# Patient Record
Sex: MALE | Race: WHITE | NOT HISPANIC OR LATINO | Employment: OTHER | URBAN - METROPOLITAN AREA
[De-identification: names, ages, dates, MRNs, and addresses within clinical notes are randomized per-mention and may not be internally consistent; named-entity substitution may affect disease eponyms.]

---

## 2019-04-16 ENCOUNTER — TELEPHONE (OUTPATIENT)
Dept: PAIN MEDICINE | Facility: CLINIC | Age: 59
End: 2019-04-16

## 2019-04-16 NOTE — TELEPHONE ENCOUNTER
Spoke with patient, states he has [place] [employer]. Advised that Dr. Neves is not in-network with Medicaid at this time. Patient voiced understanding.      ----- Message from Claudette Campbell sent at 4/16/2019 10:30 AM CDT -----  Contact: self [phone]  He said that a referral has been sent to you.  I do not see it in his chart.  Please call him to schedule or let him know that it was not received.  Thank you!

## 2020-01-27 ENCOUNTER — OFFICE VISIT (OUTPATIENT)
Dept: PODIATRY | Facility: CLINIC | Age: 60
End: 2020-01-27
Payer: MEDICAID

## 2020-01-27 VITALS
WEIGHT: 315 LBS | BODY MASS INDEX: 36.45 KG/M2 | HEART RATE: 95 BPM | TEMPERATURE: 98 F | DIASTOLIC BLOOD PRESSURE: 87 MMHG | HEIGHT: 78 IN | SYSTOLIC BLOOD PRESSURE: 135 MMHG

## 2020-01-27 DIAGNOSIS — Z89.9 HX OF AMPUTATION: ICD-10-CM

## 2020-01-27 DIAGNOSIS — L97.521 SKIN ULCER OF LEFT FOOT, LIMITED TO BREAKDOWN OF SKIN: ICD-10-CM

## 2020-01-27 PROCEDURE — 99999 PR PBB SHADOW E&M-EST. PATIENT-LVL III: CPT | Mod: PBBFAC,,, | Performed by: PODIATRIST

## 2020-01-27 PROCEDURE — 99205 PR OFFICE/OUTPT VISIT, NEW, LEVL V, 60-74 MIN: ICD-10-PCS | Mod: S$PBB,,, | Performed by: PODIATRIST

## 2020-01-27 PROCEDURE — 99213 OFFICE O/P EST LOW 20 MIN: CPT | Mod: PBBFAC | Performed by: PODIATRIST

## 2020-01-27 PROCEDURE — 99205 OFFICE O/P NEW HI 60 MIN: CPT | Mod: S$PBB,,, | Performed by: PODIATRIST

## 2020-01-27 PROCEDURE — 99999 PR PBB SHADOW E&M-EST. PATIENT-LVL III: ICD-10-PCS | Mod: PBBFAC,,, | Performed by: PODIATRIST

## 2020-01-27 RX ORDER — GABAPENTIN 600 MG/1
TABLET ORAL
COMMUNITY
Start: 2020-01-23 | End: 2020-05-19

## 2020-01-27 RX ORDER — UREA 40 %
CREAM (GRAM) TOPICAL 2 TIMES DAILY
Qty: 85 G | Refills: 6 | Status: SHIPPED | OUTPATIENT
Start: 2020-01-27 | End: 2020-02-26

## 2020-02-01 PROBLEM — Z89.9 HX OF AMPUTATION: Status: ACTIVE | Noted: 2020-02-01

## 2020-02-01 NOTE — PROGRESS NOTES
Subjective:       Patient ID: Alfred Villarreal is a 59 y.o. male.    Chief Complaint: Follow-up; Diabetes Mellitus; Foot Ulcer; and Foot Problem   Patient presents today for an ulceration of the left lower extremity patient was last seen by me over 5 years ago he states he has been living in a nursing home facility for the past 5 years and underwent extensive treatment for bone infection in his spine.  Patient has developed an ulceration on the left foot.    Past Medical History:   Diagnosis Date    Anticoagulant long-term use     Arthritis     Diabetes mellitus     Hypertension      History reviewed. No pertinent surgical history.  History reviewed. No pertinent family history.  Social History     Socioeconomic History    Marital status:      Spouse name: Not on file    Number of children: Not on file    Years of education: Not on file    Highest education level: Not on file   Occupational History    Not on file   Social Needs    Financial resource strain: Not on file    Food insecurity:     Worry: Not on file     Inability: Not on file    Transportation needs:     Medical: Not on file     Non-medical: Not on file   Tobacco Use    Smoking status: Never Smoker    Smokeless tobacco: Current User     Types: Chew   Substance and Sexual Activity    Alcohol use: Yes     Frequency: Monthly or less     Drinks per session: 3 or 4     Binge frequency: Monthly    Drug use: No    Sexual activity: Yes     Partners: Female   Lifestyle    Physical activity:     Days per week: Not on file     Minutes per session: Not on file    Stress: Not on file   Relationships    Social connections:     Talks on phone: Not on file     Gets together: Not on file     Attends Muslim service: Not on file     Active member of club or organization: Not on file     Attends meetings of clubs or organizations: Not on file     Relationship status: Not on file   Other Topics Concern    Not on file   Social History Narrative     "Not on file       Current Outpatient Medications   Medication Sig Dispense Refill    baclofen (LIORESAL) 10 MG tablet Take 10 mg by mouth 3 (three) times daily.      gabapentin (NEURONTIN) 600 MG tablet       lisinopril-hydrochlorothiazide (PRINZIDE,ZESTORETIC) 10-12.5 mg per tablet Take 1 tablet by mouth once daily.      metoprolol succinate (TOPROL-XL) 50 MG 24 hr tablet Take 50 mg by mouth once daily.      oxycodone-acetaminophen (PERCOCET)  mg per tablet   0    rivaroxaban (XARELTO) 20 mg Tab Take 20 mg by mouth once daily.      insulin aspart (NOVOLOG) 100 unit/mL InPn pen Inject 11 Units into the skin 3 (three) times daily. 3 mL 1    urea (CARMOL) 40 % Crea Apply topically 2 (two) times daily. 85 g 6     No current facility-administered medications for this visit.      Review of patient's allergies indicates:   Allergen Reactions    Amitriptyline      Vivid dreams( bad)       Review of Systems   Musculoskeletal: Positive for arthralgias, back pain, gait problem and joint swelling.   Skin: Positive for color change and wound.   Neurological: Positive for numbness.   All other systems reviewed and are negative.      Objective:      Vitals:    01/27/20 1308   BP: 135/87   Pulse: 95   Temp: 98 °F (36.7 °C)   Weight: (!) 145.2 kg (320 lb)   Height: 6' 6" (1.981 m)     Physical Exam   Constitutional: He appears well-developed and well-nourished.   Cardiovascular:   Pulses:       Dorsalis pedis pulses are 1+ on the right side.        Posterior tibial pulses are 0 on the right side.   Pulmonary/Chest: Effort normal.   Musculoskeletal: He exhibits edema.        Right foot: There is deformity.   Feet:   Right Foot:   Protective Sensation: 4 sites tested. 1 site sensed.  Skin Integrity: Positive for ulcer, skin breakdown and erythema.   Left Foot: amputated  Neurological: He displays abnormal reflex.   Skin: Capillary refill takes more than 3 seconds. There is erythema.   Psychiatric: He has a normal mood " and affect. His behavior is normal. Judgment and thought content normal.   Nursing note and vitals reviewed.                       Assessment:       1. Uncontrolled diabetes mellitus with foot ulcer    2. Skin ulcer of left foot, limited to breakdown of skin    3. 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  Patient has developed an ulceration on the left foot. Patient has a large ulceration on the plantar lateral aspect of the patient's left foot this ulceration is approximately 2 cm in diameter following sharp excisional debridement of this area there is healthy underlying pink skin and tissue with no obvious signs of infection noted patient also has breakdown and ulceration overlying the medial aspect of the left foot where there is a superficial ulceration also not displaying active signs of infection.  Skin breakdown is noted on the patient's left shin in the form of an abrasion this does not appear to be infected patient does have diabetic related neuropathy he has got significant peripheral vascular disease with extensive pitting swelling of the left lower extremity.  Wound care was performed on all areas patient advised that these areas need to be cleaned with Dakin solution and a well-padded dry dressing applied to these areas I have also it discussed with the patient the need for keeping his skin well moisturized and hydrated I have recommended a 40% urea cream this is to be applied daily I have advised patient not to put this on the current wound sites but once they have healed this will help keep the area hydrated and prevent further breakdown.  I [facility] so the patient will have appropriate supplies to  change the dressings every day. Total face-to-face time including including complete diabetic new patient evaluation examination wound care of the left lower extremity and discussion of treatment plan equaled 60 min.  Recommended follow-up will be 2-3 weeks unless the area looks worse show signs of worsening or increased infection or increased drainage.  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 Extensive time was spent with the patient today discussing his past medical history medical problems that he has had since I saw him last 5 years ago. This note was created using [employer] voice recognition software that occasionally misinterpreted phrases or words.

## 2020-02-05 ENCOUNTER — TELEPHONE (OUTPATIENT)
Dept: PODIATRY | Facility: CLINIC | Age: 60
End: 2020-02-05

## 2020-02-05 NOTE — TELEPHONE ENCOUNTER
----- Message from Ousmane Sepulveda sent at 2/5/2020  1:44 PM CST -----  Contact: Patient  Type: Needs Medical Advice    Who Called:  Patient  Best Call Back Number: [phone]  Additional Information: Patient would like to discuss new medication. Please call to advise. Thanks!

## 2020-02-06 ENCOUNTER — TELEPHONE (OUTPATIENT)
Dept: PODIATRY | Facility: CLINIC | Age: 60
End: 2020-02-06

## 2020-02-06 NOTE — TELEPHONE ENCOUNTER
----- Message from Harry Mcgowan sent at 2/6/2020 12:39 PM CST -----  Type: Needs Medical Advice    Who Called:  Self Symptoms (please be specific): NA   How long has patient had these symptoms:    Pharmacy name and phone #: A   Best Call Back Number: [phone]   Additional Information: Patient was advised a prescribed rx would arrive via home delivery. The patient has not received the rx.

## 2020-02-06 NOTE — TELEPHONE ENCOUNTER
Informed pt. that the company had his old address in the system somehow, but it has been updated now and his supplies will be sent back out for delivery to him. [employer] telephone number given to pt. In the event he has any further questions of delays.

## 2020-02-06 NOTE — TELEPHONE ENCOUNTER
Notified Lidia that pt. Did not receive medical supplies. Spoke with Alanna and address was incorrect. Address now updated and supplies will be sent back out.

## 2020-02-17 ENCOUNTER — PATIENT OUTREACH (OUTPATIENT)
Dept: ADMINISTRATIVE | Facility: HOSPITAL | Age: 60
End: 2020-02-17

## 2020-02-17 ENCOUNTER — HOSPITAL ENCOUNTER (EMERGENCY)
Facility: HOSPITAL | Age: 60
Discharge: HOME | End: 2020-02-17
Attending: EMERGENCY MEDICINE
Payer: MEDICAID

## 2020-02-17 VITALS
OXYGEN SATURATION: 100 % | DIASTOLIC BLOOD PRESSURE: 90 MMHG | WEIGHT: 315 LBS | BODY MASS INDEX: 36.45 KG/M2 | HEIGHT: 78 IN | SYSTOLIC BLOOD PRESSURE: 151 MMHG | TEMPERATURE: 98 F | RESPIRATION RATE: 16 BRPM | HEART RATE: 97 BPM

## 2020-02-17 DIAGNOSIS — L02.91 ABSCESS: Primary | ICD-10-CM

## 2020-02-17 LAB — POCT GLUCOSE: 362 MG/DL (ref 70–110)

## 2020-02-17 PROCEDURE — 10060 I&D ABSCESS SIMPLE/SINGLE: CPT

## 2020-02-17 PROCEDURE — 82962 GLUCOSE BLOOD TEST: CPT

## 2020-02-17 PROCEDURE — 99283 EMERGENCY DEPT VISIT LOW MDM: CPT | Mod: 25

## 2020-02-17 PROCEDURE — 87070 CULTURE OTHR SPECIMN AEROBIC: CPT

## 2020-02-17 PROCEDURE — 25000003 PHARM REV CODE 250: Performed by: PHYSICIAN ASSISTANT

## 2020-02-17 RX ORDER — TRAMADOL HYDROCHLORIDE 50 MG/1
50 TABLET ORAL
Status: COMPLETED | OUTPATIENT
Start: 2020-02-17 | End: 2020-02-17

## 2020-02-17 RX ORDER — LIDOCAINE HYDROCHLORIDE AND EPINEPHRINE 10; 10 MG/ML; UG/ML
10 INJECTION, SOLUTION INFILTRATION; PERINEURAL
Status: COMPLETED | OUTPATIENT
Start: 2020-02-17 | End: 2020-02-17

## 2020-02-17 RX ORDER — DOXYCYCLINE 100 MG/1
100 CAPSULE ORAL 2 TIMES DAILY
Qty: 20 CAPSULE | Refills: 0 | Status: SHIPPED | OUTPATIENT
Start: 2020-02-17 | End: 2020-02-27

## 2020-02-17 RX ADMIN — LIDOCAINE HYDROCHLORIDE,EPINEPHRINE BITARTRATE 10 ML: 10; .01 INJECTION, SOLUTION INFILTRATION; PERINEURAL at 06:02

## 2020-02-17 RX ADMIN — TRAMADOL HYDROCHLORIDE 50 MG: 50 TABLET, FILM COATED ORAL at 06:02

## 2020-02-17 NOTE — LETTER
"February 17, 2020    Alfred Villarreal  [address] [place] [address]             [facility]  [address]  [facility] [address]  Phone: [phone] Dear Alfred Villarreal    [facility] is committed to your overall health.  To help you get the most out of each of your visits, we will review your information to make sure you are up to date on all of your recommended tests and/or procedures.      As a new patient to [facility] , we may not have your complete medical records.  He has found that your chart shows you may be due for:    Health Maintenance Due   Topic Date Due    Hepatitis C Screening  1960    Lipid Panel  1960    Eye Exam  11/16/1971    HIV Screening  11/16/1976    TETANUS VACCINE  11/16/1979    Shingles Vaccine (1 of 2) 11/16/2011    Colonoscopy  11/16/2011    Hemoglobin A1c  07/16/2015    Influenza Vaccine (1) 09/01/2019   If you haven't signed up for a colorectal screening please accept this invitation to be screened.    According to the American Cancer Society, colon cancer is the third most common cancer for people in the [place].  A simple screening test "Fit Kit" - done in your own home - can help find colon cancer at an early stage when it can be treated, even before any signs or symptoms develop. THIS IS A YEARLY TEST.    Testing for blood in your stool (feces or bowel movement) is the first step. If you have an upcoming visit with your Primary Care Physician you can  a Fit Kit during your visit or you can  a Fit Kit at your Primary Care Clinic prior to your visit.    The Fit Kit includes:    · Instructions on how to perform the test  · (1) Sheet of tissue paper  · (1) Small Absorption pad  · (1) Bottle to hold the sample and a small probe to help you take the sample  · (1) Small plastic bio-hazard bag  · (1) Postage-paid return envelope    Please do your test (the instructions will tell you how) and then return your " "sample in the postage-paid return envelope within 24 hours of collection.     If your test results are negative, you won't need testing again for another year.  If results show you need more testing, we will call you with next steps.    Regular colorectal cancer screening is one of the most powerful weapons for preventing colon cancer.  The website https://www.cancer.org/cancer/colon-rectal-cancer.html can answer many of your questions about this cancer and its prevention.  Just search for "colorectal cancer".  If you have any questions please call [facility] [employer] [phone] for assistance.  If you have had any of the above done at another facility, please bring the records or information with you so that your record at [facility] will be complete.      If you are currently taking medication, please bring it with you to your appointment for review.    Also, if you have any type of Advanced Directives, please bring them with you to your office visit so we may scan them into your chart.      Thank You,  Your [facility] Team  Sarah Carbajal L.P.N. Clinical Care Coordinator  [address] [place] [address], [place] [address] [phone]           "

## 2020-02-18 ENCOUNTER — TELEPHONE (OUTPATIENT)
Dept: PODIATRY | Facility: CLINIC | Age: 60
End: 2020-02-18

## 2020-02-18 NOTE — TELEPHONE ENCOUNTER
----- Message from Princess ARA Yates sent at 2/18/2020 11:36 AM CST -----  Contact: Patient  Type: Needs Medical Advice    Who Called:  Patient  Symptoms (please be specific):  Foot is ozing out  How long has patient had these symptoms:  2 days  Best Call Back Number:   Additional Information: Patient is requesting a call in [facility] to his foot, patient states this is something new.

## 2020-02-18 NOTE — ED PROVIDER NOTES
Encounter Date: 2/17/2020       History     Chief Complaint   Patient presents with    Abscess     Patient presents to the ER with complaint of abscess to right upper arm.  Patient states has been there for the last couple of days.  Patient states is getting bigger and more painful.  Patient states has had similar symptoms in the past denies any nausea vomiting diarrhea denies any fever.  Patient denies any numbness or tingling distal the abscess.  Patient denies any drainage. Patient states has had similar symptoms in the past describes the pain as pressure nonradiating worse with palpation nothing makes it better.  Patient denied other associated symptoms.    The history is provided by the patient.     Review of patient's allergies indicates:   Allergen Reactions    Amitriptyline      Vivid dreams( bad)     Past Medical History:   Diagnosis Date    Anticoagulant long-term use     Arthritis     Diabetes mellitus     Hypertension      History reviewed. No pertinent surgical history.  History reviewed. No pertinent family history.  Social History     Tobacco Use    Smoking status: Never Smoker    Smokeless tobacco: Current User     Types: Chew   Substance Use Topics    Alcohol use: Yes     Frequency: Monthly or less     Drinks per session: 3 or 4     Binge frequency: Monthly    Drug use: No     Review of Systems   Constitutional: Negative for fever.   HENT: Negative for sore throat.    Respiratory: Negative for shortness of breath.    Cardiovascular: Negative for chest pain.   Gastrointestinal: Negative for nausea.   Genitourinary: Negative for dysuria.   Musculoskeletal: Negative for back pain.   Skin: Positive for color change (Redness surrounding abscess to right upper extremity). Negative for rash.        Abscess to right upper extremity   Neurological: Negative for weakness.   Hematological: Does not bruise/bleed easily.   All other systems reviewed and are negative.      Physical Exam     Initial  Vitals [02/17/20 1720]   BP Pulse Resp Temp SpO2   (!) 151/90 97 16 97.7 °F (36.5 °C) 100 %      MAP       --         Physical Exam    Nursing note and vitals reviewed.  Constitutional: He appears well-developed and well-nourished. He is not diaphoretic. No distress.   HENT:   Head: Atraumatic.   Eyes: Right eye exhibits no discharge. Left eye exhibits no discharge.   Neck: Normal range of motion. Neck supple.   Cardiovascular: Normal rate, regular rhythm, normal heart sounds and intact distal pulses. Exam reveals no gallop and no friction rub.    No murmur heard.  Pulmonary/Chest: Breath sounds normal. No respiratory distress. He has no wheezes. He has no rhonchi. He has no rales. He exhibits no tenderness.   Abdominal: Soft. He exhibits no distension. There is no tenderness.   Musculoskeletal: Normal range of motion.   Neurological: He is alert and oriented to person, place, and time. GCS score is 15. GCS eye subscore is 4. GCS verbal subscore is 5. GCS motor subscore is 6.   Skin: Skin is warm and dry. Capillary refill takes less than 2 seconds. Abscess (Right posterior biceps) noted. There is erythema ( right posterior bicep).   Psychiatric: He has a normal mood and affect. Thought content normal.         ED Course   I & D - Incision and Drainage  Date/Time: 2/17/2020 6:36 PM  Performed by: ANDRESSA You  Authorized by: Alfred Cabral MD   Consent Done: Yes  Consent: Verbal consent obtained.  Risks and benefits: risks, benefits and alternatives were discussed  Consent given by: patient  Patient understanding: patient states understanding of the procedure being performed  Type: abscess  Body area: upper extremity  Location details: right arm  Anesthesia: local infiltration    Anesthesia:  Local anesthesia used: yes  Local Anesthetic: lidocaine 1% with epinephrine  Anesthetic total: 6 mL  Patient sedated: no  Scalpel size: 11  Incision type: single straight  Complexity: simple  Drainage: pus,  bloody  and  purulent  Drainage amount: moderate  Wound treatment: incision,  wound left open,  drainage,  deloculation and  expression of material  Complications: No  Estimated blood loss (mL): 2  Specimens: No  Implants: No  Patient tolerance: Patient tolerated the procedure well with no immediate complications        Labs Reviewed   POCT GLUCOSE - Abnormal; Notable for the following components:       Result Value    POCT Glucose 362 (*)     All other components within normal limits   CULTURE, AEROBIC  (SPECIFY SOURCE)          Imaging Results    None          Medical Decision Making:   Differential Diagnosis:   Abscess cellulitis sebaceous cyst  Clinical Tests:   Lab Tests: Ordered  ED Management:  Discussed with the patient plan of care discussed risks benefit of abscess I&D patient states understood and verbally consented to procedure.    Discussed with the patient return to ER precautions as well as need for antibiotics discussed warm wet soaks twice daily 15 min at a time until resolution of the abscess.  Discussed with the patient need for follow-up with primary care for re-evaluation in 48 hr patient verbalized that he understood he did not have any questions.    This note was created using [employer] voice recognition software that occasionally misinterpreted phrases or words.                                 Clinical Impression:       ICD-10-CM ICD-9-CM   1. Abscess L02.91 682.9                             ADNRESSA You  02/17/20 2221

## 2020-02-18 NOTE — TELEPHONE ENCOUNTER
Pt states area to medial left foot is draining and is red in color for about 2 days. Pt need appt? Please advise

## 2020-02-18 NOTE — DISCHARGE INSTRUCTIONS
Be sureTo take all antibiotics as prescribed do not leave any left in the bottle.    Follow-up with pain management primary care for continued management of pain.    If acute worsening of symptoms redeveloped a fever return to ER for re-evaluation.

## 2020-02-20 LAB — BACTERIA SPEC AEROBE CULT: NORMAL

## 2020-03-06 LAB
LEFT EYE DM RETINOPATHY: NEGATIVE
RIGHT EYE DM RETINOPATHY: NEGATIVE

## 2020-04-29 ENCOUNTER — TELEPHONE (OUTPATIENT)
Dept: PODIATRY | Facility: CLINIC | Age: 60
End: 2020-04-29

## 2020-04-29 PROBLEM — E11.49 TYPE II DIABETES MELLITUS WITH NEUROLOGICAL MANIFESTATIONS: Status: ACTIVE | Noted: 2020-04-29

## 2020-04-29 NOTE — TELEPHONE ENCOUNTER
----- Message from Ailyn Obrien sent at 4/29/2020  1:28 PM CDT -----  Type: Needs Medical Advice    Who Called:  Patient  Best Call Back Number: [phone]  Additional Information: Patient requesting to speak with nurse concerning ordering woundcare supplies/please call back to advise.

## 2020-05-06 ENCOUNTER — OFFICE VISIT (OUTPATIENT)
Dept: PODIATRY | Facility: CLINIC | Age: 60
End: 2020-05-06
Payer: MEDICAID

## 2020-05-06 ENCOUNTER — HOSPITAL ENCOUNTER (OUTPATIENT)
Dept: RADIOLOGY | Facility: HOSPITAL | Age: 60
Discharge: HOME | End: 2020-05-06
Attending: PODIATRIST
Payer: MEDICAID

## 2020-05-06 VITALS
HEIGHT: 78 IN | TEMPERATURE: 98 F | DIASTOLIC BLOOD PRESSURE: 87 MMHG | WEIGHT: 315 LBS | SYSTOLIC BLOOD PRESSURE: 188 MMHG | HEART RATE: 84 BPM | BODY MASS INDEX: 36.45 KG/M2

## 2020-05-06 DIAGNOSIS — E11.49 TYPE II DIABETES MELLITUS WITH NEUROLOGICAL MANIFESTATIONS: ICD-10-CM

## 2020-05-06 DIAGNOSIS — Z89.9 HX OF AMPUTATION: ICD-10-CM

## 2020-05-06 DIAGNOSIS — L97.522 SKIN ULCER OF LEFT FOOT WITH FAT LAYER EXPOSED: ICD-10-CM

## 2020-05-06 DIAGNOSIS — L97.521 SKIN ULCER OF LEFT FOOT, LIMITED TO BREAKDOWN OF SKIN: Primary | ICD-10-CM

## 2020-05-06 DIAGNOSIS — L97.521 SKIN ULCER OF LEFT FOOT, LIMITED TO BREAKDOWN OF SKIN: ICD-10-CM

## 2020-05-06 PROCEDURE — 99213 OFFICE O/P EST LOW 20 MIN: CPT | Mod: PBBFAC,25 | Performed by: PODIATRIST

## 2020-05-06 PROCEDURE — 99999 PR PBB SHADOW E&M-EST. PATIENT-LVL III: ICD-10-PCS | Mod: PBBFAC,,, | Performed by: PODIATRIST

## 2020-05-06 PROCEDURE — 73630 X-RAY EXAM OF FOOT: CPT | Mod: TC,FY,LT

## 2020-05-06 PROCEDURE — 87070 CULTURE OTHR SPECIMN AEROBIC: CPT

## 2020-05-06 PROCEDURE — 73630 X-RAY EXAM OF FOOT: CPT | Mod: 26,LT,, | Performed by: RADIOLOGY

## 2020-05-06 PROCEDURE — 99214 OFFICE O/P EST MOD 30 MIN: CPT | Mod: S$PBB,,, | Performed by: PODIATRIST

## 2020-05-06 PROCEDURE — 99214 PR OFFICE/OUTPT VISIT, EST, LEVL IV, 30-39 MIN: ICD-10-PCS | Mod: S$PBB,,, | Performed by: PODIATRIST

## 2020-05-06 PROCEDURE — 99999 PR PBB SHADOW E&M-EST. PATIENT-LVL III: CPT | Mod: PBBFAC,,, | Performed by: PODIATRIST

## 2020-05-06 PROCEDURE — 87077 CULTURE AEROBIC IDENTIFY: CPT

## 2020-05-06 PROCEDURE — 87186 SC STD MICRODIL/AGAR DIL: CPT

## 2020-05-06 PROCEDURE — 73630 XR FOOT COMPLETE 3 VIEW LEFT: ICD-10-PCS | Mod: 26,LT,, | Performed by: RADIOLOGY

## 2020-05-06 RX ORDER — PIOGLITAZONEHYDROCHLORIDE 15 MG/1
15 TABLET ORAL
COMMUNITY
End: 2020-05-19

## 2020-05-06 RX ORDER — INSULIN DETEMIR 100 [IU]/ML
70 INJECTION, SOLUTION SUBCUTANEOUS 2 TIMES DAILY
COMMUNITY
Start: 2020-04-18 | End: 2021-08-31

## 2020-05-06 RX ORDER — IBUPROFEN 800 MG/1
TABLET ORAL
COMMUNITY
Start: 2020-04-15 | End: 2021-07-28

## 2020-05-06 RX ORDER — INSULIN ASPART 100 [IU]/ML
50 INJECTION, SOLUTION INTRAVENOUS; SUBCUTANEOUS
COMMUNITY
Start: 2020-04-18 | End: 2021-10-05 | Stop reason: SDUPTHER

## 2020-05-06 RX ORDER — ERGOCALCIFEROL 1.25 MG/1
CAPSULE ORAL
COMMUNITY
Start: 2020-02-11 | End: 2021-08-17

## 2020-05-06 RX ORDER — AMITRIPTYLINE HYDROCHLORIDE 10 MG/1
TABLET, FILM COATED ORAL
COMMUNITY
Start: 2020-04-15 | End: 2020-05-19

## 2020-05-06 RX ORDER — TRAZODONE HYDROCHLORIDE 100 MG/1
100 TABLET ORAL
COMMUNITY
End: 2021-10-27

## 2020-05-09 LAB — BACTERIA SPEC AEROBE CULT: ABNORMAL

## 2020-05-09 RX ORDER — SULFAMETHOXAZOLE AND TRIMETHOPRIM 400; 80 MG/1; MG/1
2 TABLET ORAL 2 TIMES DAILY
Qty: 56 TABLET | Refills: 0 | Status: SHIPPED | OUTPATIENT
Start: 2020-05-09 | End: 2020-05-19

## 2020-05-10 NOTE — PROGRESS NOTES
Subjective:       Patient ID: Alfred Villarreal is a 59 y.o. male.    Chief Complaint: Follow-up; Foot Ulcer; Foot Pain; Diabetes Mellitus; and Foot Problem   Patient presents today for an ulceration of the left lower extremity.  Patient was supposed to have followed up with me on his January 27, 2020 visit in 2-3 weeks however it has been 3 and half months since I last saw the patient for this ulceration site.    Past Medical History:   Diagnosis Date    Anticoagulant long-term use     Arthritis     Diabetes mellitus     Hypertension      History reviewed. No pertinent surgical history.  History reviewed. No pertinent family history.  Social History     Socioeconomic History    Marital status:      Spouse name: Not on file    Number of children: Not on file    Years of education: Not on file    Highest education level: Not on file   Occupational History    Not on file   Social Needs    Financial resource strain: Not on file    Food insecurity:     Worry: Not on file     Inability: Not on file    Transportation needs:     Medical: Not on file     Non-medical: Not on file   Tobacco Use    Smoking status: Never Smoker    Smokeless tobacco: Current User     Types: Chew   Substance and Sexual Activity    Alcohol use: Yes     Frequency: Monthly or less     Drinks per session: 3 or 4     Binge frequency: Monthly    Drug use: No    Sexual activity: Yes     Partners: Female   Lifestyle    Physical activity:     Days per week: Not on file     Minutes per session: Not on file    Stress: Not on file   Relationships    Social connections:     Talks on phone: Not on file     Gets together: Not on file     Attends Rastafari service: Not on file     Active member of club or organization: Not on file     Attends meetings of clubs or organizations: Not on file     Relationship status: Not on file   Other Topics Concern    Not on file   Social History Narrative    Not on file       Current Outpatient  "Medications   Medication Sig Dispense Refill    amitriptyline (ELAVIL) 10 MG tablet       baclofen (LIORESAL) 10 MG tablet Take 10 mg by mouth 3 (three) times daily.      ergocalciferol (ERGOCALCIFEROL) 50,000 unit Cap       gabapentin (NEURONTIN) 600 MG tablet       ibuprofen (ADVIL,MOTRIN) 800 MG tablet       LEVEMIR U-100 INSULIN 100 unit/mL injection ADM 80 UNI SC HS      lisinopril-hydrochlorothiazide (PRINZIDE,ZESTORETIC) 10-12.5 mg per tablet Take 1 tablet by mouth once daily.      metoprolol succinate (TOPROL-XL) 50 MG 24 hr tablet Take 50 mg by mouth once daily.      NOVOLOG U-100 INSULIN ASPART 100 unit/mL injection ADM 40 UNI SC TID B MEALS      oxycodone-acetaminophen (PERCOCET)  mg per tablet   0    pioglitazone (ACTOS) 15 MG tablet Take 15 mg by mouth.      rivaroxaban (XARELTO) 20 mg Tab Take 20 mg by mouth once daily.      sulfamethoxazole-trimethoprim 400-80mg (BACTRIM,SEPTRA) 400-80 mg per tablet Take 2 tablets by mouth 2 (two) times daily. for 14 days 56 tablet 0    traZODone (DESYREL) 100 MG tablet Take 100 mg by mouth.       No current facility-administered medications for this visit.      Review of patient's allergies indicates:   Allergen Reactions    Amitriptyline      Vivid dreams( bad)       Review of Systems   Musculoskeletal: Positive for arthralgias, back pain, gait problem and joint swelling.   Skin: Positive for color change and wound.   Neurological: Positive for numbness.   All other systems reviewed and are negative.      Objective:      Vitals:    05/06/20 1508   BP: (!) 188/87   Pulse: 84   Temp: 97.6 °F (36.4 °C)   Weight: (!) 145.2 kg (320 lb)   Height: 6' 6" (1.981 m)     Physical Exam   Constitutional: He appears well-developed and well-nourished.   Cardiovascular:   Pulses:       Dorsalis pedis pulses are 1+ on the right side.        Posterior tibial pulses are 0 on the right side.   Pulmonary/Chest: Effort normal.   Musculoskeletal: He exhibits edema.    "     Right foot: There is deformity.   Feet:   Right Foot:   Protective Sensation: 4 sites tested. 1 site sensed.  Skin Integrity: Positive for ulcer, skin breakdown and erythema.   Left Foot: amputated  Neurological: He displays abnormal reflex.   Skin: Capillary refill takes more than 3 seconds. There is erythema.   Psychiatric: He has a normal mood and affect. His behavior is normal. Judgment and thought content normal.   Nursing note and vitals reviewed.                                       Assessment:       1. Skin ulcer of left foot, limited to breakdown of skin    2. Type II diabetes mellitus with neurological manifestations    3. Hx of amputation    4. Skin ulcer of left foot with fat layer exposed        Plan:       Patient presents today for an ulceration of the left lower extremity.  Patient was supposed to have followed up with me on his January 27, 2020 visit in 2-3 weeks however it has been 3 and half months since I last saw the patient for this ulceration site.  Patient had an ulcer on the plantar lateral aspect of the left foot that completely healed however the ulcer 3 and half months ago that was small overlying the 1st MPJ left is now larger it is deeper while there is no bone exposed I cannot completely exclude the possibility of bone infection I did perform a culture and sensitivity of the area this was subsequently positive for MRSA Staph patient was contacted advised this and has been started on Bactrim immediately.  Patient has severe +4 pitting edema in the left lower extremity IA had asked him when the last time he saw his primary care provider he indicated he did not have 1 but wanted to see Dr. Cotton.  We did call Dr. Cotton office to evaluate the patient he will likely need medication adjustment or addition to help address the severe edema in the left lower extremity ultimately this is the largest contributing factor besides the patient's noncompliance to getting this area healed.   Evaluation of the x-ray displays signs that the ulcer does not go down to the level of bone there is no cortical interruption or periosteal reaction patient does have previous partial 4th and 5th ray resection left.  The area was thoroughly cleaned and sharply excisionally debrided on the edges it is 2 cm wide by 1 cm long by 4 mm deep.  Patient also indicated that he had recently smashed his left leg where he lives he states he is having problems with the battery is that of worn out and his power wheelchair they work sometimes and not others and a actually kicked in gear slamming the patient's leg I did give him an order for new wheelchair battery he has.  Patient did indicate that his leg is not nearly a swollen when he 1st gets up in the morning but immediately after he puts his leg down it becomes very edematous +4 pitting.  Face-to-face time equaled 30 min plan follow-up will be 1 month unless the patient has any problems questions or concerns prior to that time.  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  This note was created using [employer] voice recognition software that occasionally misinterpreted phrases or words.

## 2020-05-11 ENCOUNTER — TELEPHONE (OUTPATIENT)
Dept: PODIATRY | Facility: CLINIC | Age: 60
End: 2020-05-11

## 2020-05-11 ENCOUNTER — TELEPHONE (OUTPATIENT)
Dept: FAMILY MEDICINE | Facility: CLINIC | Age: 60
End: 2020-05-11

## 2020-05-11 NOTE — TELEPHONE ENCOUNTER
----- Message from Ailyn Obrien sent at 5/11/2020  2:34 PM CDT -----  Type: Needs soon appointment    Who Called:  Patient  Best Call Back Number: [phone]  Additional Information: Patient missed previous appointment in error/needs to reschedule/no appointment showing available until July/diabetic patient is being referred by Dr. Stringer for left foot being Septic/needs to be seen as soon as possible/please call back to schedule or advise.

## 2020-05-11 NOTE — TELEPHONE ENCOUNTER
----- Message from Gary Stringer DPM sent at 5/9/2020  1:09 PM CDT -----  Please call the patient regarding his abnormal result.  Call patient and advise culture and sensitivity positive for MRSA Staph prescription was sent in for Bactrim he is to start this right away.

## 2020-05-14 ENCOUNTER — TELEPHONE (OUTPATIENT)
Dept: PODIATRY | Facility: CLINIC | Age: 60
End: 2020-05-14

## 2020-05-14 RX ORDER — DOXYCYCLINE 100 MG/1
100 CAPSULE ORAL EVERY 12 HOURS
Qty: 28 CAPSULE | Refills: 0 | Status: SHIPPED | OUTPATIENT
Start: 2020-05-14 | End: 2020-05-28

## 2020-05-14 NOTE — TELEPHONE ENCOUNTER
----- Message from Brent Boyce sent at 5/14/2020  1:48 PM CDT -----  Type: Needs Medical Advice  Who Called:   Patient  Symptoms (please be specific):  Bad reaction to sulfamethoxazole-trimethoprim 400-80mg (BACTRIM,SEPTRA) 400-80 mg per tablet   How long has patient had these symptoms:  2 days    Pharmacy name and phone #:    [id number] - [place] [address] AT [facility] [address] [address]  Phone: [phone] Fax: [phone]      Best Call Back Number: [phone]  Additional Information: Please call to advise

## 2020-05-14 NOTE — TELEPHONE ENCOUNTER
Patient states he can not tolerate the antibiotic that was prescribed. He has tried eating a full meal before taking. He is experiencing nausea and general not feeling well. Please advise.

## 2020-05-15 NOTE — TELEPHONE ENCOUNTER
----- Message from Leidy Carcamo sent at 5/15/2020 10:02 AM CDT -----  Contact: self  Type:  Patient Returning Call    Who Called:  self  Who Left Message for Patient:  Lupe  Does the patient know what this is regarding?:  yes  Best Call Back Number:  [phone] (home)   Additional Information:  Patient states it is regarding his antibiotic changes. Please call patient. Thanks!

## 2020-05-19 ENCOUNTER — OFFICE VISIT (OUTPATIENT)
Dept: FAMILY MEDICINE | Facility: CLINIC | Age: 60
End: 2020-05-19
Payer: MEDICAID

## 2020-05-19 VITALS
BODY MASS INDEX: 32.17 KG/M2 | HEIGHT: 78 IN | SYSTOLIC BLOOD PRESSURE: 133 MMHG | HEART RATE: 81 BPM | TEMPERATURE: 98 F | OXYGEN SATURATION: 98 % | DIASTOLIC BLOOD PRESSURE: 77 MMHG | RESPIRATION RATE: 16 BRPM | WEIGHT: 278 LBS

## 2020-05-19 DIAGNOSIS — E11.621 TYPE 2 DIABETES MELLITUS WITH FOOT ULCER, WITH LONG-TERM CURRENT USE OF INSULIN: ICD-10-CM

## 2020-05-19 DIAGNOSIS — L97.509 TYPE 2 DIABETES MELLITUS WITH FOOT ULCER, WITH LONG-TERM CURRENT USE OF INSULIN: ICD-10-CM

## 2020-05-19 DIAGNOSIS — Z79.4 TYPE 2 DIABETES MELLITUS WITH FOOT ULCER, WITH LONG-TERM CURRENT USE OF INSULIN: ICD-10-CM

## 2020-05-19 DIAGNOSIS — Z76.89 ENCOUNTER TO ESTABLISH CARE: Primary | ICD-10-CM

## 2020-05-19 DIAGNOSIS — Z11.4 SCREENING FOR HIV (HUMAN IMMUNODEFICIENCY VIRUS): ICD-10-CM

## 2020-05-19 DIAGNOSIS — Z11.59 ENCOUNTER FOR HEPATITIS C SCREENING TEST FOR LOW RISK PATIENT: ICD-10-CM

## 2020-05-19 DIAGNOSIS — Z13.220 SCREENING FOR LIPID DISORDERS: ICD-10-CM

## 2020-05-19 PROCEDURE — 99203 OFFICE O/P NEW LOW 30 MIN: CPT | Mod: S$GLB,,, | Performed by: FAMILY MEDICINE

## 2020-05-19 PROCEDURE — 99203 PR OFFICE/OUTPT VISIT, NEW, LEVL III, 30-44 MIN: ICD-10-PCS | Mod: S$GLB,,, | Performed by: FAMILY MEDICINE

## 2020-05-23 ENCOUNTER — LAB VISIT (OUTPATIENT)
Dept: LAB | Facility: HOSPITAL | Age: 60
End: 2020-05-23
Attending: FAMILY MEDICINE
Payer: MEDICAID

## 2020-05-23 DIAGNOSIS — Z11.4 SCREENING FOR HIV (HUMAN IMMUNODEFICIENCY VIRUS): ICD-10-CM

## 2020-05-23 DIAGNOSIS — Z79.4 TYPE 2 DIABETES MELLITUS WITH FOOT ULCER, WITH LONG-TERM CURRENT USE OF INSULIN: ICD-10-CM

## 2020-05-23 DIAGNOSIS — E11.621 TYPE 2 DIABETES MELLITUS WITH FOOT ULCER, WITH LONG-TERM CURRENT USE OF INSULIN: ICD-10-CM

## 2020-05-23 DIAGNOSIS — Z11.59 ENCOUNTER FOR HEPATITIS C SCREENING TEST FOR LOW RISK PATIENT: ICD-10-CM

## 2020-05-23 DIAGNOSIS — L97.509 TYPE 2 DIABETES MELLITUS WITH FOOT ULCER, WITH LONG-TERM CURRENT USE OF INSULIN: ICD-10-CM

## 2020-05-23 DIAGNOSIS — Z13.220 SCREENING FOR LIPID DISORDERS: ICD-10-CM

## 2020-05-23 LAB
ALBUMIN SERPL BCP-MCNC: 3.9 G/DL (ref 3.5–5.2)
ALP SERPL-CCNC: 78 U/L (ref 55–135)
ALT SERPL W/O P-5'-P-CCNC: 17 U/L (ref 10–44)
ANION GAP SERPL CALC-SCNC: 12 MMOL/L (ref 8–16)
AST SERPL-CCNC: 17 U/L (ref 10–40)
BASOPHILS # BLD AUTO: 0.05 K/UL (ref 0–0.2)
BASOPHILS NFR BLD: 0.6 % (ref 0–1.9)
BILIRUB SERPL-MCNC: 0.6 MG/DL (ref 0.1–1)
BUN SERPL-MCNC: 14 MG/DL (ref 6–20)
CALCIUM SERPL-MCNC: 8.6 MG/DL (ref 8.7–10.5)
CHLORIDE SERPL-SCNC: 98 MMOL/L (ref 95–110)
CHOLEST SERPL-MCNC: 185 MG/DL (ref 120–199)
CHOLEST/HDLC SERPL: 6.2 {RATIO} (ref 2–5)
CO2 SERPL-SCNC: 23 MMOL/L (ref 23–29)
CREAT SERPL-MCNC: 0.7 MG/DL (ref 0.5–1.4)
DIFFERENTIAL METHOD: NORMAL
EOSINOPHIL # BLD AUTO: 0.2 K/UL (ref 0–0.5)
EOSINOPHIL NFR BLD: 2.4 % (ref 0–8)
ERYTHROCYTE [DISTWIDTH] IN BLOOD BY AUTOMATED COUNT: 12.4 % (ref 11.5–14.5)
EST. GFR  (AFRICAN AMERICAN): >60 ML/MIN/1.73 M^2
EST. GFR  (NON AFRICAN AMERICAN): >60 ML/MIN/1.73 M^2
GLUCOSE SERPL-MCNC: 176 MG/DL (ref 70–110)
HCT VFR BLD AUTO: 42.3 % (ref 40–54)
HDLC SERPL-MCNC: 30 MG/DL (ref 40–75)
HDLC SERPL: 16.2 % (ref 20–50)
HGB BLD-MCNC: 14 G/DL (ref 14–18)
IMM GRANULOCYTES # BLD AUTO: 0.02 K/UL (ref 0–0.04)
IMM GRANULOCYTES NFR BLD AUTO: 0.3 % (ref 0–0.5)
LDLC SERPL CALC-MCNC: 90 MG/DL (ref 63–159)
LYMPHOCYTES # BLD AUTO: 2.3 K/UL (ref 1–4.8)
LYMPHOCYTES NFR BLD: 28.5 % (ref 18–48)
MCH RBC QN AUTO: 29.5 PG (ref 27–31)
MCHC RBC AUTO-ENTMCNC: 33.1 G/DL (ref 32–36)
MCV RBC AUTO: 89 FL (ref 82–98)
MONOCYTES # BLD AUTO: 0.6 K/UL (ref 0.3–1)
MONOCYTES NFR BLD: 7 % (ref 4–15)
NEUTROPHILS # BLD AUTO: 4.9 K/UL (ref 1.8–7.7)
NEUTROPHILS NFR BLD: 61.2 % (ref 38–73)
NONHDLC SERPL-MCNC: 155 MG/DL
NRBC BLD-RTO: 0 /100 WBC
PLATELET # BLD AUTO: 189 K/UL (ref 150–350)
PMV BLD AUTO: 10.7 FL (ref 9.2–12.9)
POTASSIUM SERPL-SCNC: 3.2 MMOL/L (ref 3.5–5.1)
PROT SERPL-MCNC: 8 G/DL (ref 6–8.4)
RBC # BLD AUTO: 4.75 M/UL (ref 4.6–6.2)
SODIUM SERPL-SCNC: 133 MMOL/L (ref 136–145)
TRIGL SERPL-MCNC: 325 MG/DL (ref 30–150)
WBC # BLD AUTO: 7.99 K/UL (ref 3.9–12.7)

## 2020-05-23 PROCEDURE — 86703 HIV-1/HIV-2 1 RESULT ANTBDY: CPT

## 2020-05-23 PROCEDURE — 80053 COMPREHEN METABOLIC PANEL: CPT

## 2020-05-23 PROCEDURE — 80061 LIPID PANEL: CPT

## 2020-05-23 PROCEDURE — 83036 HEMOGLOBIN GLYCOSYLATED A1C: CPT

## 2020-05-23 PROCEDURE — 36415 COLL VENOUS BLD VENIPUNCTURE: CPT

## 2020-05-23 PROCEDURE — 86803 HEPATITIS C AB TEST: CPT

## 2020-05-23 PROCEDURE — 85025 COMPLETE CBC W/AUTO DIFF WBC: CPT

## 2020-05-24 NOTE — PROGRESS NOTES
[facility] - Clinic Note    Subjective      Mr. Villarreal is a 59 y.o. male who presents to clinic for Wound Care (left foot wound infection )    Patient presents to [facility].  He has a left foot ulcer which is healing.  He has had this since July 2019.  He finished antibiotics.  He is followed by Dr. Stringer.  He has a history of diabetes for which he takes 80 units of Levemir and 50 units of NovoLog 3 times a day with meals.  He also has a history of hypertension.  He reports that he has not been taking his lisinopril-hydrochlorothiazide or metoprolol.  He has a history of atrial fibrillation.  He denies any palpitations.  Denies any increased heart rate.  He is taking Xarelto but 1 to 2 times a week because it was causing nose bleeds.  He also takes trazodone as needed for insomnia.  He needs a new prescription for electric wheelchair which she uses because of a right below-the-knee amputation that Dr. Stringer did in 2006.  His electric wheelchair is 8 to 9 years old and needs a new battery.    PMH Alfred has a past medical history of Anticoagulant long-term use, Arthritis, Diabetes mellitus, and Hypertension.   PSXH Alfred has no past surgical history on file.    Alfred's family history is not on file.   SH Alfred reports that he has never smoked. His smokeless tobacco use includes chew. He reports that he drinks alcohol. He reports that he does not use drugs.   ALG Alfred is allergic to amitriptyline.   MED Alfred has a current medication list which includes the following prescription(s): baclofen, doxycycline, ergocalciferol, ibuprofen, lisinopril-hydrochlorothiazide, novolog u-100 insulin aspart, levemir u-100 insulin, metoprolol succinate, rivaroxaban, and trazodone.     Review of Systems   Constitutional: Negative for chills and fever.   HENT: Negative for congestion and rhinorrhea.    Eyes: Negative for visual disturbance.   Respiratory: Negative for cough and shortness of breath.    Cardiovascular: Negative  "for chest pain.   Gastrointestinal: Negative for abdominal pain, constipation, diarrhea, nausea and vomiting.   Genitourinary: Negative for dysuria.   Musculoskeletal: Negative for myalgias.   Skin: Negative for rash.   Neurological: Negative for weakness and headaches.     Objective     /77 (BP Location: Left arm, Patient Position: Sitting, BP Method: Large (Automatic))   Pulse 81   Temp 98.2 °F (36.8 °C) (Oral)   Resp 16   Ht 6' 6" (1.981 m)   Wt 126.1 kg (278 lb)   SpO2 98%   BMI 32.13 kg/m²     Physical Exam   Constitutional: He is oriented to person, place, and time. He appears well-developed and well-nourished. No distress.   HENT:   Head: Normocephalic and atraumatic.   Right Ear: External ear normal.   Left Ear: External ear normal.   Eyes: Right eye exhibits no discharge. Left eye exhibits no discharge.   Cardiovascular: Normal rate, regular rhythm and normal heart sounds.   Pulmonary/Chest: Effort normal and breath sounds normal. He has no wheezes. He has no rales.   Musculoskeletal:   Right below-the-knee amputation.  Left foot wrapped.   Neurological: He is alert and oriented to person, place, and time.   Skin: Skin is warm and dry. He is not diaphoretic.   Psychiatric: He has a normal mood and affect.   Nursing note and vitals reviewed.     Assessment/Plan     1. Encounter to establish care     2. Type 2 diabetes mellitus with foot ulcer, with long-term current use of insulin  Comprehensive metabolic panel    Hemoglobin A1C    CBC auto differential   3. Screening for lipid disorders  Lipid Panel   4. Screening for HIV (human immunodeficiency virus)  HIV 1 / 2 ANTIBODY   5. Encounter for hepatitis C screening test for low risk patient  Hepatitis C Antibody     Given history of diabetes will obtain lab work to assess.  Blood pressure controlled at this time.  Will check lab work.  Continue care of foot.  Will send prescription for electric wheelchair.    Follow up in about 1 month (around " 6/19/2020) for follow up.    Future Appointments   Date Time Provider Department Center   6/8/2020  3:00 PM Gary Stringer DPM [facility]   6/19/2020  2:20 PM Vahid Craig MD [facility] LELO Craig MD  Family Medicine  [facility] [place]

## 2020-05-25 LAB
ESTIMATED AVG GLUCOSE: 249 MG/DL (ref 68–131)
HBA1C MFR BLD HPLC: 10.3 % (ref 4.5–6.2)
HCV AB SERPL QL IA: NEGATIVE
HIV 1+2 AB+HIV1 P24 AG SERPL QL IA: NEGATIVE

## 2020-05-26 DIAGNOSIS — L97.509 TYPE 2 DIABETES MELLITUS WITH FOOT ULCER, WITH LONG-TERM CURRENT USE OF INSULIN: Primary | ICD-10-CM

## 2020-05-26 DIAGNOSIS — E11.621 TYPE 2 DIABETES MELLITUS WITH FOOT ULCER, WITH LONG-TERM CURRENT USE OF INSULIN: Primary | ICD-10-CM

## 2020-05-26 DIAGNOSIS — Z79.4 TYPE 2 DIABETES MELLITUS WITH FOOT ULCER, WITH LONG-TERM CURRENT USE OF INSULIN: Primary | ICD-10-CM

## 2020-05-26 RX ORDER — METFORMIN HYDROCHLORIDE 500 MG/1
TABLET, EXTENDED RELEASE ORAL
Qty: 120 TABLET | Refills: 3 | Status: SHIPPED | OUTPATIENT
Start: 2020-05-26 | End: 2020-07-15

## 2020-05-26 RX ORDER — ATORVASTATIN CALCIUM 40 MG/1
40 TABLET, FILM COATED ORAL DAILY
Qty: 90 TABLET | Refills: 3 | Status: SHIPPED | OUTPATIENT
Start: 2020-05-26 | End: 2021-07-28 | Stop reason: SDUPTHER

## 2020-05-28 DIAGNOSIS — Z12.11 COLON CANCER SCREENING: ICD-10-CM

## 2020-06-08 ENCOUNTER — PATIENT OUTREACH (OUTPATIENT)
Dept: ADMINISTRATIVE | Facility: HOSPITAL | Age: 60
End: 2020-06-08

## 2020-06-08 NOTE — LETTER
June 8, 2020    Alfred Villarreal  [address] [place] [address]             [facility] [address] [place] [address]  Phone: [phone] Dear [facility] is committed to your overall health and would like to ensure that you are up to date on your recommended test and/or procedures.   Vahid Craig MD  has found that your chart shows you may be due for the following:    Health Maintenance Due   Topic Date Due    TETANUS VACCINE  11/16/1979    Pneumococcal Vaccine (Medium Risk) (1 of 1 - PPSV23) 11/16/1980    Shingles Vaccine (1 of 2) 11/16/2011    Colonoscopy  11/16/2011     If you have had any of the above done at another facility, please let us know so that we may obtain copies from that facility.  If you have a copy of these records, please provide a copy for us to scan into your chart.  You are welcome to request that the report be faxed to us at  ([phone]).     Otherwise, please schedule these appointments at your earliest convenience by calling [phone] or going to [email or web address].    If you have an upcoming scheduled appointment for the item above, please disregard this letter.    Sincerely,  [facility]  MD Sarah Burns L.P.N. Clinical Care Coordinator  [address] [place] [address], [place] [address]  [phone]

## 2020-06-22 ENCOUNTER — OFFICE VISIT (OUTPATIENT)
Dept: FAMILY MEDICINE | Facility: CLINIC | Age: 60
End: 2020-06-22
Payer: MEDICAID

## 2020-06-22 VITALS
HEIGHT: 78 IN | BODY MASS INDEX: 32.13 KG/M2 | HEART RATE: 82 BPM | DIASTOLIC BLOOD PRESSURE: 81 MMHG | SYSTOLIC BLOOD PRESSURE: 131 MMHG | OXYGEN SATURATION: 96 % | RESPIRATION RATE: 16 BRPM | TEMPERATURE: 98 F

## 2020-06-22 DIAGNOSIS — N52.9 ERECTILE DYSFUNCTION, UNSPECIFIED ERECTILE DYSFUNCTION TYPE: ICD-10-CM

## 2020-06-22 DIAGNOSIS — Z79.4 TYPE 2 DIABETES MELLITUS WITH FOOT ULCER, WITH LONG-TERM CURRENT USE OF INSULIN: Primary | ICD-10-CM

## 2020-06-22 DIAGNOSIS — E11.621 TYPE 2 DIABETES MELLITUS WITH FOOT ULCER, WITH LONG-TERM CURRENT USE OF INSULIN: Primary | ICD-10-CM

## 2020-06-22 DIAGNOSIS — Z89.9 HX OF AMPUTATION: ICD-10-CM

## 2020-06-22 DIAGNOSIS — L97.509 TYPE 2 DIABETES MELLITUS WITH FOOT ULCER, WITH LONG-TERM CURRENT USE OF INSULIN: Primary | ICD-10-CM

## 2020-06-22 PROCEDURE — 99214 PR OFFICE/OUTPT VISIT, EST, LEVL IV, 30-39 MIN: ICD-10-PCS | Mod: S$GLB,,, | Performed by: FAMILY MEDICINE

## 2020-06-22 PROCEDURE — 99214 OFFICE O/P EST MOD 30 MIN: CPT | Mod: S$GLB,,, | Performed by: FAMILY MEDICINE

## 2020-06-22 RX ORDER — SILDENAFIL CITRATE 20 MG/1
TABLET ORAL
Qty: 30 TABLET | Refills: 0 | Status: SHIPPED | OUTPATIENT
Start: 2020-06-22 | End: 2023-01-27

## 2020-06-22 NOTE — PROGRESS NOTES
"[facility] - Clinic Note    Subjective      Mr. Villarreal is a 59 y.o. male who presents to clinic for Follow-up (pt is having issues with ED )    Patient presents for follow-up.  He reports that with regard to his diabetes he has maintained sugars around 200.  He states that his sugar goes below 200 then he feels bad.  He has been taking Levemir 80 units twice a day and NovoLog 50 units b.i.d. before meals.  He has not taken the metformin or the atorvastatin.  He has been having issues with erectile dysfunction.  He is not currently experiencing any chest pain, blurry vision, headaches.  He also needs a prescription for a new power wheelchair.    PMH Alfred has a past medical history of Anticoagulant long-term use, Arthritis, Diabetes mellitus, and Hypertension.   PSXH Alfred has no past surgical history on file.   FH Alfred's family history is not on file.   SH Alfred reports that he has never smoked. His smokeless tobacco use includes chew. He reports current alcohol use. He reports that he does not use drugs.   ALG Alfred is allergic to amitriptyline.   MED Alfred has a current medication list which includes the following prescription(s): atorvastatin, baclofen, ergocalciferol, ibuprofen, levemir u-100 insulin, lisinopril-hydrochlorothiazide, metformin, metoprolol succinate, novolog u-100 insulin aspart, rivaroxaban, trazodone, and sildenafil.     Review of Systems   Constitutional: Negative for chills and fever.   Eyes: Negative for visual disturbance.   Cardiovascular: Negative for chest pain.   Neurological: Negative for headaches.     Objective     /81 (BP Location: Left arm, Patient Position: Sitting, BP Method: Large (Manual))   Pulse 82   Temp 98 °F (36.7 °C) (Oral)   Resp 16   Ht 6' 6" (1.981 m)   SpO2 96%   BMI 32.13 kg/m²     Physical Exam  Vitals signs and nursing note reviewed.   Constitutional:       General: He is not in acute distress.     Appearance: He is well-developed. He is not diaphoretic. "   HENT:      Head: Normocephalic and atraumatic.      Right Ear: External ear normal.      Left Ear: External ear normal.   Eyes:      General:         Right eye: No discharge.         Left eye: No discharge.   Cardiovascular:      Rate and Rhythm: Normal rate and regular rhythm.      Heart sounds: Normal heart sounds.   Pulmonary:      Effort: Pulmonary effort is normal.      Breath sounds: Normal breath sounds. No wheezing or rales.   Musculoskeletal:      Comments: Right below-the-knee amputation.  Left foot wrapped.   Skin:     General: Skin is warm and dry.   Neurological:      Mental Status: He is alert and oriented to person, place, and time.        Assessment/Plan     1. Type 2 diabetes mellitus with foot ulcer, with long-term current use of insulin     2. Erectile dysfunction, unspecified erectile dysfunction type  sildenafil (REVATIO) 20 mg Tab   3. Hx of amputation  MOTORIZED SCOOTER FOR HOME USE     Last A1c 10.3.  Continue current medications as prescribed.  Recommended that patient start taking the metformin and atorvastatin.  Will prescribe sildenafil for erectile dysfunction.  Prescription for motorized scooter/wheelchair will be sent to the [facility].    Future Appointments   Date Time Provider Department Center   6/24/2020  1:45 PM Gary Stringer DPM [facility]   8/25/2020  3:00 PM Vahid Craig MD [facility] LELO Craig MD  Family Medicine  [facility] [facility]

## 2020-07-14 ENCOUNTER — PATIENT OUTREACH (OUTPATIENT)
Dept: ADMINISTRATIVE | Facility: OTHER | Age: 60
End: 2020-07-14

## 2020-07-15 ENCOUNTER — OFFICE VISIT (OUTPATIENT)
Dept: PODIATRY | Facility: CLINIC | Age: 60
End: 2020-07-15
Payer: MEDICAID

## 2020-07-15 ENCOUNTER — HOSPITAL ENCOUNTER (OUTPATIENT)
Dept: RADIOLOGY | Facility: HOSPITAL | Age: 60
Discharge: HOME | End: 2020-07-15
Attending: PODIATRIST
Payer: MEDICAID

## 2020-07-15 VITALS
RESPIRATION RATE: 20 BRPM | WEIGHT: 278 LBS | DIASTOLIC BLOOD PRESSURE: 86 MMHG | SYSTOLIC BLOOD PRESSURE: 153 MMHG | OXYGEN SATURATION: 98 % | TEMPERATURE: 97 F | BODY MASS INDEX: 32.17 KG/M2 | HEIGHT: 78 IN | HEART RATE: 96 BPM

## 2020-07-15 DIAGNOSIS — L97.521 SKIN ULCER OF LEFT FOOT, LIMITED TO BREAKDOWN OF SKIN: Primary | ICD-10-CM

## 2020-07-15 DIAGNOSIS — L97.521 SKIN ULCER OF LEFT FOOT, LIMITED TO BREAKDOWN OF SKIN: ICD-10-CM

## 2020-07-15 DIAGNOSIS — E11.49 TYPE II DIABETES MELLITUS WITH NEUROLOGICAL MANIFESTATIONS: ICD-10-CM

## 2020-07-15 PROCEDURE — 99999 PR PBB SHADOW E&M-EST. PATIENT-LVL IV: CPT | Mod: PBBFAC,,, | Performed by: PODIATRIST

## 2020-07-15 PROCEDURE — 99999 PR PBB SHADOW E&M-EST. PATIENT-LVL IV: ICD-10-PCS | Mod: PBBFAC,,, | Performed by: PODIATRIST

## 2020-07-15 PROCEDURE — 73630 XR FOOT COMPLETE 3 VIEW LEFT: ICD-10-PCS | Mod: 26,LT,, | Performed by: RADIOLOGY

## 2020-07-15 PROCEDURE — 99214 OFFICE O/P EST MOD 30 MIN: CPT | Mod: S$PBB,,, | Performed by: PODIATRIST

## 2020-07-15 PROCEDURE — 73630 X-RAY EXAM OF FOOT: CPT | Mod: TC,FY,LT

## 2020-07-15 PROCEDURE — 73630 X-RAY EXAM OF FOOT: CPT | Mod: 26,LT,, | Performed by: RADIOLOGY

## 2020-07-15 PROCEDURE — 99214 PR OFFICE/OUTPT VISIT, EST, LEVL IV, 30-39 MIN: ICD-10-PCS | Mod: S$PBB,,, | Performed by: PODIATRIST

## 2020-07-15 PROCEDURE — 99214 OFFICE O/P EST MOD 30 MIN: CPT | Mod: PBBFAC,25 | Performed by: PODIATRIST

## 2020-07-15 RX ORDER — POTASSIUM CHLORIDE 750 MG/1
1 CAPSULE, EXTENDED RELEASE ORAL
COMMUNITY
Start: 2014-08-20 | End: 2021-07-21 | Stop reason: ALTCHOICE

## 2020-07-15 RX ORDER — AMITRIPTYLINE HYDROCHLORIDE 10 MG/1
TABLET, FILM COATED ORAL
COMMUNITY
Start: 2020-07-08 | End: 2020-07-15

## 2020-07-15 RX ORDER — METFORMIN HYDROCHLORIDE 1000 MG/1
TABLET ORAL
COMMUNITY
Start: 2014-08-20 | End: 2021-07-21 | Stop reason: ALTCHOICE

## 2020-07-15 RX ORDER — GABAPENTIN 600 MG/1
TABLET ORAL
COMMUNITY
Start: 2020-07-08 | End: 2023-08-23

## 2020-07-15 RX ORDER — GLYBURIDE 5 MG/1
TABLET ORAL
COMMUNITY
Start: 2014-08-20 | End: 2021-08-17

## 2020-07-15 NOTE — PROGRESS NOTES
Requested updates within Care Everywhere.  Patient's chart was reviewed for overdue CECIL topics.  Immunizations reconciled.
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
## 2020-08-11 ENCOUNTER — PATIENT OUTREACH (OUTPATIENT)
Dept: ADMINISTRATIVE | Facility: HOSPITAL | Age: 60
End: 2020-08-11

## 2020-08-11 DIAGNOSIS — E11.9 DIABETES MELLITUS WITHOUT COMPLICATION: Primary | ICD-10-CM

## 2020-08-11 NOTE — PROGRESS NOTES
Pre-visit Chart review notification  Spoke with pt about overdue HM screenings. Scheduled A1c lab draw for 08/24/20 at [facility], order linked at this time.

## 2020-08-19 ENCOUNTER — OFFICE VISIT (OUTPATIENT)
Dept: PODIATRY | Facility: CLINIC | Age: 60
End: 2020-08-19
Payer: MEDICAID

## 2020-08-19 VITALS
BODY MASS INDEX: 32.17 KG/M2 | HEIGHT: 78 IN | WEIGHT: 278 LBS | SYSTOLIC BLOOD PRESSURE: 184 MMHG | DIASTOLIC BLOOD PRESSURE: 96 MMHG | HEART RATE: 91 BPM | TEMPERATURE: 97 F

## 2020-08-19 DIAGNOSIS — L97.522 SKIN ULCER OF LEFT FOOT WITH FAT LAYER EXPOSED: Primary | ICD-10-CM

## 2020-08-19 DIAGNOSIS — L97.521 SKIN ULCER OF LEFT FOOT, LIMITED TO BREAKDOWN OF SKIN: ICD-10-CM

## 2020-08-19 DIAGNOSIS — E11.49 TYPE II DIABETES MELLITUS WITH NEUROLOGICAL MANIFESTATIONS: ICD-10-CM

## 2020-08-19 PROCEDURE — 99215 OFFICE O/P EST HI 40 MIN: CPT | Mod: PBBFAC,25 | Performed by: PODIATRIST

## 2020-08-19 PROCEDURE — 99215 OFFICE O/P EST HI 40 MIN: CPT | Mod: 25,S$PBB,, | Performed by: PODIATRIST

## 2020-08-19 PROCEDURE — 99215 PR OFFICE/OUTPT VISIT, EST, LEVL V, 40-54 MIN: ICD-10-PCS | Mod: 25,S$PBB,, | Performed by: PODIATRIST

## 2020-08-19 PROCEDURE — 87070 CULTURE OTHR SPECIMN AEROBIC: CPT | Mod: 59

## 2020-08-19 PROCEDURE — 87077 CULTURE AEROBIC IDENTIFY: CPT | Mod: 59

## 2020-08-19 PROCEDURE — 87186 SC STD MICRODIL/AGAR DIL: CPT

## 2020-08-19 PROCEDURE — 87147 CULTURE TYPE IMMUNOLOGIC: CPT

## 2020-08-19 PROCEDURE — 99999 PR PBB SHADOW E&M-EST. PATIENT-LVL V: CPT | Mod: PBBFAC,,, | Performed by: PODIATRIST

## 2020-08-19 PROCEDURE — 99999 PR PBB SHADOW E&M-EST. PATIENT-LVL V: ICD-10-PCS | Mod: PBBFAC,,, | Performed by: PODIATRIST

## 2020-08-19 PROCEDURE — 96372 THER/PROPH/DIAG INJ SC/IM: CPT | Mod: PBBFAC,LT | Performed by: PODIATRIST

## 2020-08-19 RX ORDER — LIDOCAINE HYDROCHLORIDE 10 MG/ML
1 INJECTION INFILTRATION; PERINEURAL ONCE
Status: COMPLETED | OUTPATIENT
Start: 2020-08-19 | End: 2020-08-19

## 2020-08-19 RX ORDER — CEFTRIAXONE 1 G/1
1 INJECTION, POWDER, FOR SOLUTION INTRAMUSCULAR; INTRAVENOUS
Status: COMPLETED | OUTPATIENT
Start: 2020-08-19 | End: 2020-08-19

## 2020-08-19 RX ORDER — CIPROFLOXACIN 500 MG/1
500 TABLET ORAL 2 TIMES DAILY
Qty: 28 TABLET | Refills: 0 | Status: SHIPPED | OUTPATIENT
Start: 2020-08-19 | End: 2020-08-24 | Stop reason: ALTCHOICE

## 2020-08-19 RX ADMIN — LIDOCAINE HYDROCHLORIDE 1 ML: 10 INJECTION, SOLUTION INFILTRATION; PERINEURAL at 09:08

## 2020-08-19 RX ADMIN — CEFTRIAXONE SODIUM 1 G: 1 INJECTION, POWDER, FOR SOLUTION INTRAMUSCULAR; INTRAVENOUS at 09:08

## 2020-08-21 ENCOUNTER — TELEPHONE (OUTPATIENT)
Dept: PODIATRY | Facility: CLINIC | Age: 60
End: 2020-08-21

## 2020-08-21 ENCOUNTER — TELEPHONE (OUTPATIENT)
Dept: FAMILY MEDICINE | Facility: CLINIC | Age: 60
End: 2020-08-21

## 2020-08-21 NOTE — TELEPHONE ENCOUNTER
Notified pharmacy spoke with Kathi that PA for Ciprofloxacin was approved. Kathi stated it is still not showing on pharmacy side instructed to fax approval (done approval faxed to pharmacy and conformation received via email).

## 2020-08-21 NOTE — TELEPHONE ENCOUNTER
----- Message from Claudette Campbell sent at 8/21/2020  8:45 AM CDT -----  Patient is calling to follow up on the prior auth for the antibiotic.  He wants to know if it is complete so he can pick it up.  Please call him at [phone].  Thank you!

## 2020-08-21 NOTE — TELEPHONE ENCOUNTER
----- Message from Ivory Pace MA sent at 8/21/2020  4:32 PM CDT -----  Patient needs to speak with someone regarding an Rx  Please call to discuss  [phone]

## 2020-08-21 NOTE — TELEPHONE ENCOUNTER
Notified pt that medication was approved and to check with pharmacy when ready, understanding verbalized.

## 2020-08-21 NOTE — TELEPHONE ENCOUNTER
Pt calling to state he called his pharmacy, but they do not have an approved PA yet. Informed pt the insurance company has 72 hrs to reply with their decision. Pt verbalized understanding.

## 2020-08-21 NOTE — TELEPHONE ENCOUNTER
Pt calling to remind provider he needs something for his sex drive.        ----- Message from Claudette Campbell sent at 8/21/2020  8:47 AM CDT -----  Patient is requesting a return call in regards to his upcoming appt.  Please call [facility] at [phone]. Thank you!

## 2020-08-21 NOTE — TELEPHONE ENCOUNTER
Notified pt that PA was done via phone and his insurance stated it will be a 24 hour turn around time on all medications urgent or not. Instructed if unable to fill in the allotted time to call office. Understanding verbalized.

## 2020-08-22 LAB
BACTERIA SPEC AEROBE CULT: ABNORMAL
BACTERIA SPEC AEROBE CULT: ABNORMAL

## 2020-08-23 ENCOUNTER — PATIENT OUTREACH (OUTPATIENT)
Dept: ADMINISTRATIVE | Facility: OTHER | Age: 60
End: 2020-08-23

## 2020-08-23 NOTE — PROGRESS NOTES
Subjective:       Patient ID: Alfred Villarreal is a 59 y.o. male.    Chief Complaint: Follow-up, Diabetes Mellitus, Foot Ulcer, Foot Problem, and Foot Pain   Patient presents today for an unscheduled appointment he has developed a new ulceration on the left heel.    Past Medical History:   Diagnosis Date    Anticoagulant long-term use     Arthritis     Diabetes mellitus     Hypertension      History reviewed. No pertinent surgical history.  History reviewed. No pertinent family history.  Social History     Socioeconomic History    Marital status:      Spouse name: Not on file    Number of children: Not on file    Years of education: Not on file    Highest education level: Not on file   Occupational History    Not on file   Social Needs    Financial resource strain: Not on file    Food insecurity     Worry: Not on file     Inability: Not on file    Transportation needs     Medical: Not on file     Non-medical: Not on file   Tobacco Use    Smoking status: Never Smoker    Smokeless tobacco: Current User     Types: Chew   Substance and Sexual Activity    Alcohol use: Yes     Frequency: Monthly or less     Drinks per session: 3 or 4     Binge frequency: Monthly    Drug use: No    Sexual activity: Yes     Partners: Female   Lifestyle    Physical activity     Days per week: Not on file     Minutes per session: Not on file    Stress: Not on file   Relationships    Social connections     Talks on phone: Not on file     Gets together: Not on file     Attends Yazidi service: Not on file     Active member of club or organization: Not on file     Attends meetings of clubs or organizations: Not on file     Relationship status: Not on file   Other Topics Concern    Not on file   Social History Narrative    Not on file       Current Outpatient Medications   Medication Sig Dispense Refill    atorvastatin (LIPITOR) 40 MG tablet Take 1 tablet (40 mg total) by mouth once daily. 90 tablet 3    baclofen  "(LIORESAL) 10 MG tablet Take 10 mg by mouth 3 (three) times daily.      ciprofloxacin HCl (CIPRO) 500 MG tablet Take 1 tablet (500 mg total) by mouth 2 (two) times daily. for 14 days 28 tablet 0    ergocalciferol (ERGOCALCIFEROL) 50,000 unit Cap       gabapentin (NEURONTIN) 600 MG tablet       glyBURIDE (DIABETA) 5 MG tablet Take by mouth.      ibuprofen (ADVIL,MOTRIN) 800 MG tablet       LEVEMIR U-100 INSULIN 100 unit/mL injection Inject 80 Units into the skin 2 (two) times a day.      lisinopril-hydrochlorothiazide (PRINZIDE,ZESTORETIC) 10-12.5 mg per tablet Take 1 tablet by mouth once daily.      metFORMIN (GLUCOPHAGE) 1000 MG tablet Take by mouth.      metoprolol succinate (TOPROL-XL) 50 MG 24 hr tablet Take 50 mg by mouth once daily.      NOVOLOG U-100 INSULIN ASPART 100 unit/mL injection 50 Units by abdominal subcutaneous route 2 (two) times daily before meals.      potassium chloride (MICRO-K) 10 MEQ CpSR Take 1 capsule by mouth.      rivaroxaban (XARELTO) 20 mg Tab Take 20 mg by mouth once daily.      sildenafil (REVATIO) 20 mg Tab Take 1-3 tablets as need 1 hour prior to sexual activity 30 tablet 0    traZODone (DESYREL) 100 MG tablet Take 100 mg by mouth.       No current facility-administered medications for this visit.      Review of patient's allergies indicates:   Allergen Reactions    Amitriptyline      Vivid dreams( bad)       Review of Systems   Musculoskeletal: Positive for arthralgias, back pain, gait problem and joint swelling.   Skin: Positive for color change and wound.   Neurological: Positive for numbness.   All other systems reviewed and are negative.      Objective:      Vitals:    08/19/20 0850   BP: (!) 184/96   Pulse: 91   Temp: 97.2 °F (36.2 °C)   Weight: 126.1 kg (278 lb)   Height: 6' 6" (1.981 m)     Physical Exam  Vitals signs and nursing note reviewed.   Constitutional:       Appearance: He is well-developed.   Cardiovascular:      Pulses:           Dorsalis pedis " pulses are 1+ on the right side.        Posterior tibial pulses are 0 on the right side.   Pulmonary:      Effort: Pulmonary effort is normal.   Musculoskeletal:      Right foot: Deformity present.   Feet:      Right foot:      Protective Sensation: 4 sites tested. 1 site sensed.     Skin integrity: Ulcer, skin breakdown and erythema present.   Skin:     Capillary Refill: Capillary refill takes more than 3 seconds.      Findings: Erythema present.   Neurological:      Deep Tendon Reflexes: Reflexes abnormal.   Psychiatric:         Behavior: Behavior normal.         Thought Content: Thought content normal.         Judgment: Judgment normal.                                                          Assessment:       1. Skin ulcer of left foot with fat layer exposed    2. Uncontrolled diabetes mellitus with foot ulcer    3. Type II diabetes mellitus with neurological manifestations        Plan:       Patient presents today for an unscheduled appointment he came into the office stating that he has developed a new wound on the bottom of his left heel patient has had a previous right lower extremity amputation.  Patient states this was a large blister it broke last night and that is when he decided he needed to be seen.  Patient [place] states he stays in his wheelchair and his foot does not get wet.  Patient has a history of noncompliance and not following up with scheduled appointments.  On evaluation the previous ulceration overlying the medial 1st MPJ left is very macerated and draining yellow green drainage this ulceration is approximately 2 cm in diameter the patient has a very large area of new tissue breakdown on the plantar lateral aspect of the patient's left foot it is 4.5 cm long by 3 cm wide there is a centralized area of fibrous tissue with heavy serous drainage emitting from the area.  Patient admitted today he had previously tried to pull the skin off  of the area he states even though it had blistered he thought it was tape that was left on his foot from his dressing and after he pulled this off it got significantly worse.  Culture and sensitivity were performed of the newest ulceration site I have started the patient on Cipro he is to maintain a nonweightbearing status keep the area clean and dry were painting the area with Betadine allowing it to dry completely then applying a well-padded thick dressing over the area the ulceration overlying the 1st MPJ left will get the same type of dressing I want see him for follow-up in 1 week any increased redness swelling drainage he is to go to the emergency room immediately patient was administered an IM injection of Rocephin left side.  Total face-to-face time including discussion evaluation treatment wound care with both locations equaled 45 min.  I explained to the patient the severity of his condition he needs to make sure he keeps the area dry and clean and change the dressing every day as recommended he cannot get this area wet while bathing.  Patient encouraged to keep the appointment as scheduled.  This note was created using [employer] voice recognition software that occasionally misinterpreted phrases or words.

## 2020-08-24 ENCOUNTER — TELEPHONE (OUTPATIENT)
Dept: PODIATRY | Facility: CLINIC | Age: 60
End: 2020-08-24

## 2020-08-24 LAB
BACTERIA SPEC AEROBE CULT: ABNORMAL

## 2020-08-24 RX ORDER — SULFAMETHOXAZOLE AND TRIMETHOPRIM 400; 80 MG/1; MG/1
2 TABLET ORAL 2 TIMES DAILY
Qty: 56 TABLET | Refills: 0 | Status: SHIPPED | OUTPATIENT
Start: 2020-08-24 | End: 2020-09-02 | Stop reason: SDUPTHER

## 2020-08-24 NOTE — TELEPHONE ENCOUNTER
----- Message from Gary Stringer DPM sent at 8/24/2020 10:14 AM CDT -----  Please call the patient regarding his abnormal result.  Call patient and advise per culture and sensitivity he has 3 bacteria growing I have started him on Bactrim and sent in a new prescription he is to discontinue the Cipro.

## 2020-08-24 NOTE — TELEPHONE ENCOUNTER
----- Message from Debra Rodriguez sent at 8/24/2020  9:57 AM CDT -----  Regarding: PA Meds  Type: Needs Medical Advice  Who Called:  Pt  Best Call Back Number: [phone]  Additional Information: Patient is requesting a call back in regards to  PA for meds response . Please and thank you

## 2020-08-24 NOTE — TELEPHONE ENCOUNTER
----- Message from Hannah Cheek sent at 8/24/2020 12:35 PM CDT -----  Regarding: med Refill  Contact: pt  Med Refill    Patient called back he states his Insurance will not cover the Antibiotics     Patient  was told he is out of slots that is the reason his Insurance will   Not fill his antibiotics..    Pharmacy   [id number] [place] [address] [address] [facility] [address];    Call  back [phone]

## 2020-08-24 NOTE — TELEPHONE ENCOUNTER
----- Message from Claudette Campbell sent at 8/24/2020  3:20 PM CDT -----  Patient is requesting a return call to discuss the medication you ordered and the medication he already has.  His number is [phone].  Thank you!

## 2020-08-24 NOTE — TELEPHONE ENCOUNTER
----- Message from Gary Stringer DPM sent at 8/22/2020  2:26 PM CDT -----  Please call the patient regarding his abnormal result.  I need this culture un-suppressed

## 2020-08-24 NOTE — TELEPHONE ENCOUNTER
Pt stated he is out of slots with insurance. Instructed pt to ask pharmacy about cash price and that pharmacy cash prices differ and it would be worth calling around for prices. Informed pt about [facility].

## 2020-08-24 NOTE — TELEPHONE ENCOUNTER
Done. Spoke to Justina she was unable to help. Spoke with Melva and she said it will be taken care of.

## 2020-08-24 NOTE — TELEPHONE ENCOUNTER
Pt state pharmacy still will not fill Rx, informed pt PA was approved and faxed to pharmacy. Instructed pt will see what problem is and call him back. Understanding verbalized.

## 2020-08-24 NOTE — PROGRESS NOTES
Health Maintenance Due   Topic Date Due    TETANUS VACCINE  11/16/1979    Pneumococcal Vaccine (Medium Risk) (1 of 1 - PPSV23) 11/16/1980    Shingles Vaccine (1 of 2) 11/16/2011    Colorectal Cancer Screening  11/16/2011    Hemoglobin A1c  08/23/2020     Updates were requested from care everywhere.  Chart was reviewed for overdue Proactive [facility]ters ([facility]) topics (CRS, Breast Cancer Screening, Eye exam)  Health Maintenance has been updated.  LINKS immunization registry triggered.  Immunizations were reconciled.

## 2020-08-24 NOTE — TELEPHONE ENCOUNTER
Instructed pt we have submitted his PA as medically necessary and are still waiting to hear back from insurance company.

## 2020-08-26 ENCOUNTER — OFFICE VISIT (OUTPATIENT)
Dept: PODIATRY | Facility: CLINIC | Age: 60
End: 2020-08-26
Payer: MEDICAID

## 2020-08-26 VITALS
TEMPERATURE: 99 F | WEIGHT: 278 LBS | DIASTOLIC BLOOD PRESSURE: 85 MMHG | RESPIRATION RATE: 19 BRPM | OXYGEN SATURATION: 98 % | BODY MASS INDEX: 32.17 KG/M2 | HEART RATE: 89 BPM | SYSTOLIC BLOOD PRESSURE: 171 MMHG | HEIGHT: 78 IN

## 2020-08-26 DIAGNOSIS — L97.522 SKIN ULCER OF LEFT FOOT WITH FAT LAYER EXPOSED: ICD-10-CM

## 2020-08-26 DIAGNOSIS — E11.49 TYPE II DIABETES MELLITUS WITH NEUROLOGICAL MANIFESTATIONS: Primary | ICD-10-CM

## 2020-08-26 PROBLEM — L97.521 SKIN ULCER OF LEFT FOOT, LIMITED TO BREAKDOWN OF SKIN: Status: RESOLVED | Noted: 2020-01-27 | Resolved: 2020-08-26

## 2020-08-26 PROCEDURE — 97597 DBRDMT OPN WND 1ST 20 CM/<: CPT | Mod: PBBFAC | Performed by: PODIATRIST

## 2020-08-26 PROCEDURE — 97597 WOUND DEBRIDEMENT: ICD-10-PCS | Mod: S$PBB,,, | Performed by: PODIATRIST

## 2020-08-26 PROCEDURE — 99214 PR OFFICE/OUTPT VISIT, EST, LEVL IV, 30-39 MIN: ICD-10-PCS | Mod: 25,S$PBB,, | Performed by: PODIATRIST

## 2020-08-26 PROCEDURE — 99999 PR PBB SHADOW E&M-EST. PATIENT-LVL V: ICD-10-PCS | Mod: PBBFAC,,, | Performed by: PODIATRIST

## 2020-08-26 PROCEDURE — 96372 THER/PROPH/DIAG INJ SC/IM: CPT | Mod: PBBFAC,59

## 2020-08-26 PROCEDURE — 99999 PR PBB SHADOW E&M-EST. PATIENT-LVL V: CPT | Mod: PBBFAC,,, | Performed by: PODIATRIST

## 2020-08-26 PROCEDURE — 99214 OFFICE O/P EST MOD 30 MIN: CPT | Mod: 25,S$PBB,, | Performed by: PODIATRIST

## 2020-08-26 PROCEDURE — 99215 OFFICE O/P EST HI 40 MIN: CPT | Mod: PBBFAC | Performed by: PODIATRIST

## 2020-08-26 RX ORDER — LIDOCAINE HYDROCHLORIDE 10 MG/ML
1 INJECTION INFILTRATION; PERINEURAL
Status: COMPLETED | OUTPATIENT
Start: 2020-08-26 | End: 2020-08-26

## 2020-08-26 RX ORDER — CEFTRIAXONE 1 G/1
1 INJECTION, POWDER, FOR SOLUTION INTRAMUSCULAR; INTRAVENOUS
Status: COMPLETED | OUTPATIENT
Start: 2020-08-26 | End: 2020-08-26

## 2020-08-26 RX ADMIN — CEFTRIAXONE SODIUM 1 G: 1 INJECTION, POWDER, FOR SOLUTION INTRAMUSCULAR; INTRAVENOUS at 03:08

## 2020-08-26 RX ADMIN — LIDOCAINE HYDROCHLORIDE 1 ML: 10 INJECTION, SOLUTION INFILTRATION; PERINEURAL at 03:08

## 2020-08-27 ENCOUNTER — LAB VISIT (OUTPATIENT)
Dept: LAB | Facility: HOSPITAL | Age: 60
End: 2020-08-27
Attending: FAMILY MEDICINE
Payer: MEDICAID

## 2020-08-27 ENCOUNTER — OFFICE VISIT (OUTPATIENT)
Dept: FAMILY MEDICINE | Facility: CLINIC | Age: 60
End: 2020-08-27
Payer: MEDICAID

## 2020-08-27 VITALS
RESPIRATION RATE: 16 BRPM | OXYGEN SATURATION: 98 % | SYSTOLIC BLOOD PRESSURE: 160 MMHG | HEART RATE: 85 BPM | HEIGHT: 78 IN | TEMPERATURE: 98 F | BODY MASS INDEX: 32.13 KG/M2 | DIASTOLIC BLOOD PRESSURE: 78 MMHG

## 2020-08-27 DIAGNOSIS — L97.522 SKIN ULCER OF LEFT FOOT WITH FAT LAYER EXPOSED: ICD-10-CM

## 2020-08-27 DIAGNOSIS — L97.509 TYPE 2 DIABETES MELLITUS WITH FOOT ULCER, WITH LONG-TERM CURRENT USE OF INSULIN: ICD-10-CM

## 2020-08-27 DIAGNOSIS — Z79.4 TYPE 2 DIABETES MELLITUS WITH FOOT ULCER, WITH LONG-TERM CURRENT USE OF INSULIN: ICD-10-CM

## 2020-08-27 DIAGNOSIS — E11.621 TYPE 2 DIABETES MELLITUS WITH FOOT ULCER, WITH LONG-TERM CURRENT USE OF INSULIN: ICD-10-CM

## 2020-08-27 DIAGNOSIS — I10 ESSENTIAL HYPERTENSION: ICD-10-CM

## 2020-08-27 DIAGNOSIS — L97.509 TYPE 2 DIABETES MELLITUS WITH FOOT ULCER, WITH LONG-TERM CURRENT USE OF INSULIN: Primary | ICD-10-CM

## 2020-08-27 DIAGNOSIS — Z79.4 TYPE 2 DIABETES MELLITUS WITH FOOT ULCER, WITH LONG-TERM CURRENT USE OF INSULIN: Primary | ICD-10-CM

## 2020-08-27 DIAGNOSIS — E11.621 TYPE 2 DIABETES MELLITUS WITH FOOT ULCER, WITH LONG-TERM CURRENT USE OF INSULIN: Primary | ICD-10-CM

## 2020-08-27 LAB
ANION GAP SERPL CALC-SCNC: 11 MMOL/L (ref 8–16)
BUN SERPL-MCNC: 10 MG/DL (ref 6–20)
CALCIUM SERPL-MCNC: 9 MG/DL (ref 8.7–10.5)
CHLORIDE SERPL-SCNC: 96 MMOL/L (ref 95–110)
CO2 SERPL-SCNC: 25 MMOL/L (ref 23–29)
CREAT SERPL-MCNC: 0.8 MG/DL (ref 0.5–1.4)
EST. GFR  (AFRICAN AMERICAN): >60 ML/MIN/1.73 M^2
EST. GFR  (NON AFRICAN AMERICAN): >60 ML/MIN/1.73 M^2
ESTIMATED AVG GLUCOSE: 263 MG/DL (ref 68–131)
GLUCOSE SERPL-MCNC: 338 MG/DL (ref 70–110)
HBA1C MFR BLD HPLC: 10.8 % (ref 4.5–6.2)
POTASSIUM SERPL-SCNC: 3.4 MMOL/L (ref 3.5–5.1)
SODIUM SERPL-SCNC: 132 MMOL/L (ref 136–145)

## 2020-08-27 PROCEDURE — 99214 PR OFFICE/OUTPT VISIT, EST, LEVL IV, 30-39 MIN: ICD-10-PCS | Mod: 25,S$GLB,, | Performed by: FAMILY MEDICINE

## 2020-08-27 PROCEDURE — 80048 BASIC METABOLIC PNL TOTAL CA: CPT

## 2020-08-27 PROCEDURE — 96372 PR INJECTION,THERAP/PROPH/DIAG2ST, IM OR SUBCUT: ICD-10-PCS | Mod: S$GLB,,, | Performed by: FAMILY MEDICINE

## 2020-08-27 PROCEDURE — 99214 OFFICE O/P EST MOD 30 MIN: CPT | Mod: 25,S$GLB,, | Performed by: FAMILY MEDICINE

## 2020-08-27 PROCEDURE — 36415 COLL VENOUS BLD VENIPUNCTURE: CPT

## 2020-08-27 PROCEDURE — 96372 THER/PROPH/DIAG INJ SC/IM: CPT | Mod: S$GLB,,, | Performed by: FAMILY MEDICINE

## 2020-08-27 PROCEDURE — 83036 HEMOGLOBIN GLYCOSYLATED A1C: CPT

## 2020-08-27 RX ORDER — KETOROLAC TROMETHAMINE 30 MG/ML
15 INJECTION, SOLUTION INTRAMUSCULAR; INTRAVENOUS
Status: COMPLETED | OUTPATIENT
Start: 2020-08-27 | End: 2020-08-27

## 2020-08-27 RX ORDER — LISINOPRIL AND HYDROCHLOROTHIAZIDE 10; 12.5 MG/1; MG/1
2 TABLET ORAL DAILY
Qty: 180 TABLET | Refills: 3 | Status: SHIPPED | OUTPATIENT
Start: 2020-08-27 | End: 2021-07-28

## 2020-08-27 RX ORDER — KETOROLAC TROMETHAMINE 30 MG/ML
30 INJECTION, SOLUTION INTRAMUSCULAR; INTRAVENOUS ONCE
Status: COMPLETED | OUTPATIENT
Start: 2020-08-27 | End: 2020-08-27

## 2020-08-27 RX ADMIN — KETOROLAC TROMETHAMINE 30 MG: 30 INJECTION, SOLUTION INTRAMUSCULAR; INTRAVENOUS at 02:08

## 2020-08-27 RX ADMIN — KETOROLAC TROMETHAMINE 15 MG: 30 INJECTION, SOLUTION INTRAMUSCULAR; INTRAVENOUS at 03:08
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
## 2020-08-29 DIAGNOSIS — Z79.4 TYPE 2 DIABETES MELLITUS WITH FOOT ULCER, WITH LONG-TERM CURRENT USE OF INSULIN: Primary | ICD-10-CM

## 2020-08-29 DIAGNOSIS — L97.509 TYPE 2 DIABETES MELLITUS WITH FOOT ULCER, WITH LONG-TERM CURRENT USE OF INSULIN: Primary | ICD-10-CM

## 2020-08-29 DIAGNOSIS — E11.621 TYPE 2 DIABETES MELLITUS WITH FOOT ULCER, WITH LONG-TERM CURRENT USE OF INSULIN: Primary | ICD-10-CM

## 2020-08-30 NOTE — PROCEDURES
"Wound Debridement    Date/Time: 8/26/2020 3:00 PM  Performed by: Gary Stringer DPM  Authorized by: Gary Stringer DPM     Time out: Immediately prior to procedure a "time out" was called to verify the correct patient, procedure, equipment, support staff and site/side marked as required.    Consent Done?:  Yes (Verbal)    Preparation: Patient was prepped and draped in usual sterile fashion    Local anesthesia used?: No      Wound Details:    Location:  Left foot    Location:  Left Heel       Length (cm):  4       Area (sq cm):  12       Width (cm):  3       Percent Debrided (%):  100       Depth (cm):  0.4       Total Area Debrided (sq cm):  12    Depth of debridement:  Epidermis/Dermis    Devitalized tissue debrided:  Callus, Necrotic/Eschar, Slough, Fibrin and Exudate    Instruments:  Blade    Bleeding:  None  Patient tolerance:  Patient tolerated the procedure well with no immediate complications      "

## 2020-08-30 NOTE — PROGRESS NOTES
Subjective:       Patient ID: Alfred Villarreal is a 59 y.o. male.    Chief Complaint: Follow-up and Foot Ulcer   Patient presents for follow-up today multiple areas of breakdown and ulceration left.    Past Medical History:   Diagnosis Date    Anticoagulant long-term use     Arthritis     Diabetes mellitus     Hypertension      History reviewed. No pertinent surgical history.  History reviewed. No pertinent family history.  Social History     Socioeconomic History    Marital status:      Spouse name: Not on file    Number of children: Not on file    Years of education: Not on file    Highest education level: Not on file   Occupational History    Not on file   Social Needs    Financial resource strain: Not on file    Food insecurity     Worry: Not on file     Inability: Not on file    Transportation needs     Medical: Not on file     Non-medical: Not on file   Tobacco Use    Smoking status: Never Smoker    Smokeless tobacco: Current User     Types: Chew   Substance and Sexual Activity    Alcohol use: Yes     Frequency: Monthly or less     Drinks per session: 3 or 4     Binge frequency: Monthly    Drug use: No    Sexual activity: Yes     Partners: Female   Lifestyle    Physical activity     Days per week: Not on file     Minutes per session: Not on file    Stress: Not on file   Relationships    Social connections     Talks on phone: Not on file     Gets together: Not on file     Attends Protestant service: Not on file     Active member of club or organization: Not on file     Attends meetings of clubs or organizations: Not on file     Relationship status: Not on file   Other Topics Concern    Not on file   Social History Narrative    Not on file       Current Outpatient Medications   Medication Sig Dispense Refill    atorvastatin (LIPITOR) 40 MG tablet Take 1 tablet (40 mg total) by mouth once daily. 90 tablet 3    baclofen (LIORESAL) 10 MG tablet Take 10 mg by mouth 3 (three) times daily.    "   ergocalciferol (ERGOCALCIFEROL) 50,000 unit Cap       gabapentin (NEURONTIN) 600 MG tablet       glyBURIDE (DIABETA) 5 MG tablet Take by mouth.      ibuprofen (ADVIL,MOTRIN) 800 MG tablet       LEVEMIR U-100 INSULIN 100 unit/mL injection Inject 80 Units into the skin 2 (two) times a day.      metFORMIN (GLUCOPHAGE) 1000 MG tablet Take by mouth.      metoprolol succinate (TOPROL-XL) 50 MG 24 hr tablet Take 50 mg by mouth once daily.      NOVOLOG U-100 INSULIN ASPART 100 unit/mL injection 50 Units by abdominal subcutaneous route 2 (two) times daily before meals.      potassium chloride (MICRO-K) 10 MEQ CpSR Take 1 capsule by mouth.      rivaroxaban (XARELTO) 20 mg Tab Take 20 mg by mouth once daily.      sildenafil (REVATIO) 20 mg Tab Take 1-3 tablets as need 1 hour prior to sexual activity 30 tablet 0    sulfamethoxazole-trimethoprim 400-80mg (BACTRIM,SEPTRA) 400-80 mg per tablet Take 2 tablets by mouth 2 (two) times daily. for 14 days 56 tablet 0    traZODone (DESYREL) 100 MG tablet Take 100 mg by mouth.      lisinopriL-hydrochlorothiazide (PRINZIDE,ZESTORETIC) 10-12.5 mg per tablet Take 2 tablets by mouth once daily. 180 tablet 3     No current facility-administered medications for this visit.      Review of patient's allergies indicates:   Allergen Reactions    Amitriptyline      Vivid dreams( bad)       Review of Systems   Musculoskeletal: Positive for arthralgias, back pain, gait problem and joint swelling.   Skin: Positive for color change and wound.   Neurological: Positive for numbness.   All other systems reviewed and are negative.      Objective:      Vitals:    08/26/20 1448   BP: (!) 171/85   Pulse: 89   Resp: 19   Temp: 98.5 °F (36.9 °C)   TempSrc: Oral   SpO2: 98%   Weight: 126.1 kg (278 lb)   Height: 6' 6" (1.981 m)     Physical Exam  Vitals signs and nursing note reviewed.   Constitutional:       Appearance: He is well-developed.   Cardiovascular:      Pulses:           Dorsalis " pedis pulses are 1+ on the right side.        Posterior tibial pulses are 0 on the right side.   Pulmonary:      Effort: Pulmonary effort is normal.   Musculoskeletal:      Right foot: Deformity present.   Feet:      Right foot:      Protective Sensation: 4 sites tested. 1 site sensed.     Skin integrity: Ulcer, skin breakdown and erythema present.   Skin:     Capillary Refill: Capillary refill takes more than 3 seconds.      Findings: Erythema present.   Neurological:      Deep Tendon Reflexes: Reflexes abnormal.   Psychiatric:         Behavior: Behavior normal.         Thought Content: Thought content normal.         Judgment: Judgment normal.                                                                                      Assessment:       1. Type II diabetes mellitus with neurological manifestations    2. Skin ulcer of left foot with fat layer exposed        Plan:         Patient presents today follow-up of multiple sites of ulceration on the patient's left lower extremity he has had a previous amputation of the right lower extremity has an extensive history of osteomyelitis.  The ulceration overlying the 1st MPJ left show signs of improvement this ulceration site now has granular healthy tissue present it had previously been macerated with heavy drainage this ulceration is approximately 2 cm in diameter.  Area of breakdown on the patient's left heel is stable this continues to be approximately 4.5 cm long by 3 cm wide there is a centralized area of fibrous tissue with serous drainage emitting from the area there is some tracking from this ulcer site further plantar towards the heel this required excisional debridement as documented the area of debridement today was an additional 3 cm long by 2 cm wide with drainage underlying this area.  Previous culture and sensitivity was positive for 3 different bacteria there has been a delay in the patient's starting on antibiotic therapy his insurance refused to pay  for his initial antibiotics as ordered which was Cipro my staff had to get prior authorization for this this was subsequently changed to Bactrim once we got the patient's culture and sensitivity results un-suppressed.  Patient states he has now been taking the Bactrim for a couple of days I did give the patient a Rocephin injection today IM which he tolerated well left side.  Patient is going to continue to clean the entire area of ulceration with Dakin solution paint the area with Betadine and change the well-padded dressing daily he is to stay off of the foot and not put any weight on the area I have instructed the patient to go to the emergency room immediately with any increased redness swelling pain or drainage the overall area that is encompassed in the ulceration and skin breakdown site on the plantar heel region is 10 cm long by 4 cm wide patient is also having increased discomfort he has requested that we contact his pain management provider in let them know the patient is dealing with a large ulceration and increased pain to see if they will possibly increase the frequency of his pain medication.  We will reach out to the patient's pain management provider pain stop.  I explained to the patient the severity of his condition he needs to make sure he keeps the area dry and clean and change the dressing every day as recommended he cannot get this area wet while bathing.  Patient encouraged to keep the appointment as scheduled.  Face-to-face time including discussion evaluation treatment wound care wound debridement equaled 30 min.  This note was created using [employer] voice recognition software that occasionally misinterpreted phrases or words.

## 2020-09-01 ENCOUNTER — TELEPHONE (OUTPATIENT)
Dept: PODIATRY | Facility: CLINIC | Age: 60
End: 2020-09-01

## 2020-09-01 NOTE — TELEPHONE ENCOUNTER
----- Message from Anny Martinez sent at 9/1/2020  2:03 PM CDT -----  Contact: pt  Pt is retuning nurse Lupe [place] to see if he is going to be there. Pt states he has an earlier appointment in [place] with his pain management and maybe an hour late...[phone] (home)

## 2020-09-02 ENCOUNTER — OFFICE VISIT (OUTPATIENT)
Dept: PODIATRY | Facility: CLINIC | Age: 60
End: 2020-09-02
Payer: MEDICAID

## 2020-09-02 VITALS
BODY MASS INDEX: 32.17 KG/M2 | WEIGHT: 278 LBS | HEIGHT: 78 IN | DIASTOLIC BLOOD PRESSURE: 80 MMHG | SYSTOLIC BLOOD PRESSURE: 123 MMHG | HEART RATE: 57 BPM | TEMPERATURE: 99 F

## 2020-09-02 DIAGNOSIS — L97.522 SKIN ULCER OF LEFT FOOT WITH FAT LAYER EXPOSED: Primary | ICD-10-CM

## 2020-09-02 DIAGNOSIS — E11.49 TYPE II DIABETES MELLITUS WITH NEUROLOGICAL MANIFESTATIONS: ICD-10-CM

## 2020-09-02 PROCEDURE — 99214 OFFICE O/P EST MOD 30 MIN: CPT | Mod: PBBFAC | Performed by: PODIATRIST

## 2020-09-02 PROCEDURE — 99214 OFFICE O/P EST MOD 30 MIN: CPT | Mod: S$PBB,,, | Performed by: PODIATRIST

## 2020-09-02 PROCEDURE — 99999 PR PBB SHADOW E&M-EST. PATIENT-LVL IV: ICD-10-PCS | Mod: PBBFAC,,, | Performed by: PODIATRIST

## 2020-09-02 PROCEDURE — 99999 PR PBB SHADOW E&M-EST. PATIENT-LVL IV: CPT | Mod: PBBFAC,,, | Performed by: PODIATRIST

## 2020-09-02 PROCEDURE — 99214 PR OFFICE/OUTPT VISIT, EST, LEVL IV, 30-39 MIN: ICD-10-PCS | Mod: S$PBB,,, | Performed by: PODIATRIST

## 2020-09-02 RX ORDER — SULFAMETHOXAZOLE AND TRIMETHOPRIM 400; 80 MG/1; MG/1
2 TABLET ORAL 2 TIMES DAILY
Qty: 56 TABLET | Refills: 0 | Status: SHIPPED | OUTPATIENT
Start: 2020-09-02 | End: 2020-09-16

## 2020-09-07 NOTE — PROGRESS NOTES
Subjective:       Patient ID: Alfred Villarreal is a 59 y.o. male.    Chief Complaint: Follow-up, Diabetes Mellitus, and Foot Ulcer   Patient presents for follow-up today multiple areas of breakdown and ulceration left.    Past Medical History:   Diagnosis Date    Anticoagulant long-term use     Arthritis     Diabetes mellitus     Hypertension      History reviewed. No pertinent surgical history.  History reviewed. No pertinent family history.  Social History     Socioeconomic History    Marital status:      Spouse name: Not on file    Number of children: Not on file    Years of education: Not on file    Highest education level: Not on file   Occupational History    Not on file   Social Needs    Financial resource strain: Not on file    Food insecurity     Worry: Not on file     Inability: Not on file    Transportation needs     Medical: Not on file     Non-medical: Not on file   Tobacco Use    Smoking status: Never Smoker    Smokeless tobacco: Current User     Types: Chew   Substance and Sexual Activity    Alcohol use: Yes     Frequency: Monthly or less     Drinks per session: 3 or 4     Binge frequency: Monthly    Drug use: No    Sexual activity: Yes     Partners: Female   Lifestyle    Physical activity     Days per week: Not on file     Minutes per session: Not on file    Stress: Not on file   Relationships    Social connections     Talks on phone: Not on file     Gets together: Not on file     Attends Gnosticist service: Not on file     Active member of club or organization: Not on file     Attends meetings of clubs or organizations: Not on file     Relationship status: Not on file   Other Topics Concern    Not on file   Social History Narrative    Not on file       Current Outpatient Medications   Medication Sig Dispense Refill    atorvastatin (LIPITOR) 40 MG tablet Take 1 tablet (40 mg total) by mouth once daily. 90 tablet 3    baclofen (LIORESAL) 10 MG tablet Take 10 mg by mouth 3  "(three) times daily.      ergocalciferol (ERGOCALCIFEROL) 50,000 unit Cap       gabapentin (NEURONTIN) 600 MG tablet       glyBURIDE (DIABETA) 5 MG tablet Take by mouth.      ibuprofen (ADVIL,MOTRIN) 800 MG tablet       LEVEMIR U-100 INSULIN 100 unit/mL injection Inject 80 Units into the skin 2 (two) times a day.      lisinopriL-hydrochlorothiazide (PRINZIDE,ZESTORETIC) 10-12.5 mg per tablet Take 2 tablets by mouth once daily. 180 tablet 3    metFORMIN (GLUCOPHAGE) 1000 MG tablet Take by mouth.      metoprolol succinate (TOPROL-XL) 50 MG 24 hr tablet Take 50 mg by mouth once daily.      NOVOLOG U-100 INSULIN ASPART 100 unit/mL injection 50 Units by abdominal subcutaneous route 2 (two) times daily before meals.      potassium chloride (MICRO-K) 10 MEQ CpSR Take 1 capsule by mouth.      rivaroxaban (XARELTO) 20 mg Tab Take 20 mg by mouth once daily.      sildenafil (REVATIO) 20 mg Tab Take 1-3 tablets as need 1 hour prior to sexual activity 30 tablet 0    sulfamethoxazole-trimethoprim 400-80mg (BACTRIM,SEPTRA) 400-80 mg per tablet Take 2 tablets by mouth 2 (two) times daily. for 14 days 56 tablet 0    traZODone (DESYREL) 100 MG tablet Take 100 mg by mouth.       No current facility-administered medications for this visit.      Review of patient's allergies indicates:   Allergen Reactions    Amitriptyline      Vivid dreams( bad)       Review of Systems   Musculoskeletal: Positive for arthralgias, back pain, gait problem and joint swelling.   Skin: Positive for color change and wound.   Neurological: Positive for numbness.   All other systems reviewed and are negative.      Objective:      Vitals:    09/02/20 1542   BP: 123/80   Pulse: (!) 57   Temp: 98.5 °F (36.9 °C)   Weight: 126.1 kg (278 lb)   Height: 6' 6" (1.981 m)     Physical Exam  Vitals signs and nursing note reviewed.   Constitutional:       Appearance: He is well-developed.   Cardiovascular:      Pulses:           Dorsalis pedis pulses are 1+ " on the right side.        Posterior tibial pulses are 0 on the right side.   Pulmonary:      Effort: Pulmonary effort is normal.   Musculoskeletal:      Right foot: Deformity present.   Feet:      Right foot:      Protective Sensation: 4 sites tested. 1 site sensed.     Skin integrity: Ulcer, skin breakdown and erythema present.   Skin:     Capillary Refill: Capillary refill takes more than 3 seconds.      Findings: Erythema present.   Neurological:      Deep Tendon Reflexes: Reflexes abnormal.   Psychiatric:         Behavior: Behavior normal.         Thought Content: Thought content normal.         Judgment: Judgment normal.                                    Assessment:       1. Skin ulcer of left foot with fat layer exposed    2. Type II diabetes mellitus with neurological manifestations        Plan:         Patient presents today follow-up of multiple sites of ulceration on the patient's left lower extremity he has had a previous amputation of the right lower extremity has an extensive history of osteomyelitis.  The ulceration overlying the 1st MPJ left show signs of improvement this ulceration site now has granular healthy tissue present it had previously been macerated with heavy drainage this ulceration is approximately 1.5 cm in diameter.  Area of breakdown on the patient's left heel is stable this continues to be approximately 4.5 cm long by 2.5 cm wide there is a centralized area of fibrous tissue with minimal serous drainage.  Patient states he currently has about 4 days left of Bactrim I have advised him the wound looks so much better and the infection is being well controlled he is going to need another 2 weeks of Bactrim while we continue to treat this and provide wound care.  Patient is going to continue to clean the entire area of ulceration with Dakin solution paint the area with Betadine and change the well-padded dressing daily he is to stay off of the foot and not put any weight on the area I  have instructed the patient to go to the emergency room immediately with any increased redness swelling pain or drainage the overall area that is encompassed in the ulceration and skin breakdown site on the plantar heel region is 10 cm long by 4 cm wide patient is also having increased discomfort he has requested that we contact his pain management provider in let them know the patient is dealing with a large ulceration and increased pain to see if they will possibly increase the frequency of his pain medication.  We did reach out to the patient's pain management provider pain stop via fax to advised them that the patient is being treated for a large ulceration on the bottom of the left foot that is causing him increased nerve related pain..  I explained to the patient the severity of his condition he needs to make sure he keeps the area dry and clean and change the dressing every day as recommended he cannot get this area wet while bathing.  Patient encouraged to keep the appointment as scheduled.  Patient indicated he has not been on his feet at all I advised him this has made a huge difference and allowing this to heal very well it is dramatically improved in comparison to previous evaluation.  Follow-up is going to be 7-10 days.  Face-to-face time including discussion evaluation treatment wound care wound debridement equaled 30 min.  This note was created using [employer] voice recognition software that occasionally misinterpreted phrases or words.

## 2020-09-16 ENCOUNTER — OFFICE VISIT (OUTPATIENT)
Dept: PODIATRY | Facility: CLINIC | Age: 60
End: 2020-09-16
Payer: MEDICAID

## 2020-09-16 VITALS
DIASTOLIC BLOOD PRESSURE: 76 MMHG | HEART RATE: 86 BPM | WEIGHT: 278 LBS | SYSTOLIC BLOOD PRESSURE: 121 MMHG | HEIGHT: 78 IN | RESPIRATION RATE: 20 BRPM | BODY MASS INDEX: 32.17 KG/M2 | TEMPERATURE: 97 F | OXYGEN SATURATION: 98 %

## 2020-09-16 DIAGNOSIS — L97.522 SKIN ULCER OF LEFT FOOT WITH FAT LAYER EXPOSED: ICD-10-CM

## 2020-09-16 DIAGNOSIS — E11.49 TYPE II DIABETES MELLITUS WITH NEUROLOGICAL MANIFESTATIONS: Primary | ICD-10-CM

## 2020-09-16 PROCEDURE — 99999 PR PBB SHADOW E&M-EST. PATIENT-LVL V: CPT | Mod: PBBFAC,,, | Performed by: PODIATRIST

## 2020-09-16 PROCEDURE — 11045 WOUND DEBRIDEMENT: ICD-10-PCS | Mod: S$PBB,,, | Performed by: PODIATRIST

## 2020-09-16 PROCEDURE — 11042 DBRDMT SUBQ TIS 1ST 20SQCM/<: CPT | Mod: S$PBB,,, | Performed by: PODIATRIST

## 2020-09-16 PROCEDURE — 11042 WOUND DEBRIDEMENT: ICD-10-PCS | Mod: S$PBB,,, | Performed by: PODIATRIST

## 2020-09-16 PROCEDURE — 99999 PR PBB SHADOW E&M-EST. PATIENT-LVL V: ICD-10-PCS | Mod: PBBFAC,,, | Performed by: PODIATRIST

## 2020-09-16 PROCEDURE — 11045 DBRDMT SUBQ TISS EACH ADDL: CPT | Mod: PBBFAC | Performed by: PODIATRIST

## 2020-09-16 PROCEDURE — 99215 OFFICE O/P EST HI 40 MIN: CPT | Mod: PBBFAC,25 | Performed by: PODIATRIST

## 2020-09-16 PROCEDURE — 99214 OFFICE O/P EST MOD 30 MIN: CPT | Mod: 25,S$PBB,, | Performed by: PODIATRIST

## 2020-09-16 PROCEDURE — 99214 PR OFFICE/OUTPT VISIT, EST, LEVL IV, 30-39 MIN: ICD-10-PCS | Mod: 25,S$PBB,, | Performed by: PODIATRIST

## 2020-09-16 RX ORDER — OXYCODONE AND ACETAMINOPHEN 10; 325 MG/1; MG/1
1 TABLET ORAL EVERY 4 HOURS PRN
COMMUNITY
End: 2021-05-01

## 2020-09-19 NOTE — PROCEDURES
"Wound Debridement    Date/Time: 9/16/2020 2:30 PM  Performed by: Gary Stringer DPM  Authorized by: Gary Stringer DPM     Time out: Immediately prior to procedure a "time out" was called to verify the correct patient, procedure, equipment, support staff and site/side marked as required.    Consent Done?:  Yes (Verbal)    Preparation: Patient was prepped and draped in usual sterile fashion    Local anesthesia used?: No      Wound Details:    Location:  Left foot    Location:  Left Plantar    Type of Debridement:  Excisional       Length (cm):  9       Area (sq cm):  22.5       Width (cm):  2.5       Percent Debrided (%):  100       Depth (cm):  0.3       Total Area Debrided (sq cm):  22.5    Depth of debridement:  Subcutaneous tissue    Devitalized tissue debrided:  Callus, Necrotic/Eschar, Slough and Fibrin    Instruments:  Blade    Bleeding:  None  Patient tolerance:  Patient tolerated the procedure well with no immediate complications      "

## 2020-09-19 NOTE — PROGRESS NOTES
Subjective:       Patient ID: Alfred Villarreal is a 59 y.o. male.    Chief Complaint: Follow-up and Foot Ulcer   Patient presents for follow-up today multiple areas of breakdown and ulceration left.    Past Medical History:   Diagnosis Date    Anticoagulant long-term use     Arthritis     Diabetes mellitus     Hypertension      History reviewed. No pertinent surgical history.  History reviewed. No pertinent family history.  Social History     Socioeconomic History    Marital status:      Spouse name: Not on file    Number of children: Not on file    Years of education: Not on file    Highest education level: Not on file   Occupational History    Not on file   Social Needs    Financial resource strain: Not on file    Food insecurity     Worry: Not on file     Inability: Not on file    Transportation needs     Medical: Not on file     Non-medical: Not on file   Tobacco Use    Smoking status: Never Smoker    Smokeless tobacco: Current User     Types: Chew   Substance and Sexual Activity    Alcohol use: Yes     Frequency: Monthly or less     Drinks per session: 3 or 4     Binge frequency: Monthly    Drug use: No    Sexual activity: Yes     Partners: Female   Lifestyle    Physical activity     Days per week: Not on file     Minutes per session: Not on file    Stress: Not on file   Relationships    Social connections     Talks on phone: Not on file     Gets together: Not on file     Attends Voodoo service: Not on file     Active member of club or organization: Not on file     Attends meetings of clubs or organizations: Not on file     Relationship status: Not on file   Other Topics Concern    Not on file   Social History Narrative    Not on file       Current Outpatient Medications   Medication Sig Dispense Refill    atorvastatin (LIPITOR) 40 MG tablet Take 1 tablet (40 mg total) by mouth once daily. 90 tablet 3    baclofen (LIORESAL) 10 MG tablet Take 10 mg by mouth 3 (three) times daily.    "   ergocalciferol (ERGOCALCIFEROL) 50,000 unit Cap       gabapentin (NEURONTIN) 600 MG tablet       glyBURIDE (DIABETA) 5 MG tablet Take by mouth.      ibuprofen (ADVIL,MOTRIN) 800 MG tablet       LEVEMIR U-100 INSULIN 100 unit/mL injection Inject 80 Units into the skin 2 (two) times a day.      lisinopriL-hydrochlorothiazide (PRINZIDE,ZESTORETIC) 10-12.5 mg per tablet Take 2 tablets by mouth once daily. 180 tablet 3    metFORMIN (GLUCOPHAGE) 1000 MG tablet Take by mouth.      metoprolol succinate (TOPROL-XL) 50 MG 24 hr tablet Take 50 mg by mouth once daily.      NOVOLOG U-100 INSULIN ASPART 100 unit/mL injection 50 Units by abdominal subcutaneous route 2 (two) times daily before meals.      oxyCODONE-acetaminophen (PERCOCET)  mg per tablet Take 1 tablet by mouth every 4 (four) hours as needed for Pain.      potassium chloride (MICRO-K) 10 MEQ CpSR Take 1 capsule by mouth.      rivaroxaban (XARELTO) 20 mg Tab Take 20 mg by mouth once daily.      sildenafil (REVATIO) 20 mg Tab Take 1-3 tablets as need 1 hour prior to sexual activity 30 tablet 0    traZODone (DESYREL) 100 MG tablet Take 100 mg by mouth.       No current facility-administered medications for this visit.      Review of patient's allergies indicates:   Allergen Reactions    Amitriptyline      Vivid dreams( bad)       Review of Systems   Musculoskeletal: Positive for arthralgias, back pain, gait problem and joint swelling.   Skin: Positive for color change and wound.   Neurological: Positive for numbness.   All other systems reviewed and are negative.      Objective:      Vitals:    09/16/20 1428   BP: 121/76   Pulse: 86   Resp: 20   Temp: 96.9 °F (36.1 °C)   TempSrc: Temporal   SpO2: 98%   Weight: 126.1 kg (278 lb)   Height: 6' 6" (1.981 m)     Physical Exam  Vitals signs and nursing note reviewed.   Constitutional:       Appearance: He is well-developed.   Cardiovascular:      Pulses:           Dorsalis pedis pulses are 1+ on the " right side.        Posterior tibial pulses are 0 on the right side.   Pulmonary:                  Effort: Pulmonary effort is normal.   Musculoskeletal:      Right foot: Deformity present.   Feet:      Right foot:      Protective Sensation: 4 sites tested. 1 site sensed.     Skin integrity: Ulcer, skin breakdown and erythema present.   Skin:     Capillary Refill: Capillary refill takes more than 3 seconds.      Findings: Erythema present.   Neurological:      Deep Tendon Reflexes: Reflexes abnormal.   Psychiatric:         Behavior: Behavior normal.         Thought Content: Thought content normal.         Judgment: Judgment normal.                                    Assessment:       1. Type II diabetes mellitus with neurological manifestations    2. Uncontrolled diabetes mellitus with foot ulcer    3. Skin ulcer of left foot with fat layer exposed        Plan:         Patient presents today follow-up of multiple sites of ulceration on the patient's left lower extremity he has had a previous amputation of the right lower extremity has an extensive history of osteomyelitis.  The ulceration overlying the 1st MPJ left show signs of improvement this ulceration site now has granular healthy tissue present it had previously been macerated with heavy drainage this ulceration is approximately 1.0 cm in diameter.  Area of breakdown on the patient's left heel is stable this continues to be approximately 4.5 cm long by 2.5 cm wide there is a centralized area of fibrous tissue with minimal serous drainage.  Patient states he currently has about 4 days left of Bactrim I have advised him the wound looks so much better and the infection is being well controlled he is going to need another 2 weeks of Bactrim while we continue to treat this and provide wound care.  Patient is going to continue to clean the entire area of ulceration with Dakin solution paint the area with Betadine and change the well-padded dressing daily he is to  stay off of the foot and not put any weight on the area I have instructed the patient to go to the emergency room immediately with any increased redness swelling pain or drainage the overall area that is encompassed in the ulceration and skin breakdown site on the plantar heel region is 9.0 cm long by 2.5 cm wide patient is also having increased discomfort.    I explained to the patient the severity of his condition he needs to make sure he keeps the area dry and clean and change the dressing every day twice a day if needed as recommended he cannot get this area wet while bathing.  Patient states he has not been putting any pressure at all on the left foot that is why he believes it is continuing to look better.  We did showed the patient how to clean the area with a P cm X scrub this is to be done twice a week although the dressings will be changed 1-2 times per day.  Excisional debridement was performed on the largest wound site removing the nonviable skin and tissue from the edges and central aspect of the wound itself.  Patient encouraged to keep the appointment as scheduled.  Patient indicated he has not been on his feet at all I advised him this has made a huge difference and allowing this to heal very well it is dramatically improved in comparison to previous evaluation.  Follow-up is going to be 3 weeks since the area has improved and continue to improve with each visit.  Patient states he has a few Bactrim left I have advised him to finish the prescription as directed..  Face-to-face time including discussion evaluation treatment wound care wound debridement equaled 30 min.  This note was created using [employer] voice recognition software that occasionally misinterpreted phrases or words.

## 2020-09-25 ENCOUNTER — TELEPHONE (OUTPATIENT)
Dept: FAMILY MEDICINE | Facility: CLINIC | Age: 60
End: 2020-09-25

## 2020-09-25 NOTE — TELEPHONE ENCOUNTER
----- Message from En Kc sent at 9/25/2020  2:00 PM CDT -----  Contact:  Seating/Mable Akins called in and stated she faxed orders for signature to office for patients power wheelchair on 9/18/20 & wanted to check the status.    Mable's call back number is [phone], ext [phone], fax number is [phone]

## 2020-09-29 ENCOUNTER — TELEPHONE (OUTPATIENT)
Dept: PODIATRY | Facility: CLINIC | Age: 60
End: 2020-09-29

## 2020-09-29 NOTE — TELEPHONE ENCOUNTER
----- Message from Claudette Campbell sent at 9/29/2020  9:44 AM CDT -----  Patient is calling to follow up on the letter to be sent to his pain management doctor regarding additional medication due to his pain.  He has a telemed visit with pain management this morning and he wants to be sure you have sent it.  Please call him at [phone].  Thank you!

## 2020-09-29 NOTE — TELEPHONE ENCOUNTER
Spoke to patient and advised another letter will be sent to pain management provider. This is the third one that has been sent per patient's request.

## 2020-10-04 ENCOUNTER — PATIENT OUTREACH (OUTPATIENT)
Dept: ADMINISTRATIVE | Facility: OTHER | Age: 60
End: 2020-10-04

## 2020-10-04 NOTE — PROGRESS NOTES
Chart was reviewed for overdue Proactive [facility] Encounters (CECIL)  topics  Updates were requested from care everywhere  Health Maintenance was unable to be updated  LINKS immunization registry triggered

## 2020-10-08 ENCOUNTER — TELEPHONE (OUTPATIENT)
Dept: PODIATRY | Facility: CLINIC | Age: 60
End: 2020-10-08

## 2020-10-08 NOTE — TELEPHONE ENCOUNTER
----- Message from Ailyn Grace sent at 10/8/2020  8:17 AM CDT -----  Contact: marva  Type:  Same Day Appointment Request    Caller is requesting a same day appointment.  Caller declined first available appointment listed below.      Name of Caller:  patient   When is the first available appointment?  10/21/2020  Symptoms:  missed appointment yesterday, would like to be seen today states it is urgent to discuss things he needs for his foot  Best Call Back Number:  [phone]     Additional Information:

## 2020-10-09 ENCOUNTER — OFFICE VISIT (OUTPATIENT)
Dept: PODIATRY | Facility: CLINIC | Age: 60
End: 2020-10-09
Payer: MEDICAID

## 2020-10-09 VITALS
BODY MASS INDEX: 32.17 KG/M2 | WEIGHT: 278 LBS | TEMPERATURE: 98 F | DIASTOLIC BLOOD PRESSURE: 76 MMHG | HEART RATE: 77 BPM | HEIGHT: 78 IN | SYSTOLIC BLOOD PRESSURE: 137 MMHG

## 2020-10-09 DIAGNOSIS — L08.9 DIABETIC FOOT INFECTION: ICD-10-CM

## 2020-10-09 DIAGNOSIS — Z89.9 HX OF AMPUTATION: ICD-10-CM

## 2020-10-09 DIAGNOSIS — L97.522 SKIN ULCER OF LEFT FOOT WITH FAT LAYER EXPOSED: ICD-10-CM

## 2020-10-09 DIAGNOSIS — E11.628 DIABETIC FOOT INFECTION: ICD-10-CM

## 2020-10-09 DIAGNOSIS — E11.49 TYPE II DIABETES MELLITUS WITH NEUROLOGICAL MANIFESTATIONS: Primary | ICD-10-CM

## 2020-10-09 PROCEDURE — 99214 OFFICE O/P EST MOD 30 MIN: CPT | Mod: 25,S$PBB,, | Performed by: PODIATRIST

## 2020-10-09 PROCEDURE — 11045 WOUND DEBRIDEMENT: ICD-10-PCS | Mod: S$PBB,,, | Performed by: PODIATRIST

## 2020-10-09 PROCEDURE — 11042 DBRDMT SUBQ TIS 1ST 20SQCM/<: CPT | Mod: S$PBB,,, | Performed by: PODIATRIST

## 2020-10-09 PROCEDURE — 87077 CULTURE AEROBIC IDENTIFY: CPT | Mod: 59

## 2020-10-09 PROCEDURE — 99214 PR OFFICE/OUTPT VISIT, EST, LEVL IV, 30-39 MIN: ICD-10-PCS | Mod: 25,S$PBB,, | Performed by: PODIATRIST

## 2020-10-09 PROCEDURE — 11045 DBRDMT SUBQ TISS EACH ADDL: CPT | Mod: PBBFAC | Performed by: PODIATRIST

## 2020-10-09 PROCEDURE — 87186 SC STD MICRODIL/AGAR DIL: CPT

## 2020-10-09 PROCEDURE — 99999 PR PBB SHADOW E&M-EST. PATIENT-LVL V: ICD-10-PCS | Mod: PBBFAC,,, | Performed by: PODIATRIST

## 2020-10-09 PROCEDURE — 99999 PR PBB SHADOW E&M-EST. PATIENT-LVL V: CPT | Mod: PBBFAC,,, | Performed by: PODIATRIST

## 2020-10-09 PROCEDURE — 99215 OFFICE O/P EST HI 40 MIN: CPT | Mod: PBBFAC | Performed by: PODIATRIST

## 2020-10-09 PROCEDURE — 87070 CULTURE OTHR SPECIMN AEROBIC: CPT

## 2020-10-09 PROCEDURE — 11042 WOUND DEBRIDEMENT: ICD-10-PCS | Mod: S$PBB,,, | Performed by: PODIATRIST

## 2020-10-09 RX ORDER — SULFAMETHOXAZOLE AND TRIMETHOPRIM 400; 80 MG/1; MG/1
2 TABLET ORAL 2 TIMES DAILY
Qty: 84 TABLET | Refills: 0 | Status: SHIPPED | OUTPATIENT
Start: 2020-10-09 | End: 2020-10-14 | Stop reason: ALTCHOICE

## 2020-10-09 NOTE — LETTER
October 11, 2020      Vahid Craig MD  [address] [facility] [place] [address]           [facility]  [address] [facility] [facility] [place] [address]  Phone: [phone]  Fax: [phone]          Patient: Alfred Villarreal   MR Number: [id number]   YOB: 1960   Date of Visit: 10/9/2020       Dear Dr. Vahid Craig:    Thank you for referring Alfred Villarreal to me for evaluation. Attached you will find relevant portions of my assessment and plan of care.    If you have questions, please do not hesitate to call me. I look forward to following Alfred Villarreal along with you.    Sincerely,    Gary Stringer, ANNIE    Enclosure  CC:  No Recipients    If you would like to receive this communication electronically, please contact [email or web address] or [phone] to request more information on [facility] Link access.    For providers and/or their staff who would like to refer a patient to [facility], please contact us through our one-stop-shop provider referral line, [facility], at [phone].    If you feel you have received this communication in error or would no longer like to receive these types of communications, please e-mail [email or web address]

## 2020-10-11 NOTE — PROCEDURES
"Wound Debridement    Date/Time: 10/9/2020 9:00 AM  Performed by: Gary Stringer DPM  Authorized by: Gary Stringer DPM     Time out: Immediately prior to procedure a "time out" was called to verify the correct patient, procedure, equipment, support staff and site/side marked as required.    Consent Done?:  Yes (Verbal)    Preparation: Patient was prepped and draped in usual sterile fashion    Local anesthesia used?: No      Wound Details:    Location:  Left foot    Location:  Left Heel    Type of Debridement:  Excisional       Length (cm):  10       Area (sq cm):  25       Width (cm):  2.5       Percent Debrided (%):  100       Depth (cm):  0.4       Total Area Debrided (sq cm):  25    Depth of debridement:  Subcutaneous tissue    Tissue debrided:  Subcutaneous    Devitalized tissue debrided:  Callus, Necrotic/Eschar, Slough, Fibrin and Exudate    Instruments:  Blade    Bleeding:  None  Patient tolerance:  Patient tolerated the procedure well with no immediate complications      "

## 2020-10-11 NOTE — PROGRESS NOTES
Subjective:       Patient ID: Alfred Villarreal is a 59 y.o. male.    Chief Complaint: Follow-up, Foot Ulcer, Foot Pain, Diabetes Mellitus, and Foot Problem   Patient presents for follow-up today multiple areas of breakdown and ulceration left.    Past Medical History:   Diagnosis Date    Anticoagulant long-term use     Arthritis     Diabetes mellitus     Hypertension      History reviewed. No pertinent surgical history.  History reviewed. No pertinent family history.  Social History     Socioeconomic History    Marital status:      Spouse name: Not on file    Number of children: Not on file    Years of education: Not on file    Highest education level: Not on file   Occupational History    Not on file   Social Needs    Financial resource strain: Not on file    Food insecurity     Worry: Not on file     Inability: Not on file    Transportation needs     Medical: Not on file     Non-medical: Not on file   Tobacco Use    Smoking status: Never Smoker    Smokeless tobacco: Current User     Types: Chew   Substance and Sexual Activity    Alcohol use: Yes     Frequency: Monthly or less     Drinks per session: 3 or 4     Binge frequency: Monthly    Drug use: No    Sexual activity: Yes     Partners: Female   Lifestyle    Physical activity     Days per week: Not on file     Minutes per session: Not on file    Stress: Not on file   Relationships    Social connections     Talks on phone: Not on file     Gets together: Not on file     Attends [employer] service: Not on file     Active member of club or organization: Not on file     Attends meetings of clubs or organizations: Not on file     Relationship status: Not on file   Other Topics Concern    Not on file   Social History Narrative    Not on file       Current Outpatient Medications   Medication Sig Dispense Refill    atorvastatin (LIPITOR) 40 MG tablet Take 1 tablet (40 mg total) by mouth once daily. 90 tablet 3    baclofen (LIORESAL) 10 MG tablet  "Take 10 mg by mouth 3 (three) times daily.      ergocalciferol (ERGOCALCIFEROL) 50,000 unit Cap       gabapentin (NEURONTIN) 600 MG tablet       glyBURIDE (DIABETA) 5 MG tablet Take by mouth.      ibuprofen (ADVIL,MOTRIN) 800 MG tablet       LEVEMIR U-100 INSULIN 100 unit/mL injection Inject 80 Units into the skin 2 (two) times a day.      lisinopriL-hydrochlorothiazide (PRINZIDE,ZESTORETIC) 10-12.5 mg per tablet Take 2 tablets by mouth once daily. 180 tablet 3    metFORMIN (GLUCOPHAGE) 1000 MG tablet Take by mouth.      metoprolol succinate (TOPROL-XL) 50 MG 24 hr tablet Take 50 mg by mouth once daily.      NOVOLOG U-100 INSULIN ASPART 100 unit/mL injection 50 Units by abdominal subcutaneous route 2 (two) times daily before meals.      oxyCODONE-acetaminophen (PERCOCET)  mg per tablet Take 1 tablet by mouth every 4 (four) hours as needed for Pain.      potassium chloride (MICRO-K) 10 MEQ CpSR Take 1 capsule by mouth.      rivaroxaban (XARELTO) 20 mg Tab Take 20 mg by mouth once daily.      sildenafil (REVATIO) 20 mg Tab Take 1-3 tablets as need 1 hour prior to sexual activity 30 tablet 0    traZODone (DESYREL) 100 MG tablet Take 100 mg by mouth.      sulfamethoxazole-trimethoprim 400-80mg (BACTRIM,SEPTRA) 400-80 mg per tablet Take 2 tablets by mouth 2 (two) times daily. for 21 days 84 tablet 0     No current facility-administered medications for this visit.      Review of patient's allergies indicates:   Allergen Reactions    Amitriptyline      Vivid dreams( bad)       Review of Systems   Musculoskeletal: Positive for arthralgias, back pain, gait problem and joint swelling.   Skin: Positive for color change and wound.   Neurological: Positive for numbness.   All other systems reviewed and are negative.      Objective:      Vitals:    10/09/20 0835   BP: 137/76   Pulse: 77   Temp: 98.2 °F (36.8 °C)   Weight: 126.1 kg (278 lb)   Height: 6' 6" (1.981 m)     Physical Exam  Vitals signs and nursing " note reviewed.   Constitutional:       Appearance: He is well-developed.   Cardiovascular:      Pulses:           Dorsalis pedis pulses are 1+ on the right side.        Posterior tibial pulses are 0 on the right side.   Pulmonary:      Effort: Pulmonary effort is normal.   Musculoskeletal:      Right foot: Deformity present.   Feet:      Right foot:      Protective Sensation: 4 sites tested. 1 site sensed.     Skin integrity: Ulcer, skin breakdown and erythema present.   Skin:     Capillary Refill: Capillary refill takes more than 3 seconds.      Findings: Erythema present.   Neurological:      Deep Tendon Reflexes: Reflexes abnormal.   Psychiatric:         Behavior: Behavior normal.         Thought Content: Thought content normal.         Judgment: Judgment normal.                                                        Assessment:       1. Skin ulcer of left foot with fat layer exposed    2. Type II diabetes mellitus with neurological manifestations    3. Hx of amputation        Plan:         Patient presents today follow-up of multiple sites of ulceration on the patient's left lower extremity he has had a previous amputation of the right lower extremity has an extensive history of osteomyelitis.  The ulceration overlying the 1st MPJ left show signs of improvement this ulceration site now has granular healthy tissue present it had previously been macerated with heavy drainage this ulceration is approximately 1.0 cm in diameter.  Increased granular tissue is noted around the ulcer site me state medial 1st MPJ left.  The ulceration on the plantar lateral aspect of the patient's left heel is quite a bit larger there is a thick hyperkeratotic rim around the area with a new small pocket of undermining this ulceration is 10 cm long by 2 and 0.5 cm wide patient indicated he did have to put some additional weight on this foot which likely explains the increasing size and areas of breakdown.  Sharp excisional debridement  was performed on this large heel ulcer site.  New culture and sensitivity was performed on the patient's left heel I have also started the patient back on Bactrim.  Plan follow-up will be 2 weeks unless there is any problems questions or concerns prior to that time patient reminded he must stay off of this left foot any weight to the area is going to be detrimental to healing and addressing the infection.  I explained to the patient the severity of his condition he needs to make sure he keeps the area dry and clean and change the dressing every day twice a day if needed as recommended he cannot get this area wet while bathing.  Patient states he has not been putting any pressure at all on the left foot that is why he believes it is continuing to look better.  We did showed the patient how to clean the area with a P cm X scrub this is to be done twice a week although the dressings will be changed 1-2 times per day.  Excisional debridement was performed on the largest wound site removing the nonviable skin and tissue from the edges and central aspect of the wound itself.  Patient encouraged to keep the appointment as scheduled.  Patient indicated he has not been on his feet at all I advised him this has made a huge difference and allowing this to heal very well it is dramatically improved in comparison to previous evaluation.   Face-to-face time including discussion evaluation treatment wound care wound debridement equaled 30 min.  This note was created using [employer] voice recognition software that occasionally misinterpreted phrases or words.

## 2020-10-14 ENCOUNTER — TELEPHONE (OUTPATIENT)
Dept: PODIATRY | Facility: CLINIC | Age: 60
End: 2020-10-14

## 2020-10-14 PROBLEM — L08.9 DIABETIC FOOT INFECTION: Status: ACTIVE | Noted: 2020-10-14

## 2020-10-14 PROBLEM — E11.628 DIABETIC FOOT INFECTION: Status: ACTIVE | Noted: 2020-10-14

## 2020-10-14 LAB
BACTERIA SPEC AEROBE CULT: ABNORMAL
BACTERIA SPEC AEROBE CULT: ABNORMAL

## 2020-10-14 RX ORDER — LINEZOLID 600 MG/1
600 TABLET, FILM COATED ORAL EVERY 12 HOURS
Qty: 28 TABLET | Refills: 1 | Status: SHIPPED | OUTPATIENT
Start: 2020-10-14 | End: 2020-10-28

## 2020-10-14 NOTE — TELEPHONE ENCOUNTER
----- Message from Gary Stringer DPM sent at 10/14/2020 11:47 AM CDT -----  Please call the patient regarding his abnormal result.  Please call and have this culture and sensitivity un suppressed.

## 2020-10-14 NOTE — TELEPHONE ENCOUNTER
Advised patient of culture results and antibiotic needing to be changed. Patient was advised to continue taking current antibiotic as directed until PA can be done and processed. Patient verbalized understanding

## 2020-10-14 NOTE — TELEPHONE ENCOUNTER
----- Message from Gary Stringre DPM sent at 10/14/2020 12:05 PM CDT -----  Please call the patient regarding his abnormal result.  Call patient and advise he is going to need to start Zyvox as the antibiotic to cover both the enterococcus and the MRSA Staph.  Otherwise the patient will need to be on IV antibiotics as the only other option.  Prescription has been sent to his pharmacy.

## 2020-10-16 ENCOUNTER — TELEPHONE (OUTPATIENT)
Dept: PODIATRY | Facility: CLINIC | Age: 60
End: 2020-10-16

## 2020-10-16 NOTE — TELEPHONE ENCOUNTER
Zyvoxx was approved for patient. Pharmacy notified to fill rx since it was approved. Is the patient to stop bactrim once he picks up the zyvoxx? Please advise.

## 2020-10-16 NOTE — TELEPHONE ENCOUNTER
Advised patient rx for zyvoxx is approved and to stop taking bactrim and start zyvoxx asap. Patient states he was on his way to  medication. Patient states he has not taken bactrim today, I advised patient to start zyvoxx as soon as he gets home from picking it up, patient verbalized understanding   37

## 2020-10-21 ENCOUNTER — OFFICE VISIT (OUTPATIENT)
Dept: PODIATRY | Facility: CLINIC | Age: 60
End: 2020-10-21
Payer: MEDICAID

## 2020-10-21 VITALS
TEMPERATURE: 98 F | OXYGEN SATURATION: 99 % | HEART RATE: 104 BPM | BODY MASS INDEX: 32.17 KG/M2 | HEIGHT: 78 IN | WEIGHT: 278 LBS | DIASTOLIC BLOOD PRESSURE: 78 MMHG | RESPIRATION RATE: 19 BRPM | SYSTOLIC BLOOD PRESSURE: 164 MMHG

## 2020-10-21 DIAGNOSIS — L97.522 SKIN ULCER OF LEFT FOOT WITH FAT LAYER EXPOSED: ICD-10-CM

## 2020-10-21 DIAGNOSIS — L97.522 ULCER OF LEFT FOOT WITH FAT LAYER EXPOSED: ICD-10-CM

## 2020-10-21 DIAGNOSIS — E11.49 TYPE II DIABETES MELLITUS WITH NEUROLOGICAL MANIFESTATIONS: Primary | ICD-10-CM

## 2020-10-21 PROCEDURE — 99999 PR PBB SHADOW E&M-EST. PATIENT-LVL V: ICD-10-PCS | Mod: PBBFAC,,, | Performed by: PODIATRIST

## 2020-10-21 PROCEDURE — 99215 OFFICE O/P EST HI 40 MIN: CPT | Mod: PBBFAC,25 | Performed by: PODIATRIST

## 2020-10-21 PROCEDURE — 99999 PR PBB SHADOW E&M-EST. PATIENT-LVL V: CPT | Mod: PBBFAC,,, | Performed by: PODIATRIST

## 2020-10-21 PROCEDURE — 99214 OFFICE O/P EST MOD 30 MIN: CPT | Mod: 25,S$PBB,, | Performed by: PODIATRIST

## 2020-10-21 PROCEDURE — 99214 PR OFFICE/OUTPT VISIT, EST, LEVL IV, 30-39 MIN: ICD-10-PCS | Mod: 25,S$PBB,, | Performed by: PODIATRIST

## 2020-10-21 PROCEDURE — 11042 WOUND DEBRIDEMENT: ICD-10-PCS | Mod: S$PBB,,, | Performed by: PODIATRIST

## 2020-10-21 PROCEDURE — 11042 DBRDMT SUBQ TIS 1ST 20SQCM/<: CPT | Mod: PBBFAC | Performed by: PODIATRIST

## 2020-10-21 RX ORDER — HYDROCODONE BITARTRATE AND ACETAMINOPHEN 7.5; 325 MG/1; MG/1
1 TABLET ORAL EVERY 6 HOURS PRN
COMMUNITY
End: 2021-05-01

## 2020-10-21 NOTE — LETTER
October 21, 2020      Vahid Craig MD  [address] [facility] [place] [address]

## 2020-10-25 NOTE — PROCEDURES
"Wound Debridement    Date/Time: 10/21/2020 10:30 AM  Performed by: Gary Stringer DPM  Authorized by: Gary Stringer DPM     Time out: Immediately prior to procedure a "time out" was called to verify the correct patient, procedure, equipment, support staff and site/side marked as required.    Consent Done?:  Yes (Verbal)    Preparation: Patient was prepped and draped in usual sterile fashion    Local anesthesia used?: No      Wound Details:    Location:  Left foot    Location:  Left Heel    Type of Debridement:  Excisional       Length (cm):  10       Area (sq cm):  20       Width (cm):  2       Percent Debrided (%):  100       Depth (cm):  0.5       Total Area Debrided (sq cm):  20    Depth of debridement:  Subcutaneous tissue    Devitalized tissue debrided:  Callus, Necrotic/Eschar, Slough and Fibrin    Instruments:  Blade    Bleeding:  None  Patient tolerance:  Patient tolerated the procedure well with no immediate complications      "

## 2020-10-25 NOTE — PROGRESS NOTES
Subjective:       Patient ID: Alfred Villarreal is a 59 y.o. male.    Chief Complaint: Follow-up and Wound Care   Patient presents for follow-up today multiple areas of breakdown and ulceration left.    Past Medical History:   Diagnosis Date    Anticoagulant long-term use     Arthritis     Diabetes mellitus     Hypertension      History reviewed. No pertinent surgical history.  History reviewed. No pertinent family history.  Social History     Socioeconomic History    Marital status:      Spouse name: Not on file    Number of children: Not on file    Years of education: Not on file    Highest education level: Not on file   Occupational History    Not on file   Social Needs    Financial resource strain: Not on file    Food insecurity     Worry: Not on file     Inability: Not on file    Transportation needs     Medical: Not on file     Non-medical: Not on file   Tobacco Use    Smoking status: Never Smoker    Smokeless tobacco: Current User     Types: Chew   Substance and Sexual Activity    Alcohol use: Yes     Frequency: Monthly or less     Drinks per session: 3 or 4     Binge frequency: Monthly    Drug use: No    Sexual activity: Yes     Partners: Female   Lifestyle    Physical activity     Days per week: Not on file     Minutes per session: Not on file    Stress: Not on file   Relationships    Social connections     Talks on phone: Not on file     Gets together: Not on file     Attends Orthodox service: Not on file     Active member of club or organization: Not on file     Attends meetings of clubs or organizations: Not on file     Relationship status: Not on file   Other Topics Concern    Not on file   Social History Narrative    Not on file       Current Outpatient Medications   Medication Sig Dispense Refill    atorvastatin (LIPITOR) 40 MG tablet Take 1 tablet (40 mg total) by mouth once daily. 90 tablet 3    baclofen (LIORESAL) 10 MG tablet Take 10 mg by mouth 3 (three) times daily.       ergocalciferol (ERGOCALCIFEROL) 50,000 unit Cap       gabapentin (NEURONTIN) 600 MG tablet       glyBURIDE (DIABETA) 5 MG tablet Take by mouth.      HYDROcodone-acetaminophen (NORCO) 7.5-325 mg per tablet Take 1 tablet by mouth every 6 (six) hours as needed for Pain.      ibuprofen (ADVIL,MOTRIN) 800 MG tablet       LEVEMIR U-100 INSULIN 100 unit/mL injection Inject 80 Units into the skin 2 (two) times a day.      linezolid (ZYVOX) 600 mg Tab Take 1 tablet (600 mg total) by mouth every 12 (twelve) hours. for 14 days 28 tablet 1    lisinopriL-hydrochlorothiazide (PRINZIDE,ZESTORETIC) 10-12.5 mg per tablet Take 2 tablets by mouth once daily. 180 tablet 3    metFORMIN (GLUCOPHAGE) 1000 MG tablet Take by mouth.      metoprolol succinate (TOPROL-XL) 50 MG 24 hr tablet Take 50 mg by mouth once daily.      NOVOLOG U-100 INSULIN ASPART 100 unit/mL injection 50 Units by abdominal subcutaneous route 2 (two) times daily before meals.      oxyCODONE-acetaminophen (PERCOCET)  mg per tablet Take 1 tablet by mouth every 4 (four) hours as needed for Pain.      potassium chloride (MICRO-K) 10 MEQ CpSR Take 1 capsule by mouth.      rivaroxaban (XARELTO) 20 mg Tab Take 20 mg by mouth once daily.      sildenafil (REVATIO) 20 mg Tab Take 1-3 tablets as need 1 hour prior to sexual activity 30 tablet 0    traZODone (DESYREL) 100 MG tablet Take 100 mg by mouth.       No current facility-administered medications for this visit.      Review of patient's allergies indicates:   Allergen Reactions    Amitriptyline      Vivid dreams( bad)       Review of Systems   Musculoskeletal: Positive for arthralgias, back pain, gait problem and joint swelling.   Skin: Positive for color change and wound.   Neurological: Positive for numbness.   All other systems reviewed and are negative.      Objective:      Vitals:    10/21/20 1027   BP: (!) 164/78   Pulse: 104   Resp: 19   Temp: 98.2 °F (36.8 °C)   TempSrc: Temporal   SpO2: 99%  "  Weight: 126.1 kg (278 lb)   Height: 6' 6" (1.981 m)     Physical Exam  Vitals signs and nursing note reviewed.   Constitutional:       Appearance: He is well-developed.   Cardiovascular:      Pulses:           Dorsalis pedis pulses are 1+ on the right side.        Posterior tibial pulses are 0 on the right side.   Pulmonary:      Effort: Pulmonary effort is normal.   Musculoskeletal:      Right foot: Deformity present.   Feet:      Right foot:      Protective Sensation: 4 sites tested. 1 site sensed.     Skin integrity: Ulcer, skin breakdown and erythema present.   Skin:     Capillary Refill: Capillary refill takes more than 3 seconds.      Findings: Erythema present.   Neurological:      Deep Tendon Reflexes: Reflexes abnormal.   Psychiatric:         Behavior: Behavior normal.         Thought Content: Thought content normal.         Judgment: Judgment normal.                                                                    Assessment:       1. Type II diabetes mellitus with neurological manifestations    2. Ulcer of left foot with fat layer exposed    3. Skin ulcer of left foot with fat layer exposed        Plan:         Patient presents today follow-up of multiple sites of ulceration on the patient's left lower extremity he has had a previous amputation of the right lower extremity has an extensive history of osteomyelitis.  The ulceration overlying the 1st MPJ left show signs of improvement this ulceration site now has granular healthy tissue present it had previously been macerated with heavy drainage this ulceration is approximately 1.0 cm in diameter.  Increased granular tissue is noted around the ulcer site me state medial 1st MPJ left.  The ulceration on the plantar lateral aspect of the patient's left heel is quite a bit larger there is a thick hyperkeratotic rim around the area with a new small pocket of undermining this ulceration is 10 cm long by 2 cm wide and 0.3 cm deep patient indicated he did " have to put some additional weight on this foot which likely explains the increasing size and areas of breakdown.  Sharp excisional debridement was performed on this large heel ulcer site.  New culture and sensitivity was performed on the patient's left heel. Plan follow-up will be 3cweeks unless there is any problems questions or concerns prior to that time patient reminded he must stay off of this left foot any weight to the area is going to be detrimental to healing and addressing the infection.  I explained to the patient the severity of his condition he needs to make sure he keeps the area dry and clean and change the dressing every day twice a day if needed as recommended he cannot get this area wet while bathing.  Patient states he has not been putting any pressure at all on the left foot that is why he believes it is continuing to look better.  We did showed the patient how to clean the area with a P cm X scrub this is to be done twice a week although the dressings will be changed 1-2 times per day.  Excisional debridement was performed on the largest wound site removing the nonviable skin and tissue from the edges and central aspect of the wound itself.  Patient encouraged to keep the appointment as scheduled.  Patient indicated he has not been on his feet at all I advised him this has made a huge difference and allowing this to heal very well it is dramatically improved in comparison to previous evaluation.   Face-to-face time including discussion evaluation treatment wound care wound debridement equaled 30 min.  Patient is currently taking Zyvox per his culture and sensitivity he has discontinued his Bactrim.  Patient advised all the areas are looking better it is very important that he stays off of his foot and continue to follow instructions as well as do the wound care as directed.  This note was created using [employer] voice recognition software that occasionally misinterpreted phrases or  words.

## 2020-11-10 ENCOUNTER — PATIENT OUTREACH (OUTPATIENT)
Dept: ADMINISTRATIVE | Facility: OTHER | Age: 60
End: 2020-11-10

## 2020-11-10 NOTE — PROGRESS NOTES
Chart was reviewed for overdue Proactive [facility] Encounters (CECIL)  topics  Updates were requested from care everywhere  Health Maintenance has been updated  LINKS immunization registry triggered

## 2020-11-11 ENCOUNTER — OFFICE VISIT (OUTPATIENT)
Dept: PODIATRY | Facility: CLINIC | Age: 60
End: 2020-11-11
Payer: MEDICAID

## 2020-11-11 ENCOUNTER — HOSPITAL ENCOUNTER (OUTPATIENT)
Dept: RADIOLOGY | Facility: HOSPITAL | Age: 60
Discharge: HOME | End: 2020-11-11
Attending: PODIATRIST
Payer: MEDICAID

## 2020-11-11 VITALS
HEIGHT: 78 IN | DIASTOLIC BLOOD PRESSURE: 99 MMHG | BODY MASS INDEX: 32.17 KG/M2 | WEIGHT: 278 LBS | SYSTOLIC BLOOD PRESSURE: 177 MMHG | TEMPERATURE: 98 F | HEART RATE: 78 BPM

## 2020-11-11 DIAGNOSIS — L97.522 SKIN ULCER OF LEFT FOOT WITH FAT LAYER EXPOSED: ICD-10-CM

## 2020-11-11 DIAGNOSIS — E11.49 TYPE II DIABETES MELLITUS WITH NEUROLOGICAL MANIFESTATIONS: ICD-10-CM

## 2020-11-11 DIAGNOSIS — L97.522 SKIN ULCER OF LEFT FOOT WITH FAT LAYER EXPOSED: Primary | ICD-10-CM

## 2020-11-11 DIAGNOSIS — L97.522 ULCER OF LEFT FOOT WITH FAT LAYER EXPOSED: ICD-10-CM

## 2020-11-11 PROCEDURE — 73630 X-RAY EXAM OF FOOT: CPT | Mod: 26,LT,, | Performed by: RADIOLOGY

## 2020-11-11 PROCEDURE — 99214 OFFICE O/P EST MOD 30 MIN: CPT | Mod: S$PBB,,, | Performed by: PODIATRIST

## 2020-11-11 PROCEDURE — 99999 PR PBB SHADOW E&M-EST. PATIENT-LVL V: ICD-10-PCS | Mod: PBBFAC,,, | Performed by: PODIATRIST

## 2020-11-11 PROCEDURE — 99215 OFFICE O/P EST HI 40 MIN: CPT | Mod: PBBFAC,25 | Performed by: PODIATRIST

## 2020-11-11 PROCEDURE — 73630 XR FOOT COMPLETE 3 VIEW LEFT: ICD-10-PCS | Mod: 26,LT,, | Performed by: RADIOLOGY

## 2020-11-11 PROCEDURE — 99999 PR PBB SHADOW E&M-EST. PATIENT-LVL V: CPT | Mod: PBBFAC,,, | Performed by: PODIATRIST

## 2020-11-11 PROCEDURE — 99214 PR OFFICE/OUTPT VISIT, EST, LEVL IV, 30-39 MIN: ICD-10-PCS | Mod: S$PBB,,, | Performed by: PODIATRIST

## 2020-11-11 PROCEDURE — 73630 X-RAY EXAM OF FOOT: CPT | Mod: TC,FY,LT

## 2020-11-11 NOTE — LETTER
November 13, 2020      Vahid Craig MD  [address] [facility] [place] [address]           [facility]  [address] [facility] [place] [address]  Phone: [phone]  Fax: [phone]          Patient: Alfred Villarreal   MR Number: [id number]   YOB: 1960   Date of Visit: 11/11/2020       Dear Dr. Vahid Craig:    Thank you for referring Alfred Villarreal to me for evaluation. Attached you will find relevant portions of my assessment and plan of care.    If you have questions, please do not hesitate to call me. I look forward to following Alfred Villarreal along with you.    Sincerely,    Gary Stringer, ANNIE    Enclosure  CC:  No Recipients    If you would like to receive this communication electronically, please contact [email or web address] or [phone] to request more information on [facility] Link access.    For providers and/or their staff who would like to refer a patient to [facility], please contact us through our one-stop-shop provider referral line, [facility], at [phone].    If you feel you have received this communication in error or would no longer like to receive these types of communications, please e-mail [email or web address]
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
## 2020-11-30 ENCOUNTER — TELEPHONE (OUTPATIENT)
Dept: PODIATRY | Facility: CLINIC | Age: 60
End: 2020-11-30

## 2020-11-30 DIAGNOSIS — L97.522 ULCER OF LEFT FOOT WITH FAT LAYER EXPOSED: Primary | ICD-10-CM

## 2020-11-30 NOTE — TELEPHONE ENCOUNTER
Pt stated he is unable to do own wound care and would like to have HH if at all possible. Stated location of wound is making it difficult and pt has no one available and willing to provide help.

## 2020-11-30 NOTE — TELEPHONE ENCOUNTER
----- Message from Chata Walters sent at 11/30/2020  1:05 PM CST -----  Type: Needs Medical Advice  Who Called:  PATIENT  Best Call Back Number: [phone] (home)   Additional Information: THE PT WANTS A NURSE TO CALL HIM BACK ASAP TODAY PLEASE HE NEEDS AN ORDER FOR A HOME NURSE TO COME OUT.

## 2020-11-30 NOTE — TELEPHONE ENCOUNTER
Contacted GERARDO and Maria A and no waiting period that anyone is aware of. HH will be [facility].

## 2020-12-02 ENCOUNTER — OFFICE VISIT (OUTPATIENT)
Dept: PODIATRY | Facility: CLINIC | Age: 60
End: 2020-12-02
Payer: MEDICAID

## 2020-12-02 VITALS
SYSTOLIC BLOOD PRESSURE: 151 MMHG | HEIGHT: 78 IN | RESPIRATION RATE: 20 BRPM | BODY MASS INDEX: 32.28 KG/M2 | HEART RATE: 82 BPM | TEMPERATURE: 98 F | OXYGEN SATURATION: 99 % | DIASTOLIC BLOOD PRESSURE: 86 MMHG | WEIGHT: 279 LBS

## 2020-12-02 DIAGNOSIS — L97.522 SKIN ULCER OF LEFT FOOT WITH FAT LAYER EXPOSED: ICD-10-CM

## 2020-12-02 DIAGNOSIS — E11.49 TYPE II DIABETES MELLITUS WITH NEUROLOGICAL MANIFESTATIONS: Primary | ICD-10-CM

## 2020-12-02 DIAGNOSIS — L03.119 CELLULITIS OF FOOT: ICD-10-CM

## 2020-12-02 DIAGNOSIS — L97.522 ULCER OF LEFT FOOT WITH FAT LAYER EXPOSED: ICD-10-CM

## 2020-12-02 DIAGNOSIS — M71.072 ABSCESS OF BURSA OF LEFT FOOT: ICD-10-CM

## 2020-12-02 PROCEDURE — 11045 DBRDMT SUBQ TISS EACH ADDL: CPT | Mod: PBBFAC | Performed by: PODIATRIST

## 2020-12-02 PROCEDURE — 11042 WOUND DEBRIDEMENT: ICD-10-PCS | Mod: S$PBB,,, | Performed by: PODIATRIST

## 2020-12-02 PROCEDURE — 99215 PR OFFICE/OUTPT VISIT, EST, LEVL V, 40-54 MIN: ICD-10-PCS | Mod: 25,S$PBB,, | Performed by: PODIATRIST

## 2020-12-02 PROCEDURE — 99999 PR PBB SHADOW E&M-EST. PATIENT-LVL V: ICD-10-PCS | Mod: PBBFAC,,, | Performed by: PODIATRIST

## 2020-12-02 PROCEDURE — 99999 PR PBB SHADOW E&M-EST. PATIENT-LVL V: CPT | Mod: PBBFAC,,, | Performed by: PODIATRIST

## 2020-12-02 PROCEDURE — 11045 WOUND DEBRIDEMENT: ICD-10-PCS | Mod: S$PBB,,, | Performed by: PODIATRIST

## 2020-12-02 PROCEDURE — 11042 DBRDMT SUBQ TIS 1ST 20SQCM/<: CPT | Mod: S$PBB,,, | Performed by: PODIATRIST

## 2020-12-02 PROCEDURE — 99215 OFFICE O/P EST HI 40 MIN: CPT | Mod: PBBFAC | Performed by: PODIATRIST

## 2020-12-02 PROCEDURE — 99215 OFFICE O/P EST HI 40 MIN: CPT | Mod: 25,S$PBB,, | Performed by: PODIATRIST

## 2020-12-02 RX ORDER — LINEZOLID 600 MG/1
600 TABLET, FILM COATED ORAL EVERY 12 HOURS
Qty: 60 TABLET | Refills: 0 | Status: SHIPPED | OUTPATIENT
Start: 2020-12-02 | End: 2021-01-01

## 2020-12-02 NOTE — LETTER
December 2, 2020      Vahid Craig MD  [address] [place] [address]           [facility] - Podiatry/[facility]ound [facility]  [address] [address] [place] [address]  Phone: [phone]  Fax: [phone]          Patient: Alfred Villarreal   MR Number: [id number]   YOB: 1960   Date of Visit: 12/2/2020       Dear Dr. Vahid Craig:    Thank you for referring Alfred Villarreal to me for evaluation. Attached you will find relevant portions of my assessment and plan of care.    If you have questions, please do not hesitate to call me. I look forward to following Alfred Villarreal along with you.    Sincerely,    Gary Stringer, ANNIE    Enclosure  CC:  No Recipients    If you would like to receive this communication electronically, please contact [email or web address] or [phone] to request more information on [employer] access.    For providers and/or their staff who would like to refer a patient to [facility], please contact us through our one-stop-shop provider referral line, [facility], at [phone].    If you feel you have received this communication in error or would no longer like to receive these types of communications, please e-mail [email or web address]
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
## 2020-12-02 NOTE — PROCEDURES
"Wound Debridement    Date/Time: 12/2/2020 1:15 PM  Performed by: Gary Stringer DPM  Authorized by: Gary Stringer DPM     Time out: Immediately prior to procedure a "time out" was called to verify the correct patient, procedure, equipment, support staff and site/side marked as required.    Consent Done?:  Yes (Verbal)    Preparation: Patient was prepped and draped in usual sterile fashion    Local anesthesia used?: No      Wound Details:    Location:  Left foot    Location:  Left Heel    Type of Debridement:  Excisional       Length (cm):  16       Area (sq cm):  208       Width (cm):  13       Percent Debrided (%):  100       Depth (cm):  0.3       Total Area Debrided (sq cm):  208    Depth of debridement:  Subcutaneous tissue    Tissue debrided:  Epidermis, Hypergranulation, Subcutaneous and Other    Devitalized tissue debrided:  Biofilm, Callus, Exudate, Fibrin, Necrotic/Eschar and Slough    Instruments:  Blade    Bleeding:  None  Patient tolerance:  Patient tolerated the procedure well with no immediate complications      "

## 2020-12-03 PROCEDURE — G0180 PR HOME HEALTH MD CERTIFICATION: ICD-10-PCS | Mod: ,,, | Performed by: FAMILY MEDICINE

## 2020-12-03 PROCEDURE — G0180 MD CERTIFICATION HHA PATIENT: HCPCS | Mod: ,,, | Performed by: FAMILY MEDICINE

## 2020-12-04 ENCOUNTER — TELEPHONE (OUTPATIENT)
Dept: PODIATRY | Facility: CLINIC | Age: 60
End: 2020-12-04

## 2020-12-04 NOTE — TELEPHONE ENCOUNTER
----- Message from En LOPEZ Yamini sent at 12/4/2020  2:31 PM CST -----  Regarding: Prior Auth  Contact: patient  Patient called in and stated that his Rx for:    linezolid (ZYVOX) 600 mg Tab 60 tablet 0 12/2/2020 1/1/2021 --  Sig - Route: Take 1 tablet (600 mg total) by mouth every 12 (twelve) hours. - Oral    Needs a prior authorization.  Patient stated [facility] just informed him of this.      [id number] - [place] [id number] AT [facility] [address]  [address] [address]  Phone: [phone] Fax: [phone]    Patient call back number is [phone]

## 2020-12-04 NOTE — TELEPHONE ENCOUNTER
Advised patient the rx was approved he should be able to pick today. Patient verbalized understanding

## 2020-12-04 NOTE — TELEPHONE ENCOUNTER
Notified pharmacy PA was approved and can run again. Medication ran and they will prepare for pt and will notify him when it is ready for .

## 2020-12-04 NOTE — TELEPHONE ENCOUNTER
----- Message from Anca Pineda sent at 12/4/2020  3:31 PM CST -----  Regarding: prior authorization for antibiotic  Type: Needs Medical Advice  Who Called:  pt  Symptoms (please be specific):    How long has patient had these symptoms:    Pharmacy name and phone #:    Best Call Back Number: [phone] (home)     Additional Information: pt states that [facility] need prior authorization for antibiotic pls call to advise

## 2020-12-04 NOTE — TELEPHONE ENCOUNTER
Advised pharmacy medication has been approved. States she will try running it through again. Other nurse in the clinic is calling insurance company to see what the hold up may be.

## 2020-12-07 ENCOUNTER — TELEPHONE (OUTPATIENT)
Dept: PODIATRY | Facility: CLINIC | Age: 60
End: 2020-12-07

## 2020-12-07 ENCOUNTER — EXTERNAL HOME HEALTH (OUTPATIENT)
Dept: HOME HEALTH SERVICES | Facility: HOSPITAL | Age: 60
End: 2020-12-07
Payer: MEDICAID

## 2020-12-07 NOTE — TELEPHONE ENCOUNTER
[facility] stated they are able to provide visits twice weekly, then after the first of the year it will decrease to once weekly. This is due to insurance.

## 2020-12-09 ENCOUNTER — HOSPITAL ENCOUNTER (OUTPATIENT)
Dept: RADIOLOGY | Facility: HOSPITAL | Age: 60
Discharge: HOME | End: 2020-12-09
Attending: PODIATRIST
Payer: MEDICAID

## 2020-12-09 ENCOUNTER — OFFICE VISIT (OUTPATIENT)
Dept: PODIATRY | Facility: CLINIC | Age: 60
End: 2020-12-09
Payer: MEDICAID

## 2020-12-09 VITALS
OXYGEN SATURATION: 99 % | BODY MASS INDEX: 32.28 KG/M2 | WEIGHT: 279 LBS | HEART RATE: 97 BPM | RESPIRATION RATE: 18 BRPM | SYSTOLIC BLOOD PRESSURE: 142 MMHG | TEMPERATURE: 99 F | DIASTOLIC BLOOD PRESSURE: 75 MMHG | HEIGHT: 78 IN

## 2020-12-09 DIAGNOSIS — Z89.9 HX OF AMPUTATION: ICD-10-CM

## 2020-12-09 DIAGNOSIS — E11.49 TYPE II DIABETES MELLITUS WITH NEUROLOGICAL MANIFESTATIONS: Primary | ICD-10-CM

## 2020-12-09 DIAGNOSIS — L97.522 SKIN ULCER OF LEFT FOOT WITH FAT LAYER EXPOSED: ICD-10-CM

## 2020-12-09 DIAGNOSIS — L97.522 ULCER OF LEFT FOOT WITH FAT LAYER EXPOSED: ICD-10-CM

## 2020-12-09 PROCEDURE — 11042 WOUND DEBRIDEMENT: ICD-10-PCS | Mod: S$PBB,,, | Performed by: PODIATRIST

## 2020-12-09 PROCEDURE — 11042 DBRDMT SUBQ TIS 1ST 20SQCM/<: CPT | Mod: S$PBB,,, | Performed by: PODIATRIST

## 2020-12-09 PROCEDURE — 11045 WOUND DEBRIDEMENT: ICD-10-PCS | Mod: S$PBB,,, | Performed by: PODIATRIST

## 2020-12-09 PROCEDURE — 99215 OFFICE O/P EST HI 40 MIN: CPT | Mod: 25,S$PBB,, | Performed by: PODIATRIST

## 2020-12-09 PROCEDURE — 73630 X-RAY EXAM OF FOOT: CPT | Mod: 26,LT,, | Performed by: RADIOLOGY

## 2020-12-09 PROCEDURE — 99999 PR PBB SHADOW E&M-EST. PATIENT-LVL V: ICD-10-PCS | Mod: PBBFAC,,, | Performed by: PODIATRIST

## 2020-12-09 PROCEDURE — 73630 X-RAY EXAM OF FOOT: CPT | Mod: TC,FY,LT

## 2020-12-09 PROCEDURE — 73630 XR FOOT COMPLETE 3 VIEW LEFT: ICD-10-PCS | Mod: 26,LT,, | Performed by: RADIOLOGY

## 2020-12-09 PROCEDURE — 99215 OFFICE O/P EST HI 40 MIN: CPT | Mod: PBBFAC,25 | Performed by: PODIATRIST

## 2020-12-09 PROCEDURE — 99999 PR PBB SHADOW E&M-EST. PATIENT-LVL V: CPT | Mod: PBBFAC,,, | Performed by: PODIATRIST

## 2020-12-09 PROCEDURE — 11045 DBRDMT SUBQ TISS EACH ADDL: CPT | Mod: PBBFAC | Performed by: PODIATRIST

## 2020-12-09 PROCEDURE — 99215 PR OFFICE/OUTPT VISIT, EST, LEVL V, 40-54 MIN: ICD-10-PCS | Mod: 25,S$PBB,, | Performed by: PODIATRIST

## 2020-12-13 NOTE — PROGRESS NOTES
Subjective:       Patient ID: Alfred Villarreal is a 60 y.o. male.    Chief Complaint: Follow-up   Patient presents for follow-up today multiple areas of breakdown and ulceration left.  Patient states his left foot has gotten significantly worse since his wheelchair that has a faulty battery broke down he was stuck out the yard in the wet rain and his foot ultimately got wet which lead to additional skin breakdown and ulceration.    Past Medical History:   Diagnosis Date    Anticoagulant long-term use     Arthritis     Diabetes mellitus     Hypertension      History reviewed. No pertinent surgical history.  History reviewed. No pertinent family history.  Social History     Socioeconomic History    Marital status:      Spouse name: Not on file    Number of children: Not on file    Years of education: Not on file    Highest education level: Not on file   Occupational History    Not on file   Social Needs    Financial resource strain: Not on file    Food insecurity     Worry: Not on file     Inability: Not on file    Transportation needs     Medical: Not on file     Non-medical: Not on file   Tobacco Use    Smoking status: Never Smoker    Smokeless tobacco: Current User     Types: Chew   Substance and Sexual Activity    Alcohol use: Yes     Frequency: Monthly or less     Drinks per session: 3 or 4     Binge frequency: Monthly    Drug use: No    Sexual activity: Yes     Partners: Female   Lifestyle    Physical activity     Days per week: Not on file     Minutes per session: Not on file    Stress: Not on file   Relationships    Social connections     Talks on phone: Not on file     Gets together: Not on file     Attends Hoah[employer] service: Not on file     Active member of club or organization: Not on file     Attends meetings of clubs or organizations: Not on file     Relationship status: Not on file   Other Topics Concern    Not on file   Social History Narrative    Not on file       Current  Outpatient Medications   Medication Sig Dispense Refill    atorvastatin (LIPITOR) 40 MG tablet Take 1 tablet (40 mg total) by mouth once daily. 90 tablet 3    baclofen (LIORESAL) 10 MG tablet Take 10 mg by mouth 3 (three) times daily.      ergocalciferol (ERGOCALCIFEROL) 50,000 unit Cap       gabapentin (NEURONTIN) 600 MG tablet       glyBURIDE (DIABETA) 5 MG tablet Take by mouth.      HYDROcodone-acetaminophen (NORCO) 7.5-325 mg per tablet Take 1 tablet by mouth every 6 (six) hours as needed for Pain.      ibuprofen (ADVIL,MOTRIN) 800 MG tablet       LEVEMIR U-100 INSULIN 100 unit/mL injection Inject 80 Units into the skin 2 (two) times a day.      linezolid (ZYVOX) 600 mg Tab Take 1 tablet (600 mg total) by mouth every 12 (twelve) hours. 60 tablet 0    lisinopriL-hydrochlorothiazide (PRINZIDE,ZESTORETIC) 10-12.5 mg per tablet Take 2 tablets by mouth once daily. 180 tablet 3    metFORMIN (GLUCOPHAGE) 1000 MG tablet Take by mouth.      metoprolol succinate (TOPROL-XL) 50 MG 24 hr tablet Take 50 mg by mouth once daily.      NOVOLOG U-100 INSULIN ASPART 100 unit/mL injection 50 Units by abdominal subcutaneous route 2 (two) times daily before meals.      oxyCODONE-acetaminophen (PERCOCET)  mg per tablet Take 1 tablet by mouth every 4 (four) hours as needed for Pain.      potassium chloride (MICRO-K) 10 MEQ CpSR Take 1 capsule by mouth.      rivaroxaban (XARELTO) 20 mg Tab Take 20 mg by mouth once daily.      sildenafil (REVATIO) 20 mg Tab Take 1-3 tablets as need 1 hour prior to sexual activity 30 tablet 0    traZODone (DESYREL) 100 MG tablet Take 100 mg by mouth.       No current facility-administered medications for this visit.      Review of patient's allergies indicates:   Allergen Reactions    Amitriptyline      Vivid dreams( bad)       Review of Systems   Musculoskeletal: Positive for arthralgias, back pain, gait problem and joint swelling.   Skin: Positive for color change and wound.  "  Neurological: Positive for numbness.   All other systems reviewed and are negative.      Objective:      Vitals:    12/09/20 1316   BP: (!) 142/75   Pulse: 97   Resp: 18   Temp: 98.5 °F (36.9 °C)   TempSrc: Temporal   SpO2: 99%   Weight: 126.6 kg (279 lb)   Height: 6' 6" (1.981 m)     Physical Exam  Vitals signs and nursing note reviewed.   Constitutional:       General: He is in acute distress.      Appearance: Normal appearance. He is well-developed.   Cardiovascular:      Pulses:           Dorsalis pedis pulses are 1+ on the right side and 1+ on the left side.        Posterior tibial pulses are 0 on the right side and 0 on the left side.   Pulmonary:      Effort: Pulmonary effort is normal.   Musculoskeletal:      Left lower leg: Edema present.      Left foot: Deformity present.        Feet:    Feet:      Right foot:      Amputation: Right leg is amputated below knee.      Left foot:      Protective Sensation: 4 sites tested. 1 site sensed.     Skin integrity: Ulcer, skin breakdown, erythema and warmth present.   Skin:     Capillary Refill: Capillary refill takes more than 3 seconds.      Findings: Erythema and lesion present.   Neurological:      Mental Status: He is alert.      Sensory: Sensory deficit present.      Deep Tendon Reflexes: Reflexes abnormal.   Psychiatric:         Mood and Affect: Mood normal.         Behavior: Behavior normal.         Thought Content: Thought content normal.         Judgment: Judgment normal.                                                                                    Assessment:       1. Type II diabetes mellitus with neurological manifestations    2. Skin ulcer of left foot with fat layer exposed    3. Hx of amputation    4. Ulcer of left foot with fat layer exposed        Plan:         Patient presents today follow-up of multiple sites of ulceration on the patient's left lower extremity he has had a previous amputation of the right lower extremity has an extensive " history of osteomyelitis.  Patient states the ulceration on his left heel has gotten significantly worse he has been having problems with his power wheelchair breaking down he states he broke down in the middle of the yd he was caught outside in the rain on the wet grass his foot dressing and wound sites subsequently became wet which caused the ulceration to enlarge and additional tissue to breakdown.  Patient has had a chronic ulceration on his left foot for some time and had been showing signs of improvement but has subsequently gotten worse patient was previously placed on Zyvox this had been helping to control the infection he completed the course of Zyvox and the area was looking better until this recent episode that the area got wet and breakdown further now his condition has worsened on the left side patient has already undergone a lower extremity amputation on the right side.  On evaluation patient has 2 separate areas of extensive breakdown the plantar lateral aspect of the patient's left foot the area is broken down there are signs of obvious obvious infection to the area with extensive nonviable tissue the a ulceration itself is 10 cm long by 8 cm wide by 3 mm deep a new were ulceration is now present on the medial plantar aspect of the patient's left heel this area is 6 cm long by 5 cm wide by 3 mm deep this area has extensive soft tissue destruction there is serous drainage in the area with extensive nonviable tissue at the edges of the site.  Patient understands the seriousness of this condition I did sharply excisionally debride both of these ulcer locations removing the nonviable skin and tissue from the central aspect and edges of the ulcer site utilizing a 15.  Scalpel the area was then scrubbed with a P cm X scrub rinsed with Dakin solution painted with Betadine and a well-padded thick dressing was applied.  Previously noted ulceration overlying the 1st MPJ left is now completely resolved closed  and healed.  Patient indicated today he just started taking his Zyvox 4 days ago I had prescribed this medication for the patient to start 2 weeks ago however the patient's insurance of refused to approve this medication even with several hours spent by my nursing staff to get it approved it was ultimately not approved until I did appear to peer review with the pharmacist and I explained to the pharmacist patient has already had what leg amputated has a serious infection on the left foot which may lead to another leg amputation and a culture and sensitivity was not performed as noted in my medical record because the patient had covered his foot in Betadine and a culture and sensitivity would not be accurate therefore a previous culture and sensitivity was used in antibiotic selection on the last visit.  Even after the patient's antibiotics were approved there was still a delay at least several days in actually getting these to the patient.  Ultimately the patient's insurance delaying his start of antibiotics could be a contributing factor to the loss of the lower extremity if indeed it goes on to amputation.  X-rays were taken today there is serious concern for osteomyelitis in the left foot this will be monitored and re-evaluated did explain to the patient the severity of his current condition he has been pulling dead skin off of the bottom of his foot I have advised him against doing this I made him aware he needs to let me do the debridement by pulling this dead skin he may cause spreading of the infection or even get into some of the good healthy tissue that becomes damaged or contaminated by the infected tissue.  Wound care provided today excisional debridement performed plan follow-up 2 weeks obviously if there is any changes to the foot the patient will contact us immediately home health is currently seeing the patient twice a week patient is being seen by [facility].  Total face-to-face time including  discussion evaluation treatment wound debridement review of medical record and discussion with the patient regarding his insurance is delay to provide antibiotics as prescribed equaled 60 min.  This note was created using [employer] voice recognition software that occasionally misinterpreted phrases or words.

## 2020-12-13 NOTE — PROCEDURES
"Wound Debridement    Date/Time: 12/9/2020 1:45 PM  Performed by: Gary Stringer DPM  Authorized by: Gary Stringer DPM     Time out: Immediately prior to procedure a "time out" was called to verify the correct patient, procedure, equipment, support staff and site/side marked as required.    Consent Done?:  Yes (Verbal)    Preparation: Patient was prepped and draped in usual sterile fashion    Local anesthesia used?: No      Wound Details:    Location:  Left foot    Location:  Left Heel       Length (cm):  10       Area (sq cm):  80       Width (cm):  8       Percent Debrided (%):  100       Depth (cm):  0.4       Total Area Debrided (sq cm):  80    Depth of debridement:  Subcutaneous tissue    Tissue debrided:  Epidermis, Dermis, Hypergranulation and Subcutaneous    Devitalized tissue debrided:  Biofilm, Callus, Exudate, Fibrin and Necrotic/Eschar    Instruments:  Blade    Bleeding:  Minimal  Hemostasis Achieved: Yes    Method Used:  Pressure and Alginate  Patient tolerance:  Patient tolerated the procedure well with no immediate complications      "

## 2020-12-16 ENCOUNTER — TELEPHONE (OUTPATIENT)
Dept: PODIATRY | Facility: CLINIC | Age: 60
End: 2020-12-16

## 2020-12-16 NOTE — TELEPHONE ENCOUNTER
"Anne from [facility] stated she noted pt to have new area to leg, lateral just above the ankle approximately the size of a 2X2 and open. Would like wound care instructions. Pt refusing Dakins to heel would Dr. Stringer please clarify if Dakins to heel still ordered "as shown".  "

## 2020-12-16 NOTE — TELEPHONE ENCOUNTER
----- Message from Raysa Esteves sent at 12/16/2020  1:19 PM CST -----  Regarding: advice  Contact: Anne from [facility]  Type: Needs Medical Advice    Who Called:      Best Call Back Number:     Additional Information: Requesting a call back from Nurse, regarding pt told nurse it was change in wound care and nurse has found a new wound and needs advice ,please call back with advice

## 2020-12-22 ENCOUNTER — TELEPHONE (OUTPATIENT)
Dept: PODIATRY | Facility: CLINIC | Age: 60
End: 2020-12-22

## 2020-12-22 ENCOUNTER — PATIENT OUTREACH (OUTPATIENT)
Dept: ADMINISTRATIVE | Facility: OTHER | Age: 60
End: 2020-12-22

## 2020-12-22 NOTE — PROGRESS NOTES
Chart was reviewed for overdue [facility] Encounters ([facility])  topics  Updates were requested from care everywhere  Health Maintenance has been updated  LINKS immunization registry triggered  A1C previously ordered

## 2020-12-22 NOTE — TELEPHONE ENCOUNTER
----- Message from Mallory Dsouza sent at 12/22/2020 10:57 AM CST -----  Contact: Anne from [facility]  Anne states she has a question about pt's dressing change. Please call back at [phone]

## 2020-12-23 ENCOUNTER — OFFICE VISIT (OUTPATIENT)
Dept: PODIATRY | Facility: CLINIC | Age: 60
End: 2020-12-23
Payer: MEDICAID

## 2020-12-23 VITALS
WEIGHT: 279 LBS | SYSTOLIC BLOOD PRESSURE: 177 MMHG | BODY MASS INDEX: 32.28 KG/M2 | TEMPERATURE: 98 F | HEART RATE: 102 BPM | OXYGEN SATURATION: 99 % | HEIGHT: 78 IN | DIASTOLIC BLOOD PRESSURE: 92 MMHG | RESPIRATION RATE: 19 BRPM

## 2020-12-23 DIAGNOSIS — E11.628 DIABETIC FOOT INFECTION: ICD-10-CM

## 2020-12-23 DIAGNOSIS — L97.522 ULCER OF LEFT FOOT WITH FAT LAYER EXPOSED: ICD-10-CM

## 2020-12-23 DIAGNOSIS — Z89.9 HX OF AMPUTATION: ICD-10-CM

## 2020-12-23 DIAGNOSIS — M71.072 ABSCESS OF BURSA OF LEFT FOOT: ICD-10-CM

## 2020-12-23 DIAGNOSIS — E11.49 TYPE II DIABETES MELLITUS WITH NEUROLOGICAL MANIFESTATIONS: Primary | ICD-10-CM

## 2020-12-23 DIAGNOSIS — L08.9 DIABETIC FOOT INFECTION: ICD-10-CM

## 2020-12-23 DIAGNOSIS — L97.522 SKIN ULCER OF LEFT FOOT WITH FAT LAYER EXPOSED: ICD-10-CM

## 2020-12-23 PROCEDURE — 99999 PR PBB SHADOW E&M-EST. PATIENT-LVL V: CPT | Mod: PBBFAC,,, | Performed by: PODIATRIST

## 2020-12-23 PROCEDURE — 99215 OFFICE O/P EST HI 40 MIN: CPT | Mod: PBBFAC,25 | Performed by: PODIATRIST

## 2020-12-23 PROCEDURE — 11045 DBRDMT SUBQ TISS EACH ADDL: CPT | Mod: PBBFAC | Performed by: PODIATRIST

## 2020-12-23 PROCEDURE — 11045 WOUND DEBRIDEMENT: ICD-10-PCS | Mod: S$PBB,,, | Performed by: PODIATRIST

## 2020-12-23 PROCEDURE — 99214 PR OFFICE/OUTPT VISIT, EST, LEVL IV, 30-39 MIN: ICD-10-PCS | Mod: 25,S$PBB,, | Performed by: PODIATRIST

## 2020-12-23 PROCEDURE — 99214 OFFICE O/P EST MOD 30 MIN: CPT | Mod: 25,S$PBB,, | Performed by: PODIATRIST

## 2020-12-23 PROCEDURE — 11042 WOUND DEBRIDEMENT: ICD-10-PCS | Mod: S$PBB,,, | Performed by: PODIATRIST

## 2020-12-23 PROCEDURE — 99999 PR PBB SHADOW E&M-EST. PATIENT-LVL V: ICD-10-PCS | Mod: PBBFAC,,, | Performed by: PODIATRIST

## 2020-12-23 PROCEDURE — 11042 DBRDMT SUBQ TIS 1ST 20SQCM/<: CPT | Mod: S$PBB,,, | Performed by: PODIATRIST

## 2020-12-30 ENCOUNTER — DOCUMENT SCAN (OUTPATIENT)
Dept: HOME HEALTH SERVICES | Facility: HOSPITAL | Age: 60
End: 2020-12-30
Payer: MEDICAID

## 2020-12-31 NOTE — PROGRESS NOTES
Subjective:       Patient ID: Alfred Villarreal is a 60 y.o. male.    Chief Complaint: Follow-up and Foot Ulcer   Patient presents for follow-up today multiple areas of breakdown and ulceration left.  Patient states his left foot has gotten significantly worse since his wheelchair that has a faulty battery broke down he was stuck out the yard in the wet rain and his foot ultimately got wet which lead to additional skin breakdown and ulceration.    Past Medical History:   Diagnosis Date    Anticoagulant long-term use     Arthritis     Diabetes mellitus     Hypertension      History reviewed. No pertinent surgical history.  History reviewed. No pertinent family history.  Social History     Socioeconomic History    Marital status:      Spouse name: Not on file    Number of children: Not on file    Years of education: Not on file    Highest education level: Not on file   Occupational History    Not on file   Social Needs    Financial resource strain: Not on file    Food insecurity     Worry: Not on file     Inability: Not on file    Transportation needs     Medical: Not on file     Non-medical: Not on file   Tobacco Use    Smoking status: Never Smoker    Smokeless tobacco: Current User     Types: Chew   Substance and Sexual Activity    Alcohol use: Yes     Frequency: Monthly or less     Drinks per session: 3 or 4     Binge frequency: Monthly    Drug use: No    Sexual activity: Yes     Partners: Female   Lifestyle    Physical activity     Days per week: Not on file     Minutes per session: Not on file    Stress: Not on file   Relationships    Social connections     Talks on phone: Not on file     Gets together: Not on file     Attends [employer] service: Not on file     Active member of club or organization: Not on file     Attends meetings of clubs or organizations: Not on file     Relationship status: Not on file   Other Topics Concern    Not on file   Social History Narrative    Not on file        Current Outpatient Medications   Medication Sig Dispense Refill    atorvastatin (LIPITOR) 40 MG tablet Take 1 tablet (40 mg total) by mouth once daily. 90 tablet 3    baclofen (LIORESAL) 10 MG tablet Take 10 mg by mouth 3 (three) times daily.      ergocalciferol (ERGOCALCIFEROL) 50,000 unit Cap       gabapentin (NEURONTIN) 600 MG tablet       glyBURIDE (DIABETA) 5 MG tablet Take by mouth.      HYDROcodone-acetaminophen (NORCO) 7.5-325 mg per tablet Take 1 tablet by mouth every 6 (six) hours as needed for Pain.      ibuprofen (ADVIL,MOTRIN) 800 MG tablet       LEVEMIR U-100 INSULIN 100 unit/mL injection Inject 80 Units into the skin 2 (two) times a day.      linezolid (ZYVOX) 600 mg Tab Take 1 tablet (600 mg total) by mouth every 12 (twelve) hours. 60 tablet 0    lisinopriL-hydrochlorothiazide (PRINZIDE,ZESTORETIC) 10-12.5 mg per tablet Take 2 tablets by mouth once daily. 180 tablet 3    metFORMIN (GLUCOPHAGE) 1000 MG tablet Take by mouth.      metoprolol succinate (TOPROL-XL) 50 MG 24 hr tablet Take 50 mg by mouth once daily.      NOVOLOG U-100 INSULIN ASPART 100 unit/mL injection 50 Units by abdominal subcutaneous route 2 (two) times daily before meals.      oxyCODONE-acetaminophen (PERCOCET)  mg per tablet Take 1 tablet by mouth every 4 (four) hours as needed for Pain.      potassium chloride (MICRO-K) 10 MEQ CpSR Take 1 capsule by mouth.      rivaroxaban (XARELTO) 20 mg Tab Take 20 mg by mouth once daily.      sildenafil (REVATIO) 20 mg Tab Take 1-3 tablets as need 1 hour prior to sexual activity 30 tablet 0    traZODone (DESYREL) 100 MG tablet Take 100 mg by mouth.       No current facility-administered medications for this visit.      Review of patient's allergies indicates:   Allergen Reactions    Amitriptyline      Vivid dreams( bad)       Review of Systems   Musculoskeletal: Positive for arthralgias, back pain, gait problem and joint swelling.   Skin: Positive for color change  "and wound.   Neurological: Positive for numbness.   All other systems reviewed and are negative.      Objective:      Vitals:    12/23/20 1318   BP: (!) 177/92   Pulse: 102   Resp: 19   Temp: 98 °F (36.7 °C)   SpO2: 99%   Weight: 126.6 kg (279 lb)   Height: 6' 6" (1.981 m)     Physical Exam  Vitals signs and nursing note reviewed.   Constitutional:       General: He is in acute distress.      Appearance: Normal appearance. He is well-developed.   Cardiovascular:      Pulses:           Dorsalis pedis pulses are 1+ on the right side and 1+ on the left side.        Posterior tibial pulses are 0 on the right side and 0 on the left side.   Pulmonary:      Effort: Pulmonary effort is normal.   Musculoskeletal:      Left lower leg: Edema present.      Left foot: Deformity present.        Feet:    Feet:      Right foot:      Amputation: Right leg is amputated below knee.      Left foot:      Protective Sensation: 4 sites tested. 1 site sensed.     Skin integrity: Ulcer, skin breakdown, erythema and warmth present.   Skin:     Capillary Refill: Capillary refill takes more than 3 seconds.      Findings: Erythema and lesion present.   Neurological:      Mental Status: He is alert.      Sensory: Sensory deficit present.      Deep Tendon Reflexes: Reflexes abnormal.   Psychiatric:         Mood and Affect: Mood normal.         Behavior: Behavior normal.         Thought Content: Thought content normal.         Judgment: Judgment normal.                                    Assessment:       1. Type II diabetes mellitus with neurological manifestations    2. Ulcer of left foot with fat layer exposed    3. Skin ulcer of left foot with fat layer exposed    4. Diabetic foot infection    5. Abscess of bursa of left foot    6. Hx of amputation        Plan:         Patient presents today follow-up of multiple sites of ulceration on the patient's left lower extremity he has had a previous amputation of the right lower extremity has an " extensive history of osteomyelitis.    Patient has had a chronic ulceration on his left foot for some time and had been showing signs of improvement but has subsequently gotten worse.  patient was previously placed on Zyvox this had been helping to control the infection he completed the course of Zyvox and the area was looking better until this recent episode that the area got wet and breakdown further now his condition has worsened on the left side patient has already undergone a lower extremity amputation on the right side.  On evaluation patient has 2 separate areas of extensive breakdown the plantar lateral aspect of the patient's left foot the area is broken down there are signs of obvious obvious infection to the area with extensive nonviable tissue the a ulceration itself is 10 cm long by 8 cm wide by 3 mm deep a new were ulceration is now present on the medial plantar aspect of the patient's left heel this area is 6 cm long by 5 cm wide by 3 mm deep this area has extensive soft tissue destruction there is serous drainage in the area with extensive nonviable tissue at the edges of the site.  Patient understands the seriousness of this condition I did sharply excisionally debride both of these ulcer locations removing the nonviable skin and tissue from the central aspect and edges of the ulcer site utilizing a 15.  Scalpel the area was then scrubbed with a P cm X scrub rinsed with Dakin solution painted with Betadine and a well-padded thick dressing was applied.  Previously noted ulceration overlying the 1st MPJ left is now completely resolved closed and healed.    X-rays were taken today there is serious concern for osteomyelitis in the left foot this will be monitored and re-evaluated did explain to the patient the severity of his current condition he has been pulling dead skin off of the bottom of his foot I have advised him against doing this I made him aware he needs to let me do the debridement by  pulling this dead skin he may cause spreading of the infection or even get into some of the good healthy tissue that becomes damaged or contaminated by the infected tissue.  Wound care provided today excisional debridement performed plan follow-up 3 weeks obviously if there is any changes to the foot the patient will contact us immediately home health is currently seeing the patient twice a week patient is being seen by [facility].  Patient has enough Zyvox to last him until January 2, 2021 he is going to finish taking these as directed.  Current ulcerations of the left foot are stable patient has a new area of breakdown on the lateral aspect of the left lower extremity this was thoroughly cleaned with Dakin solution painted with Betadine silver alginate and a well-padded dressing was applied to this area also.  Patient has been advised to contact us with any problems questions or concerns prior to scheduled follow-up.  This note was created using [employer] voice recognition software that occasionally misinterpreted phrases or words.

## 2020-12-31 NOTE — PROCEDURES
"Wound Debridement    Date/Time: 12/23/2020 1:30 PM  Performed by: Gary Stringer DPM  Authorized by: Gary Stringer DPM     Time out: Immediately prior to procedure a "time out" was called to verify the correct patient, procedure, equipment, support staff and site/side marked as required.    Consent Done?:  Yes (Verbal)    Preparation: Patient was prepped and draped in usual sterile fashion    Local anesthesia used?: No      Wound Details:    Location:  Left foot    Location:  Left Heel    Type of Debridement:  Excisional       Length (cm):  10       Area (sq cm):  80       Width (cm):  8       Percent Debrided (%):  100       Depth (cm):  0.3       Total Area Debrided (sq cm):  80    Depth of debridement:  Subcutaneous tissue    Devitalized tissue debrided:  Callus, Necrotic/Eschar, Slough and Exudate    Instruments:  Blade    Bleeding:  None  Patient tolerance:  Patient tolerated the procedure well with no immediate complications      "

## 2021-01-13 ENCOUNTER — OFFICE VISIT (OUTPATIENT)
Dept: PODIATRY | Facility: CLINIC | Age: 61
End: 2021-01-13
Payer: MEDICAID

## 2021-01-13 ENCOUNTER — HOSPITAL ENCOUNTER (OUTPATIENT)
Dept: RADIOLOGY | Facility: HOSPITAL | Age: 61
Discharge: HOME | End: 2021-01-13
Attending: PODIATRIST
Payer: MEDICAID

## 2021-01-13 VITALS
WEIGHT: 279 LBS | HEIGHT: 78 IN | RESPIRATION RATE: 19 BRPM | HEART RATE: 88 BPM | OXYGEN SATURATION: 99 % | SYSTOLIC BLOOD PRESSURE: 180 MMHG | DIASTOLIC BLOOD PRESSURE: 96 MMHG | TEMPERATURE: 99 F | BODY MASS INDEX: 32.28 KG/M2

## 2021-01-13 DIAGNOSIS — L97.522 SKIN ULCER OF LEFT FOOT WITH FAT LAYER EXPOSED: ICD-10-CM

## 2021-01-13 DIAGNOSIS — L97.522 SKIN ULCER OF LEFT FOOT WITH FAT LAYER EXPOSED: Primary | ICD-10-CM

## 2021-01-13 DIAGNOSIS — E11.49 TYPE II DIABETES MELLITUS WITH NEUROLOGICAL MANIFESTATIONS: ICD-10-CM

## 2021-01-13 PROCEDURE — 73630 X-RAY EXAM OF FOOT: CPT | Mod: TC,FY,LT

## 2021-01-13 PROCEDURE — 73630 X-RAY EXAM OF FOOT: CPT | Mod: 26,LT,, | Performed by: RADIOLOGY

## 2021-01-13 PROCEDURE — 73630 XR FOOT COMPLETE 3 VIEW LEFT: ICD-10-PCS | Mod: 26,LT,, | Performed by: RADIOLOGY

## 2021-01-13 PROCEDURE — 99999 PR PBB SHADOW E&M-EST. PATIENT-LVL V: CPT | Mod: PBBFAC,,, | Performed by: PODIATRIST

## 2021-01-13 PROCEDURE — 99999 PR PBB SHADOW E&M-EST. PATIENT-LVL V: ICD-10-PCS | Mod: PBBFAC,,, | Performed by: PODIATRIST

## 2021-01-13 PROCEDURE — 99215 OFFICE O/P EST HI 40 MIN: CPT | Mod: S$PBB,,, | Performed by: PODIATRIST

## 2021-01-13 PROCEDURE — 99215 PR OFFICE/OUTPT VISIT, EST, LEVL V, 40-54 MIN: ICD-10-PCS | Mod: S$PBB,,, | Performed by: PODIATRIST

## 2021-01-13 PROCEDURE — 99215 OFFICE O/P EST HI 40 MIN: CPT | Mod: PBBFAC,25 | Performed by: PODIATRIST

## 2021-01-13 RX ORDER — HYDROCODONE BITARTRATE AND ACETAMINOPHEN 10; 325 MG/1; MG/1
TABLET ORAL
COMMUNITY
Start: 2020-12-28 | End: 2021-07-21 | Stop reason: ALTCHOICE

## 2021-02-01 ENCOUNTER — PATIENT OUTREACH (OUTPATIENT)
Dept: ADMINISTRATIVE | Facility: OTHER | Age: 61
End: 2021-02-01

## 2021-02-01 PROCEDURE — G0179 PR HOME HEALTH MD RECERTIFICATION: ICD-10-PCS | Mod: ,,, | Performed by: PODIATRIST

## 2021-02-01 PROCEDURE — G0179 MD RECERTIFICATION HHA PT: HCPCS | Mod: ,,, | Performed by: PODIATRIST

## 2021-02-02 ENCOUNTER — EXTERNAL HOME HEALTH (OUTPATIENT)
Dept: HOME HEALTH SERVICES | Facility: HOSPITAL | Age: 61
End: 2021-02-02
Payer: MEDICAID

## 2021-02-03 ENCOUNTER — OFFICE VISIT (OUTPATIENT)
Dept: PODIATRY | Facility: CLINIC | Age: 61
End: 2021-02-03
Payer: MEDICAID

## 2021-02-03 ENCOUNTER — HOSPITAL ENCOUNTER (OUTPATIENT)
Dept: RADIOLOGY | Facility: HOSPITAL | Age: 61
Discharge: HOME | End: 2021-02-03
Attending: PODIATRIST
Payer: MEDICAID

## 2021-02-03 ENCOUNTER — PATIENT OUTREACH (OUTPATIENT)
Dept: ADMINISTRATIVE | Facility: HOSPITAL | Age: 61
End: 2021-02-03

## 2021-02-03 VITALS
DIASTOLIC BLOOD PRESSURE: 86 MMHG | BODY MASS INDEX: 32.28 KG/M2 | TEMPERATURE: 98 F | OXYGEN SATURATION: 99 % | RESPIRATION RATE: 18 BRPM | HEART RATE: 92 BPM | SYSTOLIC BLOOD PRESSURE: 166 MMHG | WEIGHT: 279 LBS | HEIGHT: 78 IN

## 2021-02-03 DIAGNOSIS — L97.522 SKIN ULCER OF LEFT FOOT WITH FAT LAYER EXPOSED: ICD-10-CM

## 2021-02-03 DIAGNOSIS — E11.49 TYPE II DIABETES MELLITUS WITH NEUROLOGICAL MANIFESTATIONS: Primary | ICD-10-CM

## 2021-02-03 PROCEDURE — 99999 PR PBB SHADOW E&M-EST. PATIENT-LVL V: CPT | Mod: PBBFAC,,, | Performed by: PODIATRIST

## 2021-02-03 PROCEDURE — 73630 X-RAY EXAM OF FOOT: CPT | Mod: 26,LT,, | Performed by: RADIOLOGY

## 2021-02-03 PROCEDURE — 73630 X-RAY EXAM OF FOOT: CPT | Mod: TC,FY,LT

## 2021-02-03 PROCEDURE — 73630 XR FOOT COMPLETE 3 VIEW LEFT: ICD-10-PCS | Mod: 26,LT,, | Performed by: RADIOLOGY

## 2021-02-03 PROCEDURE — 99215 OFFICE O/P EST HI 40 MIN: CPT | Mod: 25,S$PBB,, | Performed by: PODIATRIST

## 2021-02-03 PROCEDURE — 11042 WOUND DEBRIDEMENT: ICD-10-PCS | Mod: S$PBB,,, | Performed by: PODIATRIST

## 2021-02-03 PROCEDURE — 99215 OFFICE O/P EST HI 40 MIN: CPT | Mod: PBBFAC,25 | Performed by: PODIATRIST

## 2021-02-03 PROCEDURE — 11045 DBRDMT SUBQ TISS EACH ADDL: CPT | Mod: S$PBB,,, | Performed by: PODIATRIST

## 2021-02-03 PROCEDURE — 11042 DBRDMT SUBQ TIS 1ST 20SQCM/<: CPT | Mod: PBBFAC | Performed by: PODIATRIST

## 2021-02-03 PROCEDURE — 99999 PR PBB SHADOW E&M-EST. PATIENT-LVL V: ICD-10-PCS | Mod: PBBFAC,,, | Performed by: PODIATRIST

## 2021-02-03 PROCEDURE — 11045 WOUND DEBRIDEMENT: ICD-10-PCS | Mod: S$PBB,,, | Performed by: PODIATRIST

## 2021-02-03 PROCEDURE — 99215 PR OFFICE/OUTPT VISIT, EST, LEVL V, 40-54 MIN: ICD-10-PCS | Mod: 25,S$PBB,, | Performed by: PODIATRIST

## 2021-02-19 ENCOUNTER — TELEPHONE (OUTPATIENT)
Dept: PODIATRY | Facility: CLINIC | Age: 61
End: 2021-02-19

## 2021-02-22 ENCOUNTER — TELEPHONE (OUTPATIENT)
Dept: PODIATRY | Facility: CLINIC | Age: 61
End: 2021-02-22

## 2021-02-24 ENCOUNTER — HOSPITAL ENCOUNTER (OUTPATIENT)
Dept: RADIOLOGY | Facility: HOSPITAL | Age: 61
Discharge: HOME | End: 2021-02-24
Attending: PODIATRIST
Payer: MEDICAID

## 2021-02-24 ENCOUNTER — OFFICE VISIT (OUTPATIENT)
Dept: PODIATRY | Facility: CLINIC | Age: 61
End: 2021-02-24
Payer: MEDICAID

## 2021-02-24 VITALS
DIASTOLIC BLOOD PRESSURE: 55 MMHG | WEIGHT: 279 LBS | TEMPERATURE: 98 F | BODY MASS INDEX: 32.28 KG/M2 | SYSTOLIC BLOOD PRESSURE: 145 MMHG | HEIGHT: 78 IN | HEART RATE: 88 BPM

## 2021-02-24 DIAGNOSIS — Z89.9 HX OF AMPUTATION: ICD-10-CM

## 2021-02-24 DIAGNOSIS — E11.49 TYPE II DIABETES MELLITUS WITH NEUROLOGICAL MANIFESTATIONS: Primary | ICD-10-CM

## 2021-02-24 DIAGNOSIS — L08.9 DIABETIC FOOT INFECTION: ICD-10-CM

## 2021-02-24 DIAGNOSIS — L97.522 ULCER OF LEFT FOOT WITH FAT LAYER EXPOSED: ICD-10-CM

## 2021-02-24 DIAGNOSIS — E11.628 DIABETIC FOOT INFECTION: ICD-10-CM

## 2021-02-24 PROCEDURE — 99215 OFFICE O/P EST HI 40 MIN: CPT | Mod: 25,S$PBB,, | Performed by: PODIATRIST

## 2021-02-24 PROCEDURE — 99215 PR OFFICE/OUTPT VISIT, EST, LEVL V, 40-54 MIN: ICD-10-PCS | Mod: 25,S$PBB,, | Performed by: PODIATRIST

## 2021-02-24 PROCEDURE — 99214 OFFICE O/P EST MOD 30 MIN: CPT | Mod: PBBFAC,25 | Performed by: PODIATRIST

## 2021-02-24 PROCEDURE — 73630 X-RAY EXAM OF FOOT: CPT | Mod: TC,FY,LT

## 2021-02-24 PROCEDURE — 99999 PR PBB SHADOW E&M-EST. PATIENT-LVL IV: CPT | Mod: PBBFAC,,, | Performed by: PODIATRIST

## 2021-02-24 PROCEDURE — 11045 WOUND DEBRIDEMENT: ICD-10-PCS | Mod: S$PBB,,, | Performed by: PODIATRIST

## 2021-02-24 PROCEDURE — 73630 XR FOOT COMPLETE 3 VIEW LEFT: ICD-10-PCS | Mod: 26,LT,, | Performed by: RADIOLOGY

## 2021-02-24 PROCEDURE — 99999 PR PBB SHADOW E&M-EST. PATIENT-LVL IV: ICD-10-PCS | Mod: PBBFAC,,, | Performed by: PODIATRIST

## 2021-02-24 PROCEDURE — 11042 DBRDMT SUBQ TIS 1ST 20SQCM/<: CPT | Mod: PBBFAC | Performed by: PODIATRIST

## 2021-02-24 PROCEDURE — 73630 X-RAY EXAM OF FOOT: CPT | Mod: 26,LT,, | Performed by: RADIOLOGY

## 2021-02-24 PROCEDURE — 11042 WOUND DEBRIDEMENT: ICD-10-PCS | Mod: S$PBB,,, | Performed by: PODIATRIST

## 2021-02-24 PROCEDURE — 11045 DBRDMT SUBQ TISS EACH ADDL: CPT | Mod: S$PBB,,, | Performed by: PODIATRIST

## 2021-02-25 ENCOUNTER — TELEPHONE (OUTPATIENT)
Dept: PODIATRY | Facility: CLINIC | Age: 61
End: 2021-02-25

## 2021-03-11 ENCOUNTER — TELEPHONE (OUTPATIENT)
Dept: PODIATRY | Facility: CLINIC | Age: 61
End: 2021-03-11

## 2021-03-14 ENCOUNTER — PATIENT OUTREACH (OUTPATIENT)
Dept: ADMINISTRATIVE | Facility: OTHER | Age: 61
End: 2021-03-14

## 2021-03-14 DIAGNOSIS — E11.9 TYPE 2 DIABETES MELLITUS WITHOUT COMPLICATION, UNSPECIFIED WHETHER LONG TERM INSULIN USE: Primary | ICD-10-CM

## 2021-03-17 ENCOUNTER — OFFICE VISIT (OUTPATIENT)
Dept: PODIATRY | Facility: CLINIC | Age: 61
End: 2021-03-17
Payer: MEDICAID

## 2021-03-17 ENCOUNTER — TELEPHONE (OUTPATIENT)
Dept: PODIATRY | Facility: CLINIC | Age: 61
End: 2021-03-17

## 2021-03-17 ENCOUNTER — HOSPITAL ENCOUNTER (OUTPATIENT)
Dept: RADIOLOGY | Facility: HOSPITAL | Age: 61
Discharge: HOME | End: 2021-03-17
Attending: PODIATRIST
Payer: MEDICAID

## 2021-03-17 VITALS
TEMPERATURE: 98 F | HEART RATE: 112 BPM | BODY MASS INDEX: 32.28 KG/M2 | SYSTOLIC BLOOD PRESSURE: 161 MMHG | DIASTOLIC BLOOD PRESSURE: 84 MMHG | HEIGHT: 78 IN | WEIGHT: 279 LBS

## 2021-03-17 DIAGNOSIS — L97.522 SKIN ULCER OF LEFT FOOT WITH FAT LAYER EXPOSED: Primary | ICD-10-CM

## 2021-03-17 DIAGNOSIS — L97.522 SKIN ULCER OF LEFT FOOT WITH FAT LAYER EXPOSED: ICD-10-CM

## 2021-03-17 DIAGNOSIS — E11.49 TYPE II DIABETES MELLITUS WITH NEUROLOGICAL MANIFESTATIONS: ICD-10-CM

## 2021-03-17 PROCEDURE — 11042 WOUND DEBRIDEMENT: ICD-10-PCS | Mod: S$PBB,,, | Performed by: PODIATRIST

## 2021-03-17 PROCEDURE — 73630 XR FOOT COMPLETE 3 VIEW LEFT: ICD-10-PCS | Mod: 26,LT,, | Performed by: RADIOLOGY

## 2021-03-17 PROCEDURE — 99999 PR PBB SHADOW E&M-EST. PATIENT-LVL V: CPT | Mod: PBBFAC,,, | Performed by: PODIATRIST

## 2021-03-17 PROCEDURE — 99215 OFFICE O/P EST HI 40 MIN: CPT | Mod: 25,S$PBB,, | Performed by: PODIATRIST

## 2021-03-17 PROCEDURE — 11045 DBRDMT SUBQ TISS EACH ADDL: CPT | Mod: S$PBB,,, | Performed by: PODIATRIST

## 2021-03-17 PROCEDURE — 73630 X-RAY EXAM OF FOOT: CPT | Mod: 26,LT,, | Performed by: RADIOLOGY

## 2021-03-17 PROCEDURE — 99215 OFFICE O/P EST HI 40 MIN: CPT | Mod: PBBFAC,25 | Performed by: PODIATRIST

## 2021-03-17 PROCEDURE — 73630 X-RAY EXAM OF FOOT: CPT | Mod: TC,FY,LT

## 2021-03-17 PROCEDURE — 11045 WOUND DEBRIDEMENT: ICD-10-PCS | Mod: S$PBB,,, | Performed by: PODIATRIST

## 2021-03-17 PROCEDURE — 11042 DBRDMT SUBQ TIS 1ST 20SQCM/<: CPT | Mod: PBBFAC | Performed by: PODIATRIST

## 2021-03-17 PROCEDURE — 99215 PR OFFICE/OUTPT VISIT, EST, LEVL V, 40-54 MIN: ICD-10-PCS | Mod: 25,S$PBB,, | Performed by: PODIATRIST

## 2021-03-17 PROCEDURE — 99999 PR PBB SHADOW E&M-EST. PATIENT-LVL V: ICD-10-PCS | Mod: PBBFAC,,, | Performed by: PODIATRIST

## 2021-03-23 ENCOUNTER — DOCUMENT SCAN (OUTPATIENT)
Dept: HOME HEALTH SERVICES | Facility: HOSPITAL | Age: 61
End: 2021-03-23
Payer: MEDICAID

## 2021-04-01 ENCOUNTER — EXTERNAL HOME HEALTH (OUTPATIENT)
Dept: HOME HEALTH SERVICES | Facility: HOSPITAL | Age: 61
End: 2021-04-01
Payer: MEDICAID

## 2021-04-02 PROCEDURE — G0179 MD RECERTIFICATION HHA PT: HCPCS | Mod: ,,, | Performed by: PODIATRIST

## 2021-04-02 PROCEDURE — G0179 PR HOME HEALTH MD RECERTIFICATION: ICD-10-PCS | Mod: ,,, | Performed by: PODIATRIST

## 2021-04-07 ENCOUNTER — HOSPITAL ENCOUNTER (OUTPATIENT)
Dept: RADIOLOGY | Facility: HOSPITAL | Age: 61
Discharge: HOME | End: 2021-04-07
Attending: PODIATRIST
Payer: MEDICAID

## 2021-04-07 ENCOUNTER — OFFICE VISIT (OUTPATIENT)
Dept: PODIATRY | Facility: CLINIC | Age: 61
End: 2021-04-07
Payer: MEDICAID

## 2021-04-07 VITALS
DIASTOLIC BLOOD PRESSURE: 89 MMHG | HEART RATE: 95 BPM | HEIGHT: 78 IN | WEIGHT: 279 LBS | BODY MASS INDEX: 32.28 KG/M2 | TEMPERATURE: 98 F | RESPIRATION RATE: 18 BRPM | SYSTOLIC BLOOD PRESSURE: 166 MMHG

## 2021-04-07 DIAGNOSIS — L97.522 ULCER OF LEFT FOOT WITH FAT LAYER EXPOSED: ICD-10-CM

## 2021-04-07 DIAGNOSIS — L97.522 ULCER OF LEFT FOOT WITH FAT LAYER EXPOSED: Primary | ICD-10-CM

## 2021-04-07 DIAGNOSIS — E11.49 TYPE II DIABETES MELLITUS WITH NEUROLOGICAL MANIFESTATIONS: ICD-10-CM

## 2021-04-07 DIAGNOSIS — Z89.9 HX OF AMPUTATION: ICD-10-CM

## 2021-04-07 DIAGNOSIS — L97.522 SKIN ULCER OF LEFT FOOT WITH FAT LAYER EXPOSED: ICD-10-CM

## 2021-04-07 PROCEDURE — 11042 DBRDMT SUBQ TIS 1ST 20SQCM/<: CPT | Mod: PBBFAC | Performed by: PODIATRIST

## 2021-04-07 PROCEDURE — 73630 XR FOOT COMPLETE 3 VIEW LEFT: ICD-10-PCS | Mod: 26,LT,, | Performed by: RADIOLOGY

## 2021-04-07 PROCEDURE — 73630 X-RAY EXAM OF FOOT: CPT | Mod: TC,FY,LT

## 2021-04-07 PROCEDURE — 73630 X-RAY EXAM OF FOOT: CPT | Mod: 26,LT,, | Performed by: RADIOLOGY

## 2021-04-07 PROCEDURE — 99999 PR PBB SHADOW E&M-EST. PATIENT-LVL V: ICD-10-PCS | Mod: PBBFAC,,, | Performed by: PODIATRIST

## 2021-04-07 PROCEDURE — 11045 DBRDMT SUBQ TISS EACH ADDL: CPT | Mod: S$PBB,,, | Performed by: PODIATRIST

## 2021-04-07 PROCEDURE — 99215 OFFICE O/P EST HI 40 MIN: CPT | Mod: PBBFAC,25 | Performed by: PODIATRIST

## 2021-04-07 PROCEDURE — 99214 PR OFFICE/OUTPT VISIT, EST, LEVL IV, 30-39 MIN: ICD-10-PCS | Mod: 25,S$PBB,, | Performed by: PODIATRIST

## 2021-04-07 PROCEDURE — 11045 WOUND DEBRIDEMENT: ICD-10-PCS | Mod: S$PBB,,, | Performed by: PODIATRIST

## 2021-04-07 PROCEDURE — 99214 OFFICE O/P EST MOD 30 MIN: CPT | Mod: 25,S$PBB,, | Performed by: PODIATRIST

## 2021-04-07 PROCEDURE — 11042 WOUND DEBRIDEMENT: ICD-10-PCS | Mod: S$PBB,,, | Performed by: PODIATRIST

## 2021-04-07 PROCEDURE — 99999 PR PBB SHADOW E&M-EST. PATIENT-LVL V: CPT | Mod: PBBFAC,,, | Performed by: PODIATRIST

## 2021-04-07 RX ORDER — INSULIN ASPART 100 [IU]/ML
INJECTION, SOLUTION INTRAVENOUS; SUBCUTANEOUS
COMMUNITY
Start: 2021-03-09 | End: 2021-04-10

## 2021-04-07 RX ORDER — TRAZODONE HYDROCHLORIDE 50 MG/1
TABLET ORAL
COMMUNITY
Start: 2021-02-16 | End: 2021-04-10

## 2021-04-20 ENCOUNTER — TELEPHONE (OUTPATIENT)
Dept: PODIATRY | Facility: CLINIC | Age: 61
End: 2021-04-20

## 2021-04-21 ENCOUNTER — TELEPHONE (OUTPATIENT)
Dept: PODIATRY | Facility: CLINIC | Age: 61
End: 2021-04-21

## 2021-04-22 ENCOUNTER — TELEPHONE (OUTPATIENT)
Dept: FAMILY MEDICINE | Facility: CLINIC | Age: 61
End: 2021-04-22

## 2021-04-27 ENCOUNTER — PATIENT OUTREACH (OUTPATIENT)
Dept: ADMINISTRATIVE | Facility: OTHER | Age: 61
End: 2021-04-27

## 2021-04-27 ENCOUNTER — TELEPHONE (OUTPATIENT)
Dept: PODIATRY | Facility: CLINIC | Age: 61
End: 2021-04-27

## 2021-04-28 ENCOUNTER — OFFICE VISIT (OUTPATIENT)
Dept: PODIATRY | Facility: CLINIC | Age: 61
End: 2021-04-28
Payer: MEDICAID

## 2021-04-28 VITALS
HEIGHT: 78 IN | DIASTOLIC BLOOD PRESSURE: 84 MMHG | HEART RATE: 91 BPM | TEMPERATURE: 98 F | WEIGHT: 279 LBS | SYSTOLIC BLOOD PRESSURE: 168 MMHG | BODY MASS INDEX: 32.28 KG/M2

## 2021-04-28 DIAGNOSIS — L97.522 ULCER OF LEFT FOOT WITH FAT LAYER EXPOSED: ICD-10-CM

## 2021-04-28 DIAGNOSIS — L97.522 SKIN ULCER OF LEFT FOOT WITH FAT LAYER EXPOSED: ICD-10-CM

## 2021-04-28 DIAGNOSIS — E11.49 TYPE II DIABETES MELLITUS WITH NEUROLOGICAL MANIFESTATIONS: Primary | ICD-10-CM

## 2021-04-28 PROCEDURE — 99999 PR PBB SHADOW E&M-EST. PATIENT-LVL V: ICD-10-PCS | Mod: PBBFAC,,, | Performed by: PODIATRIST

## 2021-04-28 PROCEDURE — 99999 PR PBB SHADOW E&M-EST. PATIENT-LVL V: CPT | Mod: PBBFAC,,, | Performed by: PODIATRIST

## 2021-04-28 PROCEDURE — 11045 WOUND DEBRIDEMENT: ICD-10-PCS | Mod: S$PBB,,, | Performed by: PODIATRIST

## 2021-04-28 PROCEDURE — 11045 DBRDMT SUBQ TISS EACH ADDL: CPT | Mod: S$PBB,,, | Performed by: PODIATRIST

## 2021-04-28 PROCEDURE — 99215 OFFICE O/P EST HI 40 MIN: CPT | Mod: PBBFAC,25 | Performed by: PODIATRIST

## 2021-04-28 PROCEDURE — 99214 OFFICE O/P EST MOD 30 MIN: CPT | Mod: 25,S$PBB,, | Performed by: PODIATRIST

## 2021-04-28 PROCEDURE — 11042 WOUND DEBRIDEMENT: ICD-10-PCS | Mod: S$PBB,,, | Performed by: PODIATRIST

## 2021-04-28 PROCEDURE — 99214 PR OFFICE/OUTPT VISIT, EST, LEVL IV, 30-39 MIN: ICD-10-PCS | Mod: 25,S$PBB,, | Performed by: PODIATRIST

## 2021-04-28 PROCEDURE — 11042 DBRDMT SUBQ TIS 1ST 20SQCM/<: CPT | Mod: PBBFAC | Performed by: PODIATRIST

## 2021-05-04 DIAGNOSIS — E11.9 TYPE 2 DIABETES MELLITUS WITHOUT COMPLICATION: ICD-10-CM

## 2021-05-05 ENCOUNTER — TELEPHONE (OUTPATIENT)
Dept: FAMILY MEDICINE | Facility: CLINIC | Age: 61
End: 2021-05-05

## 2021-05-05 ENCOUNTER — TELEPHONE (OUTPATIENT)
Dept: PODIATRY | Facility: CLINIC | Age: 61
End: 2021-05-05

## 2021-05-05 DIAGNOSIS — Z89.9 HX OF AMPUTATION: Primary | ICD-10-CM

## 2021-05-07 ENCOUNTER — DOCUMENT SCAN (OUTPATIENT)
Dept: HOME HEALTH SERVICES | Facility: HOSPITAL | Age: 61
End: 2021-05-07
Payer: MEDICAID

## 2021-05-10 ENCOUNTER — TELEPHONE (OUTPATIENT)
Dept: PODIATRY | Facility: CLINIC | Age: 61
End: 2021-05-10

## 2021-05-11 ENCOUNTER — TELEPHONE (OUTPATIENT)
Dept: FAMILY MEDICINE | Facility: CLINIC | Age: 61
End: 2021-05-11

## 2021-05-12 ENCOUNTER — TELEPHONE (OUTPATIENT)
Dept: PODIATRY | Facility: CLINIC | Age: 61
End: 2021-05-12

## 2021-05-20 ENCOUNTER — TELEPHONE (OUTPATIENT)
Dept: PODIATRY | Facility: CLINIC | Age: 61
End: 2021-05-20

## 2021-05-25 DIAGNOSIS — E11.9 TYPE 2 DIABETES MELLITUS WITHOUT COMPLICATION: ICD-10-CM

## 2021-05-27 ENCOUNTER — HOSPITAL ENCOUNTER (EMERGENCY)
Facility: HOSPITAL | Age: 61
Discharge: HOME | End: 2021-05-27
Attending: EMERGENCY MEDICINE
Payer: MEDICAID

## 2021-05-27 VITALS
BODY MASS INDEX: 31.24 KG/M2 | SYSTOLIC BLOOD PRESSURE: 150 MMHG | HEIGHT: 78 IN | DIASTOLIC BLOOD PRESSURE: 82 MMHG | OXYGEN SATURATION: 97 % | RESPIRATION RATE: 18 BRPM | HEART RATE: 99 BPM | TEMPERATURE: 98 F | WEIGHT: 270 LBS

## 2021-05-27 DIAGNOSIS — Z79.4 TYPE 2 DIABETES MELLITUS WITH HYPERGLYCEMIA, WITH LONG-TERM CURRENT USE OF INSULIN: ICD-10-CM

## 2021-05-27 DIAGNOSIS — M54.50 ACUTE EXACERBATION OF CHRONIC LOW BACK PAIN: Primary | ICD-10-CM

## 2021-05-27 DIAGNOSIS — E11.65 TYPE 2 DIABETES MELLITUS WITH HYPERGLYCEMIA, WITH LONG-TERM CURRENT USE OF INSULIN: ICD-10-CM

## 2021-05-27 DIAGNOSIS — G89.29 ACUTE EXACERBATION OF CHRONIC LOW BACK PAIN: Primary | ICD-10-CM

## 2021-05-27 LAB
POCT GLUCOSE: 434 MG/DL (ref 70–110)
POCT GLUCOSE: 478 MG/DL (ref 70–110)

## 2021-05-27 PROCEDURE — 96372 THER/PROPH/DIAG INJ SC/IM: CPT

## 2021-05-27 PROCEDURE — 99284 EMERGENCY DEPT VISIT MOD MDM: CPT | Mod: 25

## 2021-05-27 PROCEDURE — 82962 GLUCOSE BLOOD TEST: CPT

## 2021-05-27 PROCEDURE — 63600175 PHARM REV CODE 636 W HCPCS: Performed by: EMERGENCY MEDICINE

## 2021-05-27 RX ORDER — KETOROLAC TROMETHAMINE 30 MG/ML
30 INJECTION, SOLUTION INTRAMUSCULAR; INTRAVENOUS
Status: COMPLETED | OUTPATIENT
Start: 2021-05-27 | End: 2021-05-27

## 2021-05-27 RX ORDER — MORPHINE SULFATE 4 MG/ML
10 INJECTION, SOLUTION INTRAMUSCULAR; INTRAVENOUS
Status: COMPLETED | OUTPATIENT
Start: 2021-05-27 | End: 2021-05-27

## 2021-05-27 RX ADMIN — MORPHINE SULFATE 10 MG: 4 INJECTION, SOLUTION INTRAMUSCULAR; INTRAVENOUS at 09:05

## 2021-05-27 RX ADMIN — KETOROLAC TROMETHAMINE 30 MG: 30 INJECTION, SOLUTION INTRAMUSCULAR at 09:05

## 2021-06-01 ENCOUNTER — PATIENT OUTREACH (OUTPATIENT)
Dept: ADMINISTRATIVE | Facility: OTHER | Age: 61
End: 2021-06-01

## 2021-06-01 DIAGNOSIS — Z12.11 ENCOUNTER FOR FIT (FECAL IMMUNOCHEMICAL TEST) SCREENING: Primary | ICD-10-CM

## 2021-06-02 ENCOUNTER — TELEPHONE (OUTPATIENT)
Dept: PODIATRY | Facility: CLINIC | Age: 61
End: 2021-06-02

## 2021-06-07 ENCOUNTER — TELEPHONE (OUTPATIENT)
Dept: PODIATRY | Facility: CLINIC | Age: 61
End: 2021-06-07

## 2021-06-22 ENCOUNTER — TELEPHONE (OUTPATIENT)
Dept: PODIATRY | Facility: CLINIC | Age: 61
End: 2021-06-22

## 2021-06-23 ENCOUNTER — TELEPHONE (OUTPATIENT)
Dept: PODIATRY | Facility: CLINIC | Age: 61
End: 2021-06-23

## 2021-06-23 ENCOUNTER — CLINICAL SUPPORT (OUTPATIENT)
Dept: REHABILITATION | Facility: HOSPITAL | Age: 61
End: 2021-06-23
Payer: MEDICAID

## 2021-06-23 DIAGNOSIS — Z74.09 DECREASED FUNCTIONAL MOBILITY AND ENDURANCE: ICD-10-CM

## 2021-06-23 PROCEDURE — 97162 PT EVAL MOD COMPLEX 30 MIN: CPT | Mod: PN

## 2021-06-26 PROBLEM — Z74.09 DECREASED FUNCTIONAL MOBILITY AND ENDURANCE: Status: ACTIVE | Noted: 2021-06-26

## 2021-07-12 ENCOUNTER — TELEPHONE (OUTPATIENT)
Dept: PODIATRY | Facility: CLINIC | Age: 61
End: 2021-07-12

## 2021-07-13 ENCOUNTER — TELEPHONE (OUTPATIENT)
Dept: FAMILY MEDICINE | Facility: CLINIC | Age: 61
End: 2021-07-13

## 2021-07-13 ENCOUNTER — PATIENT OUTREACH (OUTPATIENT)
Dept: ADMINISTRATIVE | Facility: OTHER | Age: 61
End: 2021-07-13

## 2021-07-19 ENCOUNTER — HOSPITAL ENCOUNTER (OUTPATIENT)
Dept: RADIOLOGY | Facility: HOSPITAL | Age: 61
Discharge: HOME | End: 2021-07-19
Attending: PODIATRIST
Payer: MEDICAID

## 2021-07-19 ENCOUNTER — OFFICE VISIT (OUTPATIENT)
Dept: PODIATRY | Facility: CLINIC | Age: 61
End: 2021-07-19
Payer: MEDICAID

## 2021-07-19 VITALS
TEMPERATURE: 98 F | BODY MASS INDEX: 35.87 KG/M2 | SYSTOLIC BLOOD PRESSURE: 127 MMHG | HEART RATE: 88 BPM | DIASTOLIC BLOOD PRESSURE: 76 MMHG | WEIGHT: 310 LBS | HEIGHT: 78 IN

## 2021-07-19 DIAGNOSIS — E11.49 TYPE II DIABETES MELLITUS WITH NEUROLOGICAL MANIFESTATIONS: ICD-10-CM

## 2021-07-19 DIAGNOSIS — Z74.09 DECREASED FUNCTIONAL MOBILITY AND ENDURANCE: ICD-10-CM

## 2021-07-19 DIAGNOSIS — L97.522 SKIN ULCER OF LEFT FOOT WITH FAT LAYER EXPOSED: Primary | ICD-10-CM

## 2021-07-19 DIAGNOSIS — E11.9 COMPREHENSIVE DIABETIC FOOT EXAMINATION, TYPE 2 DM, ENCOUNTER FOR: ICD-10-CM

## 2021-07-19 DIAGNOSIS — Z89.9 HX OF AMPUTATION: ICD-10-CM

## 2021-07-19 DIAGNOSIS — L97.522 SKIN ULCER OF LEFT FOOT WITH FAT LAYER EXPOSED: ICD-10-CM

## 2021-07-19 PROCEDURE — 99999 PR PBB SHADOW E&M-EST. PATIENT-LVL V: ICD-10-PCS | Mod: PBBFAC,,, | Performed by: PODIATRIST

## 2021-07-19 PROCEDURE — 99215 OFFICE O/P EST HI 40 MIN: CPT | Mod: PBBFAC | Performed by: PODIATRIST

## 2021-07-19 PROCEDURE — 73630 XR FOOT COMPLETE 3 VIEW LEFT: ICD-10-PCS | Mod: 26,LT,, | Performed by: RADIOLOGY

## 2021-07-19 PROCEDURE — 99215 OFFICE O/P EST HI 40 MIN: CPT | Mod: S$PBB,,, | Performed by: PODIATRIST

## 2021-07-19 PROCEDURE — 99999 PR PBB SHADOW E&M-EST. PATIENT-LVL V: CPT | Mod: PBBFAC,,, | Performed by: PODIATRIST

## 2021-07-19 PROCEDURE — 99215 PR OFFICE/OUTPT VISIT, EST, LEVL V, 40-54 MIN: ICD-10-PCS | Mod: S$PBB,,, | Performed by: PODIATRIST

## 2021-07-19 PROCEDURE — 73630 X-RAY EXAM OF FOOT: CPT | Mod: 26,LT,, | Performed by: RADIOLOGY

## 2021-07-19 PROCEDURE — 73630 X-RAY EXAM OF FOOT: CPT | Mod: TC,FY,LT

## 2021-07-19 RX ORDER — OXYCODONE AND ACETAMINOPHEN 10; 325 MG/1; MG/1
1 TABLET ORAL EVERY 8 HOURS PRN
COMMUNITY
Start: 2021-07-09 | End: 2022-08-20

## 2021-07-20 ENCOUNTER — LAB VISIT (OUTPATIENT)
Dept: LAB | Facility: HOSPITAL | Age: 61
End: 2021-07-20
Attending: FAMILY MEDICINE
Payer: MEDICAID

## 2021-07-20 DIAGNOSIS — E11.9 DIABETES MELLITUS WITHOUT COMPLICATION: ICD-10-CM

## 2021-07-20 DIAGNOSIS — E11.9 TYPE 2 DIABETES MELLITUS WITHOUT COMPLICATION: ICD-10-CM

## 2021-07-20 LAB
CHOLEST SERPL-MCNC: 134 MG/DL (ref 120–199)
CHOLEST/HDLC SERPL: 4.5 {RATIO} (ref 2–5)
ESTIMATED AVG GLUCOSE: 255 MG/DL (ref 68–131)
HBA1C MFR BLD: 10.5 % (ref 4–5.6)
HDLC SERPL-MCNC: 30 MG/DL (ref 40–75)
HDLC SERPL: 22.4 % (ref 20–50)
LDLC SERPL CALC-MCNC: 87.2 MG/DL (ref 63–159)
NONHDLC SERPL-MCNC: 104 MG/DL
TRIGL SERPL-MCNC: 84 MG/DL (ref 30–150)

## 2021-07-20 PROCEDURE — 83036 HEMOGLOBIN GLYCOSYLATED A1C: CPT | Performed by: FAMILY MEDICINE

## 2021-07-20 PROCEDURE — 36415 COLL VENOUS BLD VENIPUNCTURE: CPT | Performed by: FAMILY MEDICINE

## 2021-07-20 PROCEDURE — 80061 LIPID PANEL: CPT | Performed by: FAMILY MEDICINE

## 2021-07-21 ENCOUNTER — HOSPITAL ENCOUNTER (EMERGENCY)
Facility: HOSPITAL | Age: 61
Discharge: HOME | End: 2021-07-21
Attending: EMERGENCY MEDICINE
Payer: MEDICAID

## 2021-07-21 ENCOUNTER — TELEPHONE (OUTPATIENT)
Dept: PODIATRY | Facility: CLINIC | Age: 61
End: 2021-07-21

## 2021-07-21 VITALS
OXYGEN SATURATION: 94 % | BODY MASS INDEX: 36.45 KG/M2 | SYSTOLIC BLOOD PRESSURE: 129 MMHG | TEMPERATURE: 98 F | HEIGHT: 78 IN | RESPIRATION RATE: 15 BRPM | DIASTOLIC BLOOD PRESSURE: 78 MMHG | HEART RATE: 96 BPM | WEIGHT: 315 LBS

## 2021-07-21 DIAGNOSIS — Z51.89 VISIT FOR WOUND CHECK: ICD-10-CM

## 2021-07-21 DIAGNOSIS — L97.522 ULCER OF LEFT FOOT WITH FAT LAYER EXPOSED: Primary | ICD-10-CM

## 2021-07-21 PROCEDURE — 99282 EMERGENCY DEPT VISIT SF MDM: CPT

## 2021-07-22 ENCOUNTER — TELEPHONE (OUTPATIENT)
Dept: PODIATRY | Facility: CLINIC | Age: 61
End: 2021-07-22

## 2021-07-28 ENCOUNTER — OFFICE VISIT (OUTPATIENT)
Dept: FAMILY MEDICINE | Facility: CLINIC | Age: 61
End: 2021-07-28
Payer: MEDICAID

## 2021-07-28 ENCOUNTER — HOSPITAL ENCOUNTER (OUTPATIENT)
Dept: RADIOLOGY | Facility: HOSPITAL | Age: 61
Discharge: HOME | End: 2021-07-28
Attending: FAMILY MEDICINE
Payer: MEDICAID

## 2021-07-28 ENCOUNTER — CLINICAL SUPPORT (OUTPATIENT)
Dept: FAMILY MEDICINE | Facility: CLINIC | Age: 61
End: 2021-07-28
Attending: FAMILY MEDICINE
Payer: MEDICAID

## 2021-07-28 VITALS
HEART RATE: 87 BPM | BODY MASS INDEX: 36.75 KG/M2 | OXYGEN SATURATION: 97 % | HEIGHT: 78 IN | RESPIRATION RATE: 15 BRPM | SYSTOLIC BLOOD PRESSURE: 156 MMHG | DIASTOLIC BLOOD PRESSURE: 88 MMHG | TEMPERATURE: 98 F

## 2021-07-28 DIAGNOSIS — Z12.11 SCREENING FOR COLON CANCER: ICD-10-CM

## 2021-07-28 DIAGNOSIS — Z79.4 TYPE 2 DIABETES MELLITUS WITH FOOT ULCER, WITH LONG-TERM CURRENT USE OF INSULIN: Primary | ICD-10-CM

## 2021-07-28 DIAGNOSIS — L97.509 TYPE 2 DIABETES MELLITUS WITH FOOT ULCER, WITH LONG-TERM CURRENT USE OF INSULIN: Primary | ICD-10-CM

## 2021-07-28 DIAGNOSIS — M25.562 ACUTE PAIN OF LEFT KNEE: ICD-10-CM

## 2021-07-28 DIAGNOSIS — Z79.4 TYPE 2 DIABETES MELLITUS WITH FOOT ULCER, WITH LONG-TERM CURRENT USE OF INSULIN: ICD-10-CM

## 2021-07-28 DIAGNOSIS — I10 ESSENTIAL HYPERTENSION: ICD-10-CM

## 2021-07-28 DIAGNOSIS — L97.509 TYPE 2 DIABETES MELLITUS WITH FOOT ULCER, WITH LONG-TERM CURRENT USE OF INSULIN: ICD-10-CM

## 2021-07-28 DIAGNOSIS — E11.621 TYPE 2 DIABETES MELLITUS WITH FOOT ULCER, WITH LONG-TERM CURRENT USE OF INSULIN: Primary | ICD-10-CM

## 2021-07-28 DIAGNOSIS — E11.621 TYPE 2 DIABETES MELLITUS WITH FOOT ULCER, WITH LONG-TERM CURRENT USE OF INSULIN: ICD-10-CM

## 2021-07-28 PROCEDURE — 73560 XR KNEE 1 OR 2 VIEW LEFT: ICD-10-PCS | Mod: 26,LT,, | Performed by: RADIOLOGY

## 2021-07-28 PROCEDURE — 73560 X-RAY EXAM OF KNEE 1 OR 2: CPT | Mod: 26,LT,, | Performed by: RADIOLOGY

## 2021-07-28 PROCEDURE — 99499 UNLISTED E&M SERVICE: CPT | Mod: S$GLB,,, | Performed by: OPHTHALMOLOGY

## 2021-07-28 PROCEDURE — 92228 DIABETIC EYE SCREENING PHOTO: ICD-10-PCS | Mod: TC,S$GLB,, | Performed by: FAMILY MEDICINE

## 2021-07-28 PROCEDURE — 99214 OFFICE O/P EST MOD 30 MIN: CPT | Mod: S$GLB,,, | Performed by: FAMILY MEDICINE

## 2021-07-28 PROCEDURE — 73560 X-RAY EXAM OF KNEE 1 OR 2: CPT | Mod: TC,FY,LT

## 2021-07-28 PROCEDURE — 99214 PR OFFICE/OUTPT VISIT, EST, LEVL IV, 30-39 MIN: ICD-10-PCS | Mod: S$GLB,,, | Performed by: FAMILY MEDICINE

## 2021-07-28 PROCEDURE — 92228 IMG RTA DETC/MNTR DS PHY/QHP: CPT | Mod: TC,S$GLB,, | Performed by: FAMILY MEDICINE

## 2021-07-28 PROCEDURE — 99499 DIABETIC EYE SCREENING PHOTO: ICD-10-PCS | Mod: S$GLB,,, | Performed by: OPHTHALMOLOGY

## 2021-07-28 RX ORDER — ATORVASTATIN CALCIUM 40 MG/1
40 TABLET, FILM COATED ORAL DAILY
Qty: 90 TABLET | Refills: 3 | Status: SHIPPED | OUTPATIENT
Start: 2021-07-28 | End: 2022-04-02

## 2021-07-28 RX ORDER — PEN NEEDLE, DIABETIC 29 G X1/2"
NEEDLE, DISPOSABLE MISCELLANEOUS
Qty: 100 EACH | Refills: 3 | Status: SHIPPED | OUTPATIENT
Start: 2021-07-28 | End: 2024-02-14 | Stop reason: SDUPTHER

## 2021-07-28 RX ORDER — LOSARTAN POTASSIUM 100 MG/1
100 TABLET ORAL DAILY
Qty: 90 TABLET | Refills: 3 | Status: SHIPPED | OUTPATIENT
Start: 2021-07-28 | End: 2022-04-02

## 2021-07-29 ENCOUNTER — LAB VISIT (OUTPATIENT)
Dept: LAB | Facility: HOSPITAL | Age: 61
End: 2021-07-29
Attending: FAMILY MEDICINE
Payer: MEDICAID

## 2021-07-29 ENCOUNTER — DOCUMENTATION ONLY (OUTPATIENT)
Dept: FAMILY MEDICINE | Facility: CLINIC | Age: 61
End: 2021-07-29

## 2021-07-29 DIAGNOSIS — Z12.11 SCREENING FOR COLON CANCER: ICD-10-CM

## 2021-07-29 PROCEDURE — 82274 ASSAY TEST FOR BLOOD FECAL: CPT | Performed by: FAMILY MEDICINE

## 2021-08-04 LAB — HEMOCCULT STL QL IA: NEGATIVE

## 2021-08-10 ENCOUNTER — TELEPHONE (OUTPATIENT)
Dept: FAMILY MEDICINE | Facility: CLINIC | Age: 61
End: 2021-08-10

## 2021-08-16 ENCOUNTER — OFFICE VISIT (OUTPATIENT)
Dept: PODIATRY | Facility: CLINIC | Age: 61
End: 2021-08-16
Payer: MEDICAID

## 2021-08-16 ENCOUNTER — PATIENT OUTREACH (OUTPATIENT)
Dept: ADMINISTRATIVE | Facility: HOSPITAL | Age: 61
End: 2021-08-16

## 2021-08-16 VITALS
HEART RATE: 89 BPM | SYSTOLIC BLOOD PRESSURE: 158 MMHG | WEIGHT: 315 LBS | HEIGHT: 78 IN | BODY MASS INDEX: 36.45 KG/M2 | DIASTOLIC BLOOD PRESSURE: 79 MMHG | TEMPERATURE: 98 F

## 2021-08-16 DIAGNOSIS — L97.522 ULCER OF LEFT FOOT WITH FAT LAYER EXPOSED: ICD-10-CM

## 2021-08-16 DIAGNOSIS — Z74.09 DECREASED FUNCTIONAL MOBILITY AND ENDURANCE: ICD-10-CM

## 2021-08-16 DIAGNOSIS — E11.49 TYPE II DIABETES MELLITUS WITH NEUROLOGICAL MANIFESTATIONS: ICD-10-CM

## 2021-08-16 DIAGNOSIS — L97.522 SKIN ULCER OF LEFT FOOT WITH FAT LAYER EXPOSED: Primary | ICD-10-CM

## 2021-08-16 DIAGNOSIS — Z89.9 HX OF AMPUTATION: ICD-10-CM

## 2021-08-16 DIAGNOSIS — E11.9 COMPREHENSIVE DIABETIC FOOT EXAMINATION, TYPE 2 DM, ENCOUNTER FOR: ICD-10-CM

## 2021-08-16 PROCEDURE — 99215 OFFICE O/P EST HI 40 MIN: CPT | Mod: 25,S$PBB,, | Performed by: PODIATRIST

## 2021-08-16 PROCEDURE — 11042 DBRDMT SUBQ TIS 1ST 20SQCM/<: CPT | Mod: PBBFAC | Performed by: PODIATRIST

## 2021-08-16 PROCEDURE — 11042 WOUND DEBRIDEMENT: ICD-10-PCS | Mod: S$PBB,,, | Performed by: PODIATRIST

## 2021-08-16 PROCEDURE — 99215 OFFICE O/P EST HI 40 MIN: CPT | Mod: PBBFAC | Performed by: PODIATRIST

## 2021-08-16 PROCEDURE — 99999 PR PBB SHADOW E&M-EST. PATIENT-LVL V: CPT | Mod: PBBFAC,,, | Performed by: PODIATRIST

## 2021-08-16 PROCEDURE — 99999 PR PBB SHADOW E&M-EST. PATIENT-LVL V: ICD-10-PCS | Mod: PBBFAC,,, | Performed by: PODIATRIST

## 2021-08-16 PROCEDURE — 99215 PR OFFICE/OUTPT VISIT, EST, LEVL V, 40-54 MIN: ICD-10-PCS | Mod: 25,S$PBB,, | Performed by: PODIATRIST

## 2021-08-16 PROCEDURE — 11045 DBRDMT SUBQ TISS EACH ADDL: CPT | Mod: S$PBB,,, | Performed by: PODIATRIST

## 2021-08-16 PROCEDURE — 11045 WOUND DEBRIDEMENT: ICD-10-PCS | Mod: S$PBB,,, | Performed by: PODIATRIST

## 2021-08-19 ENCOUNTER — TELEPHONE (OUTPATIENT)
Dept: PODIATRY | Facility: CLINIC | Age: 61
End: 2021-08-19

## 2021-08-20 ENCOUNTER — TELEPHONE (OUTPATIENT)
Dept: PODIATRY | Facility: CLINIC | Age: 61
End: 2021-08-20

## 2021-08-23 ENCOUNTER — TELEPHONE (OUTPATIENT)
Dept: PODIATRY | Facility: CLINIC | Age: 61
End: 2021-08-23

## 2021-08-24 ENCOUNTER — TELEPHONE (OUTPATIENT)
Dept: PODIATRY | Facility: CLINIC | Age: 61
End: 2021-08-24

## 2021-08-31 ENCOUNTER — TELEPHONE (OUTPATIENT)
Dept: FAMILY MEDICINE | Facility: CLINIC | Age: 61
End: 2021-08-31

## 2021-08-31 RX ORDER — INSULIN DETEMIR 100 [IU]/ML
70 INJECTION, SOLUTION SUBCUTANEOUS 2 TIMES DAILY
Qty: 42 ML | Refills: 11 | Status: SHIPPED | OUTPATIENT
Start: 2021-08-31 | End: 2021-10-05 | Stop reason: SDUPTHER

## 2021-09-01 ENCOUNTER — TELEPHONE (OUTPATIENT)
Dept: PODIATRY | Facility: CLINIC | Age: 61
End: 2021-09-01

## 2021-09-02 DIAGNOSIS — E11.9 COMPREHENSIVE DIABETIC FOOT EXAMINATION, TYPE 2 DM, ENCOUNTER FOR: ICD-10-CM

## 2021-09-03 ENCOUNTER — TELEPHONE (OUTPATIENT)
Dept: PODIATRY | Facility: CLINIC | Age: 61
End: 2021-09-03

## 2021-09-08 ENCOUNTER — TELEPHONE (OUTPATIENT)
Dept: PODIATRY | Facility: CLINIC | Age: 61
End: 2021-09-08

## 2021-09-09 ENCOUNTER — TELEPHONE (OUTPATIENT)
Dept: PODIATRY | Facility: CLINIC | Age: 61
End: 2021-09-09

## 2021-09-13 ENCOUNTER — TELEPHONE (OUTPATIENT)
Dept: PODIATRY | Facility: CLINIC | Age: 61
End: 2021-09-13

## 2021-09-14 ENCOUNTER — PATIENT OUTREACH (OUTPATIENT)
Dept: ADMINISTRATIVE | Facility: OTHER | Age: 61
End: 2021-09-14

## 2021-09-15 ENCOUNTER — TELEPHONE (OUTPATIENT)
Dept: PODIATRY | Facility: CLINIC | Age: 61
End: 2021-09-15

## 2021-09-17 ENCOUNTER — TELEPHONE (OUTPATIENT)
Dept: PODIATRY | Facility: CLINIC | Age: 61
End: 2021-09-17

## 2021-09-20 ENCOUNTER — OFFICE VISIT (OUTPATIENT)
Dept: PODIATRY | Facility: CLINIC | Age: 61
End: 2021-09-20
Payer: MEDICAID

## 2021-09-20 VITALS
WEIGHT: 315 LBS | TEMPERATURE: 98 F | HEIGHT: 78 IN | SYSTOLIC BLOOD PRESSURE: 157 MMHG | DIASTOLIC BLOOD PRESSURE: 83 MMHG | HEART RATE: 94 BPM | BODY MASS INDEX: 36.45 KG/M2

## 2021-09-20 DIAGNOSIS — L97.522 ULCER OF LEFT FOOT WITH FAT LAYER EXPOSED: ICD-10-CM

## 2021-09-20 DIAGNOSIS — L97.522 SKIN ULCER OF LEFT FOOT WITH FAT LAYER EXPOSED: ICD-10-CM

## 2021-09-20 DIAGNOSIS — E11.9 COMPREHENSIVE DIABETIC FOOT EXAMINATION, TYPE 2 DM, ENCOUNTER FOR: ICD-10-CM

## 2021-09-20 DIAGNOSIS — Z89.9 HX OF AMPUTATION: ICD-10-CM

## 2021-09-20 DIAGNOSIS — E11.49 TYPE II DIABETES MELLITUS WITH NEUROLOGICAL MANIFESTATIONS: Primary | ICD-10-CM

## 2021-09-20 PROCEDURE — 11042 DBRDMT SUBQ TIS 1ST 20SQCM/<: CPT | Mod: PBBFAC | Performed by: PODIATRIST

## 2021-09-20 PROCEDURE — 99214 OFFICE O/P EST MOD 30 MIN: CPT | Mod: PBBFAC,25 | Performed by: PODIATRIST

## 2021-09-20 PROCEDURE — 99999 PR PBB SHADOW E&M-EST. PATIENT-LVL IV: CPT | Mod: PBBFAC,,, | Performed by: PODIATRIST

## 2021-09-20 PROCEDURE — 11045 DBRDMT SUBQ TISS EACH ADDL: CPT | Mod: S$PBB,,, | Performed by: PODIATRIST

## 2021-09-20 PROCEDURE — 11042 WOUND DEBRIDEMENT: ICD-10-PCS | Mod: S$PBB,,, | Performed by: PODIATRIST

## 2021-09-20 PROCEDURE — 99214 PR OFFICE/OUTPT VISIT, EST, LEVL IV, 30-39 MIN: ICD-10-PCS | Mod: 25,S$PBB,, | Performed by: PODIATRIST

## 2021-09-20 PROCEDURE — 99999 PR PBB SHADOW E&M-EST. PATIENT-LVL IV: ICD-10-PCS | Mod: PBBFAC,,, | Performed by: PODIATRIST

## 2021-09-20 PROCEDURE — 11045 WOUND DEBRIDEMENT: ICD-10-PCS | Mod: S$PBB,,, | Performed by: PODIATRIST

## 2021-09-20 PROCEDURE — 99214 OFFICE O/P EST MOD 30 MIN: CPT | Mod: 25,S$PBB,, | Performed by: PODIATRIST

## 2021-09-20 RX ORDER — IBUPROFEN 800 MG/1
TABLET ORAL
COMMUNITY
Start: 2021-09-08

## 2021-10-05 ENCOUNTER — TELEPHONE (OUTPATIENT)
Dept: PODIATRY | Facility: CLINIC | Age: 61
End: 2021-10-05

## 2021-10-06 ENCOUNTER — TELEPHONE (OUTPATIENT)
Dept: PODIATRY | Facility: CLINIC | Age: 61
End: 2021-10-06

## 2021-10-06 RX ORDER — INSULIN ASPART 100 [IU]/ML
50 INJECTION, SOLUTION INTRAVENOUS; SUBCUTANEOUS
Qty: 90 ML | Refills: 0 | Status: SHIPPED | OUTPATIENT
Start: 2021-10-06 | End: 2021-12-03 | Stop reason: SDUPTHER

## 2021-10-06 RX ORDER — INSULIN DETEMIR 100 [IU]/ML
70 INJECTION, SOLUTION SUBCUTANEOUS 2 TIMES DAILY
Qty: 42 ML | Refills: 11 | Status: SHIPPED | OUTPATIENT
Start: 2021-10-06 | End: 2023-04-18 | Stop reason: SDUPTHER

## 2021-10-15 ENCOUNTER — TELEPHONE (OUTPATIENT)
Dept: PODIATRY | Facility: CLINIC | Age: 61
End: 2021-10-15

## 2021-10-17 ENCOUNTER — HOSPITAL ENCOUNTER (EMERGENCY)
Facility: HOSPITAL | Age: 61
Discharge: HOME | End: 2021-10-17
Attending: INTERNAL MEDICINE
Payer: MEDICAID

## 2021-10-17 VITALS
DIASTOLIC BLOOD PRESSURE: 95 MMHG | SYSTOLIC BLOOD PRESSURE: 171 MMHG | OXYGEN SATURATION: 98 % | HEART RATE: 86 BPM | BODY MASS INDEX: 35.87 KG/M2 | HEIGHT: 78 IN | WEIGHT: 310 LBS | RESPIRATION RATE: 18 BRPM | TEMPERATURE: 98 F
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
## 2021-10-17 DIAGNOSIS — S02.2XXA CLOSED FRACTURE OF NASAL BONE, INITIAL ENCOUNTER: Primary | ICD-10-CM
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
## 2021-10-17 DIAGNOSIS — M25.511 ACUTE PAIN OF RIGHT SHOULDER: ICD-10-CM

## 2021-10-17 LAB
AMPHET+METHAMPHET UR QL: NEGATIVE
BARBITURATES UR QL SCN>200 NG/ML: NEGATIVE
BASOPHILS # BLD AUTO: 0.03 K/UL (ref 0–0.2)
BASOPHILS NFR BLD: 0.4 % (ref 0–1.9)
BENZODIAZ UR QL SCN>200 NG/ML: NEGATIVE
BZE UR QL SCN: NEGATIVE
CANNABINOIDS UR QL SCN: NEGATIVE
CREAT UR-MCNC: 56.1 MG/DL (ref 23–375)
DIFFERENTIAL METHOD: ABNORMAL
EOSINOPHIL # BLD AUTO: 0.1 K/UL (ref 0–0.5)
EOSINOPHIL NFR BLD: 1.6 % (ref 0–8)
ERYTHROCYTE [DISTWIDTH] IN BLOOD BY AUTOMATED COUNT: 13.2 % (ref 11.5–14.5)
HCT VFR BLD AUTO: 38.8 % (ref 40–54)
HGB BLD-MCNC: 12.5 G/DL (ref 14–18)
IMM GRANULOCYTES # BLD AUTO: 0.02 K/UL (ref 0–0.04)
IMM GRANULOCYTES NFR BLD AUTO: 0.3 % (ref 0–0.5)
LYMPHOCYTES # BLD AUTO: 1.4 K/UL (ref 1–4.8)
LYMPHOCYTES NFR BLD: 17.6 % (ref 18–48)
MCH RBC QN AUTO: 28.5 PG (ref 27–31)
MCHC RBC AUTO-ENTMCNC: 32.2 G/DL (ref 32–36)
MCV RBC AUTO: 88 FL (ref 82–98)
METHADONE UR QL SCN>300 NG/ML: NEGATIVE
MONOCYTES # BLD AUTO: 0.4 K/UL (ref 0.3–1)
MONOCYTES NFR BLD: 5.4 % (ref 4–15)
NEUTROPHILS # BLD AUTO: 6 K/UL (ref 1.8–7.7)
NEUTROPHILS NFR BLD: 74.7 % (ref 38–73)
NRBC BLD-RTO: 0 /100 WBC
OPIATES UR QL SCN: ABNORMAL
PCP UR QL SCN>25 NG/ML: NEGATIVE
PLATELET # BLD AUTO: 276 K/UL (ref 150–450)
PMV BLD AUTO: 10.1 FL (ref 9.2–12.9)
RBC # BLD AUTO: 4.39 M/UL (ref 4.6–6.2)
TOXICOLOGY INFORMATION: ABNORMAL
WBC # BLD AUTO: 7.97 K/UL (ref 3.9–12.7)

## 2021-10-17 PROCEDURE — 70486 CT MAXILLOFACIAL W/O DYE: CPT | Mod: 26,,, | Performed by: RADIOLOGY

## 2021-10-17 PROCEDURE — 73562 X-RAY EXAM OF KNEE 3: CPT | Mod: 26,LT,, | Performed by: RADIOLOGY

## 2021-10-17 PROCEDURE — 70486 CT MAXILLOFACIAL W/O DYE: CPT | Mod: TC

## 2021-10-17 PROCEDURE — 73030 X-RAY EXAM OF SHOULDER: CPT | Mod: 26,RT,, | Performed by: RADIOLOGY

## 2021-10-17 PROCEDURE — 73030 X-RAY EXAM OF SHOULDER: CPT | Mod: TC,FY,RT

## 2021-10-17 PROCEDURE — 80307 DRUG TEST PRSMV CHEM ANLYZR: CPT | Performed by: INTERNAL MEDICINE

## 2021-10-17 PROCEDURE — 36415 COLL VENOUS BLD VENIPUNCTURE: CPT | Performed by: INTERNAL MEDICINE

## 2021-10-17 PROCEDURE — 70450 CT HEAD/BRAIN W/O DYE: CPT | Mod: 26,,, | Performed by: RADIOLOGY

## 2021-10-17 PROCEDURE — 85025 COMPLETE CBC W/AUTO DIFF WBC: CPT | Performed by: INTERNAL MEDICINE

## 2021-10-17 PROCEDURE — 72125 CT NECK SPINE W/O DYE: CPT | Mod: TC

## 2021-10-17 PROCEDURE — 73562 XR KNEE 3 VIEW LEFT: ICD-10-PCS | Mod: 26,LT,, | Performed by: RADIOLOGY

## 2021-10-17 PROCEDURE — 70450 CT HEAD WITHOUT CONTRAST: ICD-10-PCS | Mod: 26,,, | Performed by: RADIOLOGY

## 2021-10-17 PROCEDURE — 99284 EMERGENCY DEPT VISIT MOD MDM: CPT | Mod: 25

## 2021-10-17 PROCEDURE — 70450 CT HEAD/BRAIN W/O DYE: CPT | Mod: TC

## 2021-10-17 PROCEDURE — 73030 XR SHOULDER COMPLETE 2 OR MORE VIEWS RIGHT: ICD-10-PCS | Mod: 26,RT,, | Performed by: RADIOLOGY

## 2021-10-17 PROCEDURE — 72125 CT NECK SPINE W/O DYE: CPT | Mod: 26,,, | Performed by: RADIOLOGY

## 2021-10-17 PROCEDURE — 63600175 PHARM REV CODE 636 W HCPCS: Performed by: INTERNAL MEDICINE

## 2021-10-17 PROCEDURE — 73562 X-RAY EXAM OF KNEE 3: CPT | Mod: TC,FY,LT

## 2021-10-17 PROCEDURE — 70486 CT MAXILLOFACIAL WITHOUT CONTRAST: ICD-10-PCS | Mod: 26,,, | Performed by: RADIOLOGY

## 2021-10-17 PROCEDURE — 72125 CT CERVICAL SPINE WITHOUT CONTRAST: ICD-10-PCS | Mod: 26,,, | Performed by: RADIOLOGY

## 2021-10-17 PROCEDURE — 96372 THER/PROPH/DIAG INJ SC/IM: CPT

## 2021-10-17 RX ORDER — KETOROLAC TROMETHAMINE 30 MG/ML
30 INJECTION, SOLUTION INTRAMUSCULAR; INTRAVENOUS
Status: COMPLETED | OUTPATIENT
Start: 2021-10-17 | End: 2021-10-17

## 2021-10-17 RX ORDER — ORPHENADRINE CITRATE 30 MG/ML
30 INJECTION INTRAMUSCULAR; INTRAVENOUS
Status: COMPLETED | OUTPATIENT
Start: 2021-10-17 | End: 2021-10-17

## 2021-10-17 RX ADMIN — KETOROLAC TROMETHAMINE 30 MG: 30 INJECTION, SOLUTION INTRAMUSCULAR at 01:10

## 2021-10-17 RX ADMIN — ORPHENADRINE CITRATE 30 MG: 60 INJECTION INTRAMUSCULAR; INTRAVENOUS at 01:10

## 2021-10-18 ENCOUNTER — TELEPHONE (OUTPATIENT)
Dept: PODIATRY | Facility: CLINIC | Age: 61
End: 2021-10-18

## 2021-10-19 ENCOUNTER — TELEPHONE (OUTPATIENT)
Dept: PODIATRY | Facility: CLINIC | Age: 61
End: 2021-10-19

## 2021-10-22 ENCOUNTER — TELEPHONE (OUTPATIENT)
Dept: PODIATRY | Facility: CLINIC | Age: 61
End: 2021-10-22

## 2021-10-27 ENCOUNTER — HOSPITAL ENCOUNTER (OUTPATIENT)
Dept: RADIOLOGY | Facility: HOSPITAL | Age: 61
Discharge: HOME | End: 2021-10-27
Attending: PODIATRIST
Payer: MEDICAID

## 2021-10-27 ENCOUNTER — OFFICE VISIT (OUTPATIENT)
Dept: PODIATRY | Facility: CLINIC | Age: 61
End: 2021-10-27
Payer: MEDICAID

## 2021-10-27 VITALS
TEMPERATURE: 98 F | HEIGHT: 78 IN | SYSTOLIC BLOOD PRESSURE: 198 MMHG | HEART RATE: 91 BPM | DIASTOLIC BLOOD PRESSURE: 92 MMHG | BODY MASS INDEX: 35.87 KG/M2 | WEIGHT: 310 LBS

## 2021-10-27 DIAGNOSIS — E11.9 COMPREHENSIVE DIABETIC FOOT EXAMINATION, TYPE 2 DM, ENCOUNTER FOR: ICD-10-CM

## 2021-10-27 DIAGNOSIS — Z74.09 DECREASED FUNCTIONAL MOBILITY AND ENDURANCE: ICD-10-CM

## 2021-10-27 DIAGNOSIS — L97.522 SKIN ULCER OF LEFT FOOT WITH FAT LAYER EXPOSED: Primary | ICD-10-CM

## 2021-10-27 DIAGNOSIS — L97.522 ULCER OF LEFT FOOT WITH FAT LAYER EXPOSED: ICD-10-CM

## 2021-10-27 DIAGNOSIS — L97.522 SKIN ULCER OF LEFT FOOT WITH FAT LAYER EXPOSED: ICD-10-CM

## 2021-10-27 PROCEDURE — 73630 X-RAY EXAM OF FOOT: CPT | Mod: TC,FY,LT

## 2021-10-27 PROCEDURE — 99215 PR OFFICE/OUTPT VISIT, EST, LEVL V, 40-54 MIN: ICD-10-PCS | Mod: 25,S$PBB,, | Performed by: PODIATRIST

## 2021-10-27 PROCEDURE — 99215 OFFICE O/P EST HI 40 MIN: CPT | Mod: PBBFAC,25 | Performed by: PODIATRIST

## 2021-10-27 PROCEDURE — 73630 X-RAY EXAM OF FOOT: CPT | Mod: 26,LT,, | Performed by: RADIOLOGY

## 2021-10-27 PROCEDURE — 73630 XR FOOT COMPLETE 3 VIEW LEFT: ICD-10-PCS | Mod: 26,LT,, | Performed by: RADIOLOGY

## 2021-10-27 PROCEDURE — 99215 OFFICE O/P EST HI 40 MIN: CPT | Mod: 25,S$PBB,, | Performed by: PODIATRIST

## 2021-10-27 PROCEDURE — 99999 PR PBB SHADOW E&M-EST. PATIENT-LVL V: ICD-10-PCS | Mod: PBBFAC,,, | Performed by: PODIATRIST

## 2021-10-27 PROCEDURE — 99999 PR PBB SHADOW E&M-EST. PATIENT-LVL V: CPT | Mod: PBBFAC,,, | Performed by: PODIATRIST

## 2021-10-27 PROCEDURE — 11045 DBRDMT SUBQ TISS EACH ADDL: CPT | Mod: S$PBB,,, | Performed by: PODIATRIST

## 2021-10-27 PROCEDURE — 11042 DBRDMT SUBQ TIS 1ST 20SQCM/<: CPT | Mod: PBBFAC | Performed by: PODIATRIST

## 2021-10-27 PROCEDURE — 11042 WOUND DEBRIDEMENT: ICD-10-PCS | Mod: S$PBB,,, | Performed by: PODIATRIST

## 2021-10-27 PROCEDURE — 11045 WOUND DEBRIDEMENT: ICD-10-PCS | Mod: S$PBB,,, | Performed by: PODIATRIST

## 2021-10-27 RX ORDER — TRAZODONE HYDROCHLORIDE 50 MG/1
TABLET ORAL
COMMUNITY
Start: 2021-09-08 | End: 2022-04-02

## 2021-11-05 ENCOUNTER — TELEPHONE (OUTPATIENT)
Dept: PODIATRY | Facility: CLINIC | Age: 61
End: 2021-11-05
Payer: MEDICAID

## 2021-11-11 ENCOUNTER — TELEPHONE (OUTPATIENT)
Dept: PODIATRY | Facility: CLINIC | Age: 61
End: 2021-11-11
Payer: MEDICAID

## 2021-11-19 ENCOUNTER — TELEPHONE (OUTPATIENT)
Dept: PODIATRY | Facility: CLINIC | Age: 61
End: 2021-11-19
Payer: MEDICAID

## 2021-11-21 ENCOUNTER — HOSPITAL ENCOUNTER (EMERGENCY)
Facility: HOSPITAL | Age: 61
Discharge: HOME | End: 2021-11-21
Attending: EMERGENCY MEDICINE
Payer: MEDICAID

## 2021-11-21 VITALS
OXYGEN SATURATION: 97 % | HEART RATE: 83 BPM | SYSTOLIC BLOOD PRESSURE: 160 MMHG | HEIGHT: 78 IN | WEIGHT: 315 LBS | DIASTOLIC BLOOD PRESSURE: 85 MMHG | RESPIRATION RATE: 20 BRPM | TEMPERATURE: 98 F | BODY MASS INDEX: 36.45 KG/M2

## 2021-11-21 DIAGNOSIS — G54.2 CERVICAL NERVE ROOT IMPINGEMENT: Primary | ICD-10-CM

## 2021-11-21 PROCEDURE — 99284 EMERGENCY DEPT VISIT MOD MDM: CPT | Mod: 25

## 2021-11-21 PROCEDURE — 96372 THER/PROPH/DIAG INJ SC/IM: CPT

## 2021-11-21 PROCEDURE — 63600175 PHARM REV CODE 636 W HCPCS: Performed by: NURSE PRACTITIONER

## 2021-11-21 RX ORDER — KETOROLAC TROMETHAMINE 30 MG/ML
30 INJECTION, SOLUTION INTRAMUSCULAR; INTRAVENOUS
Status: COMPLETED | OUTPATIENT
Start: 2021-11-21 | End: 2021-11-21

## 2021-11-21 RX ORDER — ORPHENADRINE CITRATE 30 MG/ML
60 INJECTION INTRAMUSCULAR; INTRAVENOUS
Status: COMPLETED | OUTPATIENT
Start: 2021-11-21 | End: 2021-11-21

## 2021-11-21 RX ADMIN — KETOROLAC TROMETHAMINE 30 MG: 30 INJECTION, SOLUTION INTRAMUSCULAR; INTRAVENOUS at 02:11

## 2021-11-21 RX ADMIN — ORPHENADRINE CITRATE 60 MG: 30 INJECTION INTRAMUSCULAR; INTRAVENOUS at 02:11

## 2021-11-23 ENCOUNTER — TELEPHONE (OUTPATIENT)
Dept: PODIATRY | Facility: CLINIC | Age: 61
End: 2021-11-23
Payer: MEDICAID

## 2021-11-26 LAB — POCT GLUCOSE: 272 MG/DL (ref 70–110)

## 2021-11-28 ENCOUNTER — NURSE TRIAGE (OUTPATIENT)
Dept: ADMINISTRATIVE | Facility: CLINIC | Age: 61
End: 2021-11-28
Payer: MEDICAID

## 2021-11-29 ENCOUNTER — TELEPHONE (OUTPATIENT)
Dept: PODIATRY | Facility: CLINIC | Age: 61
End: 2021-11-29
Payer: MEDICAID

## 2021-11-29 ENCOUNTER — TELEPHONE (OUTPATIENT)
Dept: FAMILY MEDICINE | Facility: CLINIC | Age: 61
End: 2021-11-29
Payer: MEDICAID

## 2021-11-30 ENCOUNTER — TELEPHONE (OUTPATIENT)
Dept: FAMILY MEDICINE | Facility: CLINIC | Age: 61
End: 2021-11-30
Payer: MEDICAID

## 2021-12-03 ENCOUNTER — TELEPHONE (OUTPATIENT)
Dept: FAMILY MEDICINE | Facility: CLINIC | Age: 61
End: 2021-12-03
Payer: MEDICAID

## 2021-12-03 ENCOUNTER — NURSE TRIAGE (OUTPATIENT)
Dept: ADMINISTRATIVE | Facility: CLINIC | Age: 61
End: 2021-12-03
Payer: MEDICAID

## 2021-12-03 RX ORDER — INSULIN ASPART 100 [IU]/ML
50 INJECTION, SOLUTION INTRAVENOUS; SUBCUTANEOUS
Qty: 90 ML | Refills: 0 | Status: SHIPPED | OUTPATIENT
Start: 2021-12-03 | End: 2022-05-31

## 2021-12-08 ENCOUNTER — TELEPHONE (OUTPATIENT)
Dept: PODIATRY | Facility: CLINIC | Age: 61
End: 2021-12-08
Payer: MEDICAID

## 2021-12-13 ENCOUNTER — TELEPHONE (OUTPATIENT)
Dept: FAMILY MEDICINE | Facility: CLINIC | Age: 61
End: 2021-12-13
Payer: MEDICAID

## 2021-12-15 ENCOUNTER — OFFICE VISIT (OUTPATIENT)
Dept: PODIATRY | Facility: CLINIC | Age: 61
End: 2021-12-15
Payer: MEDICAID

## 2021-12-15 ENCOUNTER — HOSPITAL ENCOUNTER (OUTPATIENT)
Dept: RADIOLOGY | Facility: HOSPITAL | Age: 61
Discharge: HOME | End: 2021-12-15
Attending: PODIATRIST
Payer: MEDICAID

## 2021-12-15 VITALS — BODY MASS INDEX: 35.87 KG/M2 | RESPIRATION RATE: 19 BRPM | WEIGHT: 310 LBS | HEIGHT: 78 IN

## 2021-12-15 DIAGNOSIS — E11.9 COMPREHENSIVE DIABETIC FOOT EXAMINATION, TYPE 2 DM, ENCOUNTER FOR: ICD-10-CM

## 2021-12-15 DIAGNOSIS — E11.49 TYPE II DIABETES MELLITUS WITH NEUROLOGICAL MANIFESTATIONS: ICD-10-CM

## 2021-12-15 DIAGNOSIS — L97.522 SKIN ULCER OF LEFT FOOT WITH FAT LAYER EXPOSED: ICD-10-CM

## 2021-12-15 DIAGNOSIS — L97.522 SKIN ULCER OF LEFT FOOT WITH FAT LAYER EXPOSED: Primary | ICD-10-CM

## 2021-12-15 PROCEDURE — 4010F PR ACE/ARB THEARPY RXD/TAKEN: ICD-10-PCS | Mod: CPTII,,, | Performed by: PODIATRIST

## 2021-12-15 PROCEDURE — 99214 OFFICE O/P EST MOD 30 MIN: CPT | Mod: PBBFAC | Performed by: PODIATRIST

## 2021-12-15 PROCEDURE — 99999 PR PBB SHADOW E&M-EST. PATIENT-LVL IV: ICD-10-PCS | Mod: PBBFAC,,, | Performed by: PODIATRIST

## 2021-12-15 PROCEDURE — 99999 PR PBB SHADOW E&M-EST. PATIENT-LVL IV: CPT | Mod: PBBFAC,,, | Performed by: PODIATRIST

## 2021-12-15 PROCEDURE — 4010F ACE/ARB THERAPY RXD/TAKEN: CPT | Mod: CPTII,,, | Performed by: PODIATRIST

## 2021-12-15 PROCEDURE — 73630 X-RAY EXAM OF FOOT: CPT | Mod: TC,FY,LT

## 2021-12-15 PROCEDURE — 3061F PR NEG MICROALBUMINURIA RESULT DOCUMENTED/REVIEW: ICD-10-PCS | Mod: CPTII,,, | Performed by: PODIATRIST

## 2021-12-15 PROCEDURE — 3061F NEG MICROALBUMINURIA REV: CPT | Mod: CPTII,,, | Performed by: PODIATRIST

## 2021-12-15 PROCEDURE — 3066F PR DOCUMENTATION OF TREATMENT FOR NEPHROPATHY: ICD-10-PCS | Mod: CPTII,,, | Performed by: PODIATRIST

## 2021-12-15 PROCEDURE — 3066F NEPHROPATHY DOC TX: CPT | Mod: CPTII,,, | Performed by: PODIATRIST

## 2021-12-15 PROCEDURE — 99214 PR OFFICE/OUTPT VISIT, EST, LEVL IV, 30-39 MIN: ICD-10-PCS | Mod: S$PBB,,, | Performed by: PODIATRIST

## 2021-12-15 PROCEDURE — 73630 XR FOOT COMPLETE 3 VIEW LEFT: ICD-10-PCS | Mod: 26,LT,, | Performed by: RADIOLOGY

## 2021-12-15 PROCEDURE — 99214 OFFICE O/P EST MOD 30 MIN: CPT | Mod: S$PBB,,, | Performed by: PODIATRIST

## 2021-12-15 PROCEDURE — 73630 X-RAY EXAM OF FOOT: CPT | Mod: 26,LT,, | Performed by: RADIOLOGY

## 2021-12-15 RX ORDER — HYDROCODONE BITARTRATE AND ACETAMINOPHEN 10; 325 MG/1; MG/1
1 TABLET ORAL 2 TIMES DAILY PRN
Status: ON HOLD | COMMUNITY
Start: 2021-11-05 | End: 2023-08-16 | Stop reason: HOSPADM

## 2021-12-16 ENCOUNTER — TELEPHONE (OUTPATIENT)
Dept: FAMILY MEDICINE | Facility: CLINIC | Age: 61
End: 2021-12-16
Payer: MEDICAID

## 2021-12-20 ENCOUNTER — TELEPHONE (OUTPATIENT)
Dept: PODIATRY | Facility: CLINIC | Age: 61
End: 2021-12-20
Payer: MEDICAID

## 2021-12-20 ENCOUNTER — OFFICE VISIT (OUTPATIENT)
Dept: FAMILY MEDICINE | Facility: CLINIC | Age: 61
End: 2021-12-20
Payer: MEDICAID

## 2021-12-20 VITALS
TEMPERATURE: 98 F | OXYGEN SATURATION: 97 % | SYSTOLIC BLOOD PRESSURE: 160 MMHG | HEIGHT: 78 IN | RESPIRATION RATE: 14 BRPM | HEART RATE: 73 BPM | DIASTOLIC BLOOD PRESSURE: 86 MMHG | BODY MASS INDEX: 35.82 KG/M2

## 2021-12-20 DIAGNOSIS — M54.2 CERVICALGIA: Primary | ICD-10-CM

## 2021-12-20 PROCEDURE — 99999 PR PBB SHADOW E&M-EST. PATIENT-LVL IV: ICD-10-PCS | Mod: PBBFAC,,, | Performed by: FAMILY MEDICINE

## 2021-12-20 PROCEDURE — 4010F PR ACE/ARB THEARPY RXD/TAKEN: ICD-10-PCS | Mod: CPTII,,, | Performed by: FAMILY MEDICINE

## 2021-12-20 PROCEDURE — 3066F PR DOCUMENTATION OF TREATMENT FOR NEPHROPATHY: ICD-10-PCS | Mod: CPTII,,, | Performed by: FAMILY MEDICINE

## 2021-12-20 PROCEDURE — 99214 OFFICE O/P EST MOD 30 MIN: CPT | Mod: PBBFAC | Performed by: FAMILY MEDICINE

## 2021-12-20 PROCEDURE — 99214 PR OFFICE/OUTPT VISIT, EST, LEVL IV, 30-39 MIN: ICD-10-PCS | Mod: S$PBB,,, | Performed by: FAMILY MEDICINE

## 2021-12-20 PROCEDURE — 99999 PR PBB SHADOW E&M-EST. PATIENT-LVL IV: CPT | Mod: PBBFAC,,, | Performed by: FAMILY MEDICINE

## 2021-12-20 PROCEDURE — 4010F ACE/ARB THERAPY RXD/TAKEN: CPT | Mod: CPTII,,, | Performed by: FAMILY MEDICINE

## 2021-12-20 PROCEDURE — 3061F PR NEG MICROALBUMINURIA RESULT DOCUMENTED/REVIEW: ICD-10-PCS | Mod: CPTII,,, | Performed by: FAMILY MEDICINE

## 2021-12-20 PROCEDURE — 3061F NEG MICROALBUMINURIA REV: CPT | Mod: CPTII,,, | Performed by: FAMILY MEDICINE

## 2021-12-20 PROCEDURE — 99214 OFFICE O/P EST MOD 30 MIN: CPT | Mod: S$PBB,,, | Performed by: FAMILY MEDICINE

## 2021-12-20 PROCEDURE — 3066F NEPHROPATHY DOC TX: CPT | Mod: CPTII,,, | Performed by: FAMILY MEDICINE

## 2022-01-03 ENCOUNTER — TELEPHONE (OUTPATIENT)
Dept: FAMILY MEDICINE | Facility: CLINIC | Age: 62
End: 2022-01-03
Payer: MEDICAID

## 2022-01-03 NOTE — TELEPHONE ENCOUNTER
Per patient request, rescheduled appt to 01/07/2022.       ----- Message from Sun Khan sent at 1/3/2022 10:37 AM CST -----  Type: Needs Medical Advice  Who Called:  pt  Symptoms (please be specific):    How long has patient had these symptoms:    Pharmacy name and phone #:    Best Call Back Number: [phone] (home)     Additional Information: pt is needing to reschedule her nurse visit that was for today. Please call to discuss.

## 2022-01-05 ENCOUNTER — TELEPHONE (OUTPATIENT)
Dept: PODIATRY | Facility: CLINIC | Age: 62
End: 2022-01-05
Payer: MEDICAID

## 2022-01-05 NOTE — TELEPHONE ENCOUNTER
----- Message from Harry Mcgowan sent at 1/5/2022  9:12 AM CST -----  Regarding: advice  Contact: self  Type: Needs Medical Advice  Who Called:  self  Symptoms (please be specific):    How long has patient had these symptoms:    Pharmacy name and phone #:    Best Call Back Number: [phone]  Additional Information: Patient state he received wrong bandages, requesting to discuss with the nurse.

## 2022-01-07 ENCOUNTER — TELEPHONE (OUTPATIENT)
Dept: FAMILY MEDICINE | Facility: CLINIC | Age: 62
End: 2022-01-07
Payer: MEDICAID

## 2022-01-07 NOTE — TELEPHONE ENCOUNTER
Contacted patient, verified . Pt states he will be here later today for visit but ask if we can go to his vehicle due to him having issues getting his wheelchair in and out. I advised of course we will be down to check BP at 3 PM today    ----- Message from Brent Byoce sent at 2022  1:12 PM CST -----  Type: Needs Medical Advice  Who Called:  Patient    Best Call Back Number: [phone]  Additional Information: Patient states that he would like a callback regarding rescheduling his Nurse Visit today.

## 2022-01-12 ENCOUNTER — TELEPHONE (OUTPATIENT)
Dept: PODIATRY | Facility: CLINIC | Age: 62
End: 2022-01-12
Payer: MEDICAID

## 2022-01-12 ENCOUNTER — TELEPHONE (OUTPATIENT)
Dept: FAMILY MEDICINE | Facility: CLINIC | Age: 62
End: 2022-01-12
Payer: MEDICAID

## 2022-01-12 NOTE — TELEPHONE ENCOUNTER
Spoke with patient, instructed to call number on back of insurance card and see what local eye doctor that accepts his insurance and the call back to advise.

## 2022-01-12 NOTE — TELEPHONE ENCOUNTER
----- Message from Ailyn Obrien sent at 1/12/2022  1:14 PM CST -----  Type: Needs Medical Advice    Who Called:  Patient  Best Call Back Number: [phone]  Additional Information:  Patient is requesting doctor to refer him to a local eye doctor that accepts his insurance/please call patient back to advise.

## 2022-01-12 NOTE — TELEPHONE ENCOUNTER
----- Message from Gary Stringer DPM sent at 1/12/2022 11:09 AM CST -----    ----- Message -----  From: Claudette Campbell  Sent: 1/12/2022  10:59 AM CST  To: Gary Stringer DPM    Patient is requesting a return call regarding his appt.  Please call him at [phone].  Thank you!

## 2022-01-12 NOTE — TELEPHONE ENCOUNTER
Patient missed appointment due to transportation issues. Appointment rescheduled to 1/24/2022 @4:00.

## 2022-01-13 ENCOUNTER — TELEPHONE (OUTPATIENT)
Dept: PODIATRY | Facility: CLINIC | Age: 62
End: 2022-01-13
Payer: MEDICAID

## 2022-01-13 NOTE — TELEPHONE ENCOUNTER
----- Message from Zoraida Kong sent at 1/13/2022  3:04 PM CST -----  Regarding: supplies  Contact: Patient  Patient want to speak with a nurse regarding calling supplies in, please call back at [phone]

## 2022-01-18 ENCOUNTER — TELEPHONE (OUTPATIENT)
Dept: FAMILY MEDICINE | Facility: CLINIC | Age: 62
End: 2022-01-18
Payer: MEDICAID

## 2022-01-18 NOTE — TELEPHONE ENCOUNTER
----- Message from Stephanie Pastor LPN sent at 1/12/2022  2:49 PM CST -----  Can you assist the patient please?   ----- Message -----  From: Ailyn Obrien  Sent: 1/12/2022   1:16 PM CST  To: Rafa Roach Staff    Type: Needs Medical Advice    Who Called:  Patient  Best Call Back Number: [phone]  Additional Information:  Patient is requesting doctor to refer him to a local eye doctor that accepts his insurance/please call patient back to advise.

## 2022-01-18 NOTE — TELEPHONE ENCOUNTER
Called pt and advised him to call [facility] in [facility]  to make an appt

## 2022-01-24 ENCOUNTER — OFFICE VISIT (OUTPATIENT)
Dept: PODIATRY | Facility: CLINIC | Age: 62
End: 2022-01-24
Payer: MEDICAID

## 2022-01-24 ENCOUNTER — HOSPITAL ENCOUNTER (OUTPATIENT)
Dept: RADIOLOGY | Facility: HOSPITAL | Age: 62
Discharge: HOME | End: 2022-01-24
Attending: PODIATRIST
Payer: MEDICAID

## 2022-01-24 VITALS
HEIGHT: 78 IN | BODY MASS INDEX: 36.45 KG/M2 | RESPIRATION RATE: 18 BRPM | SYSTOLIC BLOOD PRESSURE: 177 MMHG | DIASTOLIC BLOOD PRESSURE: 88 MMHG | HEART RATE: 98 BPM | WEIGHT: 315 LBS

## 2022-01-24 DIAGNOSIS — L97.522 ULCER OF LEFT FOOT WITH FAT LAYER EXPOSED: ICD-10-CM

## 2022-01-24 DIAGNOSIS — L97.522 SKIN ULCER OF LEFT FOOT WITH FAT LAYER EXPOSED: ICD-10-CM

## 2022-01-24 DIAGNOSIS — E11.49 TYPE II DIABETES MELLITUS WITH NEUROLOGICAL MANIFESTATIONS: ICD-10-CM

## 2022-01-24 DIAGNOSIS — E11.9 COMPREHENSIVE DIABETIC FOOT EXAMINATION, TYPE 2 DM, ENCOUNTER FOR: ICD-10-CM

## 2022-01-24 DIAGNOSIS — M71.072 ABSCESS OF BURSA OF LEFT FOOT: Primary | ICD-10-CM

## 2022-01-24 PROCEDURE — 3008F BODY MASS INDEX DOCD: CPT | Mod: CPTII,,, | Performed by: PODIATRIST

## 2022-01-24 PROCEDURE — 11042 WOUND DEBRIDEMENT: ICD-10-PCS | Mod: S$PBB,,, | Performed by: PODIATRIST

## 2022-01-24 PROCEDURE — 73630 XR FOOT COMPLETE 3 VIEW LEFT: ICD-10-PCS | Mod: 26,LT,, | Performed by: RADIOLOGY

## 2022-01-24 PROCEDURE — 87070 CULTURE OTHR SPECIMN AEROBIC: CPT | Performed by: PODIATRIST

## 2022-01-24 PROCEDURE — 3008F PR BODY MASS INDEX (BMI) DOCUMENTED: ICD-10-PCS | Mod: CPTII,,, | Performed by: PODIATRIST

## 2022-01-24 PROCEDURE — 73630 X-RAY EXAM OF FOOT: CPT | Mod: 26,LT,, | Performed by: RADIOLOGY

## 2022-01-24 PROCEDURE — 3077F SYST BP >= 140 MM HG: CPT | Mod: CPTII,,, | Performed by: PODIATRIST

## 2022-01-24 PROCEDURE — 99214 OFFICE O/P EST MOD 30 MIN: CPT | Mod: 25,S$PBB,, | Performed by: PODIATRIST

## 2022-01-24 PROCEDURE — 3079F PR MOST RECENT DIASTOLIC BLOOD PRESSURE 80-89 MM HG: ICD-10-PCS | Mod: CPTII,,, | Performed by: PODIATRIST

## 2022-01-24 PROCEDURE — 1159F MED LIST DOCD IN RCRD: CPT | Mod: CPTII,,, | Performed by: PODIATRIST

## 2022-01-24 PROCEDURE — 1159F PR MEDICATION LIST DOCUMENTED IN MEDICAL RECORD: ICD-10-PCS | Mod: CPTII,,, | Performed by: PODIATRIST

## 2022-01-24 PROCEDURE — 1160F RVW MEDS BY RX/DR IN RCRD: CPT | Mod: CPTII,,, | Performed by: PODIATRIST

## 2022-01-24 PROCEDURE — 3079F DIAST BP 80-89 MM HG: CPT | Mod: CPTII,,, | Performed by: PODIATRIST

## 2022-01-24 PROCEDURE — 1160F PR REVIEW ALL MEDS BY PRESCRIBER/CLIN PHARMACIST DOCUMENTED: ICD-10-PCS | Mod: CPTII,,, | Performed by: PODIATRIST

## 2022-01-24 PROCEDURE — 87077 CULTURE AEROBIC IDENTIFY: CPT | Performed by: PODIATRIST

## 2022-01-24 PROCEDURE — 87186 SC STD MICRODIL/AGAR DIL: CPT | Performed by: PODIATRIST

## 2022-01-24 PROCEDURE — 11042 DBRDMT SUBQ TIS 1ST 20SQCM/<: CPT | Mod: PBBFAC | Performed by: PODIATRIST

## 2022-01-24 PROCEDURE — 73630 X-RAY EXAM OF FOOT: CPT | Mod: TC,FY,LT

## 2022-01-24 PROCEDURE — 99999 PR PBB SHADOW E&M-EST. PATIENT-LVL V: CPT | Mod: PBBFAC,,, | Performed by: PODIATRIST

## 2022-01-24 PROCEDURE — 3077F PR MOST RECENT SYSTOLIC BLOOD PRESSURE >= 140 MM HG: ICD-10-PCS | Mod: CPTII,,, | Performed by: PODIATRIST

## 2022-01-24 PROCEDURE — 99214 PR OFFICE/OUTPT VISIT, EST, LEVL IV, 30-39 MIN: ICD-10-PCS | Mod: 25,S$PBB,, | Performed by: PODIATRIST

## 2022-01-24 PROCEDURE — 99215 OFFICE O/P EST HI 40 MIN: CPT | Mod: PBBFAC,25 | Performed by: PODIATRIST

## 2022-01-24 PROCEDURE — 99999 PR PBB SHADOW E&M-EST. PATIENT-LVL V: ICD-10-PCS | Mod: PBBFAC,,, | Performed by: PODIATRIST

## 2022-01-25 NOTE — PROGRESS NOTES
"Subjective:       Patient ID: Alfred Villarreal is a 61 y.o. male.    Chief Complaint: Follow-up, Foot Ulcer, and Diabetes Mellitus   Patient presents for follow-up today multiple areas of breakdown and ulceration left.      Past Medical History:   Diagnosis Date    Anticoagulant long-term use     Arthritis     Diabetes mellitus     Hypertension      History reviewed. No pertinent surgical history.  History reviewed. No pertinent family history.  Social History     Socioeconomic History    Marital status:    Tobacco Use    Smoking status: Never Smoker    Smokeless tobacco: Current User     Types: Chew   Substance and Sexual Activity    Alcohol use: Yes    Drug use: No    Sexual activity: Yes     Partners: Female       Current Outpatient Medications   Medication Sig Dispense Refill    atorvastatin (LIPITOR) 40 MG tablet Take 1 tablet (40 mg total) by mouth once daily. 90 tablet 3    baclofen (LIORESAL) 10 MG tablet Take 10 mg by mouth 3 (three) times daily.      gabapentin (NEURONTIN) 600 MG tablet       HYDROcodone-acetaminophen (NORCO)  mg per tablet Take 1 tablet by mouth 2 (two) times daily as needed.      ibuprofen (ADVIL,MOTRIN) 800 MG tablet SMARTSI Tablet(s) By Mouth Every 12 Hours PRN      insulin aspart U-100 (NOVOLOG U-100 INSULIN ASPART) 100 unit/mL injection Inject 50 Units into the skin 2 (two) times daily before meals. 90 mL 0    insulin detemir U-100 (LEVEMIR FLEXTOUCH U-100 INSULN) 100 unit/mL (3 mL) InPn pen Inject 70 Units into the skin 2 (two) times daily. 42 mL 11    oxyCODONE-acetaminophen (PERCOCET)  mg per tablet Take 1 tablet by mouth every 8 (eight) hours as needed.      pen needle, diabetic 31 gauge x 1/4" Ndle Use daily with victoza 100 each 3    traZODone (DESYREL) 50 MG tablet TAKE 1 TO 2 TABLETS AT BEDTIME AS NEEDED FOR INSOMNIA "THANK YOU" MAY CAUSE DROWSINESS      liraglutide 0.6 mg/0.1 mL, 18 mg/3 mL, subq PNIJ (VICTOZA 2-ALEYDA) 0.6 mg/0.1 mL (18 " "mg/3 mL) PnIj pen Inject 0.6 mg into the skin once daily. (Patient not taking: No sig reported) 3 mL 11    losartan (COZAAR) 100 MG tablet Take 1 tablet (100 mg total) by mouth once daily. (Patient not taking: No sig reported) 90 tablet 3    meloxicam (MOBIC) 15 MG tablet Take 1 tablet (15 mg total) by mouth daily as needed for Pain. (Patient not taking: No sig reported) 30 tablet 0    sildenafil (REVATIO) 20 mg Tab Take 1-3 tablets as need 1 hour prior to sexual activity (Patient not taking: No sig reported) 30 tablet 0     No current facility-administered medications for this visit.     Review of patient's allergies indicates:   Allergen Reactions    Amitriptyline      Vivid dreams( bad)       Review of Systems   Musculoskeletal: Positive for arthralgias, back pain, gait problem and joint swelling.   Skin: Positive for color change and wound.   Neurological: Positive for numbness.   All other systems reviewed and are negative.      Objective:      Vitals:    01/24/22 1550   BP: (!) 177/88   Pulse: 98   Resp: 18   Weight: (!) 144.2 kg (318 lb)   Height: 6' 6" (1.981 m)     Physical Exam  Vitals and nursing note reviewed.   Constitutional:       General: He is in acute distress.      Appearance: Normal appearance. He is well-developed.   Cardiovascular:      Pulses:           Dorsalis pedis pulses are 1+ on the left side.        Posterior tibial pulses are 0 on the left side.   Pulmonary:      Effort: Pulmonary effort is normal.   Musculoskeletal:      Left lower leg: Edema present.      Left foot: Deformity present.        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   Feet:      Left foot:      Protective Sensation: 4 sites tested. 1 site sensed.     Skin integrity: Ulcer, skin breakdown, erythema and warmth present.   Skin:     Capillary Refill: Capillary refill takes more than 3 seconds.      Findings: Erythema and lesion present.   Neurological:      Mental Status: He is alert.      " Sensory: Sensory deficit present.      Deep Tendon Reflexes: Reflexes abnormal.   Psychiatric:         Mood and Affect: Mood normal.         Behavior: Behavior normal.         Thought Content: Thought content normal.         Judgment: Judgment normal.                                              Assessment:       1. Ulcer of left foot with fat layer exposed    2. Skin ulcer of left foot with fat layer exposed    3. Abscess of bursa of left foot    4. Type II diabetes mellitus with neurological manifestations    5. Comprehensive diabetic foot examination, type 2 DM, encounter for        Plan:         Patient presents today follow-up of multiple sites of ulceration on the patient's left lower extremity he has had a previous amputation of the right lower extremity has an extensive history of osteomyelitis.    Patient has had a chronic ulceration on his left foot for some time and had been showing signs of improvement but has subsequently gotten worse due to weight-bearing on the patient's left heel.  On evaluation there are no signs of obvious infection to the area with extensive nonviable tissue the a ulceration itself is 6 cm long by 4 cm wide by 3 mm deep.  Patient does have a new area of necrotic tissue centrally located within large area of granular tissue and ulceration of the patient's left heel this area the patient relates started after he hit his foot while trying to load his wheelchair in his truck he states he hit it very hard obviously a traumatized this area the area had become necrotic and required excisional debridement the area that was excisionally debrided was approximately 2 cm in diameter and was 4 mm deep.    The areas were then scrubbed with a Betadine scrub scrub rinsed with Dakin solution painted with Betadine and apply silver alginate with a well-padded thick dressing was applied.  Previously noted ulceration overlying the 1st MPJ left has opened up an ulcerated again this had been completely  closed and well resolved.  I did perform a culture and sensitivity on the wound site left and will place the patient on appropriate antibiotics pending culture and sensitivity.  Wound care provided today excisional debridement performed plan follow-up 4 weeks obviously if there is any changes to the foot the patient will contact us immediately.  All current ulcer sites look considerably better the ulcer overlying the lateral ankle area left has completely closed healed and resolved at this time.  Patient states he is on the heel quite a bit putting a lot of pressure on the area he states when the only ways he is able to transfer since he has had his right lower extremity amputated.  I again advised the patient he must stay off of this area keep pressure off of the area failure to be compliant is going to lead to nonhealing and possibly even amputation of the left lower extremity patient has already lost his right lower leg.   Total face-to-face time including discussion evaluation treatment wound care non excisional debridement and discussion of treatment plan equaled 30 min.   Plan follow-up will be 4 weeks.  Patient had an area of hyperkeratotic tissue that required non excisional debridement on the plantar lateral forefoot additionally he had a little bit of breakdown overlying the 1st MPJ where he has had previous breakdown before this was very superficial do not appear infected the patient's left heel has improved dramatically and got considerably smaller since the patient has been changing the dressing regularly and trying to stay off of the foot as much as possible.  Patient states he has again run out of wound care dressing supplies we had to provide him with additional supplies will work on getting these for the patient some days he is having to change the dressing twice a day due to drainage.  Patient states he is currently working from his wheelchair to help clear a lot I have advised him he needs to be  careful to keep this dry clean and absolutely stay off of this left foot so it can continue to heal.  X-rays were stable on today's evaluation.  This note was created using M*Modal voice recognition software that occasionally misinterpreted phrases or words.

## 2022-01-25 NOTE — PROCEDURES
"Wound Debridement    Date/Time: 1/24/2022 4:00 PM  Performed by: Gary Stringer DPM  Authorized by: Gary Stringer DPM     Time out: Immediately prior to procedure a "time out" was called to verify the correct patient, procedure, equipment, support staff and site/side marked as required.    Consent Done?:  Yes (Verbal)    Preparation: Patient was prepped and draped in usual sterile fashion    Local anesthesia used?: No      Wound Details:    Location:  Left foot    Location:  Left Heel    Type of Debridement:  Excisional       Length (cm):  2       Area (sq cm):  4       Width (cm):  2       Percent Debrided (%):  100       Depth (cm):  0.4       Total Area Debrided (sq cm):  4    Depth of debridement:  Subcutaneous tissue    Tissue debrided:  Subcutaneous    Devitalized tissue debrided:  Fibrin, Necrotic/Eschar, Slough, Biofilm and Callus    Instruments:  Blade    Bleeding:  Minimal  Hemostasis Achieved: Yes    Method Used:  Pressure and Alginate  Patient tolerance:  Patient tolerated the procedure well with no immediate complications      "

## 2022-01-27 ENCOUNTER — TELEPHONE (OUTPATIENT)
Dept: PODIATRY | Facility: CLINIC | Age: 62
End: 2022-01-27
Payer: MEDICAID

## 2022-01-27 LAB — BACTERIA SPEC AEROBE CULT: ABNORMAL

## 2022-01-27 RX ORDER — CIPROFLOXACIN 500 MG/1
500 TABLET ORAL 2 TIMES DAILY
Qty: 28 TABLET | Refills: 0 | Status: SHIPPED | OUTPATIENT
Start: 2022-01-27 | End: 2022-02-10

## 2022-01-27 NOTE — TELEPHONE ENCOUNTER
----- Message from Gary Stringer DPM sent at 1/27/2022 12:07 PM CST -----  Please call patient advise his culture and sensitivity was positive for Proteus I have sent in a prescription and started him on Cipro x2 weeks.

## 2022-02-03 ENCOUNTER — TELEPHONE (OUTPATIENT)
Dept: PODIATRY | Facility: CLINIC | Age: 62
End: 2022-02-03
Payer: MEDICAID

## 2022-02-03 NOTE — TELEPHONE ENCOUNTER
----- Message from Qing Glez sent at 2/3/2022  1:41 PM CST -----  Contact: patient  Type:  Needs Medical Advice    Who Called:  Patient       Would the patient rather a call back or a response via [facility]?  Call    Best Call Back Number:  [phone] (home)     Additional Information:  Patient needs to speak to the nurse to see where his monica and wraps will be coming from, He needs to know the number for the company where that will be coming from     Please call to advise

## 2022-02-04 ENCOUNTER — TELEPHONE (OUTPATIENT)
Dept: FAMILY MEDICINE | Facility: CLINIC | Age: 62
End: 2022-02-04
Payer: MEDICAID

## 2022-02-04 ENCOUNTER — TELEPHONE (OUTPATIENT)
Dept: PODIATRY | Facility: CLINIC | Age: 62
End: 2022-02-04
Payer: MEDICAID

## 2022-02-04 NOTE — TELEPHONE ENCOUNTER
----- Message from Martha Hammond sent at 2/4/2022 11:35 AM CST -----  Contact: Patient  Type: Needs Medical Advice    Who Called:  Patient    Best Call Back Number: [phone]    Additional Information: Patient wants to know if he can come &  some bandages.  Please call back.  Thanks.

## 2022-02-04 NOTE — TELEPHONE ENCOUNTER
Requesting an order for medical supplies.  LVM to return call to specify what medical supplies? Diabetic?. X1        ----- Message from Martha Hammodn sent at 2/4/2022  4:49 PM CST -----  Contact: [facility]  Type: Wound Care Supplies    Who Called:  Mayra    Pharmacy name and phone #:    [facility]  Phone: [phone]    Best Call Back Number: [phone]    Additional Information: Requesting an order for medical supplies.  Please call back.  Thanks.

## 2022-02-08 ENCOUNTER — TELEPHONE (OUTPATIENT)
Dept: FAMILY MEDICINE | Facility: CLINIC | Age: 62
End: 2022-02-08
Payer: MEDICAID

## 2022-02-08 DIAGNOSIS — L97.522 SKIN ULCER OF LEFT FOOT WITH FAT LAYER EXPOSED: Primary | ICD-10-CM

## 2022-02-08 NOTE — TELEPHONE ENCOUNTER
----- Message from Qing Glez sent at 2/8/2022  9:11 AM CST -----  Contact: Mayra  Type:  Needs Medical Advice    Who Called:  Mayra with [email or web address]       Would the patient rather a call back or a response via [facility]?  Call    Best Call Back Number:  [phone]     Additional Information:  Mayra with [email or web address] needs to speak to nurse about getting Evaluation for PT for patient     Please call to advise

## 2022-02-08 NOTE — TELEPHONE ENCOUNTER
NEREYDAM on the secure  for Mayra with [employer] to return the call to further discuss the requested PT orders.  LOV with Dr. Cotter on 12.20.21- PCP is listed as RUCHI Caets NP.

## 2022-02-09 NOTE — TELEPHONE ENCOUNTER
Medicaid is stating they need an order sent to [employer] [facility] for supplies.     Supplies needed are:  Sterile Gauze   Conforming bandage, non-elastic    Gelling dressing    Need a 30 day supply.    DME will need a PA due to amount requesting.   stated normally a PA is not needed but because of the amount it is needed but need an order first.     Please advise. Thanks

## 2022-02-18 ENCOUNTER — TELEPHONE (OUTPATIENT)
Dept: FAMILY MEDICINE | Facility: CLINIC | Age: 62
End: 2022-02-18
Payer: MEDICAID

## 2022-02-18 NOTE — TELEPHONE ENCOUNTER
----- Message from Alyssa Vasquez sent at 2/18/2022  1:05 PM CST -----  Regarding: advice  Contact: pt  Type: Needs Medical Advice    Who Called:  pt  Symptoms (please be specific):  n/a  How long has patient had these symptoms:  n/a  Pharmacy name and phone #:  n/a  Best Call Back Number: [phone]    Additional Information: asking for a call regarding MRI from yesterday

## 2022-02-18 NOTE — TELEPHONE ENCOUNTER
Spoke with patient . Patient stated he has an MRI completed yesterday at [facility]. Patient would like us to send the results to his pain management. Informed patient we do not have those results but that he could bring his copy to pain management. Patient voiced understanding.

## 2022-02-21 ENCOUNTER — TELEPHONE (OUTPATIENT)
Dept: PODIATRY | Facility: CLINIC | Age: 62
End: 2022-02-21
Payer: MEDICAID

## 2022-02-21 ENCOUNTER — TELEPHONE (OUTPATIENT)
Dept: FAMILY MEDICINE | Facility: CLINIC | Age: 62
End: 2022-02-21
Payer: MEDICAID

## 2022-02-21 NOTE — TELEPHONE ENCOUNTER
----- Message from Brent Boyce sent at 2/21/2022 11:04 AM CST -----  Type: Needs Medical Advice  Who Called: Patient    Best Call Back Number: [phone]  Additional Information: Patient states that he would like a callback regarding needing bandages.

## 2022-02-21 NOTE — TELEPHONE ENCOUNTER
----- Message from Ava Morrissey sent at 2/21/2022  9:19 AM CST -----  Regarding: MRI results  Name of Who is Calling: IVONNE LIN [[id number]]           What is the request in detail: Pt would like a call back in regards to MRI results, states he has on 02/17/2022, pl advise           Can the clinic reply by [facility]: no           [facility]: [phone]

## 2022-02-21 NOTE — TELEPHONE ENCOUNTER
"Spoke with patient. Patient stated," I need my MRI results for my pain management appointment on Wednesday." advised patient to contact the facility where he had his MRI completed, obtain a paper copy of his report and bring with him to his pain management appointment. Patient voiced understanding.   "

## 2022-03-04 ENCOUNTER — TELEPHONE (OUTPATIENT)
Dept: FAMILY MEDICINE | Facility: CLINIC | Age: 62
End: 2022-03-04
Payer: MEDICAID

## 2022-03-04 NOTE — TELEPHONE ENCOUNTER
----- Message from Latosha Barnard sent at 3/4/2022 10:35 AM CST -----  Contact: pt  Type: Needs Medical Advice    Who Called: pt  Best Call Back Number:[phone]  Additional  Information: pt stated he needs to speak with someone concerning his MRI  Please Advise- Thank you

## 2022-03-04 NOTE — TELEPHONE ENCOUNTER
----- Message from Kunal Oakes Patient Care Assistant sent at 3/4/2022 10:58 AM CST -----  Contact: Pt  Type: Needs Medical Advice    Who Called: Pt  Best Call Back Number: [phone]    Inquiry/Question: Pt is calling to see if someone can meet him in the parking lot with a copy of his MRI due to not having help with his wheelchair and not able to get out and come in the office. Please call back and advise. Thank you~

## 2022-03-04 NOTE — TELEPHONE ENCOUNTER
"Spoke with patient. Patient stated," I had my MRI completed at [facility] but I have no way of going to [place] to get the paperwork with the results. Is there anyway your office could get it." Informed patient we are unable to obtain the MRI report without a release of information form signed. Patient voiced understanding.   "

## 2022-03-04 NOTE — TELEPHONE ENCOUNTER
Spoke with patient. Patient would like to see if someone would be able to bring FRANTZ form to him in his vehicle to sign to obtain his MRI from [facility]. If able to do so, please contact the patient! Thanks!

## 2022-03-10 ENCOUNTER — TELEPHONE (OUTPATIENT)
Dept: PODIATRY | Facility: CLINIC | Age: 62
End: 2022-03-10
Payer: MEDICAID

## 2022-03-10 NOTE — TELEPHONE ENCOUNTER
----- Message from Melva Vasquez sent at 3/10/2022 11:31 AM CST -----  Contact: pt  Type: Needs Medical Advice  Who Called:  pt   Best Call Back Number: [phone]    Additional Information: please call pt, requesting bandage gauze

## 2022-03-14 ENCOUNTER — TELEPHONE (OUTPATIENT)
Dept: PODIATRY | Facility: CLINIC | Age: 62
End: 2022-03-14
Payer: MEDICAID

## 2022-03-14 NOTE — TELEPHONE ENCOUNTER
----- Message from Selena York sent at 3/14/2022 10:18 AM CDT -----  Regarding: advice  Contact: Patient/[phone] (home)  Type: Needs Medical Advice  Who Called:  Patient/[phone] (home)     Additional Information: Needs to talk to the office about the appointment he missed today and a personal matter. Please call for the details. Thanks.

## 2022-03-15 ENCOUNTER — TELEPHONE (OUTPATIENT)
Dept: PODIATRY | Facility: CLINIC | Age: 62
End: 2022-03-15
Payer: MEDICAID

## 2022-03-15 NOTE — TELEPHONE ENCOUNTER
----- Message from Latosha Barnard sent at 3/15/2022 11:41 AM CDT -----  Contact: [employer]  Type: Needs Medical Advice    Who Called:[employer]  Best Call Back Number:[phone]  Additional  Information: Need request for STAT order for pt medical needs supplies  Please Advise- Thank you

## 2022-03-15 NOTE — TELEPHONE ENCOUNTER
Will need updated wound care DME orders please, per Mayra at insurance company. Fax to number listed below.    [facility]  Phone#[phone]   Fax# [phone]

## 2022-03-15 NOTE — TELEPHONE ENCOUNTER
----- Message from Martha Hammond sent at 3/15/2022  3:02 PM CDT -----  Contact: Mayra Lobo  Type:  Patient Returning Call    Who Called:  Mayra  Who Left Message for Patient:  Lupe  Does the patient know what this is regarding?:  Yes  Best Call Back Number:  [phone]  Additional Information:  Please call back.  Thanks.      Assessment/Plan:  1  Primary osteoarthritis of both knees     2  Pain in both knees, unspecified chronicity  XR knee 3 vw right non injury    XR knee 3 vw left non injury     Scribe Attestation    I,:   Vasquez Bowens am acting as a scribe while in the presence of the attending physician :        I,:   Linda Crisostomo MD personally performed the services described in this documentation    as scribed in my presence :              Vinay Vickers upon examination and x-rays today demonstrates severe tricompartmental osteoarthritis of both the left and right knee  He does demonstrate a varus deformity that is exacerbated with weight-bearing  His knee does demonstrate to be stable however he may be stable due to the severe osteoarthritis  I provided a cortisone injection for both the left and right knee that he tolerated well  I stated to him that he should ice his knees at night to help with any pain or swelling that he may experience  he is encouraged to remain active  It would may follow up with me on an as-needed basis          Large joint arthrocentesis  Date/Time: 6/25/2018 11:33 AM  Consent given by: patient  Site marked: site marked  Timeout: Immediately prior to procedure a time out was called to verify the correct patient, procedure, equipment, support staff and site/side marked as required   Supporting Documentation  Indications: pain   Procedure Details  Location: knee - R knee  Preparation: Patient was prepped and draped in the usual sterile fashion  Needle size: 22 G  Ultrasound guidance: no  Approach: anterolateral  Medications administered: 4 mL lidocaine 1 %; 40 mg dexamethasone 100 mg/10 mL      Large joint arthrocentesis  Date/Time: 6/25/2018 11:37 AM  Consent given by: patient  Site marked: site marked  Timeout: Immediately prior to procedure a time out was called to verify the correct patient, procedure, equipment, support staff and site/side marked as required   Supporting Documentation  Indications: pain   Procedure Details  Location: knee - L knee  Preparation: Patient was prepped and draped in the usual sterile fashion  Needle size: 22 G  Ultrasound guidance: no  Approach: anterolateral  Medications administered: 4 mL lidocaine 1 %; 40 mg dexamethasone 100 mg/10 mL            Subjective:   Jessica Worrell is a 81 y o  male who presents with bilateral knee pain  He states that this has been ongoing issue for several years  Denies any trauma to his knees  States that he is experiencing intermittent and moderate aching about the knees when he ambulates for a moderate amount of time  Pain is better at rest   He did receive a cortisone injection into his left knee approximately 1 year ago that did provide relief for a full year  The he does note shuffling while ambulating however this is mostly due to in security and fear of falling and not so much of instability in the knee  Today currently denies any paresthesias  Review of Systems   Constitutional: Negative for chills, fever and unexpected weight change  HENT: Negative for hearing loss, nosebleeds and sore throat  Eyes: Negative for pain, redness and visual disturbance  Respiratory: Negative for cough, shortness of breath and wheezing  Cardiovascular: Negative for chest pain, palpitations and leg swelling  Gastrointestinal: Negative for abdominal pain, nausea and vomiting  Endocrine: Negative for polyphagia and polyuria  Genitourinary: Negative for dysuria and hematuria  Musculoskeletal:        See HPI   Skin: Negative for rash and wound  Neurological: Negative for dizziness, numbness and headaches  Psychiatric/Behavioral: Negative for decreased concentration and suicidal ideas  The patient is not nervous/anxious  Past Medical History:   Diagnosis Date    Hyperlipidemia     Hypertension        Past Surgical History:   Procedure Laterality Date    A-V CARDIAC PACEMAKER INSERTION         History reviewed   No pertinent family history  Social History     Occupational History    Not on file  Social History Main Topics    Smoking status: Former Smoker    Smokeless tobacco: Never Used    Alcohol use No    Drug use: No    Sexual activity: Not on file         Current Outpatient Prescriptions:     AMLODIPINE BESYLATE PO, Take 5 mg by mouth daily, Disp: , Rfl:     metoprolol tartrate (LOPRESSOR) 25 mg tablet, Take 25 mg by mouth every 12 (twelve) hours, Disp: , Rfl:     simvastatin (ZOCOR) 40 mg tablet, Take 40 mg by mouth daily at bedtime, Disp: , Rfl:     hydrocortisone 2 5 % ointment, Apply topically 2 (two) times a day for 7 days, Disp: 30 g, Rfl: 0    lisinopril-hydrochlorothiazide (PRINZIDE,ZESTORETIC) 20-12 5 MG per tablet, Take 1 tablet by mouth daily, Disp: , Rfl:     Allergies   Allergen Reactions    Penicillins        Objective:  Vitals:    06/25/18 1039   BP: (!) 171/80   Pulse: 60       Right Knee Exam     Tenderness   The patient is experiencing no tenderness  Range of Motion   Extension: 5   Flexion: 110     Tests   Stefanie:  Medial - negative Lateral - negative  Varus: negative  Valgus: negative    Other   Erythema: absent  Scars: absent  Sensation: normal  Pulse: present  Swelling: mild    Comments:  Varus defomrity      Left Knee Exam     Tenderness   The patient is experiencing no tenderness  Range of Motion   Extension: 5   Flexion: 110     Tests   Stefanie:  Medial - negative Lateral - negative  Varus: negative        Other   Sensation: normal  Pulse: present  Swelling: mild    Comments:  Varus deformity            Physical Exam   Constitutional: He is oriented to person, place, and time  He appears well-developed and well-nourished  HENT:   Head: Normocephalic and atraumatic  Eyes: Conjunctivae are normal    Neck: Normal range of motion  Cardiovascular: Normal rate      Pulmonary/Chest: Effort normal    Musculoskeletal:   As noted in HPI   Neurological: He is alert and oriented to person, place, and time  Skin: Skin is warm  Psychiatric: He has a normal mood and affect  His behavior is normal  Judgment and thought content normal        I have personally reviewed pertinent films in PACS and my interpretation is as follows:  X-rays of the left and right knee demonstrates severe tricompartmental osteoarthritis  Varus deformity demonstrated on imaging

## 2022-03-15 NOTE — TELEPHONE ENCOUNTER
----- Message from Leila Ramsey sent at 3/15/2022  9:22 AM CDT -----  Type: Needs Medical Advice  Who Called:  Betsy lopez   Symptoms (please be specific):  Calling to find out about pts diabetic supplies and to discuss with a  nurse    Best Call Back Number: [phone]  Additional Information: Please call and discuss

## 2022-03-16 ENCOUNTER — TELEPHONE (OUTPATIENT)
Dept: FAMILY MEDICINE | Facility: CLINIC | Age: 62
End: 2022-03-16
Payer: MEDICAID

## 2022-03-16 NOTE — TELEPHONE ENCOUNTER
Spoke with DME to fax old order and to send new form so that Dr. Stringer can complete and turn in as requested.

## 2022-03-16 NOTE — TELEPHONE ENCOUNTER
Spoke with Mayra from insurance company and Tosha from DME supply company. They are requesting new order to be placed with today's date for supplies. To be changed twice daily.    .     [facility]  Phone#[phone]   Fax# [phone]

## 2022-03-16 NOTE — TELEPHONE ENCOUNTER
----- Message from Melva Ventura sent at 3/16/2022  3:05 PM CDT -----  Patient Call Back    Who Called: Mayra/ Yamil    What is the request in detail: Mayra calling to speak with someone regarding the pt medication list. Please call Mayra.    Can the clinic reply by [facility]?    Best Call Back Number: [phone], leave a message

## 2022-03-17 ENCOUNTER — TELEPHONE (OUTPATIENT)
Dept: PODIATRY | Facility: CLINIC | Age: 62
End: 2022-03-17
Payer: MEDICAID

## 2022-03-17 NOTE — TELEPHONE ENCOUNTER
Spoke with Mayra with [employer] she stated for some reason the PA for medical supplies was denied due to over limit of supplies.     Reviewed with her that all the documentation has been provided for validation of amount needed to proved adequate wound care for this patiens as ordered by the provider. Informed her that this patient requires these supplies and they need to be provided, nurse is ready and willing to speak with whom ever need be to get this long going issue resolved now as this has been going on way to long and is not in the patients best intrest. Mayra agreed and will call back once she speaks with her supervisor. Instructed her to have her review Dr. Jameson detailed notes that are provided and she may understand why this is required and not an option.

## 2022-03-17 NOTE — TELEPHONE ENCOUNTER
----- Message from Mable Ferrer sent at 3/17/2022  8:24 AM CDT -----  Contact: shauntice with [employer]  Type::Prior Authorization    Who Called: Mayra Sanders  Best Call Back Number: [phone]  Additional Info: Requesting a call back regarding a prior authorization on medication.  Please advise-Thank You~

## 2022-03-17 NOTE — TELEPHONE ENCOUNTER
----- Message from Martha Hammond sent at 3/17/2022  1:21 PM CDT -----  Contact: Pt  Type: Needs Medical Advice    Who Called:  Pt    Best Call Back Number: [phone]    Additional Information: Pt wants to know if his supplies are ready to .  Please call back.  Thanks.

## 2022-03-30 ENCOUNTER — OFFICE VISIT (OUTPATIENT)
Dept: PODIATRY | Facility: CLINIC | Age: 62
End: 2022-03-30
Payer: MEDICAID

## 2022-03-30 ENCOUNTER — HOSPITAL ENCOUNTER (OUTPATIENT)
Dept: RADIOLOGY | Facility: HOSPITAL | Age: 62
Discharge: HOME | End: 2022-03-30
Attending: PODIATRIST
Payer: MEDICAID

## 2022-03-30 VITALS
SYSTOLIC BLOOD PRESSURE: 141 MMHG | HEIGHT: 78 IN | RESPIRATION RATE: 18 BRPM | WEIGHT: 315 LBS | HEART RATE: 89 BPM | BODY MASS INDEX: 36.45 KG/M2 | DIASTOLIC BLOOD PRESSURE: 83 MMHG

## 2022-03-30 DIAGNOSIS — E11.9 COMPREHENSIVE DIABETIC FOOT EXAMINATION, TYPE 2 DM, ENCOUNTER FOR: ICD-10-CM

## 2022-03-30 DIAGNOSIS — L97.522 ULCER OF LEFT FOOT WITH FAT LAYER EXPOSED: ICD-10-CM

## 2022-03-30 DIAGNOSIS — Z89.9 HX OF AMPUTATION: ICD-10-CM

## 2022-03-30 DIAGNOSIS — E11.49 TYPE II DIABETES MELLITUS WITH NEUROLOGICAL MANIFESTATIONS: Primary | ICD-10-CM

## 2022-03-30 DIAGNOSIS — L97.522 SKIN ULCER OF LEFT FOOT WITH FAT LAYER EXPOSED: ICD-10-CM

## 2022-03-30 PROCEDURE — 11042 WOUND DEBRIDEMENT: ICD-10-PCS | Mod: S$PBB,,, | Performed by: PODIATRIST

## 2022-03-30 PROCEDURE — 11045 WOUND DEBRIDEMENT: ICD-10-PCS | Mod: S$PBB,,, | Performed by: PODIATRIST

## 2022-03-30 PROCEDURE — 73630 X-RAY EXAM OF FOOT: CPT | Mod: 26,LT,, | Performed by: RADIOLOGY

## 2022-03-30 PROCEDURE — 3077F PR MOST RECENT SYSTOLIC BLOOD PRESSURE >= 140 MM HG: ICD-10-PCS | Mod: CPTII,,, | Performed by: PODIATRIST

## 2022-03-30 PROCEDURE — 99999 PR PBB SHADOW E&M-EST. PATIENT-LVL IV: CPT | Mod: PBBFAC,,, | Performed by: PODIATRIST

## 2022-03-30 PROCEDURE — 73630 X-RAY EXAM OF FOOT: CPT | Mod: TC,FY,LT

## 2022-03-30 PROCEDURE — 3008F BODY MASS INDEX DOCD: CPT | Mod: CPTII,,, | Performed by: PODIATRIST

## 2022-03-30 PROCEDURE — 99214 OFFICE O/P EST MOD 30 MIN: CPT | Mod: 25,S$PBB,, | Performed by: PODIATRIST

## 2022-03-30 PROCEDURE — 3079F PR MOST RECENT DIASTOLIC BLOOD PRESSURE 80-89 MM HG: ICD-10-PCS | Mod: CPTII,,, | Performed by: PODIATRIST

## 2022-03-30 PROCEDURE — 1160F RVW MEDS BY RX/DR IN RCRD: CPT | Mod: CPTII,,, | Performed by: PODIATRIST

## 2022-03-30 PROCEDURE — 1160F PR REVIEW ALL MEDS BY PRESCRIBER/CLIN PHARMACIST DOCUMENTED: ICD-10-PCS | Mod: CPTII,,, | Performed by: PODIATRIST

## 2022-03-30 PROCEDURE — 1159F PR MEDICATION LIST DOCUMENTED IN MEDICAL RECORD: ICD-10-PCS | Mod: CPTII,,, | Performed by: PODIATRIST

## 2022-03-30 PROCEDURE — 73630 XR FOOT COMPLETE 3 VIEW LEFT: ICD-10-PCS | Mod: 26,LT,, | Performed by: RADIOLOGY

## 2022-03-30 PROCEDURE — 99214 PR OFFICE/OUTPT VISIT, EST, LEVL IV, 30-39 MIN: ICD-10-PCS | Mod: 25,S$PBB,, | Performed by: PODIATRIST

## 2022-03-30 PROCEDURE — 3079F DIAST BP 80-89 MM HG: CPT | Mod: CPTII,,, | Performed by: PODIATRIST

## 2022-03-30 PROCEDURE — 11045 DBRDMT SUBQ TISS EACH ADDL: CPT | Mod: PBBFAC | Performed by: PODIATRIST

## 2022-03-30 PROCEDURE — 11042 DBRDMT SUBQ TIS 1ST 20SQCM/<: CPT | Mod: S$PBB,,, | Performed by: PODIATRIST

## 2022-03-30 PROCEDURE — 99999 PR PBB SHADOW E&M-EST. PATIENT-LVL IV: ICD-10-PCS | Mod: PBBFAC,,, | Performed by: PODIATRIST

## 2022-03-30 PROCEDURE — 3008F PR BODY MASS INDEX (BMI) DOCUMENTED: ICD-10-PCS | Mod: CPTII,,, | Performed by: PODIATRIST

## 2022-03-30 PROCEDURE — 99214 OFFICE O/P EST MOD 30 MIN: CPT | Mod: PBBFAC,25 | Performed by: PODIATRIST

## 2022-03-30 PROCEDURE — 3077F SYST BP >= 140 MM HG: CPT | Mod: CPTII,,, | Performed by: PODIATRIST

## 2022-03-30 PROCEDURE — 1159F MED LIST DOCD IN RCRD: CPT | Mod: CPTII,,, | Performed by: PODIATRIST

## 2022-03-30 RX ORDER — INDOMETHACIN 50 MG/1
CAPSULE ORAL
COMMUNITY
Start: 2022-03-23 | End: 2022-04-02

## 2022-03-30 RX ORDER — PREDNISONE 10 MG/1
10 TABLET ORAL DAILY
COMMUNITY
Start: 2022-03-23 | End: 2022-04-02

## 2022-03-30 RX ORDER — INSULIN DETEMIR 100 [IU]/ML
INJECTION, SOLUTION SUBCUTANEOUS
COMMUNITY
Start: 2021-06-14 | End: 2022-04-02 | Stop reason: SDUPTHER

## 2022-03-30 RX ORDER — DULOXETIN HYDROCHLORIDE 30 MG/1
30 CAPSULE, DELAYED RELEASE ORAL DAILY
COMMUNITY
Start: 2022-01-26 | End: 2022-04-02

## 2022-03-31 ENCOUNTER — TELEPHONE (OUTPATIENT)
Dept: FAMILY MEDICINE | Facility: CLINIC | Age: 62
End: 2022-03-31
Payer: MEDICAID

## 2022-03-31 DIAGNOSIS — M54.2 CERVICALGIA: Primary | ICD-10-CM

## 2022-03-31 NOTE — TELEPHONE ENCOUNTER
----- Message from Anu Samanta sent at 3/31/2022  1:24 PM CDT -----  Regarding: pt called  Name of Who is Calling: IVONNE LIN [[id number]]      What is the request in detail: pt would like to know if you can order a neck brace for him. He has neck pain and would like to speak with a nurse. Please advise       Can the clinic reply by [facility]: No      [facility]: [phone]

## 2022-03-31 NOTE — TELEPHONE ENCOUNTER
Patient is requesting Neck brace as he fell months ago and having increased neck pain. Patient follows up with neurologist in May. Please advise

## 2022-04-01 NOTE — TELEPHONE ENCOUNTER
I am not sure if we can order a neck brace for him.  We could set him up with physical therapy for evaluation if he would like.

## 2022-04-03 NOTE — PROGRESS NOTES
"Subjective:       Patient ID: Alfred Villarreal is a 61 y.o. male.    Chief Complaint: Follow-up, Foot Ulcer, Abcess, and Diabetes Mellitus   Patient presents for follow-up today multiple areas of breakdown and ulceration left.      Past Medical History:   Diagnosis Date    Anticoagulant long-term use     Arthritis     Diabetes mellitus     Hypertension      History reviewed. No pertinent surgical history.  History reviewed. No pertinent family history.  Social History     Socioeconomic History    Marital status:    Tobacco Use    Smoking status: Never Smoker    Smokeless tobacco: Current User     Types: Chew   Substance and Sexual Activity    Alcohol use: Yes    Drug use: No    Sexual activity: Yes     Partners: Female       Current Outpatient Medications   Medication Sig Dispense Refill    baclofen (LIORESAL) 10 MG tablet Take 10 mg by mouth 3 (three) times daily.      gabapentin (NEURONTIN) 600 MG tablet       HYDROcodone-acetaminophen (NORCO)  mg per tablet Take 1 tablet by mouth 2 (two) times daily as needed.      ibuprofen (ADVIL,MOTRIN) 800 MG tablet SMARTSI Tablet(s) By Mouth Every 12 Hours PRN      INSULIN ASPART U-100 SUBQ Inject 50 Units into the skin 2 (two) times a day.      insulin detemir U-100 (LEVEMIR FLEXTOUCH U-100 INSULN) 100 unit/mL (3 mL) InPn pen Inject 70 Units into the skin 2 (two) times daily. 42 mL 11    oxyCODONE-acetaminophen (PERCOCET)  mg per tablet Take 1 tablet by mouth every 8 (eight) hours as needed.      pen needle, diabetic 31 gauge x 1/4" Ndle Use daily with victoza 100 each 3    sildenafil (REVATIO) 20 mg Tab Take 1-3 tablets as need 1 hour prior to sexual activity (Patient not taking: No sig reported) 30 tablet 0     No current facility-administered medications for this visit.     Review of patient's allergies indicates:   Allergen Reactions    Amitriptyline      Vivid dreams( bad)       Review of Systems   Musculoskeletal: Positive for " "arthralgias, back pain, gait problem and joint swelling.   Skin: Positive for color change and wound.   Neurological: Positive for numbness.   All other systems reviewed and are negative.      Objective:      Vitals:    03/30/22 1343   BP: (!) 141/83   Pulse: 89   Resp: 18   Weight: (!) 142.9 kg (315 lb)   Height: 6' 6" (1.981 m)     Physical Exam  Vitals and nursing note reviewed.   Constitutional:       General: He is in acute distress.      Appearance: Normal appearance. He is well-developed.   Cardiovascular:      Pulses:           Dorsalis pedis pulses are 1+ on the left side.        Posterior tibial pulses are 0 on the left side.   Pulmonary:      Effort: Pulmonary effort is normal.   Musculoskeletal:      Left lower leg: Edema present.      Left foot: Deformity present.        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   Feet:      Left foot:      Protective Sensation: 4 sites tested. 1 site sensed.     Skin integrity: Ulcer, skin breakdown, erythema and warmth present.   Skin:     Capillary Refill: Capillary refill takes more than 3 seconds.      Findings: Erythema and lesion present.   Neurological:      Mental Status: He is alert.      Sensory: Sensory deficit present.      Deep Tendon Reflexes: Reflexes abnormal.   Psychiatric:         Mood and Affect: Mood normal.         Behavior: Behavior normal.         Thought Content: Thought content normal.         Judgment: Judgment normal.                                    Assessment:       1. Type II diabetes mellitus with neurological manifestations    2. Comprehensive diabetic foot examination, type 2 DM, encounter for    3. Ulcer of left foot with fat layer exposed    4. Skin ulcer of left foot with fat layer exposed    5. Hx of amputation        Plan:         Patient presents today follow-up of multiple sites of ulceration on the patient's left lower extremity he has had a previous amputation of the right lower extremity has an " extensive history of osteomyelitis.    Patient has had a chronic ulceration on his left foot for some time and had been showing signs of improvement but has subsequently gotten worse due to weight-bearing on the patient's left heel.  On evaluation there are no signs of obvious infection to the area with extensive nonviable tissue the a ulceration itself is 6 cm long by 4 cm wide by 3 mm deep.  The areas were then scrubbed with a Betadine scrub scrub rinsed with Dakin solution painted with Betadine and apply silver alginate with a well-padded thick dressing was applied.  Previously noted ulceration overlying the 1st MPJ left has opened up an ulcerated again this had been completely closed and well resolved.  I did perform a culture and sensitivity on the wound site left and will place the patient on appropriate antibiotics pending culture and sensitivity.  Wound care provided today excisional debridement performed plan follow-up 4 weeks obviously if there is any changes to the foot the patient will contact us immediately.  All current ulcer sites look considerably better the ulcer overlying the lateral ankle area left has completely closed healed and resolved at this time.  Patient states he is on the heel quite a bit putting a lot of pressure on the area he states when the only ways he is able to transfer since he has had his right lower extremity amputated.  I again advised the patient he must stay off of this area keep pressure off of the area failure to be compliant is going to lead to nonhealing and possibly even amputation of the left lower extremity patient has already lost his right lower leg.   Total face-to-face time including discussion evaluation treatment wound care non excisional debridement and discussion of treatment plan equaled 30 min.   Plan follow-up will be 4 weeks.  Patient had an area of hyperkeratotic tissue that required non excisional debridement on the plantar lateral forefoot additionally  he had a little bit of breakdown overlying the 1st MPJ where he has had previous breakdown before this was very superficial do not appear infected the patient's left heel has improved dramatically and got considerably smaller since the patient has been changing the dressing regularly and trying to stay off of the foot as much as possible.  Patient states he has again run out of wound care dressing supplies we had to provide him with additional supplies will work on getting these for the patient some days he is having to change the dressing twice a day due to drainage.  Patient states he is currently working from his wheelchair to help clear a lot I have advised him he needs to be careful to keep this dry clean and absolutely stay off of this left foot so it can continue to heal.  X-rays were stable on today's evaluation.  Patient does have improvement since the area of necrotic tissue in previously been debrided the area has now filled in with healthy granular tissue.  Patient states he supposed to be getting an MRI of his cervical vertebrae due to severe deformity.  This note was created using M*Modal voice recognition software that occasionally misinterpreted phrases or words.

## 2022-04-03 NOTE — PROCEDURES
"Wound Debridement    Date/Time: 3/30/2022 1:45 PM  Performed by: Gary Stringer DPM  Authorized by: Gary Stringer DPM     Time out: Immediately prior to procedure a "time out" was called to verify the correct patient, procedure, equipment, support staff and site/side marked as required.    Consent Done?:  Yes (Verbal)    Preparation: Patient was prepped and draped in usual sterile fashion    Local anesthesia used?: No      Wound Details:    Location:  Left foot    Location:  Left Heel    Type of Debridement:  Excisional       Length (cm):  6       Area (sq cm):  24       Width (cm):  4       Percent Debrided (%):  100       Depth (cm):  0.4       Total Area Debrided (sq cm):  24    Depth of debridement:  Subcutaneous tissue    Devitalized tissue debrided:  Biofilm, Callus, Necrotic/Eschar, Slough and Exudate    Instruments:  Blade    Bleeding:  None  Patient tolerance:  Patient tolerated the procedure well with no immediate complications      "

## 2022-04-05 NOTE — TELEPHONE ENCOUNTER
Physical therapy usually schedules there own pt's but I did notify the pt of the referral being placed and about the ordering of the neck brace pt did state that he does not have any transportation at this time and I did advise him to reach out to Medicaid because they did have a transportation service

## 2022-04-10 ENCOUNTER — HOSPITAL ENCOUNTER (EMERGENCY)
Facility: HOSPITAL | Age: 62
Discharge: HOME | End: 2022-04-11
Attending: FAMILY MEDICINE
Payer: MEDICAID

## 2022-04-10 VITALS
HEART RATE: 96 BPM | HEIGHT: 78 IN | WEIGHT: 315 LBS | TEMPERATURE: 98 F | BODY MASS INDEX: 36.45 KG/M2 | SYSTOLIC BLOOD PRESSURE: 152 MMHG | RESPIRATION RATE: 18 BRPM | OXYGEN SATURATION: 98 % | DIASTOLIC BLOOD PRESSURE: 70 MMHG

## 2022-04-10 DIAGNOSIS — G89.29 CHRONIC BILATERAL LOW BACK PAIN WITHOUT SCIATICA: Primary | ICD-10-CM

## 2022-04-10 DIAGNOSIS — M54.50 CHRONIC BILATERAL LOW BACK PAIN WITHOUT SCIATICA: Primary | ICD-10-CM

## 2022-04-10 LAB — POCT GLUCOSE: 392 MG/DL (ref 70–110)

## 2022-04-10 PROCEDURE — 96372 THER/PROPH/DIAG INJ SC/IM: CPT | Performed by: FAMILY MEDICINE

## 2022-04-10 PROCEDURE — 99284 EMERGENCY DEPT VISIT MOD MDM: CPT | Mod: 25

## 2022-04-10 PROCEDURE — 25000003 PHARM REV CODE 250: Performed by: FAMILY MEDICINE

## 2022-04-10 PROCEDURE — 82962 GLUCOSE BLOOD TEST: CPT

## 2022-04-10 PROCEDURE — 63600175 PHARM REV CODE 636 W HCPCS: Performed by: FAMILY MEDICINE

## 2022-04-10 RX ORDER — HYDROMORPHONE HYDROCHLORIDE 2 MG/ML
1 INJECTION, SOLUTION INTRAMUSCULAR; INTRAVENOUS; SUBCUTANEOUS
Status: COMPLETED | OUTPATIENT
Start: 2022-04-10 | End: 2022-04-10

## 2022-04-10 RX ORDER — ONDANSETRON 4 MG/1
8 TABLET, ORALLY DISINTEGRATING ORAL
Status: COMPLETED | OUTPATIENT
Start: 2022-04-10 | End: 2022-04-10

## 2022-04-10 RX ADMIN — HYDROMORPHONE HYDROCHLORIDE 1 MG: 2 INJECTION, SOLUTION INTRAMUSCULAR; INTRAVENOUS; SUBCUTANEOUS at 10:04

## 2022-04-10 RX ADMIN — INSULIN HUMAN 8 UNITS: 100 INJECTION, SOLUTION PARENTERAL at 09:04

## 2022-04-10 RX ADMIN — ONDANSETRON 8 MG: 4 TABLET, ORALLY DISINTEGRATING ORAL at 10:04

## 2022-04-11 NOTE — ED PROVIDER NOTES
"Encounter Date: 4/10/2022       History     Chief Complaint   Patient presents with    Back Pain     Pt. States he was at home yesterday when he fell out of his bed.  Pt. Has right BKA.  States he was on the ground for approx. 2 hours before he was able to get up. Pt. Now c/o upper and lower back pain, BL shoulder pain, and neck pain.  States he has chronic pain and is being treated for it but the pain is worse.      61-year-old presents the ED complaining of pain in his low back he states that he felt approximately 1/5 while transfer he is status post right BKA and has chronic ulcer to the left foot he denies any nausea vomiting diarrhea states that he does have some a help at home but it does not feel adequate        Review of patient's allergies indicates:   Allergen Reactions    Amitriptyline      Vivid dreams( bad)     Past Medical History:   Diagnosis Date    A-fib     Anticoagulant long-term use     Arthritis     Diabetes mellitus     Hypertension      Past Surgical History:   Procedure Laterality Date    BELOW KNEE AMPUTATION OF LOWER EXTREMITY Right     "About 15 years"     History reviewed. No pertinent family history.  Social History     Tobacco Use    Smoking status: Never Smoker    Smokeless tobacco: Current User     Types: Chew   Substance Use Topics    Alcohol use: Not Currently    Drug use: No     Review of Systems   Constitutional: Negative for fever.   HENT: Negative for sore throat.    Respiratory: Negative for shortness of breath.    Cardiovascular: Negative for chest pain.   Gastrointestinal: Negative for nausea.   Genitourinary: Negative for dysuria.   Musculoskeletal: Negative for back pain.   Skin: Negative for rash.   Allergic/Immunologic: Negative for environmental allergies.   Neurological: Negative for weakness.   Hematological: Does not bruise/bleed easily.       Physical Exam     Initial Vitals [04/10/22 2105]   BP Pulse Resp Temp SpO2   (!) 152/70 96 18 97.6 °F (36.4 °C) 98 " %      MAP       --         Physical Exam    Nursing note and vitals reviewed.  Constitutional: He appears well-developed and well-nourished. He is not diaphoretic. No distress.   HENT:   Head: Normocephalic and atraumatic.   Nose: Nose normal.   Mouth/Throat: Oropharynx is clear and moist. No oropharyngeal exudate.   Eyes: EOM are normal.   Neck: Neck supple. No tracheal deviation present.   Normal range of motion.  Cardiovascular: Normal rate and regular rhythm.   No murmur heard.  Pulmonary/Chest: Breath sounds normal. No stridor. No respiratory distress. He has no rales.   Abdominal: Abdomen is soft. He exhibits no distension and no mass. There is no abdominal tenderness. There is no rebound.   Musculoskeletal:         General: No edema. Normal range of motion.      Cervical back: Normal range of motion and neck supple.     Lymphadenopathy:     He has no cervical adenopathy.   Neurological: He is alert and oriented to person, place, and time. He has normal strength.   Skin: Skin is warm and dry. Capillary refill takes less than 2 seconds. No pallor.   Psychiatric: He has a normal mood and affect.         ED Course   Procedures  Labs Reviewed   POCT GLUCOSE - Abnormal; Notable for the following components:       Result Value    POCT Glucose 392 (*)     All other components within normal limits          Imaging Results    None          Medications   insulin regular injection 8 Units (8 Units Subcutaneous Given 4/10/22 2159)   HYDROmorphone (PF) injection 1 mg (1 mg Intramuscular Given 4/10/22 2208)   ondansetron disintegrating tablet 8 mg (8 mg Oral Given 4/10/22 2206)                          Clinical Impression:   Final diagnoses:  [M54.50, G89.29] Chronic bilateral low back pain without sciatica (Primary)          ED Disposition Condition    Discharge Stable        ED Prescriptions     None        Follow-up Information    None          Julio Cesar Duong MD  04/11/22 8971       Julio Cesar Duong,  MD  04/11/22 0430       Julio Cesar Duong MD  04/11/22 0431

## 2022-04-28 ENCOUNTER — TELEPHONE (OUTPATIENT)
Dept: PODIATRY | Facility: CLINIC | Age: 62
End: 2022-04-28
Payer: MEDICAID

## 2022-04-28 NOTE — TELEPHONE ENCOUNTER
----- Message from Supriya Puga sent at 4/28/2022 10:15 AM CDT -----  Type: Needs Medical Advice  Who Called: Patient  Symptoms (please be specific):   How long has patient had these symptoms:    Pharmacy name and phone #:    Best Call Back Number: [phone]  Additional Information: Pt requesting a call back from the nurse.

## 2022-05-11 ENCOUNTER — TELEPHONE (OUTPATIENT)
Dept: PODIATRY | Facility: CLINIC | Age: 62
End: 2022-05-11
Payer: MEDICAID

## 2022-05-11 NOTE — TELEPHONE ENCOUNTER
----- Message from Michael Connell sent at 5/11/2022 12:14 PM CDT -----  Type: Needs Medical Advice  Who Called:  Pt  Symptoms (please be specific):  back pain  How long has patient had these symptoms:    Pharmacy name and phone #:    Best Call Back Number: [phone]     Additional Information: Wants to speak to someone prior to his appt .  Please advise -- Thank you

## 2022-05-25 ENCOUNTER — HOSPITAL ENCOUNTER (OUTPATIENT)
Dept: RADIOLOGY | Facility: HOSPITAL | Age: 62
Discharge: HOME | End: 2022-05-25
Attending: PODIATRIST
Payer: MEDICAID

## 2022-05-25 ENCOUNTER — OFFICE VISIT (OUTPATIENT)
Dept: PODIATRY | Facility: CLINIC | Age: 62
End: 2022-05-25
Payer: MEDICAID

## 2022-05-25 VITALS
WEIGHT: 315 LBS | DIASTOLIC BLOOD PRESSURE: 88 MMHG | HEART RATE: 84 BPM | HEIGHT: 78 IN | TEMPERATURE: 98 F | BODY MASS INDEX: 36.45 KG/M2 | SYSTOLIC BLOOD PRESSURE: 154 MMHG

## 2022-05-25 DIAGNOSIS — E11.9 COMPREHENSIVE DIABETIC FOOT EXAMINATION, TYPE 2 DM, ENCOUNTER FOR: ICD-10-CM

## 2022-05-25 DIAGNOSIS — Z89.9 HX OF AMPUTATION: ICD-10-CM

## 2022-05-25 DIAGNOSIS — L97.522 SKIN ULCER OF LEFT FOOT WITH FAT LAYER EXPOSED: ICD-10-CM

## 2022-05-25 DIAGNOSIS — L97.522 ULCER OF LEFT FOOT WITH FAT LAYER EXPOSED: Primary | ICD-10-CM

## 2022-05-25 DIAGNOSIS — E11.49 TYPE II DIABETES MELLITUS WITH NEUROLOGICAL MANIFESTATIONS: ICD-10-CM

## 2022-05-25 DIAGNOSIS — L97.522 ULCER OF LEFT FOOT WITH FAT LAYER EXPOSED: ICD-10-CM

## 2022-05-25 PROCEDURE — 99999 PR PBB SHADOW E&M-EST. PATIENT-LVL IV: ICD-10-PCS | Mod: PBBFAC,,, | Performed by: PODIATRIST

## 2022-05-25 PROCEDURE — 3079F DIAST BP 80-89 MM HG: CPT | Mod: CPTII,,, | Performed by: PODIATRIST

## 2022-05-25 PROCEDURE — 99214 OFFICE O/P EST MOD 30 MIN: CPT | Mod: S$PBB,,, | Performed by: PODIATRIST

## 2022-05-25 PROCEDURE — 3077F PR MOST RECENT SYSTOLIC BLOOD PRESSURE >= 140 MM HG: ICD-10-PCS | Mod: CPTII,,, | Performed by: PODIATRIST

## 2022-05-25 PROCEDURE — 3079F PR MOST RECENT DIASTOLIC BLOOD PRESSURE 80-89 MM HG: ICD-10-PCS | Mod: CPTII,,, | Performed by: PODIATRIST

## 2022-05-25 PROCEDURE — 3008F PR BODY MASS INDEX (BMI) DOCUMENTED: ICD-10-PCS | Mod: CPTII,,, | Performed by: PODIATRIST

## 2022-05-25 PROCEDURE — 73630 XR FOOT COMPLETE 3 VIEW LEFT: ICD-10-PCS | Mod: 26,LT,, | Performed by: RADIOLOGY

## 2022-05-25 PROCEDURE — 1159F PR MEDICATION LIST DOCUMENTED IN MEDICAL RECORD: ICD-10-PCS | Mod: CPTII,,, | Performed by: PODIATRIST

## 2022-05-25 PROCEDURE — 73630 X-RAY EXAM OF FOOT: CPT | Mod: TC,FY,LT

## 2022-05-25 PROCEDURE — 73630 X-RAY EXAM OF FOOT: CPT | Mod: 26,LT,, | Performed by: RADIOLOGY

## 2022-05-25 PROCEDURE — 3008F BODY MASS INDEX DOCD: CPT | Mod: CPTII,,, | Performed by: PODIATRIST

## 2022-05-25 PROCEDURE — 1160F PR REVIEW ALL MEDS BY PRESCRIBER/CLIN PHARMACIST DOCUMENTED: ICD-10-PCS | Mod: CPTII,,, | Performed by: PODIATRIST

## 2022-05-25 PROCEDURE — 1160F RVW MEDS BY RX/DR IN RCRD: CPT | Mod: CPTII,,, | Performed by: PODIATRIST

## 2022-05-25 PROCEDURE — 99999 PR PBB SHADOW E&M-EST. PATIENT-LVL IV: CPT | Mod: PBBFAC,,, | Performed by: PODIATRIST

## 2022-05-25 PROCEDURE — 99214 PR OFFICE/OUTPT VISIT, EST, LEVL IV, 30-39 MIN: ICD-10-PCS | Mod: S$PBB,,, | Performed by: PODIATRIST

## 2022-05-25 PROCEDURE — 3077F SYST BP >= 140 MM HG: CPT | Mod: CPTII,,, | Performed by: PODIATRIST

## 2022-05-25 PROCEDURE — 99214 OFFICE O/P EST MOD 30 MIN: CPT | Mod: PBBFAC | Performed by: PODIATRIST

## 2022-05-25 PROCEDURE — 1159F MED LIST DOCD IN RCRD: CPT | Mod: CPTII,,, | Performed by: PODIATRIST

## 2022-05-29 NOTE — PROGRESS NOTES
"Subjective:       Patient ID: Alfred Villarreal is a 61 y.o. male.    Chief Complaint: Diabetes Mellitus, Follow-up, and Foot Ulcer   Patient presents for follow-up today multiple areas of breakdown and ulceration left.      Past Medical History:   Diagnosis Date    A-fib     Anticoagulant long-term use     Arthritis     Diabetes mellitus     Hypertension      Past Surgical History:   Procedure Laterality Date    BELOW KNEE AMPUTATION OF LOWER EXTREMITY Right     "About 15 years"     History reviewed. No pertinent family history.  Social History     Socioeconomic History    Marital status:    Tobacco Use    Smoking status: Never Smoker    Smokeless tobacco: Current User     Types: Chew   Substance and Sexual Activity    Alcohol use: Not Currently    Drug use: No    Sexual activity: Yes     Partners: Female       Current Outpatient Medications   Medication Sig Dispense Refill    baclofen (LIORESAL) 10 MG tablet Take 10 mg by mouth 3 (three) times daily.      gabapentin (NEURONTIN) 600 MG tablet       HYDROcodone-acetaminophen (NORCO)  mg per tablet Take 1 tablet by mouth 2 (two) times daily as needed.      ibuprofen (ADVIL,MOTRIN) 800 MG tablet SMARTSI Tablet(s) By Mouth Every 12 Hours PRN      INSULIN ASPART U-100 SUBQ Inject 50 Units into the skin 2 (two) times a day.      insulin detemir U-100 (LEVEMIR FLEXTOUCH U-100 INSULN) 100 unit/mL (3 mL) InPn pen Inject 70 Units into the skin 2 (two) times daily. 42 mL 11    oxyCODONE-acetaminophen (PERCOCET)  mg per tablet Take 1 tablet by mouth every 8 (eight) hours as needed.      pen needle, diabetic 31 gauge x 1/4" Ndle Use daily with victoza 100 each 3    sildenafil (REVATIO) 20 mg Tab Take 1-3 tablets as need 1 hour prior to sexual activity 30 tablet 0     No current facility-administered medications for this visit.     Review of patient's allergies indicates:   Allergen Reactions    Amitriptyline      Vivid dreams( bad) " "      Review of Systems   Musculoskeletal: Positive for arthralgias, back pain, gait problem and joint swelling.   Skin: Positive for color change and wound.   Neurological: Positive for numbness.   All other systems reviewed and are negative.      Objective:      Vitals:    05/25/22 1341   BP: (!) 154/88   Pulse: 84   Temp: 98 °F (36.7 °C)   Weight: (!) 147.4 kg (325 lb)   Height: 6' 6" (1.981 m)     Physical Exam  Vitals and nursing note reviewed.   Constitutional:       General: He is in acute distress.      Appearance: Normal appearance. He is well-developed.   Cardiovascular:      Pulses:           Dorsalis pedis pulses are 1+ on the left side.        Posterior tibial pulses are 0 on the left side.   Pulmonary:      Effort: Pulmonary effort is normal.   Musculoskeletal:      Left lower leg: Edema present.      Left foot: Deformity present.        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   Feet:      Left foot:      Protective Sensation: 4 sites tested. 1 site sensed.     Skin integrity: Ulcer, skin breakdown, erythema and warmth present.   Skin:     Capillary Refill: Capillary refill takes more than 3 seconds.      Findings: Erythema and lesion present.   Neurological:      Mental Status: He is alert.      Sensory: Sensory deficit present.      Deep Tendon Reflexes: Reflexes abnormal.   Psychiatric:         Mood and Affect: Mood normal.         Behavior: Behavior normal.         Thought Content: Thought content normal.         Judgment: Judgment normal.                                            Assessment:       1. Ulcer of left foot with fat layer exposed    2. Type II diabetes mellitus with neurological manifestations    3. Comprehensive diabetic foot examination, type 2 DM, encounter for    4. Hx of amputation    5. Skin ulcer of left foot with fat layer exposed        Plan:         Patient presents today follow-up of multiple sites of ulceration on the patient's left lower " extremity he has had a previous amputation of the right lower extremity has an extensive history of osteomyelitis.    Patient has had a chronic ulceration on his left foot for some time and had been showing signs of improvement but has subsequently gotten worse due to weight-bearing on the patient's left heel.  On evaluation there are no signs of obvious infection to the area with extensive nonviable tissue the a ulceration itself is 6 cm long by 4 cm wide by 3 mm deep.  The areas were then scrubbed with a Betadine scrub scrub rinsed with Dakin solution painted with Betadine and apply silver alginate with a well-padded thick dressing was applied.  Previously noted ulceration overlying the 1st MPJ left has opened up an ulcerated again this had been completely closed and well resolved.  I did perform a culture and sensitivity on the wound site left and will place the patient on appropriate antibiotics pending culture and sensitivity.  Wound care provided today excisional debridement performed plan follow-up 6 weeks obviously if there is any changes to the foot the patient will contact us immediately.  All current ulcer sites look considerably better the ulcer overlying the lateral ankle area left has completely closed healed and resolved at this time.  Patient states he is on the heel quite a bit putting a lot of pressure on the area he states when the only ways he is able to transfer since he has had his right lower extremity amputated.  I again advised the patient he must stay off of this area keep pressure off of the area failure to be compliant is going to lead to nonhealing and possibly even amputation of the left lower extremity patient has already lost his right lower leg.   Total face-to-face time including discussion evaluation treatment wound care non excisional debridement and discussion of treatment plan equaled 30 min.   Plan follow-up will be 6 weeks.  Patient had an area of hyperkeratotic tissue that  required non excisional debridement on the plantar lateral forefoot additionally he had a little bit of breakdown overlying the 1st MPJ where he has had previous breakdown before this was very superficial do not appear infected the patient's left heel has improved dramatically and got considerably smaller since the patient has been changing the dressing regularly and trying to stay off of the foot as much as possible.  Patient states he has again run out of wound care dressing supplies we had to provide him with additional supplies will work on getting these for the patient some days he is having to change the dressing twice a day due to drainage.  Patient states he is currently working from his wheelchair to help clear a lot I have advised him he needs to be careful to keep this dry clean and absolutely stay off of this left foot so it can continue to heal.  X-rays were stable on today's evaluation.  Patient does have improvement since the area of necrotic tissue in previously been debrided the area has now filled in with healthy granular tissue.  Patient is usually followed up every month however it has been 2 months since I saw him last due to no call no-show.  Patient is having increasing pain and discomfort with his neck and back issues.  Patient is currently under the care of pain management.  Patient has been putting some pressure on the left foot which I have advised against this could certainly lead to further breakdown or necrotic tissue.  This note was created using [employer] voice recognition software that occasionally misinterpreted phrases or words.

## 2022-07-01 ENCOUNTER — TELEPHONE (OUTPATIENT)
Dept: PODIATRY | Facility: CLINIC | Age: 62
End: 2022-07-01
Payer: MEDICAID

## 2022-07-01 NOTE — TELEPHONE ENCOUNTER
----- Message from Renetta Cadet sent at 7/1/2022  9:58 AM CDT -----  Regarding: Call back  Contact: [phone]  Type:  Patient Call Back    Who Called:pt  What this is regarding?:NEW RX  Would the patient rather a call back or a response via [employer]? Call back  Best Call Back Number:[phone]  Additional Information:     Advised to call back directly if there are further questions, or if these symptoms fail to improve as anticipated or worsen.

## 2022-07-06 ENCOUNTER — HOSPITAL ENCOUNTER (OUTPATIENT)
Dept: RADIOLOGY | Facility: HOSPITAL | Age: 62
Discharge: HOME | End: 2022-07-06
Attending: PODIATRIST
Payer: MEDICAID

## 2022-07-06 ENCOUNTER — OFFICE VISIT (OUTPATIENT)
Dept: PODIATRY | Facility: CLINIC | Age: 62
End: 2022-07-06
Payer: MEDICAID

## 2022-07-06 VITALS
BODY MASS INDEX: 36.45 KG/M2 | TEMPERATURE: 98 F | DIASTOLIC BLOOD PRESSURE: 75 MMHG | SYSTOLIC BLOOD PRESSURE: 144 MMHG | HEART RATE: 77 BPM | WEIGHT: 315 LBS | HEIGHT: 78 IN

## 2022-07-06 DIAGNOSIS — L97.522 ULCER OF LEFT FOOT WITH FAT LAYER EXPOSED: ICD-10-CM

## 2022-07-06 DIAGNOSIS — E11.9 COMPREHENSIVE DIABETIC FOOT EXAMINATION, TYPE 2 DM, ENCOUNTER FOR: ICD-10-CM

## 2022-07-06 DIAGNOSIS — L97.522 ULCER OF LEFT FOOT WITH FAT LAYER EXPOSED: Primary | ICD-10-CM

## 2022-07-06 DIAGNOSIS — E11.49 TYPE II DIABETES MELLITUS WITH NEUROLOGICAL MANIFESTATIONS: ICD-10-CM

## 2022-07-06 DIAGNOSIS — Z89.9 HX OF AMPUTATION: ICD-10-CM

## 2022-07-06 PROCEDURE — 1159F PR MEDICATION LIST DOCUMENTED IN MEDICAL RECORD: ICD-10-PCS | Mod: CPTII,,, | Performed by: PODIATRIST

## 2022-07-06 PROCEDURE — 1159F MED LIST DOCD IN RCRD: CPT | Mod: CPTII,,, | Performed by: PODIATRIST

## 2022-07-06 PROCEDURE — 1160F PR REVIEW ALL MEDS BY PRESCRIBER/CLIN PHARMACIST DOCUMENTED: ICD-10-PCS | Mod: CPTII,,, | Performed by: PODIATRIST

## 2022-07-06 PROCEDURE — 73630 XR FOOT COMPLETE 3 VIEW LEFT: ICD-10-PCS | Mod: 26,LT,, | Performed by: RADIOLOGY

## 2022-07-06 PROCEDURE — 87077 CULTURE AEROBIC IDENTIFY: CPT | Performed by: PODIATRIST

## 2022-07-06 PROCEDURE — 99999 PR PBB SHADOW E&M-EST. PATIENT-LVL IV: ICD-10-PCS | Mod: PBBFAC,,, | Performed by: PODIATRIST

## 2022-07-06 PROCEDURE — 99215 OFFICE O/P EST HI 40 MIN: CPT | Mod: S$PBB,,, | Performed by: PODIATRIST

## 2022-07-06 PROCEDURE — 87147 CULTURE TYPE IMMUNOLOGIC: CPT | Performed by: PODIATRIST

## 2022-07-06 PROCEDURE — 3077F PR MOST RECENT SYSTOLIC BLOOD PRESSURE >= 140 MM HG: ICD-10-PCS | Mod: CPTII,,, | Performed by: PODIATRIST

## 2022-07-06 PROCEDURE — 1160F RVW MEDS BY RX/DR IN RCRD: CPT | Mod: CPTII,,, | Performed by: PODIATRIST

## 2022-07-06 PROCEDURE — 99215 PR OFFICE/OUTPT VISIT, EST, LEVL V, 40-54 MIN: ICD-10-PCS | Mod: S$PBB,,, | Performed by: PODIATRIST

## 2022-07-06 PROCEDURE — 73630 X-RAY EXAM OF FOOT: CPT | Mod: TC,LT

## 2022-07-06 PROCEDURE — 3008F PR BODY MASS INDEX (BMI) DOCUMENTED: ICD-10-PCS | Mod: CPTII,,, | Performed by: PODIATRIST

## 2022-07-06 PROCEDURE — 3008F BODY MASS INDEX DOCD: CPT | Mod: CPTII,,, | Performed by: PODIATRIST

## 2022-07-06 PROCEDURE — 3078F PR MOST RECENT DIASTOLIC BLOOD PRESSURE < 80 MM HG: ICD-10-PCS | Mod: CPTII,,, | Performed by: PODIATRIST

## 2022-07-06 PROCEDURE — 99214 OFFICE O/P EST MOD 30 MIN: CPT | Mod: PBBFAC | Performed by: PODIATRIST

## 2022-07-06 PROCEDURE — 87070 CULTURE OTHR SPECIMN AEROBIC: CPT | Performed by: PODIATRIST

## 2022-07-06 PROCEDURE — 73630 X-RAY EXAM OF FOOT: CPT | Mod: 26,LT,, | Performed by: RADIOLOGY

## 2022-07-06 PROCEDURE — 99999 PR PBB SHADOW E&M-EST. PATIENT-LVL IV: CPT | Mod: PBBFAC,,, | Performed by: PODIATRIST

## 2022-07-06 PROCEDURE — 87186 SC STD MICRODIL/AGAR DIL: CPT | Performed by: PODIATRIST

## 2022-07-06 PROCEDURE — 3078F DIAST BP <80 MM HG: CPT | Mod: CPTII,,, | Performed by: PODIATRIST

## 2022-07-06 PROCEDURE — 3077F SYST BP >= 140 MM HG: CPT | Mod: CPTII,,, | Performed by: PODIATRIST

## 2022-07-08 NOTE — PROGRESS NOTES
"Subjective:       Patient ID: Alfred Villarreal is a 61 y.o. male.    Chief Complaint: Follow-up, Foot Ulcer, and Diabetes Mellitus   Patient presents for follow-up today multiple areas of breakdown and ulceration left.      Past Medical History:   Diagnosis Date    A-fib     Anticoagulant long-term use     Arthritis     Diabetes mellitus     Hypertension      Past Surgical History:   Procedure Laterality Date    BELOW KNEE AMPUTATION OF LOWER EXTREMITY Right     "About 15 years"     History reviewed. No pertinent family history.  Social History     Socioeconomic History    Marital status:    Tobacco Use    Smoking status: Never Smoker    Smokeless tobacco: Current User     Types: Chew   Substance and Sexual Activity    Alcohol use: Not Currently    Drug use: No    Sexual activity: Yes     Partners: Female       Current Outpatient Medications   Medication Sig Dispense Refill    liraglutide 0.6 mg/0.1 mL, 18 mg/3 mL, subq PNIJ (VICTOZA 2-ALEYDA) 0.6 mg/0.1 mL (18 mg/3 mL) PnIj pen Inject 0.6 mg into the skin.      baclofen (LIORESAL) 10 MG tablet Take 10 mg by mouth 3 (three) times daily.      gabapentin (NEURONTIN) 600 MG tablet       HYDROcodone-acetaminophen (NORCO)  mg per tablet Take 1 tablet by mouth 2 (two) times daily as needed.      ibuprofen (ADVIL,MOTRIN) 800 MG tablet SMARTSI Tablet(s) By Mouth Every 12 Hours PRN      insulin aspart U-100 (NOVOLOG) 100 unit/mL injection INJECT 50 UNITS UNDER THE SKIN TWICE DAILY BEFORE MEALS 90 mL 0    INSULIN ASPART U-100 SUBQ Inject 50 Units into the skin 2 (two) times a day.      insulin detemir U-100 (LEVEMIR FLEXTOUCH U-100 INSULN) 100 unit/mL (3 mL) InPn pen Inject 70 Units into the skin 2 (two) times daily. 42 mL 11    oxyCODONE-acetaminophen (PERCOCET)  mg per tablet Take 1 tablet by mouth every 8 (eight) hours as needed.      pen needle, diabetic 31 gauge x 1/4" Ndle Use daily with victoza 100 each 3    sildenafil (REVATIO) 20 " "mg Tab Take 1-3 tablets as need 1 hour prior to sexual activity 30 tablet 0     No current facility-administered medications for this visit.     Review of patient's allergies indicates:   Allergen Reactions    Amitriptyline      Vivid dreams( bad)       Review of Systems   Musculoskeletal: Positive for arthralgias, back pain, gait problem and joint swelling.   Skin: Positive for color change and wound.   Neurological: Positive for numbness.   All other systems reviewed and are negative.      Objective:      Vitals:    07/06/22 1326   BP: (!) 144/75   Pulse: 77   Temp: 97.8 °F (36.6 °C)   Weight: (!) 147.4 kg (325 lb)   Height: 6' 6" (1.981 m)     Physical Exam  Vitals and nursing note reviewed.   Constitutional:       General: He is in acute distress.      Appearance: Normal appearance. He is well-developed.   Cardiovascular:      Pulses:           Dorsalis pedis pulses are 1+ on the left side.        Posterior tibial pulses are 0 on the left side.   Pulmonary:      Effort: Pulmonary effort is normal.   Musculoskeletal:      Left lower leg: Edema present.      Left foot: Deformity present.        Feet:                                   Right Lower Extremity: Right leg is amputated below knee.   Feet:      Left foot:      Protective Sensation: 4 sites tested. 1 site sensed.     Skin integrity: Ulcer, skin breakdown, erythema and warmth present.   Skin:     Capillary Refill: Capillary refill takes more than 3 seconds.      Findings: Erythema and lesion present.   Neurological:      Mental Status: He is alert.      Sensory: Sensory deficit present.      Deep Tendon Reflexes: Reflexes abnormal.   Psychiatric:         Mood and Affect: Mood normal.         Behavior: Behavior normal.         Thought Content: Thought content normal.         Judgment: Judgment normal.                                            Assessment:       1. Ulcer of left foot with fat layer exposed    2. Hx of amputation    3. Type II diabetes " mellitus with neurological manifestations    4. Comprehensive diabetic foot examination, type 2 DM, encounter for        Plan:         Patient presents today follow-up of multiple sites of ulceration on the patient's left lower extremity he has had a previous amputation of the right lower extremity has an extensive history of osteomyelitis.    Patient has had a chronic ulceration on his left foot for some time and had been showing signs of improvement but has subsequently gotten worse due to weight-bearing on the patient's left heel.  On evaluation there are no signs of obvious infection to the area with extensive nonviable tissue the a ulceration itself is 6 cm long by 4 cm wide by 3 mm deep.  The areas were then scrubbed with a Betadine scrub scrub rinsed with Dakin solution painted with Betadine and apply silver alginate with a well-padded thick dressing was applied.  Previously noted ulceration overlying the 1st MPJ left has opened up an ulcerated again this had been completely closed and well resolved.  I did perform a culture and sensitivity on the wound site left and will place the patient on appropriate antibiotics pending culture and sensitivity.  Wound care provided today excisional debridement performed plan follow-up 6 weeks obviously if there is any changes to the foot the patient will contact us immediately.  All current ulcer sites look considerably better the ulcer overlying the lateral ankle area left has completely closed healed and resolved at this time.  Patient states he is on the heel quite a bit putting a lot of pressure on the area he states when the only ways he is able to transfer since he has had his right lower extremity amputated.  I again advised the patient he must stay off of this area keep pressure off of the area failure to be compliant is going to lead to nonhealing and possibly even amputation of the left lower extremity patient has already lost his right lower leg.   Total  face-to-face time including discussion evaluation treatment wound care non excisional debridement and discussion of treatment plan equaled 40 min.   Plan follow-up will be 6 weeks.  Patient had an area of hyperkeratotic tissue that required non excisional debridement on the plantar lateral forefoot additionally he had a little bit of breakdown overlying the 1st MPJ where he has had previous breakdown before this was very superficial do not appear infected the patient's left heel has improved dramatically and got considerably smaller since the patient has been changing the dressing regularly and trying to stay off of the foot as much as possible.  Patient states he has again run out of wound care dressing supplies we had to provide him with additional supplies will work on getting these for the patient some days he is having to change the dressing twice a day due to drainage.  Patient states he is currently working from his wheelchair to help clear a lot I have advised him he needs to be careful to keep this dry clean and absolutely stay off of this left foot so it can continue to heal.  X-rays were stable on today's evaluation.  Patient does have improvement since the area of necrotic tissue in previously been debrided the area has now filled in with healthy granular tissue.   Patient is having increasing pain and discomfort with his neck and back issues.  Patient is currently under the care of pain management.  Patient has been putting some pressure on the left foot which I have advised against this could certainly lead to further breakdown or necrotic tissue.  Patient did have an area of drainage and previously healed ulcers sub 1st MPJ left I did perform a culture and sensitivity on this area will place the patient on appropriate antibiotics pending culture.  Patient had a large area of ulceration on the plantar lateral foot this was non excisionally debrided today.  Recommended follow-up 4-6 weeks unless the  patient has any problems questions or concerns sooner.  Patient requires extensive wound care due to the shear size of the ulceration sites with associated nonexcisional debridement and review of x-ray total time including documentation of today's visit equaled 40 minutes.  This note was created using [employer] voice recognition software that occasionally misinterpreted phrases or words.

## 2022-07-12 ENCOUNTER — TELEPHONE (OUTPATIENT)
Dept: PODIATRY | Facility: CLINIC | Age: 62
End: 2022-07-12
Payer: MEDICAID

## 2022-07-12 NOTE — TELEPHONE ENCOUNTER
Called to have unsuppressed but lab stated they would leave a message for morning crew to unsuppress. I will call again in the morning.

## 2022-07-12 NOTE — TELEPHONE ENCOUNTER
----- Message from Gary Stringer DPM sent at 7/12/2022  4:47 PM CDT -----  Please call and have this culture and sensitivity un suppressed.

## 2022-07-13 ENCOUNTER — TELEPHONE (OUTPATIENT)
Dept: PODIATRY | Facility: CLINIC | Age: 62
End: 2022-07-13
Payer: MEDICAID

## 2022-07-13 RX ORDER — CIPROFLOXACIN 500 MG/1
500 TABLET ORAL 2 TIMES DAILY
Qty: 42 TABLET | Refills: 0 | Status: SHIPPED | OUTPATIENT
Start: 2022-07-13 | End: 2022-08-03

## 2022-07-13 NOTE — TELEPHONE ENCOUNTER
Patient notified of results and verbalized understanding that he is to take antibiotic for 3 weeks.

## 2022-07-13 NOTE — TELEPHONE ENCOUNTER
----- Message from Gary Stringer DPM sent at 7/13/2022  8:47 AM CDT -----  Please call the patient and advise per his culture and sensitivity there were 2 bacteria both strep and Staph I have put in a prescription for him to start taking Cipro x3 weeks.

## 2022-07-15 LAB
BACTERIA SPEC AEROBE CULT: ABNORMAL
BACTERIA SPEC AEROBE CULT: ABNORMAL

## 2022-07-22 ENCOUNTER — TELEPHONE (OUTPATIENT)
Dept: FAMILY MEDICINE | Facility: CLINIC | Age: 62
End: 2022-07-22

## 2022-07-29 ENCOUNTER — TELEPHONE (OUTPATIENT)
Dept: FAMILY MEDICINE | Facility: CLINIC | Age: 62
End: 2022-07-29
Payer: MEDICAID

## 2022-07-29 NOTE — TELEPHONE ENCOUNTER
----- Message from Anu Samanta sent at 7/29/2022  2:19 PM CDT -----  Regarding: pt called  Name of Who is Calling: IVONNE LIN [[id number]]      What is the request in detail: pt is requested a referral be place for a pain management. Please fax it to [phone] attn : Dr Childress . Please advise       Can the clinic reply by [facility]: NO      What Number to Call Back if not in [facility]: [phone] (home)

## 2022-07-29 NOTE — TELEPHONE ENCOUNTER
Spoke to the pt and discussed that he will have to make an appt to see his PCP to get a referral for pain management.

## 2022-08-17 ENCOUNTER — OFFICE VISIT (OUTPATIENT)
Dept: PODIATRY | Facility: CLINIC | Age: 62
End: 2022-08-17
Payer: MEDICAID

## 2022-08-17 ENCOUNTER — HOSPITAL ENCOUNTER (OUTPATIENT)
Dept: RADIOLOGY | Facility: HOSPITAL | Age: 62
Discharge: HOME | End: 2022-08-17
Attending: PODIATRIST
Payer: MEDICAID

## 2022-08-17 VITALS
HEART RATE: 86 BPM | SYSTOLIC BLOOD PRESSURE: 159 MMHG | BODY MASS INDEX: 36.45 KG/M2 | TEMPERATURE: 98 F | HEIGHT: 78 IN | DIASTOLIC BLOOD PRESSURE: 88 MMHG | WEIGHT: 315 LBS | RESPIRATION RATE: 18 BRPM

## 2022-08-17 DIAGNOSIS — L97.522 ULCER OF LEFT FOOT WITH FAT LAYER EXPOSED: Primary | ICD-10-CM

## 2022-08-17 DIAGNOSIS — E11.9 COMPREHENSIVE DIABETIC FOOT EXAMINATION, TYPE 2 DM, ENCOUNTER FOR: ICD-10-CM

## 2022-08-17 DIAGNOSIS — Z89.9 HX OF AMPUTATION: ICD-10-CM

## 2022-08-17 DIAGNOSIS — L97.522 ULCER OF LEFT FOOT WITH FAT LAYER EXPOSED: ICD-10-CM

## 2022-08-17 DIAGNOSIS — E11.49 TYPE II DIABETES MELLITUS WITH NEUROLOGICAL MANIFESTATIONS: ICD-10-CM

## 2022-08-17 PROCEDURE — 99214 OFFICE O/P EST MOD 30 MIN: CPT | Mod: S$PBB,,, | Performed by: PODIATRIST

## 2022-08-17 PROCEDURE — 3008F PR BODY MASS INDEX (BMI) DOCUMENTED: ICD-10-PCS | Mod: CPTII,,, | Performed by: PODIATRIST

## 2022-08-17 PROCEDURE — 73630 X-RAY EXAM OF FOOT: CPT | Mod: TC,LT

## 2022-08-17 PROCEDURE — 73630 X-RAY EXAM OF FOOT: CPT | Mod: 26,LT,, | Performed by: RADIOLOGY

## 2022-08-17 PROCEDURE — 99215 OFFICE O/P EST HI 40 MIN: CPT | Mod: PBBFAC | Performed by: PODIATRIST

## 2022-08-17 PROCEDURE — 3079F DIAST BP 80-89 MM HG: CPT | Mod: CPTII,,, | Performed by: PODIATRIST

## 2022-08-17 PROCEDURE — 1159F PR MEDICATION LIST DOCUMENTED IN MEDICAL RECORD: ICD-10-PCS | Mod: CPTII,,, | Performed by: PODIATRIST

## 2022-08-17 PROCEDURE — 3077F SYST BP >= 140 MM HG: CPT | Mod: CPTII,,, | Performed by: PODIATRIST

## 2022-08-17 PROCEDURE — 99999 PR PBB SHADOW E&M-EST. PATIENT-LVL V: CPT | Mod: PBBFAC,,, | Performed by: PODIATRIST

## 2022-08-17 PROCEDURE — 3077F PR MOST RECENT SYSTOLIC BLOOD PRESSURE >= 140 MM HG: ICD-10-PCS | Mod: CPTII,,, | Performed by: PODIATRIST

## 2022-08-17 PROCEDURE — 3079F PR MOST RECENT DIASTOLIC BLOOD PRESSURE 80-89 MM HG: ICD-10-PCS | Mod: CPTII,,, | Performed by: PODIATRIST

## 2022-08-17 PROCEDURE — 3008F BODY MASS INDEX DOCD: CPT | Mod: CPTII,,, | Performed by: PODIATRIST

## 2022-08-17 PROCEDURE — 99214 PR OFFICE/OUTPT VISIT, EST, LEVL IV, 30-39 MIN: ICD-10-PCS | Mod: S$PBB,,, | Performed by: PODIATRIST

## 2022-08-17 PROCEDURE — 99999 PR PBB SHADOW E&M-EST. PATIENT-LVL V: ICD-10-PCS | Mod: PBBFAC,,, | Performed by: PODIATRIST

## 2022-08-17 PROCEDURE — 1160F RVW MEDS BY RX/DR IN RCRD: CPT | Mod: CPTII,,, | Performed by: PODIATRIST

## 2022-08-17 PROCEDURE — 1159F MED LIST DOCD IN RCRD: CPT | Mod: CPTII,,, | Performed by: PODIATRIST

## 2022-08-17 PROCEDURE — 73630 XR FOOT COMPLETE 3 VIEW LEFT: ICD-10-PCS | Mod: 26,LT,, | Performed by: RADIOLOGY

## 2022-08-17 PROCEDURE — 1160F PR REVIEW ALL MEDS BY PRESCRIBER/CLIN PHARMACIST DOCUMENTED: ICD-10-PCS | Mod: CPTII,,, | Performed by: PODIATRIST

## 2022-08-17 RX ORDER — CYCLOBENZAPRINE HCL 10 MG
10 TABLET ORAL 3 TIMES DAILY
COMMUNITY
Start: 2022-08-10 | End: 2022-11-19

## 2022-08-21 NOTE — PROGRESS NOTES
"Subjective:       Patient ID: Alfred Villarreal is a 61 y.o. male.    Chief Complaint: Follow-up, Diabetes Mellitus, Foot Ulcer, and Foot Pain   Patient presents for follow-up today multiple areas of breakdown and ulceration left.      Past Medical History:   Diagnosis Date    A-fib     Anticoagulant long-term use     Arthritis     Diabetes mellitus     Hypertension      Past Surgical History:   Procedure Laterality Date    BELOW KNEE AMPUTATION OF LOWER EXTREMITY Right     "About 15 years"     History reviewed. No pertinent family history.  Social History     Socioeconomic History    Marital status:    Tobacco Use    Smoking status: Never Smoker    Smokeless tobacco: Current User     Types: Chew   Substance and Sexual Activity    Alcohol use: Not Currently    Drug use: No    Sexual activity: Yes     Partners: Female       Current Outpatient Medications   Medication Sig Dispense Refill    baclofen (LIORESAL) 10 MG tablet Take 10 mg by mouth 3 (three) times daily.      gabapentin (NEURONTIN) 600 MG tablet       HYDROcodone-acetaminophen (NORCO)  mg per tablet Take 1 tablet by mouth 2 (two) times daily as needed.      ibuprofen (ADVIL,MOTRIN) 800 MG tablet SMARTSI Tablet(s) By Mouth Every 12 Hours PRN      insulin aspart U-100 (NOVOLOG) 100 unit/mL injection INJECT 50 UNITS UNDER THE SKIN TWICE DAILY BEFORE A MEAL 90 mL 0    insulin detemir U-100 (LEVEMIR FLEXTOUCH U-100 INSULN) 100 unit/mL (3 mL) InPn pen Inject 70 Units into the skin 2 (two) times daily. 42 mL 11    pen needle, diabetic 31 gauge x 1/4" Ndle Use daily with victoza 100 each 3    cyclobenzaprine (FLEXERIL) 10 MG tablet Take 10 mg by mouth 3 (three) times daily.      sildenafil (REVATIO) 20 mg Tab Take 1-3 tablets as need 1 hour prior to sexual activity 30 tablet 0     No current facility-administered medications for this visit.     Review of patient's allergies indicates:   Allergen Reactions    Amitriptyline      Vivid " "dreams( bad)       Review of Systems   Musculoskeletal: Positive for arthralgias, back pain, gait problem and joint swelling.   Skin: Positive for color change and wound.   Neurological: Positive for numbness.   All other systems reviewed and are negative.      Objective:      Vitals:    08/17/22 1359   BP: (!) 159/88   Pulse: 86   Resp: 18   Temp: 98.3 °F (36.8 °C)   TempSrc: Oral   Weight: (!) 147.4 kg (325 lb)   Height: 6' 6" (1.981 m)     Physical Exam  Vitals and nursing note reviewed.   Constitutional:       General: He is in acute distress.      Appearance: Normal appearance. He is well-developed.   Cardiovascular:      Pulses:           Dorsalis pedis pulses are 1+ on the left side.        Posterior tibial pulses are 0 on the left side.   Pulmonary:      Effort: Pulmonary effort is normal.   Musculoskeletal:      Left lower leg: Edema present.      Left foot: Deformity present.        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   Feet:      Left foot:      Protective Sensation: 4 sites tested. 1 site sensed.     Skin integrity: Ulcer, skin breakdown, erythema and warmth present.   Skin:     Capillary Refill: Capillary refill takes more than 3 seconds.      Findings: Erythema and lesion present.   Neurological:      Mental Status: He is alert.      Sensory: Sensory deficit present.      Deep Tendon Reflexes: Reflexes abnormal.   Psychiatric:         Mood and Affect: Mood normal.         Behavior: Behavior normal.         Thought Content: Thought content normal.         Judgment: Judgment normal.                                                                                                Assessment:       1. Ulcer of left foot with fat layer exposed    2. Type II diabetes mellitus with neurological manifestations    3. Comprehensive diabetic foot examination, type 2 DM, encounter for    4. Hx of amputation        Plan:         Patient presents today follow-up of multiple sites of " ulceration on the patient's left lower extremity he has had a previous amputation of the right lower extremity has an extensive history of osteomyelitis.    Patient has had a chronic ulceration on his left foot for some time and had been showing signs of improvement but has subsequently gotten worse due to weight-bearing on the patient's left heel.  On evaluation there are no signs of obvious infection to the area with extensive nonviable tissue the a ulceration itself is 6 cm long by 4 cm wide by 3 mm deep.  The areas were then scrubbed with a Betadine scrub scrub rinsed with Dakin solution painted with Betadine and apply silver alginate with a well-padded thick dressing was applied.  Previously noted ulceration overlying the 1st MPJ left has opened up an ulcerated again this had been completely closed and well resolved.  I did perform a culture and sensitivity on the wound site left and will place the patient on appropriate antibiotics pending culture and sensitivity.  Wound care provided today excisional debridement performed plan follow-up 6 weeks obviously if there is any changes to the foot the patient will contact us immediately.  All current ulcer sites look considerably better the ulcer overlying the lateral ankle area left has completely closed healed and resolved at this time.  Patient states he is on the heel quite a bit putting a lot of pressure on the area he states when the only ways he is able to transfer since he has had his right lower extremity amputated.  I again advised the patient he must stay off of this area keep pressure off of the area failure to be compliant is going to lead to nonhealing and possibly even amputation of the left lower extremity patient has already lost his right lower leg.   Total face-to-face time including discussion evaluation treatment wound care non excisional debridement and discussion of treatment plan equaled 40 min.   Plan follow-up will be 6 weeks.  Patient had  an area of hyperkeratotic tissue that required non excisional debridement on the plantar lateral forefoot additionally he had a little bit of breakdown overlying the 1st MPJ where he has had previous breakdown before this was very superficial do not appear infected the patient's left heel has improved dramatically and got considerably smaller since the patient has been changing the dressing regularly and trying to stay off of the foot as much as possible.  Patient states he has again run out of wound care dressing supplies we had to provide him with additional supplies will work on getting these for the patient some days he is having to change the dressing twice a day due to drainage.  Patient states he is currently working from his wheelchair to help clear a lot I have advised him he needs to be careful to keep this dry clean and absolutely stay off of this left foot so it can continue to heal.  X-rays were stable on today's evaluation.  Patient does have improvement since the area of necrotic tissue in previously been debrided the area has now filled in with healthy granular tissue.   Patient is having increasing pain and discomfort with his neck and back issues.  Patient is currently under the care of pain management.  Patient has been putting some pressure on the left foot which I have advised against this could certainly lead to further breakdown or necrotic tissue. Patient had a large area of ulceration on the plantar lateral foot this was non excisionally debrided today.  Patient had a blood blister on the 2nd digit left he states he had run is wheelchair over the tip of his toe.  Patient had multiple areas of blister none of these areas were active for infection am going to have the patient use a surgical Betadine scrub to scrub the bottom of his left foot and the large ulcer rinse with Dakin's and apply ascetic acid wet-to-dry dressing as there does appear to be some degree of pseudomonal involvement on the  surface of the wound.  Patient was in understanding and agreement with this I would like to see him for follow-up in 4 weeks however the patient frequently pushes this to 6 weeks.  Recommended follow-up 4-6 weeks unless the patient has any problems questions or concerns sooner.  Patient requires extensive wound care due to the shear size of the ulceration sites with associated nonexcisional debridement and review of x-ray total time including documentation of today's visit equaled 40 minutes.  This note was created using [employer] voice recognition software that occasionally misinterpreted phrases or words.

## 2022-09-09 ENCOUNTER — TELEPHONE (OUTPATIENT)
Dept: FAMILY MEDICINE | Facility: CLINIC | Age: 62
End: 2022-09-09
Payer: MEDICAID

## 2022-09-09 NOTE — TELEPHONE ENCOUNTER
----- Message from Brayden Roy sent at 9/9/2022  1:13 PM CDT -----  Type: Needs Medical Advice  Who Called:  pt  Symptoms (please be specific):  pt said he need to know when he can drop off some paper work for the dr to fill out--please call and advise  Best Call Back Number: [phone] (home)     Additional Information: thank you

## 2022-10-18 ENCOUNTER — TELEPHONE (OUTPATIENT)
Dept: PODIATRY | Facility: CLINIC | Age: 62
End: 2022-10-18
Payer: MEDICAID

## 2022-10-18 NOTE — TELEPHONE ENCOUNTER
Rescheduled appointment for patient----- Message from Michael Connell sent at 10/18/2022 11:21 AM CDT -----  Type: Needs Medical Advice  Who Called:  Pt  Symptoms (please be specific):  situation    Best Call Back Number: [phone]     Additional Information: Sts he needs to talk to someone in the office about a Situation.  Wouldn't say more.  Please advise -- Thank you

## 2022-10-19 ENCOUNTER — TELEPHONE (OUTPATIENT)
Dept: PODIATRY | Facility: CLINIC | Age: 62
End: 2022-10-19
Payer: MEDICAID

## 2022-10-19 ENCOUNTER — TELEPHONE (OUTPATIENT)
Dept: FAMILY MEDICINE | Facility: CLINIC | Age: 62
End: 2022-10-19
Payer: MEDICAID

## 2022-10-19 NOTE — TELEPHONE ENCOUNTER
Rescheduled appointment with patient----- Message from Mable Ferrer sent at 10/19/2022 11:57 AM CDT -----  Contact: pt  Type: Needs Medical Advice    Who Called: pt  Best Call Back Number: [phone]    Inquiry/Question: pt has an appt today. He is not able to make it but needs a nurse to call him back.      Thank you~

## 2022-10-19 NOTE — TELEPHONE ENCOUNTER
I spoke to Nel regarding paperwork for this patient and advised her that he will have to be evaluation before any paperwork can be signed. He has an appointment scheduled with Dr. Craig on 11/18/22.

## 2022-10-19 NOTE — TELEPHONE ENCOUNTER
----- Message from Cinthia Lyman LPN sent at 10/6/2022 10:36 AM CDT -----  Contact: called at  [phone] ext. [phone]  Can you please advise if received.   Thanks!  ----- Message -----  From: Sherrie Bonner  Sent: 10/6/2022  10:35 AM CDT  To: Rafa Roach Staff     Type: Needs Medical Advice  Who Called:  Stefania from diabetic supply  Best Call Back Number: [phone]. [phone]  Additional Information: She needs a signature on paperwork that was faxed over on 08/18/2022 for supplies needed for pt. Her fax number is [phone].

## 2022-10-26 ENCOUNTER — OFFICE VISIT (OUTPATIENT)
Dept: PODIATRY | Facility: CLINIC | Age: 62
End: 2022-10-26
Payer: MEDICAID

## 2022-10-26 ENCOUNTER — HOSPITAL ENCOUNTER (OUTPATIENT)
Dept: RADIOLOGY | Facility: HOSPITAL | Age: 62
Discharge: HOME | End: 2022-10-26
Attending: PODIATRIST
Payer: MEDICAID

## 2022-10-26 VITALS
SYSTOLIC BLOOD PRESSURE: 176 MMHG | TEMPERATURE: 98 F | WEIGHT: 315 LBS | BODY MASS INDEX: 36.45 KG/M2 | HEART RATE: 86 BPM | DIASTOLIC BLOOD PRESSURE: 96 MMHG | HEIGHT: 78 IN

## 2022-10-26 DIAGNOSIS — E11.49 TYPE II DIABETES MELLITUS WITH NEUROLOGICAL MANIFESTATIONS: ICD-10-CM

## 2022-10-26 DIAGNOSIS — L97.522 SKIN ULCER OF LEFT FOOT WITH FAT LAYER EXPOSED: Primary | ICD-10-CM

## 2022-10-26 DIAGNOSIS — E11.9 COMPREHENSIVE DIABETIC FOOT EXAMINATION, TYPE 2 DM, ENCOUNTER FOR: ICD-10-CM

## 2022-10-26 DIAGNOSIS — L97.522 SKIN ULCER OF LEFT FOOT WITH FAT LAYER EXPOSED: ICD-10-CM

## 2022-10-26 DIAGNOSIS — Z89.9 HX OF AMPUTATION: ICD-10-CM

## 2022-10-26 PROCEDURE — 73630 X-RAY EXAM OF FOOT: CPT | Mod: 26,LT,, | Performed by: RADIOLOGY

## 2022-10-26 PROCEDURE — 1160F RVW MEDS BY RX/DR IN RCRD: CPT | Mod: CPTII,,, | Performed by: PODIATRIST

## 2022-10-26 PROCEDURE — 99999 PR PBB SHADOW E&M-EST. PATIENT-LVL V: ICD-10-PCS | Mod: PBBFAC,,, | Performed by: PODIATRIST

## 2022-10-26 PROCEDURE — 73630 XR FOOT COMPLETE 3 VIEW LEFT: ICD-10-PCS | Mod: 26,LT,, | Performed by: RADIOLOGY

## 2022-10-26 PROCEDURE — 1159F MED LIST DOCD IN RCRD: CPT | Mod: CPTII,,, | Performed by: PODIATRIST

## 2022-10-26 PROCEDURE — 99215 OFFICE O/P EST HI 40 MIN: CPT | Mod: S$PBB,,, | Performed by: PODIATRIST

## 2022-10-26 PROCEDURE — 1160F PR REVIEW ALL MEDS BY PRESCRIBER/CLIN PHARMACIST DOCUMENTED: ICD-10-PCS | Mod: CPTII,,, | Performed by: PODIATRIST

## 2022-10-26 PROCEDURE — 3080F DIAST BP >= 90 MM HG: CPT | Mod: CPTII,,, | Performed by: PODIATRIST

## 2022-10-26 PROCEDURE — 1159F PR MEDICATION LIST DOCUMENTED IN MEDICAL RECORD: ICD-10-PCS | Mod: CPTII,,, | Performed by: PODIATRIST

## 2022-10-26 PROCEDURE — 99215 OFFICE O/P EST HI 40 MIN: CPT | Mod: PBBFAC | Performed by: PODIATRIST

## 2022-10-26 PROCEDURE — 73630 X-RAY EXAM OF FOOT: CPT | Mod: TC,LT

## 2022-10-26 PROCEDURE — 99215 PR OFFICE/OUTPT VISIT, EST, LEVL V, 40-54 MIN: ICD-10-PCS | Mod: S$PBB,,, | Performed by: PODIATRIST

## 2022-10-26 PROCEDURE — 99999 PR PBB SHADOW E&M-EST. PATIENT-LVL V: CPT | Mod: PBBFAC,,, | Performed by: PODIATRIST

## 2022-10-26 PROCEDURE — 3077F PR MOST RECENT SYSTOLIC BLOOD PRESSURE >= 140 MM HG: ICD-10-PCS | Mod: CPTII,,, | Performed by: PODIATRIST

## 2022-10-26 PROCEDURE — 3077F SYST BP >= 140 MM HG: CPT | Mod: CPTII,,, | Performed by: PODIATRIST

## 2022-10-26 PROCEDURE — 3080F PR MOST RECENT DIASTOLIC BLOOD PRESSURE >= 90 MM HG: ICD-10-PCS | Mod: CPTII,,, | Performed by: PODIATRIST

## 2022-10-26 RX ORDER — OXYCODONE AND ACETAMINOPHEN 10; 325 MG/1; MG/1
1 TABLET ORAL 2 TIMES DAILY PRN
Status: ON HOLD | COMMUNITY
Start: 2022-10-05 | End: 2023-08-16 | Stop reason: HOSPADM

## 2022-10-29 NOTE — PROGRESS NOTES
"Subjective:       Patient ID: Alfred Villarreal is a 61 y.o. male.    Chief Complaint: Diabetes Mellitus, Follow-up, Foot Ulcer, and Foot Pain   Patient presents for follow-up today multiple areas of breakdown and ulceration left.      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 No pertinent family history.  Social History     Socioeconomic History    Marital status:    Tobacco Use    Smoking status: Never    Smokeless tobacco: Current     Types: Chew   Substance and Sexual Activity    Alcohol use: Not Currently    Drug use: No    Sexual activity: Yes     Partners: Female       Current Outpatient Medications   Medication Sig Dispense Refill    baclofen (LIORESAL) 10 MG tablet Take 10 mg by mouth 3 (three) times daily.      cyclobenzaprine (FLEXERIL) 10 MG tablet Take 10 mg by mouth 3 (three) times daily.      gabapentin (NEURONTIN) 600 MG tablet       HYDROcodone-acetaminophen (NORCO)  mg per tablet Take 1 tablet by mouth 2 (two) times daily as needed.      ibuprofen (ADVIL,MOTRIN) 800 MG tablet SMARTSI Tablet(s) By Mouth Every 12 Hours PRN      insulin aspart U-100 (NOVOLOG) 100 unit/mL injection INJECT 50 UNITS UNDER THE SKIN TWICE DAILY BEFORE A MEAL 90 mL 0    insulin detemir U-100 (LEVEMIR FLEXTOUCH U-100 INSULN) 100 unit/mL (3 mL) InPn pen Inject 70 Units into the skin 2 (two) times daily. 42 mL 11    oxyCODONE-acetaminophen (PERCOCET)  mg per tablet Take 1 tablet by mouth 2 (two) times daily as needed.      pen needle, diabetic 31 gauge x 1/4" Ndle Use daily with victoza 100 each 3    sildenafil (REVATIO) 20 mg Tab Take 1-3 tablets as need 1 hour prior to sexual activity 30 tablet 0     No current facility-administered medications for this visit.     Review " "of patient's allergies indicates:   Allergen Reactions    Amitriptyline      Vivid dreams( bad)       Review of Systems   Musculoskeletal:  Positive for arthralgias, back pain, gait problem and joint swelling.   Skin:  Positive for color change and wound.   Neurological:  Positive for numbness.   All other systems reviewed and are negative.    Objective:      Vitals:    10/26/22 1032   BP: (!) 176/96   Pulse: 86   Temp: 98.3 °F (36.8 °C)   Weight: (!) 147.4 kg (325 lb)   Height: 6' 6" (1.981 m)     Physical Exam  Vitals and nursing note reviewed.   Constitutional:       General: He is in acute distress.      Appearance: Normal appearance. He is well-developed.   Cardiovascular:      Pulses:           Dorsalis pedis pulses are 1+ on the left side.        Posterior tibial pulses are 0 on the left side.   Pulmonary:      Effort: Pulmonary effort is normal.   Musculoskeletal:      Left lower leg: Edema present.      Left foot: Deformity present.        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   Feet:      Left foot:      Protective Sensation: 4 sites tested.   1 site sensed.     Skin integrity: Ulcer, skin breakdown, erythema and warmth present.   Skin:     Capillary Refill: Capillary refill takes more than 3 seconds.      Findings: Erythema and lesion present.   Neurological:      Mental Status: He is alert.      Sensory: Sensory deficit present.      Deep Tendon Reflexes: Reflexes abnormal.   Psychiatric:         Mood and Affect: Mood normal.         Behavior: Behavior normal.         Thought Content: Thought content normal.         Judgment: Judgment normal.                                                                                                                        Assessment:       1. Skin ulcer of left foot with fat layer exposed    2. Hx of amputation    3. Comprehensive diabetic foot examination, type 2 DM, encounter for    4. Type II diabetes mellitus with neurological " manifestations        Plan:         Patient presents today follow-up of multiple sites of ulceration on the patient's left lower extremity he has had a previous amputation of the right lower extremity has an extensive history of osteomyelitis.    Patient has had a chronic ulceration on his left foot for some time and had been showing signs of improvement but has subsequently gotten worse due to weight-bearing on the patient's left heel.  On evaluation there are no signs of obvious infection to the area with extensive nonviable tissue the a ulceration itself is 6 cm long by 4 cm wide by 3 mm deep.  The areas were then scrubbed with a Betadine scrub scrub rinsed with Dakin solution painted with Betadine and apply silver alginate with a well-padded thick dressing was applied.  Previously noted ulceration overlying the 1st MPJ left has opened up an ulcerated again this had been completely closed and well resolved.  I did perform a culture and sensitivity on the wound site left and will place the patient on appropriate antibiotics pending culture and sensitivity.  Wound care provided today excisional debridement performed plan follow-up 6 weeks obviously if there is any changes to the foot the patient will contact us immediately.  All current ulcer sites look considerably better the ulcer overlying the lateral ankle area left has completely closed healed and resolved at this time.  Patient states he is on the heel quite a bit putting a lot of pressure on the area he states when the only ways he is able to transfer since he has had his right lower extremity amputated.  I again advised the patient he must stay off of this area keep pressure off of the area failure to be compliant is going to lead to nonhealing and possibly even amputation of the left lower extremity patient has already lost his right lower leg.   Total face-to-face time including discussion evaluation treatment wound care non excisional debridement and  discussion of treatment plan equaled 40 min.   Plan follow-up will be 6 weeks.  Patient had an area of hyperkeratotic tissue that required non excisional debridement on the plantar lateral forefoot additionally he had a little bit of breakdown overlying the 1st MPJ where he has had previous breakdown before this was very superficial do not appear infected the patient's left heel has improved dramatically and got considerably smaller since the patient has been changing the dressing regularly and trying to stay off of the foot as much as possible.  Patient states he has again run out of wound care dressing supplies we had to provide him with additional supplies will work on getting these for the patient some days he is having to change the dressing twice a day due to drainage.  Patient states he is currently working from his wheelchair to help clear a lot I have advised him he needs to be careful to keep this dry clean and absolutely stay off of this left foot so it can continue to heal.  X-rays were stable on today's evaluation.  Patient does have improvement since the area of necrotic tissue in previously been debrided the area has now filled in with healthy granular tissue.   Patient is having increasing pain and discomfort with his neck and back issues.  Patient is currently under the care of pain management.  Patient has been putting some pressure on the left foot which I have advised against this could certainly lead to further breakdown or necrotic tissue. Patient had a large area of ulceration on the plantar lateral foot this was non excisionally debrided today.  Patient had a blood blister on the 2nd digit left he states he had run is wheelchair over the tip of his toe.  Patient had multiple areas of blister none of these areas were active for infection am going to have the patient use a surgical Betadine scrub to scrub the bottom of his left foot and the large ulcer rinse with Dakin's and apply ascetic acid  wet-to-dry dressing as there does appear to be some degree of pseudomonal involvement on the surface of the wound.  Patient was in understanding and agreement with this I would like to see him for follow-up in 4 weeks however the patient frequently pushes this to 6 weeks.  Recommended follow-up 4-6 weeks unless the patient has any problems questions or concerns sooner.  Patient requires extensive wound care due to the shear size of the ulceration sites with associated nonexcisional debridement and review of x-ray total time including documentation of today's visit equaled 40 minutes.  This note was created using [email or web address] voice recognition software that occasionally misinterpreted phrases or words.

## 2022-11-18 ENCOUNTER — LAB VISIT (OUTPATIENT)
Dept: LAB | Facility: HOSPITAL | Age: 62
End: 2022-11-18
Attending: FAMILY MEDICINE
Payer: MEDICAID

## 2022-11-18 ENCOUNTER — OFFICE VISIT (OUTPATIENT)
Dept: FAMILY MEDICINE | Facility: CLINIC | Age: 62
End: 2022-11-18
Payer: MEDICAID

## 2022-11-18 VITALS
DIASTOLIC BLOOD PRESSURE: 80 MMHG | BODY MASS INDEX: 37.56 KG/M2 | RESPIRATION RATE: 18 BRPM | OXYGEN SATURATION: 99 % | TEMPERATURE: 98 F | SYSTOLIC BLOOD PRESSURE: 150 MMHG | HEIGHT: 78 IN | HEART RATE: 99 BPM

## 2022-11-18 DIAGNOSIS — Z79.4 TYPE 2 DIABETES MELLITUS WITH FOOT ULCER, WITH LONG-TERM CURRENT USE OF INSULIN: ICD-10-CM

## 2022-11-18 DIAGNOSIS — Z00.00 ANNUAL PHYSICAL EXAM: Primary | ICD-10-CM

## 2022-11-18 DIAGNOSIS — E11.621 TYPE 2 DIABETES MELLITUS WITH FOOT ULCER, WITH LONG-TERM CURRENT USE OF INSULIN: ICD-10-CM

## 2022-11-18 DIAGNOSIS — L97.522 SKIN ULCER OF LEFT FOOT WITH FAT LAYER EXPOSED: ICD-10-CM

## 2022-11-18 DIAGNOSIS — L97.509 TYPE 2 DIABETES MELLITUS WITH FOOT ULCER, WITH LONG-TERM CURRENT USE OF INSULIN: ICD-10-CM

## 2022-11-18 DIAGNOSIS — Z89.9 HX OF AMPUTATION: ICD-10-CM

## 2022-11-18 LAB
ALBUMIN SERPL BCP-MCNC: 3 G/DL (ref 3.5–5.2)
ALP SERPL-CCNC: 95 U/L (ref 55–135)
ALT SERPL W/O P-5'-P-CCNC: 11 U/L (ref 10–44)
ANION GAP SERPL CALC-SCNC: 10 MMOL/L (ref 8–16)
AST SERPL-CCNC: 13 U/L (ref 10–40)
BASOPHILS # BLD AUTO: 0.02 K/UL (ref 0–0.2)
BASOPHILS NFR BLD: 0.2 % (ref 0–1.9)
BILIRUB SERPL-MCNC: 0.6 MG/DL (ref 0.1–1)
BUN SERPL-MCNC: 6 MG/DL (ref 8–23)
CALCIUM SERPL-MCNC: 8.9 MG/DL (ref 8.7–10.5)
CHLORIDE SERPL-SCNC: 95 MMOL/L (ref 95–110)
CHOLEST SERPL-MCNC: 134 MG/DL (ref 120–199)
CHOLEST/HDLC SERPL: 4.5 {RATIO} (ref 2–5)
CO2 SERPL-SCNC: 26 MMOL/L (ref 23–29)
CREAT SERPL-MCNC: 0.8 MG/DL (ref 0.5–1.4)
DIFFERENTIAL METHOD: ABNORMAL
EOSINOPHIL # BLD AUTO: 0.1 K/UL (ref 0–0.5)
EOSINOPHIL NFR BLD: 0.5 % (ref 0–8)
ERYTHROCYTE [DISTWIDTH] IN BLOOD BY AUTOMATED COUNT: 12.6 % (ref 11.5–14.5)
EST. GFR  (NO RACE VARIABLE): >60 ML/MIN/1.73 M^2
ESTIMATED AVG GLUCOSE: 246 MG/DL (ref 68–131)
GLUCOSE SERPL-MCNC: 338 MG/DL (ref 70–110)
HBA1C MFR BLD: 10.2 % (ref 4–5.6)
HCT VFR BLD AUTO: 39.3 % (ref 40–54)
HDLC SERPL-MCNC: 30 MG/DL (ref 40–75)
HDLC SERPL: 22.4 % (ref 20–50)
HGB BLD-MCNC: 13.2 G/DL (ref 14–18)
IMM GRANULOCYTES # BLD AUTO: 0.02 K/UL (ref 0–0.04)
IMM GRANULOCYTES NFR BLD AUTO: 0.2 % (ref 0–0.5)
LDLC SERPL CALC-MCNC: 92.4 MG/DL (ref 63–159)
LYMPHOCYTES # BLD AUTO: 1.3 K/UL (ref 1–4.8)
LYMPHOCYTES NFR BLD: 13.7 % (ref 18–48)
MCH RBC QN AUTO: 28.8 PG (ref 27–31)
MCHC RBC AUTO-ENTMCNC: 33.6 G/DL (ref 32–36)
MCV RBC AUTO: 86 FL (ref 82–98)
MONOCYTES # BLD AUTO: 0.4 K/UL (ref 0.3–1)
MONOCYTES NFR BLD: 3.7 % (ref 4–15)
NEUTROPHILS # BLD AUTO: 7.8 K/UL (ref 1.8–7.7)
NEUTROPHILS NFR BLD: 81.7 % (ref 38–73)
NONHDLC SERPL-MCNC: 104 MG/DL
NRBC BLD-RTO: 0 /100 WBC
PLATELET # BLD AUTO: 253 K/UL (ref 150–450)
PMV BLD AUTO: 9.7 FL (ref 9.2–12.9)
POTASSIUM SERPL-SCNC: 3.5 MMOL/L (ref 3.5–5.1)
PROT SERPL-MCNC: 9.3 G/DL (ref 6–8.4)
RBC # BLD AUTO: 4.58 M/UL (ref 4.6–6.2)
SODIUM SERPL-SCNC: 131 MMOL/L (ref 136–145)
TRIGL SERPL-MCNC: 58 MG/DL (ref 30–150)
WBC # BLD AUTO: 9.52 K/UL (ref 3.9–12.7)

## 2022-11-18 PROCEDURE — 99396 PREV VISIT EST AGE 40-64: CPT | Mod: S$PBB,,, | Performed by: FAMILY MEDICINE

## 2022-11-18 PROCEDURE — 99214 OFFICE O/P EST MOD 30 MIN: CPT | Mod: PBBFAC | Performed by: FAMILY MEDICINE

## 2022-11-18 PROCEDURE — 36415 COLL VENOUS BLD VENIPUNCTURE: CPT | Performed by: FAMILY MEDICINE

## 2022-11-18 PROCEDURE — 83036 HEMOGLOBIN GLYCOSYLATED A1C: CPT | Performed by: FAMILY MEDICINE

## 2022-11-18 PROCEDURE — 3008F PR BODY MASS INDEX (BMI) DOCUMENTED: ICD-10-PCS | Mod: CPTII,,, | Performed by: FAMILY MEDICINE

## 2022-11-18 PROCEDURE — 1159F PR MEDICATION LIST DOCUMENTED IN MEDICAL RECORD: ICD-10-PCS | Mod: CPTII,,, | Performed by: FAMILY MEDICINE

## 2022-11-18 PROCEDURE — 1160F RVW MEDS BY RX/DR IN RCRD: CPT | Mod: CPTII,,, | Performed by: FAMILY MEDICINE

## 2022-11-18 PROCEDURE — 3079F PR MOST RECENT DIASTOLIC BLOOD PRESSURE 80-89 MM HG: ICD-10-PCS | Mod: CPTII,,, | Performed by: FAMILY MEDICINE

## 2022-11-18 PROCEDURE — 3077F PR MOST RECENT SYSTOLIC BLOOD PRESSURE >= 140 MM HG: ICD-10-PCS | Mod: CPTII,,, | Performed by: FAMILY MEDICINE

## 2022-11-18 PROCEDURE — 3077F SYST BP >= 140 MM HG: CPT | Mod: CPTII,,, | Performed by: FAMILY MEDICINE

## 2022-11-18 PROCEDURE — 80053 COMPREHEN METABOLIC PANEL: CPT | Performed by: FAMILY MEDICINE

## 2022-11-18 PROCEDURE — 99396 PR PREVENTIVE VISIT,EST,40-64: ICD-10-PCS | Mod: S$PBB,,, | Performed by: FAMILY MEDICINE

## 2022-11-18 PROCEDURE — 1159F MED LIST DOCD IN RCRD: CPT | Mod: CPTII,,, | Performed by: FAMILY MEDICINE

## 2022-11-18 PROCEDURE — 3008F BODY MASS INDEX DOCD: CPT | Mod: CPTII,,, | Performed by: FAMILY MEDICINE

## 2022-11-18 PROCEDURE — 80061 LIPID PANEL: CPT | Performed by: FAMILY MEDICINE

## 2022-11-18 PROCEDURE — 99999 PR PBB SHADOW E&M-EST. PATIENT-LVL IV: ICD-10-PCS | Mod: PBBFAC,,, | Performed by: FAMILY MEDICINE

## 2022-11-18 PROCEDURE — 3079F DIAST BP 80-89 MM HG: CPT | Mod: CPTII,,, | Performed by: FAMILY MEDICINE

## 2022-11-18 PROCEDURE — 99999 PR PBB SHADOW E&M-EST. PATIENT-LVL IV: CPT | Mod: PBBFAC,,, | Performed by: FAMILY MEDICINE

## 2022-11-18 PROCEDURE — 1160F PR REVIEW ALL MEDS BY PRESCRIBER/CLIN PHARMACIST DOCUMENTED: ICD-10-PCS | Mod: CPTII,,, | Performed by: FAMILY MEDICINE

## 2022-11-18 PROCEDURE — 85025 COMPLETE CBC W/AUTO DIFF WBC: CPT | Performed by: FAMILY MEDICINE

## 2022-11-18 NOTE — PROGRESS NOTES
"[facility] - Clinic Note    Subjective      Mr. Villarreal is a 62 y.o. male who presents to clinic for Annual Exam (refills)    Patient reports persistent pain in his neck.  Has weakness and numbness in his right upper extremity.  Had a CT cervical spine which showed extensive degenerative disc disease.  Has been seeing pain management with minimal improvement in his symptoms.    PMH Alfred has a past medical history of A-fib, Anticoagulant long-term use, Arthritis, Diabetes mellitus, and Hypertension.   PSXH Alfred has a past surgical history that includes Below knee amputation of lower extremity (Right).    Alfred's family history is not on file.   SH Alfred reports that he has never smoked. His smokeless tobacco use includes chew. He reports that he does not currently use alcohol. He reports that he does not use drugs.   ALG Alfred is allergic to amitriptyline.   MED Alfred has a current medication list which includes the following prescription(s): baclofen, gabapentin, hydrocodone-acetaminophen, ibuprofen, insulin aspart u-100, oxycodone-acetaminophen, pen needle, diabetic, sildenafil, doxepin, and levemir flextouch u-100 insuln.     Review of Systems   Constitutional:  Negative for chills and fever.   HENT:  Negative for congestion and rhinorrhea.    Eyes:  Negative for visual disturbance.   Respiratory:  Negative for cough and shortness of breath.    Cardiovascular:  Negative for chest pain.   Gastrointestinal:  Negative for abdominal pain, constipation, diarrhea, nausea and vomiting.   Genitourinary:  Negative for dysuria.   Musculoskeletal:  Positive for neck pain. Negative for arthralgias and myalgias.   Skin:  Negative for rash.   Neurological:  Negative for weakness and headaches.   Objective     BP (!) 150/80 (BP Location: Left arm, Patient Position: Sitting, BP Method: Large (Manual))   Pulse 99   Temp 97.9 °F (36.6 °C) (Temporal)   Resp 18   Ht 6' 6" (1.981 m)   SpO2 99%   BMI 37.56 kg/m²     Physical " Exam  Vitals and nursing note reviewed.   Constitutional:       General: He is not in acute distress.     Appearance: He is well-developed. He is not diaphoretic.   HENT:      Head: Normocephalic and atraumatic.      Right Ear: External ear normal.      Left Ear: External ear normal.   Eyes:      General:         Right eye: No discharge.         Left eye: No discharge.   Cardiovascular:      Rate and Rhythm: Normal rate and regular rhythm.      Heart sounds: Normal heart sounds.   Pulmonary:      Effort: Pulmonary effort is normal.      Breath sounds: Normal breath sounds. No wheezing or rales.   Musculoskeletal:      Comments: Right below-the-knee amputation.  Left foot wrapped.   Skin:     General: Skin is warm and dry.   Neurological:      Mental Status: He is alert and oriented to person, place, and time.      Assessment/Plan     1. Annual physical exam        2. Type 2 diabetes mellitus with foot ulcer, with long-term current use of insulin  Lipid Panel    Comprehensive Metabolic Panel    CBC Auto Differential    Hemoglobin A1C      3. Skin ulcer of left foot with fat layer exposed        4. Hx of amputation          Due for annual labs as above.  Patient has a history right below-the-knee amputation and ulcer of the left foot.  This impairs his ambulation significantly.  He has uncontrolled diabetes which impairs the sensation in his left foot as well as healing of the ulcer.  He would benefit from having evaluation for power wheelchair to help with his mobility.    Future Appointments   Date Time Provider Department Center   1/18/2023  1:00 PM Gary Stringer DPM [facility] Tierney Craig MD  Family Medicine  [facility]

## 2022-11-19 DIAGNOSIS — G47.00 INSOMNIA, UNSPECIFIED TYPE: ICD-10-CM

## 2022-11-19 DIAGNOSIS — M54.2 NECK PAIN: Primary | ICD-10-CM

## 2022-11-19 RX ORDER — DOXEPIN HYDROCHLORIDE 10 MG/1
10 CAPSULE ORAL NIGHTLY
Qty: 30 CAPSULE | Refills: 11 | Status: ON HOLD | OUTPATIENT
Start: 2022-11-19 | End: 2023-08-16 | Stop reason: HOSPADM

## 2022-11-19 RX ORDER — TIZANIDINE 4 MG/1
4 TABLET ORAL EVERY 6 HOURS PRN
Qty: 90 TABLET | Refills: 2 | Status: SHIPPED | OUTPATIENT
Start: 2022-11-19 | End: 2022-11-29

## 2022-11-23 ENCOUNTER — TELEPHONE (OUTPATIENT)
Dept: FAMILY MEDICINE | Facility: CLINIC | Age: 62
End: 2022-11-23
Payer: MEDICAID

## 2022-11-23 NOTE — TELEPHONE ENCOUNTER
----- Message from Vahid Craig MD sent at 11/23/2022  8:56 AM CST -----  Paperwork has been sent  ----- Message -----  From: Cinthia Lyman LPN  Sent: 11/22/2022   2:45 PM CST  To: Vahid Craig MD    Please let me know the status of paperwork being filled out  ----- Message -----  From: Shania Kay  Sent: 11/22/2022   2:44 PM CST  To: Rafa Redding-Nevin Staff    .Type:  Patient Call Back    Who Called: PT        Does the patient know what this is regarding?: PT CHECKING STATUS OF THE PAPERWORK THE OFFICE IS SUPPOSED TO BE FILLING OUT FOR HIM    Would the patient rather a call back YES     Best Call Back Number: [phone]    Additional Information: Thank You

## 2022-12-07 ENCOUNTER — HOSPITAL ENCOUNTER (OUTPATIENT)
Dept: RADIOLOGY | Facility: HOSPITAL | Age: 62
Discharge: HOME | End: 2022-12-07
Attending: PODIATRIST
Payer: MEDICAID

## 2022-12-07 ENCOUNTER — OFFICE VISIT (OUTPATIENT)
Dept: PODIATRY | Facility: CLINIC | Age: 62
End: 2022-12-07
Payer: MEDICAID

## 2022-12-07 DIAGNOSIS — L97.522 SKIN ULCER OF LEFT FOOT WITH FAT LAYER EXPOSED: ICD-10-CM

## 2022-12-07 DIAGNOSIS — Z89.9 HX OF AMPUTATION: ICD-10-CM

## 2022-12-07 DIAGNOSIS — E11.49 TYPE II DIABETES MELLITUS WITH NEUROLOGICAL MANIFESTATIONS: Primary | ICD-10-CM

## 2022-12-07 DIAGNOSIS — M71.072 ABSCESS OF BURSA OF LEFT FOOT: ICD-10-CM

## 2022-12-07 DIAGNOSIS — L97.522 ULCER OF LEFT FOOT WITH FAT LAYER EXPOSED: ICD-10-CM

## 2022-12-07 DIAGNOSIS — E11.9 COMPREHENSIVE DIABETIC FOOT EXAMINATION, TYPE 2 DM, ENCOUNTER FOR: ICD-10-CM

## 2022-12-07 PROCEDURE — 1159F PR MEDICATION LIST DOCUMENTED IN MEDICAL RECORD: ICD-10-PCS | Mod: CPTII,,, | Performed by: PODIATRIST

## 2022-12-07 PROCEDURE — 87077 CULTURE AEROBIC IDENTIFY: CPT | Mod: 59 | Performed by: PODIATRIST

## 2022-12-07 PROCEDURE — 99213 OFFICE O/P EST LOW 20 MIN: CPT | Mod: PBBFAC | Performed by: PODIATRIST

## 2022-12-07 PROCEDURE — 1160F RVW MEDS BY RX/DR IN RCRD: CPT | Mod: CPTII,,, | Performed by: PODIATRIST

## 2022-12-07 PROCEDURE — 1159F MED LIST DOCD IN RCRD: CPT | Mod: CPTII,,, | Performed by: PODIATRIST

## 2022-12-07 PROCEDURE — 11045 DBRDMT SUBQ TISS EACH ADDL: CPT | Mod: PBBFAC | Performed by: PODIATRIST

## 2022-12-07 PROCEDURE — 11045 WOUND DEBRIDEMENT: ICD-10-PCS | Mod: S$PBB,,, | Performed by: PODIATRIST

## 2022-12-07 PROCEDURE — 99215 OFFICE O/P EST HI 40 MIN: CPT | Mod: 25,S$PBB,, | Performed by: PODIATRIST

## 2022-12-07 PROCEDURE — 3046F PR MOST RECENT HEMOGLOBIN A1C LEVEL > 9.0%: ICD-10-PCS | Mod: CPTII,,, | Performed by: PODIATRIST

## 2022-12-07 PROCEDURE — 73630 X-RAY EXAM OF FOOT: CPT | Mod: 26,LT,, | Performed by: RADIOLOGY

## 2022-12-07 PROCEDURE — 1160F PR REVIEW ALL MEDS BY PRESCRIBER/CLIN PHARMACIST DOCUMENTED: ICD-10-PCS | Mod: CPTII,,, | Performed by: PODIATRIST

## 2022-12-07 PROCEDURE — 73630 X-RAY EXAM OF FOOT: CPT | Mod: TC,LT

## 2022-12-07 PROCEDURE — 3046F HEMOGLOBIN A1C LEVEL >9.0%: CPT | Mod: CPTII,,, | Performed by: PODIATRIST

## 2022-12-07 PROCEDURE — 11042 DBRDMT SUBQ TIS 1ST 20SQCM/<: CPT | Mod: S$PBB,,, | Performed by: PODIATRIST

## 2022-12-07 PROCEDURE — 11042 WOUND DEBRIDEMENT: ICD-10-PCS | Mod: S$PBB,,, | Performed by: PODIATRIST

## 2022-12-07 PROCEDURE — 99999 PR PBB SHADOW E&M-EST. PATIENT-LVL III: CPT | Mod: PBBFAC,,, | Performed by: PODIATRIST

## 2022-12-07 PROCEDURE — 73630 XR FOOT COMPLETE 3 VIEW LEFT: ICD-10-PCS | Mod: 26,LT,, | Performed by: RADIOLOGY

## 2022-12-07 PROCEDURE — 99999 PR PBB SHADOW E&M-EST. PATIENT-LVL III: ICD-10-PCS | Mod: PBBFAC,,, | Performed by: PODIATRIST

## 2022-12-07 PROCEDURE — 99215 PR OFFICE/OUTPT VISIT, EST, LEVL V, 40-54 MIN: ICD-10-PCS | Mod: 25,S$PBB,, | Performed by: PODIATRIST

## 2022-12-07 PROCEDURE — 87070 CULTURE OTHR SPECIMN AEROBIC: CPT | Performed by: PODIATRIST

## 2022-12-07 PROCEDURE — 87186 SC STD MICRODIL/AGAR DIL: CPT | Performed by: PODIATRIST

## 2022-12-10 NOTE — PROGRESS NOTES
"Subjective:       Patient ID: Alfred Villarreal is a 62 y.o. male.    Chief Complaint: Follow-up     Patient presents for follow-up today multiple areas of breakdown and ulceration left.      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 No pertinent family history.  Social History     Socioeconomic History    Marital status:    Tobacco Use    Smoking status: Never    Smokeless tobacco: Current     Types: Chew   Substance and Sexual Activity    Alcohol use: Not Currently    Drug use: No    Sexual activity: Yes     Partners: Female       Current Outpatient Medications   Medication Sig Dispense Refill    baclofen (LIORESAL) 10 MG tablet Take 10 mg by mouth 3 (three) times daily.      doxepin (SINEQUAN) 10 MG capsule Take 1 capsule (10 mg total) by mouth every evening. 30 capsule 11    gabapentin (NEURONTIN) 600 MG tablet       HYDROcodone-acetaminophen (NORCO)  mg per tablet Take 1 tablet by mouth 2 (two) times daily as needed.      ibuprofen (ADVIL,MOTRIN) 800 MG tablet SMARTSI Tablet(s) By Mouth Every 12 Hours PRN      insulin aspart U-100 (NOVOLOG) 100 unit/mL injection INJECT 50 UNITS UNDER THE SKIN TWICE DAILY BEFORE A MEAL 90 mL 0    insulin detemir U-100 (LEVEMIR FLEXTOUCH U-100 INSULN) 100 unit/mL (3 mL) InPn pen Inject 70 Units into the skin 2 (two) times daily. 42 mL 11    oxyCODONE-acetaminophen (PERCOCET)  mg per tablet Take 1 tablet by mouth 2 (two) times daily as needed.      pen needle, diabetic 31 gauge x 1/4" Ndle Use daily with victoza 100 each 3    sildenafil (REVATIO) 20 mg Tab Take 1-3 tablets as need 1 hour prior to sexual activity 30 tablet 0     No current facility-administered medications for this visit.     Review of patient's allergies " indicates:   Allergen Reactions    Amitriptyline      Vivid dreams( bad)       Review of Systems   Musculoskeletal:  Positive for arthralgias, back pain, gait problem and joint swelling.   Skin:  Positive for color change and wound.   Neurological:  Positive for numbness.   All other systems reviewed and are negative.    Objective:      There were no vitals filed for this visit.    Physical Exam  Vitals and nursing note reviewed.   Constitutional:       General: He is in acute distress.      Appearance: Normal appearance. He is well-developed.   Cardiovascular:      Pulses:           Dorsalis pedis pulses are 1+ on the left side.        Posterior tibial pulses are 0 on the left side.   Pulmonary:      Effort: Pulmonary effort is normal.   Musculoskeletal:      Left lower leg: Edema present.      Left foot: Deformity present.        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   Feet:      Left foot:      Protective Sensation: 4 sites tested.   1 site sensed.     Skin integrity: Ulcer, skin breakdown, erythema and warmth present.   Skin:     Capillary Refill: Capillary refill takes more than 3 seconds.      Findings: Erythema and lesion present.   Neurological:      Mental Status: He is alert.      Sensory: Sensory deficit present.      Deep Tendon Reflexes: Reflexes abnormal.   Psychiatric:         Mood and Affect: Mood normal.         Behavior: Behavior normal.         Thought Content: Thought content normal.         Judgment: Judgment normal.                                                                                                                                      Assessment:       1. Skin ulcer of left foot with fat layer exposed    2. Ulcer of left foot with fat layer exposed    3. Hx of amputation    4. Type II diabetes mellitus with neurological manifestations    5. Comprehensive diabetic foot examination, type 2 DM, encounter for    6. Abscess of bursa of left foot         Plan:         Patient presents today follow-up of multiple sites of ulceration on the patient's left lower extremity he has had a previous amputation of the right lower extremity has an extensive history of osteomyelitis.    Patient has had a chronic ulceration on his left foot for some time and had been showing signs of improvement but has subsequently gotten worse due to weight-bearing on the patient's left heel.  On evaluation there are no signs of obvious infection to the area with extensive nonviable tissue the a ulceration itself is 6 cm long by 4 cm wide by 3 mm deep.  The areas were then scrubbed with a Betadine scrub scrub rinsed with Dakin solution painted with Betadine and apply silver alginate with a well-padded thick dressing was applied.  Previously noted ulceration overlying the 1st MPJ left has opened up an ulcerated again this had been completely closed and well resolved.  I did perform a culture and sensitivity on the wound site left and will place the patient on appropriate antibiotics pending culture and sensitivity.  Wound care provided today excisional debridement performed plan follow-up 6 weeks obviously if there is any changes to the foot the patient will contact us immediately.  All current ulcer sites look considerably better the ulcer overlying the lateral ankle area left has completely closed healed and resolved at this time.  Patient states he is on the heel quite a bit putting a lot of pressure on the area he states when the only ways he is able to transfer since he has had his right lower extremity amputated.  I again advised the patient he must stay off of this area keep pressure off of the area failure to be compliant is going to lead to nonhealing and possibly even amputation of the left lower extremity patient has already lost his right lower leg.  Patient states he has again run out of wound care dressing supplies we had to provide him with additional supplies will work on  getting these for the patient some days he is having to change the dressing twice a day due to drainage.  Patient is having increasing pain and discomfort with his neck and back issues.  Patient is currently under the care of pain management.  Patient has been putting some pressure on the left foot which I have advised against this could certainly lead to further breakdown or necrotic tissue. Patient had a large area of ulceration on the plantar lateral foot this was non excisionally debrided today. Patient had multiple areas of blister none of these areas were active for infection am going to have the patient use a surgical Betadine scrub to scrub the bottom of his left foot and the large ulcer rinse with Dakin's and apply ascetic acid wet-to-dry dressing as there does appear to be some degree of pseudomonal involvement on the surface of the wound.  Patient was in understanding and agreement with this I would like to see him for follow-up in 4 weeks however the patient frequently pushes this to 6 weeks.  Recommended follow-up 4-6 weeks unless the patient has any problems questions or concerns sooner.  Patient requires extensive wound care due to the shear size of the ulceration sites with associated nonexcisional debridement and review of x-ray total time including documentation of today's visit equaled 45 minutes.  Excisional debridement was performed today as documented culture and sensitivity was performed patient will be placed on appropriate antibiotics pending culture I did advise him is very likely a pseudomonal infection therefore he needs to continue the ascetic at said wet-to-dry dressings after he scrub the area with a Betadine scrub and rinses with Dakin's the ascetic acid wet-to-dry needs to be applied this includes the wounds on the posterior aspect of the left lower extremity.  Patient's lower extremity was very red and displayed malodor today it was heavily draining.  Patient indicated today because  of his current situation with pain management the constant pain he is in he states he is not really caring about much of anything these days including taking care of the wounds as directed he appears very depressed at the time of evaluation.  Patient states the way that he feels may be related to the time of the year including the holidays.  This note was created using [employer] voice recognition software that occasionally misinterpreted phrases or words.

## 2022-12-10 NOTE — PROCEDURES
"Wound Debridement    Date/Time: 12/7/2022 1:30 PM  Performed by: Gary Stringer DPM  Authorized by: Gary Stringer DPM     Time out: Immediately prior to procedure a "time out" was called to verify the correct patient, procedure, equipment, support staff and site/side marked as required.    Consent Done?:  Yes (Verbal)    Preparation: Patient was prepped and draped in usual sterile fashion    Local anesthesia used?: No      Wound Details:    Location:  Right foot    Location:  Right Heel    Type of Debridement:  Excisional       Length (cm):  6       Area (sq cm):  24       Width (cm):  4       Percent Debrided (%):  100       Depth (cm):  0.6       Total Area Debrided (sq cm):  24    Depth of debridement:  Subcutaneous tissue    Devitalized tissue debrided:  Biofilm, Callus, Necrotic/Eschar and Slough    Instruments:  Blade    Bleeding:  Minimal  Hemostasis Achieved: Yes    Method Used:  Pressure  "

## 2022-12-12 ENCOUNTER — TELEPHONE (OUTPATIENT)
Dept: PODIATRY | Facility: CLINIC | Age: 62
End: 2022-12-12
Payer: MEDICAID

## 2022-12-12 LAB
BACTERIA SPEC AEROBE CULT: ABNORMAL
BACTERIA SPEC AEROBE CULT: ABNORMAL

## 2022-12-12 RX ORDER — CIPROFLOXACIN 500 MG/1
500 TABLET ORAL 2 TIMES DAILY
Qty: 60 TABLET | Refills: 0 | Status: SHIPPED | OUTPATIENT
Start: 2022-12-12 | End: 2023-01-18 | Stop reason: SDUPTHER

## 2022-12-12 NOTE — TELEPHONE ENCOUNTER
LVM for patient informing him he has an antibiotic he needs to begin taking and to call office if he has any questions or concerns.    1.66

## 2022-12-29 ENCOUNTER — TELEPHONE (OUTPATIENT)
Dept: FAMILY MEDICINE | Facility: CLINIC | Age: 62
End: 2022-12-29
Payer: MEDICAID

## 2022-12-29 DIAGNOSIS — M54.2 NECK PAIN: Primary | ICD-10-CM

## 2022-12-29 DIAGNOSIS — Z89.9 HX OF AMPUTATION: ICD-10-CM

## 2022-12-29 DIAGNOSIS — M54.2 CERVICALGIA: ICD-10-CM

## 2022-12-30 NOTE — TELEPHONE ENCOUNTER
----- Message from Sergei Britton sent at 12/29/2022 11:11 AM CST -----  Type: Patient Call Back         Who called: [facility]          What is the request in detail: requesting the pt  3 most recent chart notes and PTOT Orders for mobility fax to  [phone] [facility]         Can the clinic reply by [facility]? No          Would the patient rather a call back or a response via [facility]? Call back          Best call back number:[phone]         Additional Information:           Thank You

## 2023-01-06 ENCOUNTER — TELEPHONE (OUTPATIENT)
Dept: PODIATRY | Facility: CLINIC | Age: 63
End: 2023-01-06
Payer: MEDICAID

## 2023-01-18 ENCOUNTER — OFFICE VISIT (OUTPATIENT)
Dept: PODIATRY | Facility: CLINIC | Age: 63
End: 2023-01-18
Payer: MEDICAID

## 2023-01-18 ENCOUNTER — HOSPITAL ENCOUNTER (OUTPATIENT)
Dept: RADIOLOGY | Facility: HOSPITAL | Age: 63
Discharge: HOME | End: 2023-01-18
Attending: PODIATRIST
Payer: MEDICAID

## 2023-01-18 VITALS
BODY MASS INDEX: 36.45 KG/M2 | HEART RATE: 98 BPM | WEIGHT: 315 LBS | DIASTOLIC BLOOD PRESSURE: 85 MMHG | HEIGHT: 78 IN | SYSTOLIC BLOOD PRESSURE: 132 MMHG

## 2023-01-18 DIAGNOSIS — Z89.9 HX OF AMPUTATION: ICD-10-CM

## 2023-01-18 DIAGNOSIS — M71.072 ABSCESS OF BURSA OF LEFT FOOT: ICD-10-CM

## 2023-01-18 DIAGNOSIS — L97.522 SKIN ULCER OF LEFT FOOT WITH FAT LAYER EXPOSED: ICD-10-CM

## 2023-01-18 DIAGNOSIS — E11.49 TYPE II DIABETES MELLITUS WITH NEUROLOGICAL MANIFESTATIONS: ICD-10-CM

## 2023-01-18 DIAGNOSIS — E11.9 COMPREHENSIVE DIABETIC FOOT EXAMINATION, TYPE 2 DM, ENCOUNTER FOR: ICD-10-CM

## 2023-01-18 DIAGNOSIS — Z74.09 DECREASED FUNCTIONAL MOBILITY AND ENDURANCE: ICD-10-CM

## 2023-01-18 DIAGNOSIS — L97.522 SKIN ULCER OF LEFT FOOT WITH FAT LAYER EXPOSED: Primary | ICD-10-CM

## 2023-01-18 PROCEDURE — 73630 X-RAY EXAM OF FOOT: CPT | Mod: TC,LT

## 2023-01-18 PROCEDURE — 1159F MED LIST DOCD IN RCRD: CPT | Mod: CPTII,,, | Performed by: PODIATRIST

## 2023-01-18 PROCEDURE — 99999 PR PBB SHADOW E&M-EST. PATIENT-LVL IV: ICD-10-PCS | Mod: PBBFAC,,, | Performed by: PODIATRIST

## 2023-01-18 PROCEDURE — 3079F PR MOST RECENT DIASTOLIC BLOOD PRESSURE 80-89 MM HG: ICD-10-PCS | Mod: CPTII,,, | Performed by: PODIATRIST

## 2023-01-18 PROCEDURE — 1160F RVW MEDS BY RX/DR IN RCRD: CPT | Mod: CPTII,,, | Performed by: PODIATRIST

## 2023-01-18 PROCEDURE — 99215 OFFICE O/P EST HI 40 MIN: CPT | Mod: S$PBB,,, | Performed by: PODIATRIST

## 2023-01-18 PROCEDURE — 99215 PR OFFICE/OUTPT VISIT, EST, LEVL V, 40-54 MIN: ICD-10-PCS | Mod: S$PBB,,, | Performed by: PODIATRIST

## 2023-01-18 PROCEDURE — 3008F PR BODY MASS INDEX (BMI) DOCUMENTED: ICD-10-PCS | Mod: CPTII,,, | Performed by: PODIATRIST

## 2023-01-18 PROCEDURE — 99999 PR PBB SHADOW E&M-EST. PATIENT-LVL IV: CPT | Mod: PBBFAC,,, | Performed by: PODIATRIST

## 2023-01-18 PROCEDURE — 3008F BODY MASS INDEX DOCD: CPT | Mod: CPTII,,, | Performed by: PODIATRIST

## 2023-01-18 PROCEDURE — 3079F DIAST BP 80-89 MM HG: CPT | Mod: CPTII,,, | Performed by: PODIATRIST

## 2023-01-18 PROCEDURE — 73630 X-RAY EXAM OF FOOT: CPT | Mod: 26,LT,, | Performed by: RADIOLOGY

## 2023-01-18 PROCEDURE — 1160F PR REVIEW ALL MEDS BY PRESCRIBER/CLIN PHARMACIST DOCUMENTED: ICD-10-PCS | Mod: CPTII,,, | Performed by: PODIATRIST

## 2023-01-18 PROCEDURE — 1159F PR MEDICATION LIST DOCUMENTED IN MEDICAL RECORD: ICD-10-PCS | Mod: CPTII,,, | Performed by: PODIATRIST

## 2023-01-18 PROCEDURE — 3075F SYST BP GE 130 - 139MM HG: CPT | Mod: CPTII,,, | Performed by: PODIATRIST

## 2023-01-18 PROCEDURE — 99214 OFFICE O/P EST MOD 30 MIN: CPT | Mod: PBBFAC | Performed by: PODIATRIST

## 2023-01-18 PROCEDURE — 3075F PR MOST RECENT SYSTOLIC BLOOD PRESS GE 130-139MM HG: ICD-10-PCS | Mod: CPTII,,, | Performed by: PODIATRIST

## 2023-01-18 PROCEDURE — 73630 XR FOOT COMPLETE 3 VIEW LEFT: ICD-10-PCS | Mod: 26,LT,, | Performed by: RADIOLOGY

## 2023-01-18 RX ORDER — CIPROFLOXACIN 500 MG/1
500 TABLET ORAL 2 TIMES DAILY
Qty: 60 TABLET | Refills: 0 | Status: SHIPPED | OUTPATIENT
Start: 2023-01-18 | End: 2023-02-17

## 2023-01-21 NOTE — PROGRESS NOTES
"Subjective:       Patient ID: Alfred Villarreal is a 62 y.o. male.    Chief Complaint: Follow-up (Left foot)     Patient presents for follow-up today multiple areas of breakdown and ulceration left.      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 No pertinent family history.  Social History     Socioeconomic History    Marital status:    Tobacco Use    Smoking status: Never    Smokeless tobacco: Current     Types: Chew   Substance and Sexual Activity    Alcohol use: Not Currently    Drug use: No    Sexual activity: Yes     Partners: Female       Current Outpatient Medications   Medication Sig Dispense Refill    baclofen (LIORESAL) 10 MG tablet Take 10 mg by mouth 3 (three) times daily.      doxepin (SINEQUAN) 10 MG capsule Take 1 capsule (10 mg total) by mouth every evening. 30 capsule 11    gabapentin (NEURONTIN) 600 MG tablet       ibuprofen (ADVIL,MOTRIN) 800 MG tablet SMARTSI Tablet(s) By Mouth Every 12 Hours PRN      insulin aspart U-100 (NOVOLOG) 100 unit/mL injection INJECT 50 UNITS UNDER THE SKIN TWICE DAILY BEFORE A MEAL 90 mL 0    oxyCODONE-acetaminophen (PERCOCET)  mg per tablet Take 1 tablet by mouth 2 (two) times daily as needed.      pen needle, diabetic 31 gauge x 1/4" Ndle Use daily with victoza 100 each 3    sildenafil (REVATIO) 20 mg Tab Take 1-3 tablets as need 1 hour prior to sexual activity 30 tablet 0    ciprofloxacin HCl (CIPRO) 500 MG tablet Take 1 tablet (500 mg total) by mouth 2 (two) times daily. 60 tablet 0    HYDROcodone-acetaminophen (NORCO)  mg per tablet Take 1 tablet by mouth 2 (two) times daily as needed.      insulin detemir U-100 (LEVEMIR FLEXTOUCH U-100 INSULN) 100 unit/mL (3 mL) InPn pen Inject 70 Units into the skin 2 (two) " "times daily. 42 mL 11     No current facility-administered medications for this visit.     Review of patient's allergies indicates:   Allergen Reactions    Amitriptyline      Vivid dreams( bad)       Review of Systems   Musculoskeletal:  Positive for arthralgias, back pain, gait problem and joint swelling.   Skin:  Positive for color change and wound.   Neurological:  Positive for numbness.   All other systems reviewed and are negative.    Objective:      Vitals:    01/18/23 1306   BP: 132/85   Pulse: 98   Weight: (!) 147.4 kg (325 lb)   Height: 6' 6" (1.981 m)       Physical Exam  Vitals and nursing note reviewed.   Constitutional:       General: He is in acute distress.      Appearance: Normal appearance. He is well-developed.   Cardiovascular:      Pulses:           Dorsalis pedis pulses are 1+ on the left side.        Posterior tibial pulses are 0 on the left side.   Pulmonary:      Effort: Pulmonary effort is normal.   Musculoskeletal:      Left lower leg: Edema present.      Left foot: Deformity present.        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   Feet:      Left foot:      Protective Sensation: 4 sites tested.   1 site sensed.     Skin integrity: Ulcer, skin breakdown, erythema and warmth present.   Skin:     Capillary Refill: Capillary refill takes more than 3 seconds.      Findings: Erythema and lesion present.   Neurological:      Mental Status: He is alert.      Sensory: Sensory deficit present.      Deep Tendon Reflexes: Reflexes abnormal.   Psychiatric:         Mood and Affect: Mood normal.         Behavior: Behavior normal.         Thought Content: Thought content normal.         Judgment: Judgment normal.                                                                                                                                                                        Assessment:       1. Skin ulcer of left foot with fat layer exposed    2. Type II diabetes mellitus " with neurological manifestations    3. Comprehensive diabetic foot examination, type 2 DM, encounter for    4. Abscess of bursa of left foot    5. Hx of amputation    6. Decreased functional mobility and endurance        Plan:         Patient presents today follow-up of multiple sites of ulceration on the patient's left lower extremity he has had a previous amputation of the right lower extremity has an extensive history of osteomyelitis.    Patient has had a chronic ulceration on his left foot for some time and had been showing signs of improvement but has subsequently gotten worse due to weight-bearing on the patient's left heel.  On evaluation there are no signs of obvious infection to the area with extensive nonviable tissue the a ulceration itself is 6 cm long by 4 cm wide by 3 mm deep.  The areas were then scrubbed with a Betadine scrub scrub rinsed with Dakin solution painted with Betadine and apply silver alginate with a well-padded thick dressing was applied.  Previously noted ulceration overlying the 1st MPJ left has opened up an ulcerated again this had been completely closed and well resolved.  I did perform a culture and sensitivity on the wound site left and will place the patient on appropriate antibiotics pending culture and sensitivity.  Wound care provided today excisional debridement performed plan follow-up 6 weeks obviously if there is any changes to the foot the patient will contact us immediately.  All current ulcer sites look considerably better the ulcer overlying the lateral ankle area left has completely closed healed and resolved at this time.  Patient states he is on the heel quite a bit putting a lot of pressure on the area he states when the only ways he is able to transfer since he has had his right lower extremity amputated.  I again advised the patient he must stay off of this area keep pressure off of the area failure to be compliant is going to lead to nonhealing and possibly even  amputation of the left lower extremity patient has already lost his right lower leg.  Patient states he has again run out of wound care dressing supplies we had to provide him with additional supplies will work on getting these for the patient some days he is having to change the dressing twice a day due to drainage.  Patient is having increasing pain and discomfort with his neck and back issues.  Patient is currently under the care of pain management.  Patient has been putting some pressure on the left foot which I have advised against this could certainly lead to further breakdown or necrotic tissue. Patient had a large area of ulceration on the plantar lateral foot this was non excisionally debrided today. Patient had multiple areas of blister none of these areas were active for infection am going to have the patient use a surgical Betadine scrub to scrub the bottom of his left foot and the large ulcer rinse with Dakin's and apply ascetic acid wet-to-dry dressing as there does appear to be some degree of pseudomonal involvement on the surface of the wound.  Patient was in understanding and agreement with this I would like to see him for follow-up in 4 weeks however the patient frequently pushes this to 6 weeks.  Recommended follow-up 4-6 weeks unless the patient has any problems questions or concerns sooner.  Patient requires extensive wound care due to the shear size of the ulceration sites with associated nonexcisional debridement and review of x-ray total time including documentation of today's visit equaled 45 minutes.  Excisional debridement was performed today as documented culture and sensitivity was performed patient will be placed on appropriate antibiotics pending culture I did advise him is very likely a pseudomonal infection therefore he needs to continue the ascetic at said wet-to-dry dressings after he scrub the area with a Betadine scrub and rinses with Dakin's the ascetic acid wet-to-dry needs to  be applied this includes the wounds on the posterior aspect of the left lower extremity.  Patient's lower extremity was very red and displayed malodor today it was heavily draining.  Patient indicated today because of his current situation with pain management the constant pain he is in he states he is not really caring about much of anything these days including taking care of the wounds as directed he appears very depressed at the time of evaluation.  Patient had just finished the Cipro that I had previously prescribed I advised the patient I am going to continue the Cipro and gave him a new prescription he also indicated he has been applying Silvadene cream to the wound sites which I advised against as these will keep the areas too wet to moist and will not allow them to drain if need be patient's previous culture was positive for both staph and Pseudomonas I did give the patient a prescription for an electric wheelchair he is not able to get around in the wheelchair that he previously had no longer functions.  Patient states he recently fell on his knees out of his regular wheelchair and was unable to get up on his own he is unable to get around without the use of the power wheelchair.  This note was created using [employer] voice recognition software that occasionally misinterpreted phrases or words.

## 2023-01-27 ENCOUNTER — OFFICE VISIT (OUTPATIENT)
Dept: FAMILY MEDICINE | Facility: CLINIC | Age: 63
End: 2023-01-27
Payer: MEDICAID

## 2023-01-27 VITALS
RESPIRATION RATE: 16 BRPM | DIASTOLIC BLOOD PRESSURE: 72 MMHG | OXYGEN SATURATION: 98 % | BODY MASS INDEX: 36.45 KG/M2 | SYSTOLIC BLOOD PRESSURE: 126 MMHG | WEIGHT: 315 LBS | HEART RATE: 80 BPM | HEIGHT: 78 IN

## 2023-01-27 DIAGNOSIS — Z89.9 HX OF AMPUTATION: Primary | ICD-10-CM

## 2023-01-27 DIAGNOSIS — L97.522 SKIN ULCER OF LEFT FOOT WITH FAT LAYER EXPOSED: ICD-10-CM

## 2023-01-27 DIAGNOSIS — M54.2 CERVICALGIA: ICD-10-CM

## 2023-01-27 PROCEDURE — 3074F PR MOST RECENT SYSTOLIC BLOOD PRESSURE < 130 MM HG: ICD-10-PCS | Mod: CPTII,,, | Performed by: FAMILY MEDICINE

## 2023-01-27 PROCEDURE — 99999 PR PBB SHADOW E&M-EST. PATIENT-LVL III: CPT | Mod: PBBFAC,,, | Performed by: FAMILY MEDICINE

## 2023-01-27 PROCEDURE — 99999 PR PBB SHADOW E&M-EST. PATIENT-LVL III: ICD-10-PCS | Mod: PBBFAC,,, | Performed by: FAMILY MEDICINE

## 2023-01-27 PROCEDURE — 3074F SYST BP LT 130 MM HG: CPT | Mod: CPTII,,, | Performed by: FAMILY MEDICINE

## 2023-01-27 PROCEDURE — 1159F PR MEDICATION LIST DOCUMENTED IN MEDICAL RECORD: ICD-10-PCS | Mod: CPTII,,, | Performed by: FAMILY MEDICINE

## 2023-01-27 PROCEDURE — 3078F DIAST BP <80 MM HG: CPT | Mod: CPTII,,, | Performed by: FAMILY MEDICINE

## 2023-01-27 PROCEDURE — 3078F PR MOST RECENT DIASTOLIC BLOOD PRESSURE < 80 MM HG: ICD-10-PCS | Mod: CPTII,,, | Performed by: FAMILY MEDICINE

## 2023-01-27 PROCEDURE — 1159F MED LIST DOCD IN RCRD: CPT | Mod: CPTII,,, | Performed by: FAMILY MEDICINE

## 2023-01-27 PROCEDURE — 3008F PR BODY MASS INDEX (BMI) DOCUMENTED: ICD-10-PCS | Mod: CPTII,,, | Performed by: FAMILY MEDICINE

## 2023-01-27 PROCEDURE — 1160F RVW MEDS BY RX/DR IN RCRD: CPT | Mod: CPTII,,, | Performed by: FAMILY MEDICINE

## 2023-01-27 PROCEDURE — 99213 OFFICE O/P EST LOW 20 MIN: CPT | Mod: PBBFAC | Performed by: FAMILY MEDICINE

## 2023-01-27 PROCEDURE — 99214 OFFICE O/P EST MOD 30 MIN: CPT | Mod: S$PBB,,, | Performed by: FAMILY MEDICINE

## 2023-01-27 PROCEDURE — 99214 PR OFFICE/OUTPT VISIT, EST, LEVL IV, 30-39 MIN: ICD-10-PCS | Mod: S$PBB,,, | Performed by: FAMILY MEDICINE

## 2023-01-27 PROCEDURE — 3008F BODY MASS INDEX DOCD: CPT | Mod: CPTII,,, | Performed by: FAMILY MEDICINE

## 2023-01-27 PROCEDURE — 1160F PR REVIEW ALL MEDS BY PRESCRIBER/CLIN PHARMACIST DOCUMENTED: ICD-10-PCS | Mod: CPTII,,, | Performed by: FAMILY MEDICINE

## 2023-01-27 RX ORDER — TADALAFIL 5 MG/1
5 TABLET ORAL DAILY PRN
Qty: 30 TABLET | Refills: 1 | Status: SHIPPED | OUTPATIENT
Start: 2023-01-27 | End: 2023-08-23

## 2023-01-28 NOTE — PROGRESS NOTES
[facility] - Clinic Note    Subjective      Mr. Villarreal is a 62 y.o. male who presents to clinic for Follow-up and F2F for motoroized wheelchair    Patient reports persistent pain in his neck.  Has weakness and numbness in his right upper extremity.  Had a CT cervical spine which showed extensive degenerative disc disease.  Has been seeing pain management with minimal improvement in his symptoms.    Due to right below the knee amputation/left foot ulcer secondary to uncontrolled diabetes patient has difficulty completing his activities of daily living.  Patient is unable to walk with a rolling walker due to right below the knee amputation/left foot ulcer secondary to uncontrolled diabetes.  Patient is unable to use a manual wheelchair because patient is unable to set it up and propel the wheelchair.  Patient's home is set up for a power wheelchair and patient has the capacity to use the power wheelchair safely.     PMH Alfred has a past medical history of A-fib, Anticoagulant long-term use, Arthritis, Diabetes mellitus, and Hypertension.   PSXH Alfred has a past surgical history that includes Below knee amputation of lower extremity (Right).    Alfred's family history is not on file.   SH Alfred reports that he has never smoked. His smokeless tobacco use includes chew. He reports that he does not currently use alcohol. He reports that he does not use drugs.   ALG Alfred is allergic to amitriptyline.   MED Alfred has a current medication list which includes the following prescription(s): baclofen, ciprofloxacin hcl, doxepin, gabapentin, hydrocodone-acetaminophen, ibuprofen, insulin aspart u-100, oxycodone-acetaminophen, pen needle, diabetic, levemir flextouch u-100 insuln, and tadalafil.     Review of Systems   Constitutional:  Negative for chills and fever.   HENT:  Negative for congestion and rhinorrhea.    Eyes:  Negative for visual disturbance.   Respiratory:  Negative for cough and shortness of breath.    Cardiovascular:  " Negative for chest pain.   Gastrointestinal:  Negative for abdominal pain, constipation, diarrhea, nausea and vomiting.   Genitourinary:  Negative for dysuria.   Musculoskeletal:  Positive for neck pain. Negative for arthralgias and myalgias.   Skin:  Negative for rash.   Neurological:  Negative for weakness and headaches.   Objective     /72 (BP Location: Left arm, Patient Position: Sitting, BP Method: Medium (Manual))   Pulse 80   Resp 16   Ht 6' 6" (1.981 m)   Wt (!) 147.4 kg (325 lb)   SpO2 98%   BMI 37.56 kg/m²     Physical Exam  Vitals and nursing note reviewed.   Constitutional:       General: He is not in acute distress.     Appearance: He is well-developed. He is not diaphoretic.   HENT:      Head: Normocephalic and atraumatic.      Right Ear: External ear normal.      Left Ear: External ear normal.   Eyes:      General:         Right eye: No discharge.         Left eye: No discharge.   Cardiovascular:      Rate and Rhythm: Normal rate and regular rhythm.      Heart sounds: Normal heart sounds.   Pulmonary:      Effort: Pulmonary effort is normal.      Breath sounds: Normal breath sounds. No wheezing or rales.   Musculoskeletal:      Comments: Right below-the-knee amputation.  Left foot wrapped.   Skin:     General: Skin is warm and dry.   Neurological:      Mental Status: He is alert and oriented to person, place, and time.      Assessment/Plan     1. Hx of amputation  MOTORIZED SCOOTER FOR HOME USE      2. Cervicalgia  MOTORIZED SCOOTER FOR HOME USE      3. Skin ulcer of left foot with fat layer exposed  MOTORIZED SCOOTER FOR HOME USE        Patient has a history right below-the-knee amputation and ulcer of the left foot.  This impairs his ambulation significantly.  He has uncontrolled diabetes which impairs the sensation in his left foot as well as healing of the ulcer. Patient has limited mobility and difficulty completing their activities of daily living at times when their pain becomes " severe.  A power wheelchair would help patient complete their activities of daily living.     Future Appointments   Date Time Provider Department Center   3/1/2023  2:00 PM Gary Stringer DPM [facility] [place] Stevie Craig MD  Family Medicine  [facility]

## 2023-02-01 ENCOUNTER — TELEPHONE (OUTPATIENT)
Dept: FAMILY MEDICINE | Facility: CLINIC | Age: 63
End: 2023-02-01
Payer: MEDICAID

## 2023-02-01 NOTE — TELEPHONE ENCOUNTER
----- Message from Katina Frank sent at 2/1/2023  1:23 PM CST -----  Regarding: pt call back  Who Called:IVONNE LIN [[phone]]         What is the reqeust in detail: Pt is returning call about the [employer] mobility fax [phone] in [place] need chart notes and prescription          Can the clinic reply by [facility]?         Best Call Back Number:[phone]           Additional Information: Pt wants call back when completed!

## 2023-02-01 NOTE — TELEPHONE ENCOUNTER
Order for motorized scooter and needed information sent to [facility] at fax # [phone]

## 2023-02-01 NOTE — TELEPHONE ENCOUNTER
----- Message from Brayden Roy sent at 2/1/2023  1:11 PM CST -----  Type: Needs Medical Advice  Who Called:  pt  Symptoms (please be specific):  pt said he trying to get a new wheel chair--said he need the office to call ASAP he can not get around without his chair--please call and advise  Best Call Back Number: [phone] (home)     Additional Information: thank you

## 2023-02-01 NOTE — TELEPHONE ENCOUNTER
"Spoke with patient. Patient states, " I just spoke with the wheel chair place, you need to send chart notes and order to them" patient unable to give company order needs to go to.patient will call office back with information. Verbalized understanding.   "

## 2023-02-06 ENCOUNTER — TELEPHONE (OUTPATIENT)
Dept: FAMILY MEDICINE | Facility: CLINIC | Age: 63
End: 2023-02-06
Payer: MEDICAID

## 2023-02-06 NOTE — TELEPHONE ENCOUNTER
Clinical staff, could you print patient order to electric wheel chair and physically fax to [facility]? I have faxed it via Epic, yet they are not receiving it.     Thanks!

## 2023-02-06 NOTE — TELEPHONE ENCOUNTER
----- Message from Leila Rand sent at 2/6/2023  3:13 PM CST -----  Contact: Charmaine  Type:  Needs Medical Advice    Who Called:  Charmaine PURI       Would the patient rather a call back or a response via [facility]?  Call     Best Call Back Number: [phone]    Additional Information:  Charmaine PURI would like to speak with the nurse in regards to the PT evaluation and needing a prescription faxed for it.     Please call to advise

## 2023-02-06 NOTE — TELEPHONE ENCOUNTER
----- Message from Natalie Jason sent at 2/6/2023  2:00 PM CST -----  Regarding: pt concern  Name of Who is Calling:IVONNE LIN [[id number]]          What is the request in detail:     pt needs a power wheel chair prescription pt been waiting for some time       [phone] fax number pt  would also like a call back               Can the clinic reply by [facility]: no           What Number to Call Back if not in [facility]: [phone] (home)

## 2023-02-06 NOTE — TELEPHONE ENCOUNTER
Spoke with patient. Patient reports [employer] has not received order for wheel chair.informed pt it was sent last week and we would resend to today. Pt voiced understanding.       Order for wheel chair/scooter sent to [facility] fax #  [phone]

## 2023-02-08 ENCOUNTER — TELEPHONE (OUTPATIENT)
Dept: FAMILY MEDICINE | Facility: CLINIC | Age: 63
End: 2023-02-08
Payer: MEDICAID

## 2023-02-08 NOTE — TELEPHONE ENCOUNTER
----- Message from Sharon Hendricks sent at 2/8/2023 12:53 PM CST -----  Contact: pt  Type:  Needs Medical Advice    Who Called: pt    Would the patient rather a call back or a response via [facility]? call  Best Call Back Number:  [phone]  Additional Information: states he need a call as soon as possible. Also states that the office wont send paperwork over for his wheelchair. Please advise thank you.

## 2023-02-08 NOTE — TELEPHONE ENCOUNTER
Per patient [employer] sent paperwork via fax for provider to sign and fax back. Please advise if received/completed

## 2023-02-28 ENCOUNTER — TELEPHONE (OUTPATIENT)
Dept: PODIATRY | Facility: CLINIC | Age: 63
End: 2023-02-28
Payer: MEDICAID

## 2023-02-28 NOTE — TELEPHONE ENCOUNTER
Cancelled appointment for patient----- Message from Rolando Lange sent at 2/28/2023  1:26 PM CST -----  Contact: patient  Type:  Patient Call          Who Called: Patient         Does the patient know what this is regarding?: Requesting a callback about his appt on tomorrow; please advise           Would the patient rather a call back or a response via [facility]? call          Best Call Back Number:[phone]             Additional Information:

## 2023-03-01 ENCOUNTER — TELEPHONE (OUTPATIENT)
Dept: PODIATRY | Facility: CLINIC | Age: 63
End: 2023-03-01
Payer: MEDICAID

## 2023-03-01 NOTE — TELEPHONE ENCOUNTER
Patient has covid and can't come to appt today. He is requesting Acetic acid and dakins for his wounds. He will have a friend come . Supplies are at the .

## 2023-03-16 ENCOUNTER — TELEPHONE (OUTPATIENT)
Dept: FAMILY MEDICINE | Facility: CLINIC | Age: 63
End: 2023-03-16
Payer: MEDICAID

## 2023-03-16 NOTE — TELEPHONE ENCOUNTER
Attempted to contact Alfred Villarreal to discuss  wheel chair order .    Left voice mail to return our call at [phone] on [phone] (home).    Cinthia Lyman LPN

## 2023-03-16 NOTE — TELEPHONE ENCOUNTER
----- Message from Nadia Syed sent at 3/16/2023  1:06 PM CDT -----  Contact: Patient  Type: Needs Medical Advice  Who Called:  Patient  He needs a wheelchair mobility order faxed back over after it is signed.   Best Call Back Number: [phone]  Additional Information: Please call the pt back to advise. Thanks!

## 2023-03-22 ENCOUNTER — TELEPHONE (OUTPATIENT)
Dept: FAMILY MEDICINE | Facility: CLINIC | Age: 63
End: 2023-03-22
Payer: MEDICAID

## 2023-03-22 NOTE — TELEPHONE ENCOUNTER
Spoke with patient. Patient [facility] seating mobility has sent a fax for evaluation to signed off and fax back to  Fax # [phone]

## 2023-03-22 NOTE — TELEPHONE ENCOUNTER
----- Message from Shonda Sow sent at 3/22/2023 12:34 PM CDT -----  Who Called: Pt    What is the request in detail: Requesting call back to speak about signing off on his wheelchair. Pt states they sent over 3 different times for it to be signed off. Please advise    Can the clinic reply by [facility]? No    Best Call Back Number: [phone]      Additional Information:

## 2023-03-22 NOTE — TELEPHONE ENCOUNTER
"Pt sees Dr. Craig. Pt states, PT evaluation must be signed off by PCP, Dr. Craig as it was sent via Fax"  "

## 2023-03-27 NOTE — TELEPHONE ENCOUNTER
Order signed in the absence of the PCP on [employer] approximately 5-6 days ago and faxed. Pt was to be notified by Kendy Ley MA

## 2023-03-29 ENCOUNTER — TELEPHONE (OUTPATIENT)
Dept: FAMILY MEDICINE | Facility: CLINIC | Age: 63
End: 2023-03-29
Payer: MEDICAID

## 2023-03-29 NOTE — TELEPHONE ENCOUNTER
Attempted to contact Alfred Villarreal to discuss  neck pain .    Left voice mail to return our call at [phone] on [phone] (home).    Cinthia Lyman LPN

## 2023-03-29 NOTE — TELEPHONE ENCOUNTER
----- Message from Denae Del Rosario sent at 3/29/2023 12:24 PM CDT -----  Contact: [phone]  Type: Needs Medical Advice  Who Called:  Pt   Symptoms (please be specific):  Neck pain     Best Call Back Number: [phone]    Additional Information: Pt is calling to ask if he should go to the er for his neck pain so he can get an mri  or schedule an appt. Pls call back and advise.

## 2023-04-03 ENCOUNTER — TELEPHONE (OUTPATIENT)
Dept: FAMILY MEDICINE | Facility: CLINIC | Age: 63
End: 2023-04-03
Payer: MEDICAID

## 2023-04-03 NOTE — TELEPHONE ENCOUNTER
----- Message from Corey Taylor sent at 4/3/2023  3:26 PM CDT -----  Who Called: patient         What is the reqeust in detail: Requesting call back to discuss neck is hurting, needing an A1C and eye test, right knee is hurting as well. Please call pt asap.          Can the clinic reply by [facility]? no         Best Call Back Number: [phone]           Additional Information:

## 2023-04-03 NOTE — TELEPHONE ENCOUNTER
Returned call to patient in regards to pain. Pt requesting appointment for neck pain. Appt scheduled. Pt voiced understanding.

## 2023-04-10 ENCOUNTER — TELEPHONE (OUTPATIENT)
Dept: FAMILY MEDICINE | Facility: CLINIC | Age: 63
End: 2023-04-10
Payer: MEDICAID

## 2023-04-10 NOTE — TELEPHONE ENCOUNTER
----- Message from Giorgi Branham sent at 4/10/2023 10:37 AM CDT -----  Regarding: reschedule today's appt  Type:  Sooner Appointment Request    Caller is requesting a sooner appointment.  Caller declined first available appointment listed below.  Caller will not accept being placed on the waitlist and is requesting a message be sent to doctor.    Name of Caller:  Alfred  When is the first available appointment?  After June appt  Symptoms:  neck pain and eye issue  Best Call Back Number:  [phone]  Additional Information:

## 2023-04-10 NOTE — TELEPHONE ENCOUNTER
Attempted to contact Alfred Villarreal to discuss  nia appt .    Unable to leave message on phone - no voicemail or mailbox full on [phone] (home).    Cinthia Lyman LPN

## 2023-04-18 RX ORDER — INSULIN DETEMIR 100 [IU]/ML
70 INJECTION, SOLUTION SUBCUTANEOUS 2 TIMES DAILY
Qty: 42 ML | Refills: 11 | Status: SHIPPED | OUTPATIENT
Start: 2023-04-18 | End: 2023-07-20 | Stop reason: SDUPTHER

## 2023-04-18 NOTE — TELEPHONE ENCOUNTER
----- Message from Shonda Sow sent at 4/18/2023 10:32 AM CDT -----  Who Called: Pt    What is the request in detail: Requesting call back to discuss New Rx, pt is completely out     insulin detemir U-100 (LEVEMIR FLEXTOUCH U-100 INSULN) 100 unit/mL (3 mL) InPn pen 42 mL 11 10/6/2021 10/26/2022 No  Sig - Route: Inject 70 Units into the skin 2 (two) times daily. - Subcutaneous  Sent to pharmacy as: insulin detemir U-100 (LEVEMIR FLEXTOUCH U-100 INSULN) 100 unit/mL (3 mL) InPn pen  Class: Normal  Order: [id number]      [id number] [place] [address] [address]  [address] [address]  Phone: [phone] Fax: [phone]    Can the clinic reply by [facility]? No    Best Call Back Number: [phone]      Additional Information:

## 2023-04-18 NOTE — TELEPHONE ENCOUNTER
----- Message from Trudy David sent at 4/18/2023  3:03 PM CDT -----  Type:  Needs Medical Advice    Who Called: self  Would the patient rather a call back or a response via [facility]? call  Best Call Back Number: [phone] (home)       Additional Information: pt needs a refill on Levemir. Please advise and thank you.    [id number] - [place] [address] AT [facility] [address] [address]  Phone: [phone] Fax: [phone]

## 2023-04-26 ENCOUNTER — TELEPHONE (OUTPATIENT)
Dept: FAMILY MEDICINE | Facility: CLINIC | Age: 63
End: 2023-04-26
Payer: MEDICAID

## 2023-04-26 DIAGNOSIS — Z89.9 HX OF AMPUTATION: Primary | ICD-10-CM

## 2023-04-26 NOTE — TELEPHONE ENCOUNTER
----- Message from Ingrid Kay sent at 4/26/2023 10:40 AM CDT -----  Regarding: Pt call back  Name of Who is Calling: IVONNE LIN [[id number]]        What is the request in detail: Pt was calling to get  a prescription for a new mattress for his hospital bed, pt's insurance told him that he has to get a prescription from his provider for this. Would like a call back. Please advise.         Can the clinic reply by [facility]: no           What [facility]: [phone]

## 2023-05-03 ENCOUNTER — TELEPHONE (OUTPATIENT)
Dept: FAMILY MEDICINE | Facility: CLINIC | Age: 63
End: 2023-05-03
Payer: MEDICAID

## 2023-05-03 NOTE — TELEPHONE ENCOUNTER
Spoke with pt. Informed pt order for Hospital Bed Mattress sent to [facility]. Advised pt to reach out to their office. Pt voiced understanding.

## 2023-05-03 NOTE — TELEPHONE ENCOUNTER
----- Message from Xi Peng sent at 5/3/2023  9:40 AM CDT -----  Type: Needs Medical Advice  Who Called:  pt     Best Call Back Number: [phone]    Additional Information: pt is requesting a call back in regards to getting a hospital mattress and has not heard anything from office . Please call back and advise

## 2023-05-04 ENCOUNTER — TELEPHONE (OUTPATIENT)
Dept: FAMILY MEDICINE | Facility: CLINIC | Age: 63
End: 2023-05-04
Payer: MEDICAID

## 2023-05-04 ENCOUNTER — HOSPITAL ENCOUNTER (EMERGENCY)
Facility: HOSPITAL | Age: 63
Discharge: HOME | End: 2023-05-04
Attending: STUDENT IN AN ORGANIZED HEALTH CARE EDUCATION/TRAINING PROGRAM
Payer: MEDICAID

## 2023-05-04 VITALS
DIASTOLIC BLOOD PRESSURE: 69 MMHG | BODY MASS INDEX: 36.45 KG/M2 | RESPIRATION RATE: 20 BRPM | SYSTOLIC BLOOD PRESSURE: 143 MMHG | HEIGHT: 78 IN | TEMPERATURE: 100 F | OXYGEN SATURATION: 95 % | HEART RATE: 102 BPM | WEIGHT: 315 LBS

## 2023-05-04 DIAGNOSIS — E11.9 TYPE 2 DIABETES MELLITUS WITHOUT COMPLICATION, UNSPECIFIED WHETHER LONG TERM INSULIN USE: ICD-10-CM

## 2023-05-04 DIAGNOSIS — M25.569 KNEE PAIN: ICD-10-CM

## 2023-05-04 DIAGNOSIS — E11.9 TYPE 2 DIABETES MELLITUS WITHOUT COMPLICATION: ICD-10-CM

## 2023-05-04 DIAGNOSIS — S80.02XA CONTUSION OF LEFT KNEE, INITIAL ENCOUNTER: ICD-10-CM
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
## 2023-05-04 DIAGNOSIS — M54.2 NECK PAIN: ICD-10-CM

## 2023-05-04 LAB
HCV AB SERPL QL IA: NORMAL
HIV 1+2 AB+HIV1 P24 AG SERPL QL IA: NORMAL

## 2023-05-04 PROCEDURE — 87389 HIV-1 AG W/HIV-1&-2 AB AG IA: CPT | Performed by: STUDENT IN AN ORGANIZED HEALTH CARE EDUCATION/TRAINING PROGRAM

## 2023-05-04 PROCEDURE — 86803 HEPATITIS C AB TEST: CPT | Performed by: STUDENT IN AN ORGANIZED HEALTH CARE EDUCATION/TRAINING PROGRAM

## 2023-05-04 PROCEDURE — 96374 THER/PROPH/DIAG INJ IV PUSH: CPT

## 2023-05-04 PROCEDURE — 25000003 PHARM REV CODE 250: Performed by: STUDENT IN AN ORGANIZED HEALTH CARE EDUCATION/TRAINING PROGRAM

## 2023-05-04 PROCEDURE — 73562 XR KNEE 3 VIEW LEFT: ICD-10-PCS | Mod: 26,LT,, | Performed by: RADIOLOGY

## 2023-05-04 PROCEDURE — 63600175 PHARM REV CODE 636 W HCPCS: Performed by: STUDENT IN AN ORGANIZED HEALTH CARE EDUCATION/TRAINING PROGRAM

## 2023-05-04 PROCEDURE — 73562 X-RAY EXAM OF KNEE 3: CPT | Mod: TC,LT

## 2023-05-04 PROCEDURE — 99284 EMERGENCY DEPT VISIT MOD MDM: CPT | Mod: 25

## 2023-05-04 PROCEDURE — 73562 X-RAY EXAM OF KNEE 3: CPT | Mod: 26,LT,, | Performed by: RADIOLOGY

## 2023-05-04 RX ORDER — METHOCARBAMOL 500 MG/1
1000 TABLET, FILM COATED ORAL
Status: COMPLETED | OUTPATIENT
Start: 2023-05-04 | End: 2023-05-04

## 2023-05-04 RX ORDER — KETOROLAC TROMETHAMINE 30 MG/ML
15 INJECTION, SOLUTION INTRAMUSCULAR; INTRAVENOUS
Status: COMPLETED | OUTPATIENT
Start: 2023-05-04 | End: 2023-05-04

## 2023-05-04 RX ADMIN — KETOROLAC TROMETHAMINE 15 MG: 30 INJECTION, SOLUTION INTRAMUSCULAR at 08:05

## 2023-05-04 RX ADMIN — METHOCARBAMOL TABLETS 1000 MG: 500 TABLET, COATED ORAL at 08:05

## 2023-05-04 NOTE — ED NOTES
"RN at bedside for medication administration. RN explains to pt that he is only able to drink enough water to swallow the pills give at this time. Upon swallowing the medication, pt attempts to drink the entire glass of water. RN explains that if anything is broken and requiring surgery, he will not be able to drink the entire glass of water. Pt then raises voice with RN and states that "I'm going to drink this whole damned cup of water. I don't care if anything is broken, I am thirsty." Pt then continues to drink water.   "

## 2023-05-04 NOTE — ED NOTES
RN explained to pt that we will be in shortly to clean him, but that RN is unable to clean him alone. Pt on phone with family member at this time stating that staff is not cleaning him. RN at bedside for 2nd time explaining that as soon as another staff member is available, we will be at bedside to clean him and change linens.

## 2023-05-04 NOTE — DISCHARGE INSTRUCTIONS
Return to the emergency department with any new or worsening symptoms.  Your x-rays did not show any acute fractures today.  Please follow-up with your primary care doctor as an outpatient and continue taking your home medications for pain that are already prescribed to you.

## 2023-05-04 NOTE — ED PROVIDER NOTES
"  HPI: Patient is a 62 y.o. male who presents with the chief complaint of ground level fall.  The patient fell out of his bed trying to transfer to his wheelchair this morning.  He then called an ambulance.  He complains of neck and back pain.  He was transported to the emergency department by EMS with C-collar in place.  He also complains of some left knee pain.  Has a right below-the-knee amputation.  He has some chronic wounds on his left lower extremity that are currently being managed as an outpatient by wound care.    REVIEW OF SYSTEMS - 10 systems were independently reviewed and are otherwise negative with the exception of those items previously documented in the HPI and nursing notes.    Allergy: Amitriptyline    Past medical history:   Past Medical History:   Diagnosis Date    A-fib     Anticoagulant long-term use     Arthritis     Diabetes mellitus     Hypertension        Surgical History:   Past Surgical History:   Procedure Laterality Date    BELOW KNEE AMPUTATION OF LOWER EXTREMITY Right     "About 15 years"       Social history:   Social History     Socioeconomic History    Marital status:    Tobacco Use    Smoking status: Never    Smokeless tobacco: Current     Types: Chew   Substance and Sexual Activity    Alcohol use: Not Currently    Drug use: No    Sexual activity: Yes     Partners: Female       Family history: {non-contributory    EHR: reviewed    History obtained from: {the patient        Vitals: BP (!) 143/69   Pulse 102   Temp 99.5 °F (37.5 °C) (Oral)   Resp 20   Ht 6' 6" (1.981 m)   Wt (!) 147.4 kg (325 lb)   SpO2 95%   BMI 37.56 kg/m²     PHYSICAL EXAM:    GENERAL: awake and alert, oriented, GCS 15, initially arrived in C-collar  HEENT:  normocephalic, atraumatic, sclerae anicteric, moist mucus membranes, Normal facial symmetry,   Neck:  There is no midline tenderness to palpation, patient has full active and passive range of motion in all distributions and C-collar was " cleared clinically,  Back:  No obvious traumatic appearance, no midline tenderness  CARDIOVASCULAR: regular rate and rhythm, no murmurs, rubs, gallops,   PULMONARY: unlabored, no respiratory distress, CTAB,   GASTROINTESTINAL: non-tender, non-distended, no rebound, no guarding,   NEUROLOGIC: Lucid with normal mental status, speech fluid, moving all extremities spontaneously, sensation fully intact in the bilateral upper extremities, some decreased sensation to the left lower extremity that is chronic  MUSCULOSKELETAL: well-nourished, well-developed, no joint deformities, large amount of left lower extremity swelling  SKIN: warm and dry, no visible rashes,   PSYCHIATRIC: normal affect, normal concentration              Labs Reviewed   HIV 1 / 2 ANTIBODY   HEPATITIS C ANTIBODY       X-Ray Knee 3 View Left   Final Result      No acute abnormality.  Subcutaneous edema, osteoarthritis.         Electronically signed by: Patricia Tejada   Date:    05/04/2023   Time:    08:40          Diagnostic interpretation:  No acute abnormalities in left knee x-ray    MEDICAL DECISION MAKING: Patient is a 62 y.o. male who presented with chief complaint of fall out of bed trying to transfer to his wheelchair this morning.  Patient was placed in a C-collar by EMS.  C-collar was able to be cleared clinically.  The patient has no midline C-spine tenderness, did not strike his head or neck on the ground states he landed on his buttocks.  Has no T or L-spine tenderness I have no suspicion for acute fracture at this time.  Patient is wheelchair-bound at baseline, has no obvious external injury to the low back, pelvis or left lower extremity.  There is no abnormalities knee x-ray which was the other site the patient had some chronic pain.  He is already prescribed numerous pain medications which he was informed should be effective in managing his symptoms at home.  He was given some Toradol and Robaxin here in the emergency department.   States he is feeling little bit better.  No suspicion for intrathoracic or abdominal injuries, no suspicion for intracranial injuries or spinal fracture/dislocation.  Patient is stable for outpatient follow-up    CLINICAL IMPRESSION:  1. Fall, initial encounter    2. Knee pain    3. Contusion of left knee, initial encounter    4. Neck pain        Please note that my documentation in this Electronic Healthcare Record was produced using speech recognition software and therefore may contain errors related to that software.These could include grammar, punctuation and spelling errors or the inclusion/ exclusion of phrases that were not intended. Please contact myself for any clarification, questions or concerns.     Na Chicas MD  05/04/23 [id number]

## 2023-05-04 NOTE — TELEPHONE ENCOUNTER
Spoke with patient, informed faxed order for hospital bed mattress to [facility] 4/26.  You need to call them to see when it can be delivered.

## 2023-05-04 NOTE — ED TRIAGE NOTES
Pt fell while transferring from his bed to electric wheelchair, denies LOC or dizziness, c/o neck and back pain

## 2023-05-04 NOTE — TELEPHONE ENCOUNTER
----- Message from Cesario Winston sent at 5/4/2023 11:55 AM CDT -----  Contact: pt at  [phone]  Type: Needs Medical Advice  Who Called:  pt  Best Call Back Number: [phone]  Additional Information: pt is calling the office requesting a call back from the nurse. Please call back and advise.    PER THE PT IT'S URGENT

## 2023-05-09 ENCOUNTER — TELEPHONE (OUTPATIENT)
Dept: PODIATRY | Facility: CLINIC | Age: 63
End: 2023-05-09
Payer: MEDICAID

## 2023-05-09 NOTE — TELEPHONE ENCOUNTER
Patient has appointment tomorrow. He fell last week and since then his foot has been draining a lot. Patient asked about getting home health. Patient requested someone come out today but I explained it doesn't work that way.  Patient vocalized understanding.

## 2023-05-10 ENCOUNTER — OFFICE VISIT (OUTPATIENT)
Dept: PODIATRY | Facility: CLINIC | Age: 63
End: 2023-05-10
Payer: MEDICAID

## 2023-05-10 ENCOUNTER — TELEPHONE (OUTPATIENT)
Dept: PODIATRY | Facility: CLINIC | Age: 63
End: 2023-05-10
Payer: MEDICAID

## 2023-05-10 ENCOUNTER — HOSPITAL ENCOUNTER (OUTPATIENT)
Dept: RADIOLOGY | Facility: HOSPITAL | Age: 63
Discharge: HOME | End: 2023-05-10
Attending: PODIATRIST
Payer: MEDICAID

## 2023-05-10 ENCOUNTER — TELEPHONE (OUTPATIENT)
Dept: FAMILY MEDICINE | Facility: CLINIC | Age: 63
End: 2023-05-10
Payer: MEDICAID

## 2023-05-10 VITALS
BODY MASS INDEX: 36.45 KG/M2 | DIASTOLIC BLOOD PRESSURE: 87 MMHG | SYSTOLIC BLOOD PRESSURE: 179 MMHG | HEIGHT: 78 IN | WEIGHT: 315 LBS | HEART RATE: 91 BPM

## 2023-05-10 DIAGNOSIS — L03.116 CELLULITIS AND ABSCESS OF LEFT LEG: ICD-10-CM

## 2023-05-10 DIAGNOSIS — L02.416 CELLULITIS AND ABSCESS OF LEFT LEG: ICD-10-CM

## 2023-05-10 DIAGNOSIS — L97.522 ULCER OF LEFT FOOT WITH FAT LAYER EXPOSED: ICD-10-CM

## 2023-05-10 DIAGNOSIS — L97.522 ULCER OF LEFT FOOT WITH FAT LAYER EXPOSED: Primary | ICD-10-CM

## 2023-05-10 DIAGNOSIS — M71.072 ABSCESS OF BURSA OF LEFT FOOT: ICD-10-CM

## 2023-05-10 DIAGNOSIS — E11.49 TYPE II DIABETES MELLITUS WITH NEUROLOGICAL MANIFESTATIONS: ICD-10-CM

## 2023-05-10 DIAGNOSIS — E11.9 COMPREHENSIVE DIABETIC FOOT EXAMINATION, TYPE 2 DM, ENCOUNTER FOR: ICD-10-CM

## 2023-05-10 PROCEDURE — 87070 CULTURE OTHR SPECIMN AEROBIC: CPT | Performed by: PODIATRIST

## 2023-05-10 PROCEDURE — 3008F BODY MASS INDEX DOCD: CPT | Mod: CPTII,,, | Performed by: PODIATRIST

## 2023-05-10 PROCEDURE — 87186 SC STD MICRODIL/AGAR DIL: CPT | Performed by: PODIATRIST

## 2023-05-10 PROCEDURE — 87077 CULTURE AEROBIC IDENTIFY: CPT | Mod: 59 | Performed by: PODIATRIST

## 2023-05-10 PROCEDURE — 87147 CULTURE TYPE IMMUNOLOGIC: CPT | Performed by: PODIATRIST

## 2023-05-10 PROCEDURE — 99999 PR PBB SHADOW E&M-EST. PATIENT-LVL IV: CPT | Mod: PBBFAC,,, | Performed by: PODIATRIST

## 2023-05-10 PROCEDURE — 3077F SYST BP >= 140 MM HG: CPT | Mod: CPTII,,, | Performed by: PODIATRIST

## 2023-05-10 PROCEDURE — 1159F MED LIST DOCD IN RCRD: CPT | Mod: CPTII,,, | Performed by: PODIATRIST

## 2023-05-10 PROCEDURE — 1160F PR REVIEW ALL MEDS BY PRESCRIBER/CLIN PHARMACIST DOCUMENTED: ICD-10-PCS | Mod: CPTII,,, | Performed by: PODIATRIST

## 2023-05-10 PROCEDURE — 3079F PR MOST RECENT DIASTOLIC BLOOD PRESSURE 80-89 MM HG: ICD-10-PCS | Mod: CPTII,,, | Performed by: PODIATRIST

## 2023-05-10 PROCEDURE — 3008F PR BODY MASS INDEX (BMI) DOCUMENTED: ICD-10-PCS | Mod: CPTII,,, | Performed by: PODIATRIST

## 2023-05-10 PROCEDURE — 99215 PR OFFICE/OUTPT VISIT, EST, LEVL V, 40-54 MIN: ICD-10-PCS | Mod: S$PBB,,, | Performed by: PODIATRIST

## 2023-05-10 PROCEDURE — 99214 OFFICE O/P EST MOD 30 MIN: CPT | Mod: PBBFAC | Performed by: PODIATRIST

## 2023-05-10 PROCEDURE — 3077F PR MOST RECENT SYSTOLIC BLOOD PRESSURE >= 140 MM HG: ICD-10-PCS | Mod: CPTII,,, | Performed by: PODIATRIST

## 2023-05-10 PROCEDURE — 1159F PR MEDICATION LIST DOCUMENTED IN MEDICAL RECORD: ICD-10-PCS | Mod: CPTII,,, | Performed by: PODIATRIST

## 2023-05-10 PROCEDURE — 73630 X-RAY EXAM OF FOOT: CPT | Mod: 26,LT,, | Performed by: RADIOLOGY

## 2023-05-10 PROCEDURE — 3079F DIAST BP 80-89 MM HG: CPT | Mod: CPTII,,, | Performed by: PODIATRIST

## 2023-05-10 PROCEDURE — 73630 X-RAY EXAM OF FOOT: CPT | Mod: TC,LT

## 2023-05-10 PROCEDURE — 99999 PR PBB SHADOW E&M-EST. PATIENT-LVL IV: ICD-10-PCS | Mod: PBBFAC,,, | Performed by: PODIATRIST

## 2023-05-10 PROCEDURE — 1160F RVW MEDS BY RX/DR IN RCRD: CPT | Mod: CPTII,,, | Performed by: PODIATRIST

## 2023-05-10 PROCEDURE — 99215 OFFICE O/P EST HI 40 MIN: CPT | Mod: S$PBB,,, | Performed by: PODIATRIST

## 2023-05-10 PROCEDURE — 73630 XR FOOT COMPLETE 3 VIEW LEFT: ICD-10-PCS | Mod: 26,LT,, | Performed by: RADIOLOGY

## 2023-05-10 RX ORDER — OXYCODONE HYDROCHLORIDE 10 MG/1
10 TABLET ORAL 2 TIMES DAILY PRN
Status: ON HOLD | COMMUNITY
Start: 2023-02-22 | End: 2023-08-16 | Stop reason: HOSPADM

## 2023-05-10 RX ORDER — CIPROFLOXACIN 750 MG/1
750 TABLET, FILM COATED ORAL EVERY 12 HOURS
Qty: 60 TABLET | Refills: 0 | Status: SHIPPED | OUTPATIENT
Start: 2023-05-10 | End: 2023-06-09

## 2023-05-10 NOTE — TELEPHONE ENCOUNTER
Patient contacted and informed to call and someone can assist him into the office. Patient verbalized understanding.

## 2023-05-10 NOTE — TELEPHONE ENCOUNTER
"Spoke with patient. Patient states, " Dr. Stringer is putting me in the hospital due to my foot. I need Dr. Craig to write a letter to my pain management doctor, allowing me to do the appt virtual." Informed pt letter would need to come from Dr. Stringer. Pt states, " they told me to contact my pcp, but I will  call yall back tomorrow. Verbalized understanding.   "

## 2023-05-10 NOTE — TELEPHONE ENCOUNTER
Patient asking if he would be out of hospital by Monday if he gets admitted tomorrow. Told patient that may depend on the severity of the infection and the hospitalist. Patient has appointment to see pain mgmt Monday and they may require documentation of what is going on. Told patient we would look into this and I'm sure someone could send them the proper documentation.

## 2023-05-10 NOTE — TELEPHONE ENCOUNTER
----- Message from Irene Montes sent at 5/10/2023  4:20 PM CDT -----  Type: Needs Medical Advice  Who Called:  Pt  Best Call Back Number: [phone]  Additional Information: Pt would like to speak with the Dr or nurse about something, pl call bk to advise thanks

## 2023-05-11 ENCOUNTER — TELEPHONE (OUTPATIENT)
Dept: PODIATRY | Facility: CLINIC | Age: 63
End: 2023-05-11
Payer: MEDICAID

## 2023-05-11 NOTE — TELEPHONE ENCOUNTER
Patient changing bandage and bandage is stuck to his skin. Patient advised to use wound cleanser or dakins to wet bandage so it would loosen. Patient didn't have either. Patient said he would use distilled water and is planning to go to [facility] to be treated for infection. He said if admitted he wanted to get out or sign himself out Jj evening so he wouldn't miss his pain mgmt appointment Monday. I have tried to reach out to pain mgmt with no success. LVM for pain mgmt to contact office to inform them of patient's situation as requested from patient.

## 2023-05-12 ENCOUNTER — TELEPHONE (OUTPATIENT)
Dept: PODIATRY | Facility: CLINIC | Age: 63
End: 2023-05-12
Payer: MEDICAID

## 2023-05-12 NOTE — TELEPHONE ENCOUNTER
Patient called stated has had transportation issues but has a friend coming in town to bring him to the hospital.Stated leg has shown some improvement.

## 2023-05-12 NOTE — TELEPHONE ENCOUNTER
----- Message from Irene Montes sent at 5/12/2023  1:30 PM CDT -----  Type: Needs Medical Advice  Who Called:  Pt  Best Call Back Number: [phone]  Additional Information: Pt would like to speak with the nurse or Dr, he is trying to be admitted to the hospital today, pl call bk to advise Thanks

## 2023-05-13 ENCOUNTER — HOSPITAL ENCOUNTER (EMERGENCY)
Facility: HOSPITAL | Age: 63
Discharge: HOME | End: 2023-05-13
Attending: EMERGENCY MEDICINE
Payer: MEDICAID

## 2023-05-13 VITALS
HEART RATE: 78 BPM | DIASTOLIC BLOOD PRESSURE: 78 MMHG | SYSTOLIC BLOOD PRESSURE: 168 MMHG | BODY MASS INDEX: 36.45 KG/M2 | RESPIRATION RATE: 18 BRPM | TEMPERATURE: 98 F | WEIGHT: 315 LBS | HEIGHT: 78 IN | OXYGEN SATURATION: 98 %

## 2023-05-13 DIAGNOSIS — E11.621 DIABETIC ULCER OF LEFT HEEL ASSOCIATED WITH TYPE 2 DIABETES MELLITUS, LIMITED TO BREAKDOWN OF SKIN: Primary | ICD-10-CM

## 2023-05-13 DIAGNOSIS — L97.421 DIABETIC ULCER OF LEFT HEEL ASSOCIATED WITH TYPE 2 DIABETES MELLITUS, LIMITED TO BREAKDOWN OF SKIN: Primary | ICD-10-CM

## 2023-05-13 LAB
ALBUMIN SERPL BCP-MCNC: 2.8 G/DL (ref 3.5–5.2)
ALP SERPL-CCNC: 97 U/L (ref 55–135)
ALT SERPL W/O P-5'-P-CCNC: 9 U/L (ref 10–44)
ANION GAP SERPL CALC-SCNC: 8 MMOL/L (ref 8–16)
AST SERPL-CCNC: 13 U/L (ref 10–40)
BASOPHILS # BLD AUTO: 0.03 K/UL (ref 0–0.2)
BASOPHILS NFR BLD: 0.4 % (ref 0–1.9)
BILIRUB SERPL-MCNC: 0.5 MG/DL (ref 0.1–1)
BUN SERPL-MCNC: 5 MG/DL (ref 8–23)
CALCIUM SERPL-MCNC: 9 MG/DL (ref 8.7–10.5)
CHLORIDE SERPL-SCNC: 101 MMOL/L (ref 95–110)
CO2 SERPL-SCNC: 28 MMOL/L (ref 23–29)
CREAT SERPL-MCNC: 0.8 MG/DL (ref 0.5–1.4)
DIFFERENTIAL METHOD: ABNORMAL
EOSINOPHIL # BLD AUTO: 0.1 K/UL (ref 0–0.5)
EOSINOPHIL NFR BLD: 0.8 % (ref 0–8)
ERYTHROCYTE [DISTWIDTH] IN BLOOD BY AUTOMATED COUNT: 13.3 % (ref 11.5–14.5)
EST. GFR  (NO RACE VARIABLE): >60 ML/MIN/1.73 M^2
GLUCOSE SERPL-MCNC: 174 MG/DL (ref 70–110)
HCT VFR BLD AUTO: 37.2 % (ref 40–54)
HGB BLD-MCNC: 12.3 G/DL (ref 14–18)
IMM GRANULOCYTES # BLD AUTO: 0.03 K/UL (ref 0–0.04)
IMM GRANULOCYTES NFR BLD AUTO: 0.4 % (ref 0–0.5)
LYMPHOCYTES # BLD AUTO: 1.3 K/UL (ref 1–4.8)
LYMPHOCYTES NFR BLD: 15.8 % (ref 18–48)
MCH RBC QN AUTO: 28.2 PG (ref 27–31)
MCHC RBC AUTO-ENTMCNC: 33.1 G/DL (ref 32–36)
MCV RBC AUTO: 85 FL (ref 82–98)
MONOCYTES # BLD AUTO: 0.4 K/UL (ref 0.3–1)
MONOCYTES NFR BLD: 4.5 % (ref 4–15)
NEUTROPHILS # BLD AUTO: 6.6 K/UL (ref 1.8–7.7)
NEUTROPHILS NFR BLD: 78.1 % (ref 38–73)
NRBC BLD-RTO: 0 /100 WBC
PLATELET # BLD AUTO: 304 K/UL (ref 150–450)
PMV BLD AUTO: 9.5 FL (ref 9.2–12.9)
POCT GLUCOSE: 178 MG/DL (ref 70–110)
POTASSIUM SERPL-SCNC: 4.1 MMOL/L (ref 3.5–5.1)
PROT SERPL-MCNC: 9 G/DL (ref 6–8.4)
RBC # BLD AUTO: 4.36 M/UL (ref 4.6–6.2)
SODIUM SERPL-SCNC: 137 MMOL/L (ref 136–145)
WBC # BLD AUTO: 8.49 K/UL (ref 3.9–12.7)

## 2023-05-13 PROCEDURE — 85025 COMPLETE CBC W/AUTO DIFF WBC: CPT | Performed by: EMERGENCY MEDICINE

## 2023-05-13 PROCEDURE — 99283 EMERGENCY DEPT VISIT LOW MDM: CPT

## 2023-05-13 PROCEDURE — 25000003 PHARM REV CODE 250: Performed by: EMERGENCY MEDICINE

## 2023-05-13 PROCEDURE — 80053 COMPREHEN METABOLIC PANEL: CPT | Performed by: EMERGENCY MEDICINE

## 2023-05-13 PROCEDURE — 82962 GLUCOSE BLOOD TEST: CPT

## 2023-05-13 RX ORDER — OXYCODONE AND ACETAMINOPHEN 10; 325 MG/1; MG/1
1 TABLET ORAL
Status: COMPLETED | OUTPATIENT
Start: 2023-05-13 | End: 2023-05-13

## 2023-05-13 RX ADMIN — OXYCODONE HYDROCHLORIDE AND ACETAMINOPHEN 1 TABLET: 10; 325 TABLET ORAL at 02:05

## 2023-05-13 NOTE — ED PROVIDER NOTES
"Encounter Date: 5/13/2023       History     Chief Complaint   Patient presents with    Leg Pain     Left lower leg pain, swelling  and infection. Pt reports he was told by Dr. Stringer to come to ER a few days ago     62-year-old male, here for evaluation and treatment of chronic ulcers on the sole of the left foot, as well as chronic sores on the left lower leg.  Patient is diabetic.  He has had a BKA procedure for the right lower leg in the past.  Patient states the wounds on his left lower leg and foot are chronic, but 3 days ago they were very inflamed.  He had contacted his podiatrist, Dr. Stringer about this and was told to come to the emergency department for evaluation and to get antibiotics.  Patient states that he has no transportation and was unable to get here until today.  He states that his wounds are actually significantly improved.  Denies fever or chills.  A purulent drainage.    Review of patient's allergies indicates:   Allergen Reactions    Amitriptyline      Vivid dreams( bad)     Past Medical History:   Diagnosis Date    A-fib     Anticoagulant long-term use     Arthritis     Diabetes mellitus     Hypertension      Past Surgical History:   Procedure Laterality Date    BELOW KNEE AMPUTATION OF LOWER EXTREMITY Right     "About 15 years"     History reviewed. No pertinent family history.  Social History     Tobacco Use    Smoking status: Never    Smokeless tobacco: Current     Types: Chew   Substance Use Topics    Alcohol use: Not Currently    Drug use: No     Review of Systems   Constitutional:  Negative for chills and fever.   HENT: Negative.     Eyes: Negative.    Respiratory: Negative.     Cardiovascular: Negative.    Gastrointestinal: Negative.    Musculoskeletal: Negative.    Skin:  Positive for wound.   Allergic/Immunologic: Negative.    Hematological: Negative.    Psychiatric/Behavioral: Negative.       Physical Exam     Initial Vitals [05/13/23 1301]   BP Pulse Resp Temp SpO2   (!) 155/82 " 90 18 98.1 °F (36.7 °C) 95 %      MAP       --         Physical Exam    Nursing note and vitals reviewed.  Constitutional: He appears well-developed and well-nourished. No distress.   HENT:   Head: Normocephalic and atraumatic.   Nose: Nose normal.   Mouth/Throat: Oropharynx is clear and moist. No oropharyngeal exudate.   Eyes: Conjunctivae and EOM are normal. Pupils are equal, round, and reactive to light. No scleral icterus.   Neck: Neck supple. No JVD present.   Normal range of motion.  Cardiovascular:  Normal rate, regular rhythm, normal heart sounds and intact distal pulses.           No murmur heard.  Pulmonary/Chest: Breath sounds normal. No stridor. No respiratory distress. He has no wheezes. He has no rhonchi. He has no rales.   Abdominal: Abdomen is soft. Bowel sounds are normal. He exhibits no distension. There is no abdominal tenderness.   Musculoskeletal:         General: Edema present. No tenderness. Normal range of motion.      Cervical back: Normal range of motion and neck supple.      Comments: There is a right BKA.  Stump appears to be well healed.  Left lower extremities wrapped in gauze.  When this gauze dressing is removed, it reveals chronic skin changes with a few areas of weeping in a circumferential pattern around the lower half of the right lower leg.  On the sole of the foot, there are 2 large ulcers.  No fat exposed at this time.  Wound margins appear to be healing.  No evidence of gangrene or necrosis.  No foul odor, no purulent discharge.     Neurological: He is alert and oriented to person, place, and time. He has normal strength. No cranial nerve deficit or sensory deficit. GCS score is 15. GCS eye subscore is 4. GCS verbal subscore is 5. GCS motor subscore is 6.   Skin: Skin is warm and dry. Capillary refill takes less than 2 seconds. No rash noted. No erythema.   Psychiatric: He has a normal mood and affect. His behavior is normal.       ED Course   Procedures  Labs Reviewed   CBC  W/ AUTO DIFFERENTIAL - Abnormal; Notable for the following components:       Result Value    RBC 4.36 (*)     Hemoglobin 12.3 (*)     Hematocrit 37.2 (*)     Gran % 78.1 (*)     Lymph % 15.8 (*)     All other components within normal limits   COMPREHENSIVE METABOLIC PANEL - Abnormal; Notable for the following components:    Glucose 174 (*)     BUN 5 (*)     Total Protein 9.0 (*)     Albumin 2.8 (*)     ALT 9 (*)     All other components within normal limits   POCT GLUCOSE - Abnormal; Notable for the following components:    POCT Glucose 178 (*)     All other components within normal limits          Imaging Results    None          Medications   oxyCODONE-acetaminophen  mg per tablet 1 tablet (1 tablet Oral Given 5/13/23 1443)     Medical Decision Making:   Differential Diagnosis:   Sepsis, diabetic foot ulcers, chronic venous stasis ulcers, other diabetes-related wounds, etc.  ED Management:  Patient's vital signs are stable.  No fever.  Labs are unremarkable.  Normal white count.  Normal BUN and creatinine.  Normal electrolytes.  I tried to contact Dr. Stringer about this patient but he was unavailable.  I would planned place the patient on Cipro and Bactrim, but patient told me a discharge that he is already taking Cipro which was called in by Dr. Stringer, and he has some Bactrim at home from a previous episode of infection.  Patient was start taking the Bactrim in addition to the Cipro, and he will follow-up with Dr. Stringer in 2 days.  Return here as needed or if worse in any way.                        Clinical Impression:   Final diagnoses:  [E11.621, L97.421] Diabetic ulcer of left heel associated with type 2 diabetes mellitus, limited to breakdown of skin (Primary)        ED Disposition Condition    Discharge Stable          ED Prescriptions    None       Follow-up Information       Follow up With Specialties Details Why Contact Info    Vahid Craig MD Family Medicine In 2 days  [address]  [facility] [address]  [phone]      Gary Stringer, DPM Podiatry In 2 days  [address] [place] [address]      [place] - Emergency Dept Emergency Medicine  As needed, If symptoms worsen [address] [facility] [address] [phone]             Demetri Moise MD  05/13/23 3791

## 2023-05-13 NOTE — PROGRESS NOTES
"Subjective:       Patient ID: Alfred Villarreal is a 62 y.o. male.    Chief Complaint: Wound Check     Patient presents for follow-up today multiple areas of breakdown and ulceration left.      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 No pertinent family history.  Social History     Socioeconomic History    Marital status:    Tobacco Use    Smoking status: Never    Smokeless tobacco: Current     Types: Chew   Substance and Sexual Activity    Alcohol use: Not Currently    Drug use: No    Sexual activity: Yes     Partners: Female       Current Outpatient Medications   Medication Sig Dispense Refill    baclofen (LIORESAL) 10 MG tablet Take 10 mg by mouth 3 (three) times daily.      doxepin (SINEQUAN) 10 MG capsule Take 1 capsule (10 mg total) by mouth every evening. 30 capsule 11    gabapentin (NEURONTIN) 600 MG tablet       HYDROcodone-acetaminophen (NORCO)  mg per tablet Take 1 tablet by mouth 2 (two) times daily as needed.      ibuprofen (ADVIL,MOTRIN) 800 MG tablet SMARTSI Tablet(s) By Mouth Every 12 Hours PRN      insulin aspart U-100 (NOVOLOG) 100 unit/mL injection INJECT 50 UNITS UNDER THE SKIN TWICE DAILY BEFORE A MEAL 90 mL 0    insulin detemir U-100, Levemir, (LEVEMIR FLEXTOUCH U-100 INSULN) 100 unit/mL (3 mL) InPn pen Inject 70 Units into the skin 2 (two) times daily. 42 mL 11    oxyCODONE (ROXICODONE) 10 mg Tab immediate release tablet Take 10 mg by mouth 2 (two) times daily as needed.      oxyCODONE-acetaminophen (PERCOCET)  mg per tablet Take 1 tablet by mouth 2 (two) times daily as needed.      pen needle, diabetic 31 gauge x 1/4" Ndle Use daily with victoza 100 each 3    tadalafiL (CIALIS) 5 MG tablet Take 1 tablet (5 mg total) by mouth daily as " "needed for Erectile Dysfunction. 30 tablet 1    ciprofloxacin HCl (CIPRO) 750 MG tablet Take 1 tablet (750 mg total) by mouth every 12 (twelve) hours. 60 tablet 0     No current facility-administered medications for this visit.     Review of patient's allergies indicates:   Allergen Reactions    Amitriptyline      Vivid dreams( bad)       Review of Systems   Musculoskeletal:  Positive for arthralgias, back pain, gait problem and joint swelling.   Skin:  Positive for color change and wound.   Neurological:  Positive for numbness.   All other systems reviewed and are negative.    Objective:      Vitals:    05/10/23 1307   BP: (!) 179/87   Pulse: 91   Weight: (!) 147.4 kg (325 lb)   Height: 6' 6" (1.981 m)       Physical Exam  Vitals and nursing note reviewed.   Constitutional:       General: He is in acute distress.      Appearance: Normal appearance. He is well-developed. He is ill-appearing.   Cardiovascular:      Pulses:           Dorsalis pedis pulses are 1+ on the left side.        Posterior tibial pulses are 0 on the left side.   Pulmonary:      Effort: Pulmonary effort is normal.   Musculoskeletal:         General: Swelling, tenderness and signs of injury present.      Left lower leg: Edema present.      Left foot: Deformity present.        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   Feet:      Left foot:      Protective Sensation: 4 sites tested.   1 site sensed.     Skin integrity: Ulcer, skin breakdown, erythema and warmth present.   Skin:     Capillary Refill: Capillary refill takes more than 3 seconds.      Findings: Erythema and lesion present.   Neurological:      Mental Status: He is alert.      Sensory: Sensory deficit present.      Deep Tendon Reflexes: Reflexes abnormal.   Psychiatric:         Mood and Affect: Mood normal.         Behavior: Behavior normal.         Thought Content: Thought content normal.         Judgment: Judgment normal. "                                                                                                                                                                       X-Ray Foot Complete Left  Order: [id number]  Status: Final result     Visible to patient: No (inaccessible in Patient Portal)     Next appt: 05/15/2023 at 02:30 PM in Family Medicine (Kati Ibrahim NP)     Dx: Ulcer of left foot with fat layer exp...     0 Result Notes  Details    Reading Physician Reading Date Result Priority   Jhonny Calderón MD  [phone] 5/10/2023 Routine     Narrative & Impression  EXAMINATION:  XR FOOT COMPLETE 3 VIEW LEFT     CLINICAL HISTORY:  . Non-pressure chronic ulcer of other part of left foot with fat layer exposed     TECHNIQUE:  AP, lateral, and oblique views of the left foot were performed.     COMPARISON:  01/18/2023.     FINDINGS:  There is remote amputation of 4th and 5th ray.  Chronic mild hallux valgus deformity mild degenerative osteoarthrosis of the 1st MTP joint.  There is significant hammertoe formation of the 2nd 3rd toes with mild to moderate degenerative osteoarthrosis.     Mild chronic diffuse soft tissue swelling likely representing chronic cellulitis.  No soft tissue emphysema.  No radiopaque foreign body.  No plain film evidence to suggest osteomyelitis.     There is chronic mild to moderate degenerative osteoarthrosis of the midfoot with chronic pes planus.  Dorsal and plantar calcaneal spurs.  Subtle approximately 6 cm soft tissue lucency overlying the posterior calcaneus consistent with a nonhealing ulcer.     Impression:     1. Prominent nonhealing ulcer of the heel without plain film evidence to suggest underlying osteomyelitis.  2. 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  3. Chronic hammertoe formation.  4. Chronic pes planus with calcaneal spurs.        Electronically signed by: Jhonny Calderón  Date:                                            05/10/2023  Time:                                            13:42                          Assessment:       1. Ulcer of left foot with fat layer exposed    2. Abscess of bursa of left foot    3. Type II diabetes mellitus with neurological manifestations    4. Comprehensive diabetic foot examination, type 2 DM, encounter for    5. Cellulitis and abscess of left leg        Plan:         Patient presents today follow-up of multiple sites of ulceration on the patient's left lower extremity he has had a previous amputation of the right lower extremity has an extensive history of osteomyelitis.    Patient presents today he was last seen approximately 4 months ago he was supposed to be a 4 week follow-up frequently the patient does not keep his follow-up appointments is often a no call no-show for appointments.  Patient has an extensive history of noncompliance.  Patient indicates about 10 days ago he slammed his left leg in his truck door he states he really push the dark door very hard when he hit his leg he went to the emergency department at the time and states it was not as bad then as it is now patient has had chronic ulceration encompassing the heel region left he is also had some areas of breakdown under the big toe area and on the left lower extremity however now the patient has a well-defined area of discoloration just below the knee down to the forefoot left with areas of breakdown heavy drainage and extensive edema.  Ulceration on the patient's left heel is approximately 8 cm long by 6 cm wide by 3 mm deep there was heavy drainage emitting from this area he has hyperkeratotic pre ulcerative area sub 1st MPJ left multiple areas of skin break abrasion and breakdown on the left lower extremity are appreciated.  The approximate date of the injury was May the 3rd.  Patient's condition has definitely worsened I know the patient is supposed to stay off the left lower extremity but he does bear weight on the lower extremity there is a well-defined area  just below the knee of normal appearing skin superiorly while inferiorly the patient has extensive skin breakdown purplish blue discoloration there is an area of laceration where the car door apparently hit the left lower extremity.  At this point I advised the patient he needs to be admitted to the hospital he has a long history of osteomyelitis however he refused to be admitted to the hospital for IV antibiotic therapy stating he has a few things to do and that he would go to the hospital for admission in the next 2 days.  I did advised the patient he is at significant risk for losing the left lower extremity he is already undergone an amputation on the right side.  Patient was very concerned about his pain management appointment upcoming Monday and missing this appointment.  X-rays taken today without obvious evidence of osteomyelitis noted.  Culture and sensitivity performed on the excessive drainage from the left lower extremity.  Preliminary findings of the culture while not finalized display at minimum 3 different bacteria I have started the patient on a high dose of Cipro per his previous culture and sensitivities he is to start this immediately again I advised the patient he needs to be in the hospital but again has refused.  Patient states he is aware that he is at high risk for losing the left lower extremity I also discussed compartmental syndrome with the patient.  The entire left lower extremity was cleaned with a P cm X scrub allowed to sit for 10 minutes it was cleaned and rinsed off with Dakin solution the entire left lower extremity was then painted with Betadine covered with a well-padded thick protective dressing and an Ace wrap was applied with minimal compression to maintain the dressing so that it did not shift or move.  Patient indicates he will go to the emergency room on Friday for admission.  Patient understands my recommendation for immediate admission to the hospital.  Total time  including discussion evaluation treatment extensive wound care wound debridement non excisional x-ray review and counseling equaled 45 minutes.  This note was created using [employer] voice recognition software that occasionally misinterpreted phrases or words.

## 2023-05-13 NOTE — DISCHARGE INSTRUCTIONS
Take Cipro and Bactrim as we discussed, keep the wounds clean and dry, and follow-up with Dr. Stringer.  Return here for any worsening signs or symptoms.

## 2023-05-15 ENCOUNTER — TELEPHONE (OUTPATIENT)
Dept: PODIATRY | Facility: CLINIC | Age: 63
End: 2023-05-15
Payer: MEDICAID

## 2023-05-15 LAB
BACTERIA SPEC AEROBE CULT: ABNORMAL

## 2023-05-15 NOTE — TELEPHONE ENCOUNTER
----- Message from Gary Stringer DPM sent at 5/15/2023 12:55 PM CDT -----  Please call the patient and advise his culture and sensitivity was positive for 4 different bacteria is to continue taking the Cipro and the Bactrim as prescribed I do recommend follow up with him in 2 weeks.  ----- Message -----  From: Carl, [facility]  Sent: 5/12/2023   9:58 AM CDT  To: Gary Stringer DPM

## 2023-05-15 NOTE — TELEPHONE ENCOUNTER
Done, all reviewed with patient and understanding verbalized. Instructed patient to be sure we have a good working fax number as we have had many issues reaching this particular clinic in the past. Patient stated would call back in a day or two with a good number.

## 2023-05-15 NOTE — TELEPHONE ENCOUNTER
Done, all reviewed with patient and understanding verbalized. Patient wanted to know if possible to request increase in pain medication from [facility].

## 2023-05-15 NOTE — TELEPHONE ENCOUNTER
----- Message from Gary Stringer DPM sent at 5/15/2023  7:43 AM CDT -----  Please call and have this culture and sensitivity un suppressed.  ----- Message -----  From: Carl [facility]  Sent: 5/12/2023   9:58 AM CDT  To: Gary Stringer DPM

## 2023-05-15 NOTE — TELEPHONE ENCOUNTER
Patient still requesting increase in pain medication. Stated spoke with Pain Management today and they will increase mediation if they have documentation of the leg pain. He stated he is currently on Percocet 10/325 one tablet by mouth three times a day for his back pain.

## 2023-05-18 ENCOUNTER — TELEPHONE (OUTPATIENT)
Dept: PODIATRY | Facility: CLINIC | Age: 63
End: 2023-05-18
Payer: MEDICAID

## 2023-05-18 NOTE — TELEPHONE ENCOUNTER
Patient says he is in severe pain and says [facility] has not responded to letter that was sent on 5/15. Resent letter today and added that patient states he is in severe pain.

## 2023-05-25 ENCOUNTER — PATIENT OUTREACH (OUTPATIENT)
Dept: ADMINISTRATIVE | Facility: HOSPITAL | Age: 63
End: 2023-05-25
Payer: MEDICAID

## 2023-05-25 NOTE — PROGRESS NOTES
Population Health Chart Review & Patient Outreach Details:     Reason for Outreach Encounter:     [x]  Non-Compliant Report   []  Payor Report ([employer], PHN, BCBS, MSSP, MCIP, UHC, etc.)   []  Pre-Visit Chart Review     Updates Requested / Reviewed:     [x]  Care Everywhere    []     [x]  External Sources ([facility], [facility], DIS, etc.)   []  Care Team Updated    Patient Outreach Method:    [x]  Telephone Outreach Completed   [x] Successful   [] Left Voicemail   [] Unable to Contact (wrong number, no voicemail)  []  [employer] Outreach Sent  []  Letter Outreach Mailed  []  Fax Sent for External Records  []  External Records Upload    Health Maintenance Topics Addressed and Outreach Outcomes / Actions Taken:        []      Breast Cancer Screening []  Mammo Scheduled      []  External Records Requested     []  Added Reminder to Complete to Upcoming Primary Care Appt Notes     []  Patient Declined     []  Patient Will Call Back to Schedule     []  Patient Will Schedule with External Provider / Order Routed if Applicable             []       Cervical Cancer Screening []  Pap Scheduled      []  External Records Requested     []  Added Reminder to Complete to Upcoming Primary Care Appt Notes     []  Patient Declined     []  Patient Will Call Back to Schedule     []  Patient Will Schedule with External Provider               []          Colorectal Cancer Screening []  Colonoscopy Case Request or Referral Placed     []  External Records Requested     []  Added Reminder to Complete to Upcoming Primary Care Appt Notes     []  Patient Declined     []  Patient Will Call Back to Schedule     []  Patient Will Schedule with External Provider     []  Fit Kit Mailed (add the SmartPhrase under additional notes)     []  Reminded Patient to Complete Home Test             []      Diabetic Eye Exam []  Eye Camera Scheduled or Optometry Referral Placed     []  External Records Requested     []  Added Reminder to Complete to  Upcoming Primary Care Appt Notes     []  Patient Declined     []  Patient Will Call Back to Schedule     []  Patient Will Schedule with External Provider             []      Blood Pressure Control []  Primary Care Follow Up Visit Scheduled     []  Remote Blood Pressure Reading Captured     []  Added Reminder to Complete to Upcoming Primary Care Appt Notes     []  Patient Declined     []  Patient Will Call Back / Patient Will Send Portal Message with Reading     []  Patient Will Call Back to Schedule Provider Visit             [x]       HbA1c & Other Labs [x]  Lab Appt Scheduled for Due Labs     []  Primary Care Follow Up Visit Scheduled      []  Reminded Patient to Complete Home Test     []  Added Reminder to Complete to Upcoming Primary Care Appt Notes     []  Patient Declined     []  Patient Will Call Back to Schedule     []  Patient Will Schedule with External Provider / Order Routed if Applicable           []    Schedule Primary Care Appt []  Primary Care Appt Scheduled     []  Patient Declined     []  Patient Will Call Back to Schedule     []  Pt Established with External Provider & Updated Care Team             []      Medication Adherence []  Primary Care Appointment Scheduled     []  Added Reminder to Upcoming Primary Care Appt Notes     []  Patient Reminded to  Prescription     []  Patient Declined, Provider Notified if Needed     []  Sent Provider Message to Review and/or Add Exclusion to Problem List             []      Osteoporosis Screening []  DXA Appointment Scheduled     []  External Records Requested     []  Added Reminder to Complete to Upcoming Primary Care Appt Notes     []  Patient Declined     []  Patient Will Call Back to Schedule     []  Patient Will Schedule with External Provider / Order Routed if Applicable     Additional Care Coordinator Notes:         Further Action Needed If Patient Returns Outreach:

## 2023-05-27 ENCOUNTER — LAB VISIT (OUTPATIENT)
Dept: LAB | Facility: HOSPITAL | Age: 63
End: 2023-05-27
Attending: FAMILY MEDICINE
Payer: MEDICAID

## 2023-05-27 DIAGNOSIS — E11.9 TYPE 2 DIABETES MELLITUS WITHOUT COMPLICATION: ICD-10-CM

## 2023-05-27 LAB
ESTIMATED AVG GLUCOSE: 237 MG/DL (ref 68–131)
HBA1C MFR BLD: 9.9 % (ref 4–5.6)

## 2023-05-27 PROCEDURE — 36415 COLL VENOUS BLD VENIPUNCTURE: CPT | Performed by: FAMILY MEDICINE

## 2023-05-27 PROCEDURE — 83036 HEMOGLOBIN GLYCOSYLATED A1C: CPT | Performed by: FAMILY MEDICINE

## 2023-05-31 ENCOUNTER — OFFICE VISIT (OUTPATIENT)
Dept: PODIATRY | Facility: CLINIC | Age: 63
End: 2023-05-31
Payer: MEDICAID

## 2023-05-31 VITALS
WEIGHT: 315 LBS | BODY MASS INDEX: 36.45 KG/M2 | SYSTOLIC BLOOD PRESSURE: 141 MMHG | HEIGHT: 78 IN | HEART RATE: 104 BPM | DIASTOLIC BLOOD PRESSURE: 59 MMHG

## 2023-05-31 DIAGNOSIS — M71.072 ABSCESS OF BURSA OF LEFT FOOT: ICD-10-CM

## 2023-05-31 DIAGNOSIS — L97.522 ULCER OF LEFT FOOT WITH FAT LAYER EXPOSED: Primary | ICD-10-CM

## 2023-05-31 DIAGNOSIS — L97.522 SKIN ULCER OF LEFT FOOT WITH FAT LAYER EXPOSED: ICD-10-CM

## 2023-05-31 DIAGNOSIS — E11.49 TYPE II DIABETES MELLITUS WITH NEUROLOGICAL MANIFESTATIONS: ICD-10-CM

## 2023-05-31 DIAGNOSIS — Z89.9 HX OF AMPUTATION: ICD-10-CM

## 2023-05-31 DIAGNOSIS — E11.9 COMPREHENSIVE DIABETIC FOOT EXAMINATION, TYPE 2 DM, ENCOUNTER FOR: ICD-10-CM

## 2023-05-31 PROCEDURE — 3046F HEMOGLOBIN A1C LEVEL >9.0%: CPT | Mod: CPTII,,, | Performed by: PODIATRIST

## 2023-05-31 PROCEDURE — 3078F DIAST BP <80 MM HG: CPT | Mod: CPTII,,, | Performed by: PODIATRIST

## 2023-05-31 PROCEDURE — 1159F PR MEDICATION LIST DOCUMENTED IN MEDICAL RECORD: ICD-10-PCS | Mod: CPTII,,, | Performed by: PODIATRIST

## 2023-05-31 PROCEDURE — 3008F PR BODY MASS INDEX (BMI) DOCUMENTED: ICD-10-PCS | Mod: CPTII,,, | Performed by: PODIATRIST

## 2023-05-31 PROCEDURE — 1159F MED LIST DOCD IN RCRD: CPT | Mod: CPTII,,, | Performed by: PODIATRIST

## 2023-05-31 PROCEDURE — 3046F PR MOST RECENT HEMOGLOBIN A1C LEVEL > 9.0%: ICD-10-PCS | Mod: CPTII,,, | Performed by: PODIATRIST

## 2023-05-31 PROCEDURE — 99214 OFFICE O/P EST MOD 30 MIN: CPT | Mod: PBBFAC | Performed by: PODIATRIST

## 2023-05-31 PROCEDURE — 3008F BODY MASS INDEX DOCD: CPT | Mod: CPTII,,, | Performed by: PODIATRIST

## 2023-05-31 PROCEDURE — 99215 PR OFFICE/OUTPT VISIT, EST, LEVL V, 40-54 MIN: ICD-10-PCS | Mod: S$PBB,,, | Performed by: PODIATRIST

## 2023-05-31 PROCEDURE — 3077F SYST BP >= 140 MM HG: CPT | Mod: CPTII,,, | Performed by: PODIATRIST

## 2023-05-31 PROCEDURE — 1160F PR REVIEW ALL MEDS BY PRESCRIBER/CLIN PHARMACIST DOCUMENTED: ICD-10-PCS | Mod: CPTII,,, | Performed by: PODIATRIST

## 2023-05-31 PROCEDURE — 3077F PR MOST RECENT SYSTOLIC BLOOD PRESSURE >= 140 MM HG: ICD-10-PCS | Mod: CPTII,,, | Performed by: PODIATRIST

## 2023-05-31 PROCEDURE — 99999 PR PBB SHADOW E&M-EST. PATIENT-LVL IV: CPT | Mod: PBBFAC,,, | Performed by: PODIATRIST

## 2023-05-31 PROCEDURE — 1160F RVW MEDS BY RX/DR IN RCRD: CPT | Mod: CPTII,,, | Performed by: PODIATRIST

## 2023-05-31 PROCEDURE — 99999 PR PBB SHADOW E&M-EST. PATIENT-LVL IV: ICD-10-PCS | Mod: PBBFAC,,, | Performed by: PODIATRIST

## 2023-05-31 PROCEDURE — 3078F PR MOST RECENT DIASTOLIC BLOOD PRESSURE < 80 MM HG: ICD-10-PCS | Mod: CPTII,,, | Performed by: PODIATRIST

## 2023-05-31 PROCEDURE — 99215 OFFICE O/P EST HI 40 MIN: CPT | Mod: S$PBB,,, | Performed by: PODIATRIST

## 2023-05-31 RX ORDER — CIPROFLOXACIN 500 MG/1
500 TABLET ORAL 2 TIMES DAILY
Qty: 60 TABLET | Refills: 0 | Status: SHIPPED | OUTPATIENT
Start: 2023-05-31 | End: 2023-06-30

## 2023-06-03 NOTE — PROGRESS NOTES
"Subjective:       Patient ID: Alfred Villarreal is a 62 y.o. male.    Chief Complaint: No chief complaint on file.     Patient presents for follow-up today multiple areas of breakdown and ulceration left.      Past Medical History:   Diagnosis Date    A-fib     Anticoagulant long-term use     Arthritis     Diabetes mellitus     Hypertension      Past Surgical History:   Procedure Laterality Date    BELOW KNEE AMPUTATION OF LOWER EXTREMITY Right     "About 15 years"     No family history on file.  Social History     Socioeconomic History    Marital status:    Tobacco Use    Smoking status: Never    Smokeless tobacco: Current     Types: Chew   Substance and Sexual Activity    Alcohol use: Not Currently    Drug use: No    Sexual activity: Yes     Partners: Female       Current Outpatient Medications   Medication Sig Dispense Refill    baclofen (LIORESAL) 10 MG tablet Take 10 mg by mouth 3 (three) times daily.      ciprofloxacin HCl (CIPRO) 500 MG tablet Take 1 tablet (500 mg total) by mouth 2 (two) times daily. 60 tablet 0    ciprofloxacin HCl (CIPRO) 750 MG tablet Take 1 tablet (750 mg total) by mouth every 12 (twelve) hours. 60 tablet 0    doxepin (SINEQUAN) 10 MG capsule Take 1 capsule (10 mg total) by mouth every evening. 30 capsule 11    gabapentin (NEURONTIN) 600 MG tablet       HYDROcodone-acetaminophen (NORCO)  mg per tablet Take 1 tablet by mouth 2 (two) times daily as needed.      ibuprofen (ADVIL,MOTRIN) 800 MG tablet SMARTSI Tablet(s) By Mouth Every 12 Hours PRN      insulin aspart U-100 (NOVOLOG) 100 unit/mL injection INJECT 50 UNITS UNDER THE SKIN TWICE DAILY BEFORE A MEAL 90 mL 0    insulin detemir U-100, Levemir, (LEVEMIR FLEXTOUCH U-100 INSULN) 100 unit/mL (3 mL) InPn pen Inject 70 Units into the skin 2 (two) times daily. 42 mL 11    oxyCODONE (ROXICODONE) 10 mg Tab immediate release tablet Take 10 mg by mouth 2 (two) times daily as needed.      oxyCODONE-acetaminophen (PERCOCET)  mg " "per tablet Take 1 tablet by mouth 2 (two) times daily as needed.      pen needle, diabetic 31 gauge x 1/4" Ndle Use daily with victoza 100 each 3    tadalafiL (CIALIS) 5 MG tablet Take 1 tablet (5 mg total) by mouth daily as needed for Erectile Dysfunction. 30 tablet 1     No current facility-administered medications for this visit.     Review of patient's allergies indicates:   Allergen Reactions    Amitriptyline      Vivid dreams( bad)       Review of Systems   Musculoskeletal:  Positive for arthralgias, back pain, gait problem and joint swelling.   Skin:  Positive for color change and wound.   Neurological:  Positive for numbness.   All other systems reviewed and are negative.    Objective:      Vitals:    05/31/23 1314   BP: (!) 141/59   BP Location: Left arm   Patient Position: Sitting   Pulse: 104   Weight: (!) 145.2 kg (320 lb)   Height: 6' 6" (1.981 m)       Physical Exam  Vitals and nursing note reviewed.   Constitutional:       General: He is in acute distress.      Appearance: Normal appearance. He is well-developed.   Cardiovascular:      Pulses:           Dorsalis pedis pulses are 1+ on the left side.        Posterior tibial pulses are 0 on the left side.   Pulmonary:      Effort: Pulmonary effort is normal.   Musculoskeletal:      Left lower leg: Edema present.      Left foot: Deformity present.        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   Feet:      Left foot:      Protective Sensation: 4 sites tested.   1 site sensed.     Skin integrity: Ulcer, skin breakdown, erythema and warmth present.   Skin:     Capillary Refill: Capillary refill takes more than 3 seconds.      Findings: Erythema and lesion present.   Neurological:      Mental Status: He is alert.      Sensory: Sensory deficit present.      Deep Tendon Reflexes: Reflexes abnormal.   Psychiatric:         Mood and Affect: Mood normal.         Behavior: Behavior normal.         Thought Content: Thought content " normal.         Judgment: Judgment normal.                                                                                                                                                                                              Assessment:       1. Ulcer of left foot with fat layer exposed    2. Type II diabetes mellitus with neurological manifestations    3. Comprehensive diabetic foot examination, type 2 DM, encounter for    4. Abscess of bursa of left foot    5. Skin ulcer of left foot with fat layer exposed    6. Hx of amputation        Plan:         Patient presents today follow-up of multiple sites of ulceration on the patient's left lower extremity he has had a previous amputation of the right lower extremity has an extensive history of osteomyelitis.    Patient has had a chronic ulceration on his left foot for some time and had been showing signs of improvement but has subsequently gotten worse due to weight-bearing on the patient's left heel.  On evaluation there are no signs of obvious infection to the area with extensive nonviable tissue the a ulceration itself is 6 cm long by 4 cm wide by 3 mm deep.    Patient requires extensive wound care due to the shear size of the ulceration sites with associated nonexcisional debridement.  Patient needs to continue the ascetic at said wet-to-dry dressings after he scrub the area with a PCMX scrub and rinses with Dakin's the ascetic acid wet-to-dry needs to be applied this includes the wounds on the posterior aspect of the left lower extremity.  Patient is showing considerable signs of improvement not only in the heel ulceration on the patient's left heel but the areas of breakdown on the left lower leg where he had previous injury the area on the plantar aspect of the patient's left foot which following debridement today is approximately 1.5 cm in diameter clearly the ascetic acid wet-to-dry dressings the current wound cares working very well I am going to  extend the patient's Cipro however I am decreasing his Cipro to 500 mg twice a day he was taking 750 twice a day and the patient is tolerating this well.  Plan follow-up will be 4 weeks unless the patient has has any problems questions or concerns he is not only been doing the ascetic acid wet-to-dry but he is also been cleaning the area with that as well as Dakin solution patient dispensed P cm X scrub so that he can scrub this area at the time of wound care.  Total time including discussion evaluation treatment discussion of treatment options treatment plan wound care including non excisional wound debridement of all locations and discussion of treatment plan as well as review of the patient's chart and documentation of today's visit equaled 45 minutes.  This note was created using [employer] voice recognition software that occasionally misinterpreted phrases or words.

## 2023-06-12 ENCOUNTER — HOSPITAL ENCOUNTER (EMERGENCY)
Facility: HOSPITAL | Age: 63
Discharge: HOME | End: 2023-06-12
Payer: MEDICAID

## 2023-06-12 VITALS
BODY MASS INDEX: 36.45 KG/M2 | DIASTOLIC BLOOD PRESSURE: 76 MMHG | WEIGHT: 315 LBS | HEART RATE: 77 BPM | RESPIRATION RATE: 18 BRPM | SYSTOLIC BLOOD PRESSURE: 148 MMHG | HEIGHT: 78 IN | OXYGEN SATURATION: 96 % | TEMPERATURE: 98 F

## 2023-06-12 DIAGNOSIS — S91.115A LACERATION OF LESSER TOE OF LEFT FOOT WITHOUT FOREIGN BODY PRESENT OR DAMAGE TO NAIL, INITIAL ENCOUNTER: Primary | ICD-10-CM

## 2023-06-12 PROCEDURE — 73660 X-RAY EXAM OF TOE(S): CPT | Mod: TC,LT

## 2023-06-12 PROCEDURE — 12002 RPR S/N/AX/GEN/TRNK2.6-7.5CM: CPT

## 2023-06-12 PROCEDURE — 73660 XR TOE 2 OR MORE VIEWS LEFT: ICD-10-PCS | Mod: 26,LT,, | Performed by: RADIOLOGY

## 2023-06-12 PROCEDURE — 73660 X-RAY EXAM OF TOE(S): CPT | Mod: 26,LT,, | Performed by: RADIOLOGY

## 2023-06-12 PROCEDURE — 99284 EMERGENCY DEPT VISIT MOD MDM: CPT | Mod: 25

## 2023-06-12 RX ORDER — LIDOCAINE HYDROCHLORIDE 10 MG/ML
5 INJECTION, SOLUTION EPIDURAL; INFILTRATION; INTRACAUDAL; PERINEURAL
Status: DISCONTINUED | OUTPATIENT
Start: 2023-06-12 | End: 2023-06-13 | Stop reason: HOSPADM

## 2023-06-13 ENCOUNTER — TELEPHONE (OUTPATIENT)
Dept: PODIATRY | Facility: CLINIC | Age: 63
End: 2023-06-13
Payer: MEDICAID

## 2023-06-13 NOTE — DISCHARGE INSTRUCTIONS
Keep lacerated area clean and dry.    Use Neosporin twice a day to help with healing.    Monitor for signs symptoms of infection.    Follow up with primary care Dr. Stringer the podiatrist if needed.    Return emergency room if symptoms do develop, you develop any new other worrisome symptom.

## 2023-06-13 NOTE — ED PROVIDER NOTES
"Encounter Date: 6/12/2023       History     Chief Complaint   Patient presents with    Toe Injury     Pt states he "busted his toe" about 30 minutes ago. Pt wrapped his leg in Kerlix, but pt states it is still bleeding      Patient is 62-year-old male presents emergency room with left toe injury.  Patient states he was trying to release his parking brake on vehicle when his toe got caught.  Patient states it started bleeding significantly.  He tried to rapid up the best he could at home prior to arrival.  Patient has had multiple surgeries on his left foot and ankle in the past secondary to infections.  Past medical history, medications, surgical history has been reviewed by me.  Bleeding is mildly controlled with bandage at placing has paste around his left foot.    Review of patient's allergies indicates:   Allergen Reactions    Amitriptyline      Vivid dreams( bad)     Past Medical History:   Diagnosis Date    A-fib     Anticoagulant long-term use     Arthritis     Diabetes mellitus     Hypertension      Past Surgical History:   Procedure Laterality Date    BELOW KNEE AMPUTATION OF LOWER EXTREMITY Right     "About 15 years"     History reviewed. No pertinent family history.  Social History     Tobacco Use    Smoking status: Never    Smokeless tobacco: Current     Types: Chew   Substance Use Topics    Alcohol use: Not Currently    Drug use: No     Review of Systems   Constitutional: Negative.    HENT: Negative.     Eyes: Negative.    Respiratory: Negative.     Cardiovascular: Negative.    Gastrointestinal: Negative.    Endocrine: Negative.    Genitourinary: Negative.    Musculoskeletal: Negative.    Skin:  Positive for wound (bleeding from left 5th toe).   Allergic/Immunologic: Negative for food allergies.   Neurological: Negative.    Hematological: Negative.    Psychiatric/Behavioral: Negative.     All other systems reviewed and are negative.    Physical Exam     Initial Vitals [06/12/23 2002]   BP Pulse Resp " Temp SpO2   (!) 148/76 77 18 98.4 °F (36.9 °C) 96 %      MAP       --         Physical Exam    Nursing note and vitals reviewed.  Constitutional: He appears well-developed and well-nourished. He is not diaphoretic. No distress.   HENT:   Head: Normocephalic and atraumatic.   Mouth/Throat: Oropharynx is clear and moist.   Eyes: Conjunctivae and EOM are normal. Pupils are equal, round, and reactive to light.   Neck:   Normal range of motion.  Cardiovascular:  Normal rate and intact distal pulses.           Pulmonary/Chest: No respiratory distress.   Musculoskeletal:      Cervical back: Normal range of motion.      Comments: Right BKA, patient was missing to toes to the left foot.  Bleeding is controlled with direct pressure at this time.     Neurological: He is alert and oriented to person, place, and time. He has normal strength. No sensory deficit. GCS score is 15. GCS eye subscore is 4. GCS verbal subscore is 5. GCS motor subscore is 6.   Skin: Skin is warm and dry. Capillary refill takes 2 to 3 seconds.   Laceration noted at the base of the 5th toe left foot.  Proximally 4 cm   Psychiatric: He has a normal mood and affect.       ED Course   Lac Repair    Date/Time: 6/12/2023 9:51 PM  Performed by: Nicholas Peng NP  Authorized by: Nicholas Peng NP     Consent:     Consent obtained:  Verbal    Consent given by:  Patient    Risks, benefits, and alternatives were discussed: yes      Risks discussed:  Infection, need for additional repair, nerve damage, poor wound healing, poor cosmetic result, pain, retained foreign body, tendon damage and vascular damage    Alternatives discussed:  Observation, referral, delayed treatment and no treatment  Universal protocol:     Procedure explained and questions answered to patient or proxy's satisfaction: yes      Relevant documents present and verified: yes      Imaging studies available: yes      Required blood products, implants, devices, and special equipment available:  yes      Immediately prior to procedure, a time out was called: yes      Patient identity confirmed:  Verbally with patient, arm band and hospital-assigned identification number  Anesthesia:     Anesthesia method:  Local infiltration    Local anesthetic:  Lidocaine 1% w/o epi  Laceration details:     Location:  Toe    Toe location:  L little toe    Length (cm):  4    Depth (mm):  100  Pre-procedure details:     Preparation:  Patient was prepped and draped in usual sterile fashion and imaging obtained to evaluate for foreign bodies  Exploration:     Hemostasis achieved with:  Direct pressure    Imaging outcome: foreign body not noted      Wound exploration: wound explored through full range of motion and entire depth of wound visualized      Wound extent: no foreign bodies/material noted, no muscle damage noted, no nerve damage noted, no underlying fracture noted and no vascular damage noted      Contaminated: no    Treatment:     Area cleansed with:  Povidone-iodine and saline    Amount of cleaning:  Standard    Irrigation solution:  Sterile saline    Irrigation volume:  10 ml    Irrigation method:  Pressure wash and syringe    Debridement:  None  Skin repair:     Repair method:  Sutures    Suture size:  3-0    Suture material:  Nylon    Suture technique:  Simple interrupted    Number of sutures:  4  Approximation:     Approximation:  Close  Repair type:     Repair type:  Simple  Post-procedure details:     Dressing:  Antibiotic ointment and non-adherent dressing    Procedure completion:  Tolerated well, no immediate complications  Labs Reviewed - No data to display       Imaging Results              X-Ray Toe 2 or More Views Left (In process)  Result time 06/12/23 21:54:53   Procedure changed from X-Ray Toe 2 or More Views Right                    Medications   LIDOcaine (PF) 10 mg/ml (1%) injection 50 mg (has no administration in time range)   neomycin-bacitracnZn-polymyxnB packet (has no administration in time  range)     Medical Decision Making:   History:   Old Records Summarized: other records.       <> Summary of Records: History of uncontrolled diabetes, morbid obesity, right BKA, multiple left wrist surgeries  Initial Assessment:   Patient seen examined emergency room, no acute distress this time.  Detailed assessment as noted above.  Differential Diagnosis:   Fracture, dislocation, avulsion, laceration, amputation  Clinical Tests:   Radiological Study: Ordered  ED Management:  After patient seen examined emergency room, x-ray left foot was ordered to evaluate left great toe.                        Clinical Impression:   Final diagnoses:  [S91.115A] Laceration of lesser toe of left foot without foreign body present or damage to nail, initial encounter (Primary)        ED Disposition Condition    Discharge Stable          ED Prescriptions    None       Follow-up Information       Follow up With Specialties Details Why Contact Info    Vahid Craig MD Family Medicine In 1 week If symptoms worsen, As needed [address] [facility] [address]  [phone]      Gary Stringer DPM Podiatry In 1 week If symptoms worsen, As needed [address] [facility]  [address]  [facility] [place] [id number] [phone]               Nicholas Peng NP  06/12/23 5218

## 2023-06-13 NOTE — TELEPHONE ENCOUNTER
Called  [facility] and they do accept his insurance. Contacted patient and told him that home health doesn't come out everyday. Patient said to fabian. We have opening in the morning and patient is scheduled to follow up from ED and dressing change.

## 2023-06-13 NOTE — TELEPHONE ENCOUNTER
Patient was seen in ED yesterday. He caught his 5th digit left foot in the emergency brake in his car. Patient says he is unable to care for area himself. Please advise.

## 2023-06-14 ENCOUNTER — OFFICE VISIT (OUTPATIENT)
Dept: PODIATRY | Facility: CLINIC | Age: 63
End: 2023-06-14
Payer: MEDICAID

## 2023-06-14 VITALS
SYSTOLIC BLOOD PRESSURE: 113 MMHG | DIASTOLIC BLOOD PRESSURE: 65 MMHG | BODY MASS INDEX: 36.45 KG/M2 | HEART RATE: 94 BPM | HEIGHT: 78 IN | WEIGHT: 315 LBS

## 2023-06-14 DIAGNOSIS — L97.522 ULCER OF LEFT FOOT WITH FAT LAYER EXPOSED: Primary | ICD-10-CM

## 2023-06-14 DIAGNOSIS — L97.522 SKIN ULCER OF LEFT FOOT WITH FAT LAYER EXPOSED: ICD-10-CM

## 2023-06-14 DIAGNOSIS — M71.072 ABSCESS OF BURSA OF LEFT FOOT: ICD-10-CM

## 2023-06-14 DIAGNOSIS — E11.49 TYPE II DIABETES MELLITUS WITH NEUROLOGICAL MANIFESTATIONS: ICD-10-CM

## 2023-06-14 DIAGNOSIS — E11.9 COMPREHENSIVE DIABETIC FOOT EXAMINATION, TYPE 2 DM, ENCOUNTER FOR: ICD-10-CM

## 2023-06-14 DIAGNOSIS — Z89.9 HX OF AMPUTATION: ICD-10-CM

## 2023-06-14 PROCEDURE — 3074F PR MOST RECENT SYSTOLIC BLOOD PRESSURE < 130 MM HG: ICD-10-PCS | Mod: CPTII,,, | Performed by: PODIATRIST

## 2023-06-14 PROCEDURE — 99999 PR PBB SHADOW E&M-EST. PATIENT-LVL III: CPT | Mod: PBBFAC,,, | Performed by: PODIATRIST

## 2023-06-14 PROCEDURE — 3008F PR BODY MASS INDEX (BMI) DOCUMENTED: ICD-10-PCS | Mod: CPTII,,, | Performed by: PODIATRIST

## 2023-06-14 PROCEDURE — 1160F RVW MEDS BY RX/DR IN RCRD: CPT | Mod: CPTII,,, | Performed by: PODIATRIST

## 2023-06-14 PROCEDURE — 1160F PR REVIEW ALL MEDS BY PRESCRIBER/CLIN PHARMACIST DOCUMENTED: ICD-10-PCS | Mod: CPTII,,, | Performed by: PODIATRIST

## 2023-06-14 PROCEDURE — 1159F PR MEDICATION LIST DOCUMENTED IN MEDICAL RECORD: ICD-10-PCS | Mod: CPTII,,, | Performed by: PODIATRIST

## 2023-06-14 PROCEDURE — 3078F DIAST BP <80 MM HG: CPT | Mod: CPTII,,, | Performed by: PODIATRIST

## 2023-06-14 PROCEDURE — 99213 OFFICE O/P EST LOW 20 MIN: CPT | Mod: PBBFAC | Performed by: PODIATRIST

## 2023-06-14 PROCEDURE — 3074F SYST BP LT 130 MM HG: CPT | Mod: CPTII,,, | Performed by: PODIATRIST

## 2023-06-14 PROCEDURE — 99215 OFFICE O/P EST HI 40 MIN: CPT | Mod: S$PBB,,, | Performed by: PODIATRIST

## 2023-06-14 PROCEDURE — 99999 PR PBB SHADOW E&M-EST. PATIENT-LVL III: ICD-10-PCS | Mod: PBBFAC,,, | Performed by: PODIATRIST

## 2023-06-14 PROCEDURE — 3046F HEMOGLOBIN A1C LEVEL >9.0%: CPT | Mod: CPTII,,, | Performed by: PODIATRIST

## 2023-06-14 PROCEDURE — 3078F PR MOST RECENT DIASTOLIC BLOOD PRESSURE < 80 MM HG: ICD-10-PCS | Mod: CPTII,,, | Performed by: PODIATRIST

## 2023-06-14 PROCEDURE — 3046F PR MOST RECENT HEMOGLOBIN A1C LEVEL > 9.0%: ICD-10-PCS | Mod: CPTII,,, | Performed by: PODIATRIST

## 2023-06-14 PROCEDURE — 3008F BODY MASS INDEX DOCD: CPT | Mod: CPTII,,, | Performed by: PODIATRIST

## 2023-06-14 PROCEDURE — 99215 PR OFFICE/OUTPT VISIT, EST, LEVL V, 40-54 MIN: ICD-10-PCS | Mod: S$PBB,,, | Performed by: PODIATRIST

## 2023-06-14 PROCEDURE — 1159F MED LIST DOCD IN RCRD: CPT | Mod: CPTII,,, | Performed by: PODIATRIST

## 2023-06-18 NOTE — PROGRESS NOTES
"Subjective:       Patient ID: Alfred Villarreal is a 62 y.o. male.    Chief Complaint: Follow-up     Patient presents for follow-up today multiple areas of breakdown and ulceration left.      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 No pertinent family history.  Social History     Socioeconomic History    Marital status:    Tobacco Use    Smoking status: Never    Smokeless tobacco: Current     Types: Chew   Substance and Sexual Activity    Alcohol use: Not Currently    Drug use: No    Sexual activity: Yes     Partners: Female       Current Outpatient Medications   Medication Sig Dispense Refill    baclofen (LIORESAL) 10 MG tablet Take 10 mg by mouth 3 (three) times daily.      ciprofloxacin HCl (CIPRO) 500 MG tablet Take 1 tablet (500 mg total) by mouth 2 (two) times daily. 60 tablet 0    doxepin (SINEQUAN) 10 MG capsule Take 1 capsule (10 mg total) by mouth every evening. 30 capsule 11    gabapentin (NEURONTIN) 600 MG tablet       HYDROcodone-acetaminophen (NORCO)  mg per tablet Take 1 tablet by mouth 2 (two) times daily as needed.      ibuprofen (ADVIL,MOTRIN) 800 MG tablet SMARTSI Tablet(s) By Mouth Every 12 Hours PRN      insulin aspart U-100 (NOVOLOG) 100 unit/mL injection INJECT 50 UNITS UNDER THE SKIN TWICE DAILY BEFORE A MEAL 90 mL 0    insulin detemir U-100, Levemir, (LEVEMIR FLEXTOUCH U-100 INSULN) 100 unit/mL (3 mL) InPn pen Inject 70 Units into the skin 2 (two) times daily. 42 mL 11    oxyCODONE (ROXICODONE) 10 mg Tab immediate release tablet Take 10 mg by mouth 2 (two) times daily as needed.      oxyCODONE-acetaminophen (PERCOCET)  mg per tablet Take 1 tablet by mouth 2 (two) times daily as needed.      pen needle, diabetic 31 gauge x 1/4" Ndle Use " "daily with victoza 100 each 3    tadalafiL (CIALIS) 5 MG tablet Take 1 tablet (5 mg total) by mouth daily as needed for Erectile Dysfunction. 30 tablet 1     No current facility-administered medications for this visit.     Review of patient's allergies indicates:   Allergen Reactions    Amitriptyline      Vivid dreams( bad)       Review of Systems   Musculoskeletal:  Positive for arthralgias, back pain, gait problem and joint swelling.   Skin:  Positive for color change and wound.   Neurological:  Positive for numbness.   All other systems reviewed and are negative.    Objective:      Vitals:    06/14/23 0944   BP: 113/65   BP Location: Right arm   Patient Position: Sitting   Pulse: 94   Weight: (!) 145.2 kg (320 lb)   Height: 6' 6" (1.981 m)       Physical Exam  Vitals and nursing note reviewed.   Constitutional:       General: He is in acute distress.      Appearance: Normal appearance. He is well-developed.   Cardiovascular:      Pulses:           Dorsalis pedis pulses are 1+ on the left side.        Posterior tibial pulses are 0 on the left side.   Pulmonary:      Effort: Pulmonary effort is normal.   Musculoskeletal:      Left lower leg: Edema present.      Left foot: Deformity present.        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   Feet:      Left foot:      Protective Sensation: 4 sites tested.   1 site sensed.     Skin integrity: Ulcer, skin breakdown, erythema and warmth present.   Skin:     Capillary Refill: Capillary refill takes more than 3 seconds.      Findings: Erythema and lesion present.   Neurological:      Mental Status: He is alert.      Sensory: Sensory deficit present.      Deep Tendon Reflexes: Reflexes abnormal.   Psychiatric:         Mood and Affect: Mood normal.         Behavior: Behavior normal.         Thought Content: Thought content normal.         Judgment: Judgment normal.                            Assessment:       1. Ulcer of left foot with fat layer " exposed    2. Hx of amputation    3. Skin ulcer of left foot with fat layer exposed    4. Abscess of bursa of left foot    5. Type II diabetes mellitus with neurological manifestations    6. Comprehensive diabetic foot examination, type 2 DM, encounter for        Plan:         Patient presents today follow-up of multiple sites of ulceration on the patient's left lower extremity he has had a previous amputation of the right lower extremity has an extensive history of osteomyelitis.    Patient has had a chronic ulceration on his left foot for some time and had been showing signs of improvement but has subsequently gotten worse due to weight-bearing on the patient's left heel.  On evaluation there are no signs of obvious infection to the area with extensive nonviable tissue the a ulceration itself is 6 cm long by 4 cm wide by 3 mm deep.    Patient requires extensive wound care due to the shear size of the ulceration sites with associated nonexcisional debridement.  Patient needs to continue the ascetic at said wet-to-dry dressings after he scrub the area with a PCMX scrub and rinses with Dakin's the ascetic acid wet-to-dry needs to be applied this includes the wounds on the posterior aspect of the left lower extremity.  Patient is showing considerable signs of improvement not only in the heel ulceration on the patient's left heel but the areas of breakdown on the left lower leg where he had previous injury the area on the plantar aspect of the patient's left foot which following debridement today is approximately 1.5 cm in diameter clearly the ascetic acid wet-to-dry dressings the current wound cares working very well I am going to extend the patient's Cipro however I am decreasing his Cipro to 500 mg twice a day he was taking 750 twice a day and the patient is tolerating this well.  Plan follow-up will be 4 weeks unless the patient has has any problems questions or concerns he is not only been doing the ascetic acid  wet-to-dry but he is also been cleaning the area with that as well as Dakin solution patient dispensed P cm X scrub so that he can scrub this area at the time of wound care.    Patient has a new area of injury that he was recently seen in the emergency department for this is the reason he is following up with us today he subsequently fractured the 3rd digit on the left foot hyper dorsiflexing the digit causing a laceration in the emergency room had stitch the laceration closed patient currently has nylon stitches in the plantar aspect of the 3rd digit left.  Patient states this occurred about 2 days ago when he went to push the emergency breaking on his vehicle it snapped up hyperextending or dorsiflexing the 3rd digit left.  Patient is still on Cipro as prescribed the ascetic acid wet-to-dry dressings are working very well a controlling previously noted infection on the surface of all wound sites we will continue this.  Two by 2s will be placed between the 2nd and 3rd digits to prevent excessive maceration the patient needs to do this every day he had inquired about home health however I made the patient aware they can not come out every single day and see him and then he would not be able to get supplies if he had home health he states he will continue to do the dressing changes himself. Total time including discussion evaluation treatment discussion of treatment options treatment plan wound care including non excisional wound debridement of all locations and discussion of treatment plan as well as review of the patient's chart and documentation of today's visit equaled 45 minutes.  This note was created using [employer] voice recognition software that occasionally misinterpreted phrases or words.

## 2023-07-05 ENCOUNTER — TELEPHONE (OUTPATIENT)
Dept: PODIATRY | Facility: CLINIC | Age: 63
End: 2023-07-05
Payer: MEDICAID

## 2023-07-05 NOTE — TELEPHONE ENCOUNTER
Patient advised stitches will be removed at his follow up 7/12. Patient verbalized understanding.

## 2023-07-12 ENCOUNTER — OFFICE VISIT (OUTPATIENT)
Dept: PODIATRY | Facility: CLINIC | Age: 63
End: 2023-07-12
Payer: MEDICAID

## 2023-07-12 ENCOUNTER — HOSPITAL ENCOUNTER (OUTPATIENT)
Dept: RADIOLOGY | Facility: HOSPITAL | Age: 63
Discharge: HOME | End: 2023-07-12
Attending: PODIATRIST
Payer: MEDICAID

## 2023-07-12 VITALS
SYSTOLIC BLOOD PRESSURE: 130 MMHG | HEART RATE: 77 BPM | BODY MASS INDEX: 36.45 KG/M2 | DIASTOLIC BLOOD PRESSURE: 77 MMHG | HEIGHT: 78 IN | WEIGHT: 315 LBS

## 2023-07-12 DIAGNOSIS — L97.522 SKIN ULCER OF LEFT FOOT WITH FAT LAYER EXPOSED: Primary | ICD-10-CM

## 2023-07-12 DIAGNOSIS — L08.9 DIABETIC FOOT INFECTION: ICD-10-CM

## 2023-07-12 DIAGNOSIS — E11.49 TYPE II DIABETES MELLITUS WITH NEUROLOGICAL MANIFESTATIONS: ICD-10-CM

## 2023-07-12 DIAGNOSIS — Z89.9 HX OF AMPUTATION: ICD-10-CM

## 2023-07-12 DIAGNOSIS — Z74.09 DECREASED FUNCTIONAL MOBILITY AND ENDURANCE: ICD-10-CM

## 2023-07-12 DIAGNOSIS — E11.628 DIABETIC FOOT INFECTION: ICD-10-CM

## 2023-07-12 DIAGNOSIS — L97.522 SKIN ULCER OF LEFT FOOT WITH FAT LAYER EXPOSED: ICD-10-CM

## 2023-07-12 DIAGNOSIS — E11.9 COMPREHENSIVE DIABETIC FOOT EXAMINATION, TYPE 2 DM, ENCOUNTER FOR: ICD-10-CM

## 2023-07-12 PROCEDURE — 99214 OFFICE O/P EST MOD 30 MIN: CPT | Mod: PBBFAC | Performed by: PODIATRIST

## 2023-07-12 PROCEDURE — 3075F SYST BP GE 130 - 139MM HG: CPT | Mod: CPTII,,, | Performed by: PODIATRIST

## 2023-07-12 PROCEDURE — 73630 XR FOOT COMPLETE 3 VIEW LEFT: ICD-10-PCS | Mod: 26,LT,, | Performed by: RADIOLOGY

## 2023-07-12 PROCEDURE — 73630 X-RAY EXAM OF FOOT: CPT | Mod: 26,LT,, | Performed by: RADIOLOGY

## 2023-07-12 PROCEDURE — 3008F BODY MASS INDEX DOCD: CPT | Mod: CPTII,,, | Performed by: PODIATRIST

## 2023-07-12 PROCEDURE — 3046F PR MOST RECENT HEMOGLOBIN A1C LEVEL > 9.0%: ICD-10-PCS | Mod: CPTII,,, | Performed by: PODIATRIST

## 2023-07-12 PROCEDURE — 3078F PR MOST RECENT DIASTOLIC BLOOD PRESSURE < 80 MM HG: ICD-10-PCS | Mod: CPTII,,, | Performed by: PODIATRIST

## 2023-07-12 PROCEDURE — 1160F RVW MEDS BY RX/DR IN RCRD: CPT | Mod: CPTII,,, | Performed by: PODIATRIST

## 2023-07-12 PROCEDURE — 3078F DIAST BP <80 MM HG: CPT | Mod: CPTII,,, | Performed by: PODIATRIST

## 2023-07-12 PROCEDURE — 3008F PR BODY MASS INDEX (BMI) DOCUMENTED: ICD-10-PCS | Mod: CPTII,,, | Performed by: PODIATRIST

## 2023-07-12 PROCEDURE — 3046F HEMOGLOBIN A1C LEVEL >9.0%: CPT | Mod: CPTII,,, | Performed by: PODIATRIST

## 2023-07-12 PROCEDURE — 99215 PR OFFICE/OUTPT VISIT, EST, LEVL V, 40-54 MIN: ICD-10-PCS | Mod: S$PBB,,, | Performed by: PODIATRIST

## 2023-07-12 PROCEDURE — 1160F PR REVIEW ALL MEDS BY PRESCRIBER/CLIN PHARMACIST DOCUMENTED: ICD-10-PCS | Mod: CPTII,,, | Performed by: PODIATRIST

## 2023-07-12 PROCEDURE — 1159F MED LIST DOCD IN RCRD: CPT | Mod: CPTII,,, | Performed by: PODIATRIST

## 2023-07-12 PROCEDURE — 99999 PR PBB SHADOW E&M-EST. PATIENT-LVL IV: ICD-10-PCS | Mod: PBBFAC,,, | Performed by: PODIATRIST

## 2023-07-12 PROCEDURE — 3075F PR MOST RECENT SYSTOLIC BLOOD PRESS GE 130-139MM HG: ICD-10-PCS | Mod: CPTII,,, | Performed by: PODIATRIST

## 2023-07-12 PROCEDURE — 99215 OFFICE O/P EST HI 40 MIN: CPT | Mod: S$PBB,,, | Performed by: PODIATRIST

## 2023-07-12 PROCEDURE — 99999 PR PBB SHADOW E&M-EST. PATIENT-LVL IV: CPT | Mod: PBBFAC,,, | Performed by: PODIATRIST

## 2023-07-12 PROCEDURE — 1159F PR MEDICATION LIST DOCUMENTED IN MEDICAL RECORD: ICD-10-PCS | Mod: CPTII,,, | Performed by: PODIATRIST

## 2023-07-12 PROCEDURE — 73630 X-RAY EXAM OF FOOT: CPT | Mod: TC,LT

## 2023-07-15 NOTE — PROGRESS NOTES
"Subjective:       Patient ID: Alfred Villarreal is a 62 y.o. male.    Chief Complaint: No chief complaint on file.     Patient presents for follow-up today multiple areas of breakdown and ulceration left.      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 No pertinent family history.  Social History     Socioeconomic History    Marital status:    Tobacco Use    Smoking status: Never    Smokeless tobacco: Current     Types: Chew   Substance and Sexual Activity    Alcohol use: Not Currently    Drug use: No    Sexual activity: Yes     Partners: Female       Current Outpatient Medications   Medication Sig Dispense Refill    gabapentin (NEURONTIN) 600 MG tablet       HYDROcodone-acetaminophen (NORCO)  mg per tablet Take 1 tablet by mouth 2 (two) times daily as needed.      ibuprofen (ADVIL,MOTRIN) 800 MG tablet SMARTSI Tablet(s) By Mouth Every 12 Hours PRN      insulin aspart U-100 (NOVOLOG) 100 unit/mL injection INJECT 50 UNITS UNDER THE SKIN TWICE DAILY BEFORE A MEAL 90 mL 0    insulin detemir U-100, Levemir, (LEVEMIR FLEXTOUCH U-100 INSULN) 100 unit/mL (3 mL) InPn pen Inject 70 Units into the skin 2 (two) times daily. 42 mL 11    oxyCODONE-acetaminophen (PERCOCET)  mg per tablet Take 1 tablet by mouth 2 (two) times daily as needed.      pen needle, diabetic 31 gauge x 1/4" Ndle Use daily with victoza 100 each 3    baclofen (LIORESAL) 10 MG tablet Take 10 mg by mouth 3 (three) times daily.      doxepin (SINEQUAN) 10 MG capsule Take 1 capsule (10 mg total) by mouth every evening. (Patient not taking: Reported on 2023) 30 capsule 11    oxyCODONE (ROXICODONE) 10 mg Tab immediate release tablet Take 10 mg by mouth 2 (two) times daily as needed.      tadalafiL (CIALIS) 5 " "MG tablet Take 1 tablet (5 mg total) by mouth daily as needed for Erectile Dysfunction. (Patient not taking: Reported on 7/12/2023) 30 tablet 1     No current facility-administered medications for this visit.     Review of patient's allergies indicates:   Allergen Reactions    Amitriptyline      Vivid dreams( bad)       Review of Systems   Musculoskeletal:  Positive for arthralgias, back pain, gait problem and joint swelling.   Skin:  Positive for color change and wound.   Neurological:  Positive for numbness.   All other systems reviewed and are negative.    Objective:      Vitals:    07/12/23 1338   BP: 130/77   BP Location: Right arm   Patient Position: Sitting   Pulse: 77   Weight: (!) 145.2 kg (320 lb)   Height: 6' 6" (1.981 m)       Physical Exam  Vitals and nursing note reviewed.   Constitutional:       General: He is in acute distress.      Appearance: Normal appearance. He is well-developed.   Cardiovascular:      Pulses:           Dorsalis pedis pulses are 1+ on the left side.        Posterior tibial pulses are 0 on the left side.   Pulmonary:      Effort: Pulmonary effort is normal.   Musculoskeletal:      Left lower leg: Edema present.      Left foot: Deformity present.        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   Feet:      Left foot:      Protective Sensation: 4 sites tested.   1 site sensed.     Skin integrity: Ulcer, skin breakdown, erythema and warmth present.   Skin:     Capillary Refill: Capillary refill takes more than 3 seconds.      Findings: Erythema and lesion present.   Neurological:      Mental Status: He is alert.      Sensory: Sensory deficit present.      Deep Tendon Reflexes: Reflexes abnormal.   Psychiatric:         Mood and Affect: Mood normal.         Behavior: Behavior normal.         Thought Content: Thought content normal.         Judgment: Judgment normal.                                      Assessment:       1. Skin ulcer of left foot with fat " layer exposed    2. Type II diabetes mellitus with neurological manifestations    3. Comprehensive diabetic foot examination, type 2 DM, encounter for    4. Diabetic foot infection    5. Hx of amputation    6. Decreased functional mobility and endurance        Plan:         Patient presents today follow-up of multiple sites of ulceration on the patient's left lower extremity he has had a previous amputation of the right lower extremity has an extensive history of osteomyelitis.    Patient has had a chronic ulceration on his left foot for some time and had been showing signs of improvement but has subsequently gotten worse due to weight-bearing on the patient's left heel.  On evaluation there are no signs of obvious infection to the area with extensive nonviable tissue the a ulceration itself is 6 cm long by 4 cm wide by 3 mm deep.    Patient requires extensive wound care due to the shear size of the ulceration sites with associated nonexcisional debridement.  Patient needs to continue the ascetic at said wet-to-dry dressings after he scrub the area with a PCMX scrub and rinses with Dakin's the ascetic acid wet-to-dry needs to be applied this includes the wounds on the posterior aspect of the left lower extremity.  Patient is showing considerable signs of improvement not only in the heel ulceration on the patient's left heel but the areas of breakdown on the left lower leg where he had previous injury the area on the plantar aspect of the patient's left foot which following debridement today is approximately 1.5 cm in diameter clearly the ascetic acid wet-to-dry dressings the current wound cares working very well.   Plan follow-up will be 6 weeks unless the patient has has any problems questions or concerns he is not only been doing the ascetic acid wet-to-dry but he is also been cleaning the area with that as well as Dakin solution patient dispensed P cm X scrub so that he can scrub this area at the time of wound  care.    In addition to the extensive wound care provided today patient did have the sutures removed from the digit on the left foot that he had previously lacerated secondary to injury this areas now all completely healed well resolved.  Total time including discussion evaluation treatment discussion of treatment options treatment plan wound care including non excisional wound debridement of all locations and discussion of treatment plan as well as review of the patient's chart and documentation of today's visit equaled 45 minutes.  This note was created using Central Desktop voice recognition software that occasionally misinterpreted phrases or words.

## 2023-07-20 RX ORDER — INSULIN DETEMIR 100 [IU]/ML
70 INJECTION, SOLUTION SUBCUTANEOUS 2 TIMES DAILY
Qty: 42 ML | Refills: 11 | Status: SHIPPED | OUTPATIENT
Start: 2023-07-20 | End: 2024-02-14 | Stop reason: SDUPTHER

## 2023-07-20 RX ORDER — INSULIN ASPART 100 [IU]/ML
INJECTION, SOLUTION INTRAVENOUS; SUBCUTANEOUS
Qty: 90 ML | Refills: 0 | Status: SHIPPED | OUTPATIENT
Start: 2023-07-20 | End: 2024-01-12 | Stop reason: SDUPTHER

## 2023-07-20 NOTE — TELEPHONE ENCOUNTER
----- Message from Renetta Mittal sent at 7/20/2023  1:14 PM CDT -----  Regarding: Needs insulin  Type: Needs Medical Advice  Who Called:  Alfred Hahn Call Back Number: [phone]    Additional Information: pt is almost out of his insulin aspart and nees more please call to enrique.

## 2023-08-14 ENCOUNTER — HOSPITAL ENCOUNTER (OUTPATIENT)
Facility: HOSPITAL | Age: 63
Discharge: HOME | End: 2023-08-16
Attending: EMERGENCY MEDICINE | Admitting: STUDENT IN AN ORGANIZED HEALTH CARE EDUCATION/TRAINING PROGRAM
Payer: MEDICAID

## 2023-08-14 DIAGNOSIS — I10 PRIMARY HYPERTENSION: Chronic | ICD-10-CM

## 2023-08-14 DIAGNOSIS — L03.116 CELLULITIS AND ABSCESS OF LEFT LEG: ICD-10-CM

## 2023-08-14 DIAGNOSIS — L08.9 DIABETIC FOOT INFECTION: ICD-10-CM

## 2023-08-14 DIAGNOSIS — A41.9 SEVERE SEPSIS: ICD-10-CM

## 2023-08-14 DIAGNOSIS — G06.1 ABSCESS IN EPIDURAL SPACE OF L2-L5 LUMBAR SPINE: ICD-10-CM

## 2023-08-14 DIAGNOSIS — Z89.9 HX OF AMPUTATION: ICD-10-CM

## 2023-08-14 DIAGNOSIS — I48.91 ATRIAL FIBRILLATION WITH RVR: ICD-10-CM

## 2023-08-14 DIAGNOSIS — L97.522 ULCER OF LEFT FOOT WITH FAT LAYER EXPOSED: ICD-10-CM

## 2023-08-14 DIAGNOSIS — N39.0 SEPSIS DUE TO URINARY TRACT INFECTION: ICD-10-CM

## 2023-08-14 DIAGNOSIS — Z79.01 ANTICOAGULANT LONG-TERM USE: ICD-10-CM

## 2023-08-14 DIAGNOSIS — A41.9 SEPSIS, DUE TO UNSPECIFIED ORGANISM, UNSPECIFIED WHETHER ACUTE ORGAN DYSFUNCTION PRESENT: Primary | ICD-10-CM

## 2023-08-14 DIAGNOSIS — L02.412 ABSCESS OF AXILLA, LEFT: ICD-10-CM

## 2023-08-14 DIAGNOSIS — E11.628 DIABETIC FOOT INFECTION: ICD-10-CM

## 2023-08-14 DIAGNOSIS — M71.072 ABSCESS OF BURSA OF LEFT FOOT: ICD-10-CM

## 2023-08-14 DIAGNOSIS — L97.522 SKIN ULCER OF LEFT FOOT WITH FAT LAYER EXPOSED: ICD-10-CM

## 2023-08-14 DIAGNOSIS — E11.49 TYPE II DIABETES MELLITUS WITH NEUROLOGICAL MANIFESTATIONS: ICD-10-CM

## 2023-08-14 DIAGNOSIS — Z74.09 DECREASED FUNCTIONAL MOBILITY AND ENDURANCE: ICD-10-CM

## 2023-08-14 DIAGNOSIS — R65.20 SEVERE SEPSIS: ICD-10-CM

## 2023-08-14 DIAGNOSIS — E87.1 HYPONATREMIA: ICD-10-CM

## 2023-08-14 DIAGNOSIS — K68.12 PSOAS MUSCLE ABSCESS: ICD-10-CM

## 2023-08-14 DIAGNOSIS — R52 PAIN: ICD-10-CM

## 2023-08-14 DIAGNOSIS — A41.9 SEPSIS DUE TO URINARY TRACT INFECTION: ICD-10-CM

## 2023-08-14 DIAGNOSIS — M60.08 ABSCESS OF PARASPINAL MUSCLES: ICD-10-CM
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
## 2023-08-14 DIAGNOSIS — E11.9 COMPREHENSIVE DIABETIC FOOT EXAMINATION, TYPE 2 DM, ENCOUNTER FOR: ICD-10-CM

## 2023-08-14 DIAGNOSIS — L02.416 CELLULITIS AND ABSCESS OF LEFT LEG: ICD-10-CM

## 2023-08-14 DIAGNOSIS — M46.26 OSTEOMYELITIS OF LUMBAR VERTEBRA: ICD-10-CM

## 2023-08-14 DIAGNOSIS — R07.9 CHEST PAIN: ICD-10-CM

## 2023-08-14 DIAGNOSIS — L03.119 CELLULITIS OF FOOT: ICD-10-CM

## 2023-08-14 LAB
ALBUMIN SERPL BCP-MCNC: 2.7 G/DL (ref 3.5–5.2)
ALP SERPL-CCNC: 109 U/L (ref 55–135)
ALT SERPL W/O P-5'-P-CCNC: 10 U/L (ref 10–44)
ANION GAP SERPL CALC-SCNC: 8 MMOL/L (ref 8–16)
APTT PPP: 25.8 SEC (ref 21–32)
AST SERPL-CCNC: 14 U/L (ref 10–40)
BASOPHILS # BLD AUTO: 0.05 K/UL (ref 0–0.2)
BASOPHILS NFR BLD: 0.3 % (ref 0–1.9)
BILIRUB SERPL-MCNC: 0.7 MG/DL (ref 0.1–1)
BILIRUB UR QL STRIP: NEGATIVE
BUN SERPL-MCNC: 10 MG/DL (ref 8–23)
CALCIUM SERPL-MCNC: 8.8 MG/DL (ref 8.7–10.5)
CHLORIDE SERPL-SCNC: 98 MMOL/L (ref 95–110)
CLARITY UR: CLEAR
CO2 SERPL-SCNC: 25 MMOL/L (ref 23–29)
COLOR UR: YELLOW
CREAT SERPL-MCNC: 0.9 MG/DL (ref 0.5–1.4)
DIFFERENTIAL METHOD: ABNORMAL
EOSINOPHIL # BLD AUTO: 0.1 K/UL (ref 0–0.5)
EOSINOPHIL NFR BLD: 0.3 % (ref 0–8)
ERYTHROCYTE [DISTWIDTH] IN BLOOD BY AUTOMATED COUNT: 12.9 % (ref 11.5–14.5)
EST. GFR  (NO RACE VARIABLE): >60 ML/MIN/1.73 M^2
ESTIMATED AVG GLUCOSE: 275 MG/DL (ref 68–131)
GLUCOSE SERPL-MCNC: 277 MG/DL (ref 70–110)
GLUCOSE UR QL STRIP: ABNORMAL
HBA1C MFR BLD: 11.2 % (ref 4–5.6)
HCT VFR BLD AUTO: 37.9 % (ref 40–54)
HGB BLD-MCNC: 12.9 G/DL (ref 14–18)
HGB UR QL STRIP: NEGATIVE
IMM GRANULOCYTES # BLD AUTO: 0.1 K/UL (ref 0–0.04)
IMM GRANULOCYTES NFR BLD AUTO: 0.5 % (ref 0–0.5)
INR PPP: 1.1 (ref 0.8–1.2)
KETONES UR QL STRIP: NEGATIVE
LACTATE SERPL-SCNC: 1.3 MMOL/L (ref 0.5–2.2)
LEUKOCYTE ESTERASE UR QL STRIP: NEGATIVE
LYMPHOCYTES # BLD AUTO: 0.5 K/UL (ref 1–4.8)
LYMPHOCYTES NFR BLD: 2.7 % (ref 18–48)
MCH RBC QN AUTO: 28.5 PG (ref 27–31)
MCHC RBC AUTO-ENTMCNC: 34 G/DL (ref 32–36)
MCV RBC AUTO: 84 FL (ref 82–98)
MONOCYTES # BLD AUTO: 0.5 K/UL (ref 0.3–1)
MONOCYTES NFR BLD: 2.8 % (ref 4–15)
NEUTROPHILS # BLD AUTO: 18.1 K/UL (ref 1.8–7.7)
NEUTROPHILS NFR BLD: 93.4 % (ref 38–73)
NITRITE UR QL STRIP: NEGATIVE
NRBC BLD-RTO: 0 /100 WBC
PH UR STRIP: 6 [PH] (ref 5–8)
PLATELET # BLD AUTO: 223 K/UL (ref 150–450)
PMV BLD AUTO: 9.9 FL (ref 9.2–12.9)
POCT GLUCOSE: 251 MG/DL (ref 70–110)
POCT GLUCOSE: 274 MG/DL (ref 70–110)
POCT GLUCOSE: 288 MG/DL (ref 70–110)
POTASSIUM SERPL-SCNC: 3.5 MMOL/L (ref 3.5–5.1)
PROT SERPL-MCNC: 8.6 G/DL (ref 6–8.4)
PROT UR QL STRIP: NEGATIVE
PROTHROMBIN TIME: 11.3 SEC (ref 9–12.5)
RBC # BLD AUTO: 4.52 M/UL (ref 4.6–6.2)
SODIUM SERPL-SCNC: 131 MMOL/L (ref 136–145)
SP GR UR STRIP: 1.01 (ref 1–1.03)
URN SPEC COLLECT METH UR: ABNORMAL
UROBILINOGEN UR STRIP-ACNC: 1 EU/DL
WBC # BLD AUTO: 19.37 K/UL (ref 3.9–12.7)

## 2023-08-14 PROCEDURE — 87077 CULTURE AEROBIC IDENTIFY: CPT | Performed by: PODIATRIST

## 2023-08-14 PROCEDURE — 96367 TX/PROPH/DG ADDL SEQ IV INF: CPT

## 2023-08-14 PROCEDURE — 87184 SC STD DISK METHOD PER PLATE: CPT | Performed by: PODIATRIST

## 2023-08-14 PROCEDURE — 99223 PR INITIAL HOSPITAL CARE,LEVL III: ICD-10-PCS | Mod: ,,, | Performed by: PODIATRIST

## 2023-08-14 PROCEDURE — 85025 COMPLETE CBC W/AUTO DIFF WBC: CPT | Performed by: EMERGENCY MEDICINE

## 2023-08-14 PROCEDURE — 99285 EMERGENCY DEPT VISIT HI MDM: CPT | Mod: 25

## 2023-08-14 PROCEDURE — 25000003 PHARM REV CODE 250: Performed by: STUDENT IN AN ORGANIZED HEALTH CARE EDUCATION/TRAINING PROGRAM

## 2023-08-14 PROCEDURE — 96372 THER/PROPH/DIAG INJ SC/IM: CPT | Mod: 59 | Performed by: STUDENT IN AN ORGANIZED HEALTH CARE EDUCATION/TRAINING PROGRAM

## 2023-08-14 PROCEDURE — 63600175 PHARM REV CODE 636 W HCPCS: Mod: UD | Performed by: STUDENT IN AN ORGANIZED HEALTH CARE EDUCATION/TRAINING PROGRAM

## 2023-08-14 PROCEDURE — 96361 HYDRATE IV INFUSION ADD-ON: CPT

## 2023-08-14 PROCEDURE — 73701 CT LOWER EXTREMITY W/DYE: CPT | Mod: TC,LT

## 2023-08-14 PROCEDURE — G0378 HOSPITAL OBSERVATION PER HR: HCPCS

## 2023-08-14 PROCEDURE — 96365 THER/PROPH/DIAG IV INF INIT: CPT

## 2023-08-14 PROCEDURE — 73630 X-RAY EXAM OF FOOT: CPT | Mod: TC,LT

## 2023-08-14 PROCEDURE — 72128 CT THORACIC SPINE WITHOUT CONTRAST: ICD-10-PCS | Mod: 26,,, | Performed by: RADIOLOGY

## 2023-08-14 PROCEDURE — 72128 CT CHEST SPINE W/O DYE: CPT | Mod: 26,,, | Performed by: RADIOLOGY

## 2023-08-14 PROCEDURE — 72131 CT LUMBAR SPINE WITHOUT CONTRAST: ICD-10-PCS | Mod: 26,,, | Performed by: RADIOLOGY

## 2023-08-14 PROCEDURE — 72131 CT LUMBAR SPINE W/O DYE: CPT | Mod: TC

## 2023-08-14 PROCEDURE — 87070 CULTURE OTHR SPECIMN AEROBIC: CPT | Performed by: PODIATRIST

## 2023-08-14 PROCEDURE — 93005 ELECTROCARDIOGRAM TRACING: CPT

## 2023-08-14 PROCEDURE — 73030 X-RAY EXAM OF SHOULDER: CPT | Mod: TC,RT

## 2023-08-14 PROCEDURE — 73080 X-RAY EXAM OF ELBOW: CPT | Mod: TC,RT

## 2023-08-14 PROCEDURE — 73030 XR SHOULDER TRAUMA 3 VIEW RIGHT: ICD-10-PCS | Mod: 26,RT,, | Performed by: RADIOLOGY

## 2023-08-14 PROCEDURE — 87147 CULTURE TYPE IMMUNOLOGIC: CPT | Performed by: PODIATRIST

## 2023-08-14 PROCEDURE — 96366 THER/PROPH/DIAG IV INF ADDON: CPT

## 2023-08-14 PROCEDURE — 73080 XR ELBOW COMPLETE 3 VIEW RIGHT: ICD-10-PCS | Mod: 26,RT,, | Performed by: RADIOLOGY

## 2023-08-14 PROCEDURE — 87040 BLOOD CULTURE FOR BACTERIA: CPT | Performed by: EMERGENCY MEDICINE

## 2023-08-14 PROCEDURE — 72170 XR PELVIS ROUTINE AP: ICD-10-PCS | Mod: 26,,, | Performed by: RADIOLOGY

## 2023-08-14 PROCEDURE — 73701 CT LOWER EXTREMITY W/DYE: CPT | Mod: 26,LT,, | Performed by: RADIOLOGY

## 2023-08-14 PROCEDURE — 73630 X-RAY EXAM OF FOOT: CPT | Mod: 26,LT,, | Performed by: RADIOLOGY

## 2023-08-14 PROCEDURE — 93010 ELECTROCARDIOGRAM REPORT: CPT | Mod: ,,, | Performed by: INTERNAL MEDICINE

## 2023-08-14 PROCEDURE — 83036 HEMOGLOBIN GLYCOSYLATED A1C: CPT | Performed by: STUDENT IN AN ORGANIZED HEALTH CARE EDUCATION/TRAINING PROGRAM

## 2023-08-14 PROCEDURE — 25500020 PHARM REV CODE 255: Performed by: STUDENT IN AN ORGANIZED HEALTH CARE EDUCATION/TRAINING PROGRAM

## 2023-08-14 PROCEDURE — 63600175 PHARM REV CODE 636 W HCPCS: Mod: UD | Performed by: EMERGENCY MEDICINE

## 2023-08-14 PROCEDURE — 72128 CT CHEST SPINE W/O DYE: CPT | Mod: TC

## 2023-08-14 PROCEDURE — 25000003 PHARM REV CODE 250: Performed by: EMERGENCY MEDICINE

## 2023-08-14 PROCEDURE — 87186 SC STD MICRODIL/AGAR DIL: CPT | Performed by: PODIATRIST

## 2023-08-14 PROCEDURE — 87075 CULTR BACTERIA EXCEPT BLOOD: CPT | Performed by: PODIATRIST

## 2023-08-14 PROCEDURE — 99223 1ST HOSP IP/OBS HIGH 75: CPT | Mod: ,,, | Performed by: PODIATRIST

## 2023-08-14 PROCEDURE — 83605 ASSAY OF LACTIC ACID: CPT | Performed by: EMERGENCY MEDICINE

## 2023-08-14 PROCEDURE — 73630 XR FOOT COMPLETE 3 VIEW LEFT: ICD-10-PCS | Mod: 26,LT,, | Performed by: RADIOLOGY

## 2023-08-14 PROCEDURE — 11000001 HC ACUTE MED/SURG PRIVATE ROOM

## 2023-08-14 PROCEDURE — 99223 PR INITIAL HOSPITAL CARE,LEVL III: ICD-10-PCS | Mod: ,,, | Performed by: STUDENT IN AN ORGANIZED HEALTH CARE EDUCATION/TRAINING PROGRAM

## 2023-08-14 PROCEDURE — 87076 CULTURE ANAEROBE IDENT EACH: CPT | Performed by: PODIATRIST

## 2023-08-14 PROCEDURE — 36415 COLL VENOUS BLD VENIPUNCTURE: CPT | Performed by: STUDENT IN AN ORGANIZED HEALTH CARE EDUCATION/TRAINING PROGRAM

## 2023-08-14 PROCEDURE — 72170 X-RAY EXAM OF PELVIS: CPT | Mod: TC

## 2023-08-14 PROCEDURE — 73030 X-RAY EXAM OF SHOULDER: CPT | Mod: 26,RT,, | Performed by: RADIOLOGY

## 2023-08-14 PROCEDURE — 93010 EKG 12-LEAD: ICD-10-PCS | Mod: ,,, | Performed by: INTERNAL MEDICINE

## 2023-08-14 PROCEDURE — 96375 TX/PRO/DX INJ NEW DRUG ADDON: CPT

## 2023-08-14 PROCEDURE — 63600175 PHARM REV CODE 636 W HCPCS: Performed by: STUDENT IN AN ORGANIZED HEALTH CARE EDUCATION/TRAINING PROGRAM

## 2023-08-14 PROCEDURE — 85730 THROMBOPLASTIN TIME PARTIAL: CPT | Performed by: STUDENT IN AN ORGANIZED HEALTH CARE EDUCATION/TRAINING PROGRAM

## 2023-08-14 PROCEDURE — 72131 CT LUMBAR SPINE W/O DYE: CPT | Mod: 26,,, | Performed by: RADIOLOGY

## 2023-08-14 PROCEDURE — 85610 PROTHROMBIN TIME: CPT | Performed by: STUDENT IN AN ORGANIZED HEALTH CARE EDUCATION/TRAINING PROGRAM

## 2023-08-14 PROCEDURE — 72170 X-RAY EXAM OF PELVIS: CPT | Mod: 26,,, | Performed by: RADIOLOGY

## 2023-08-14 PROCEDURE — 82962 GLUCOSE BLOOD TEST: CPT

## 2023-08-14 PROCEDURE — 80053 COMPREHEN METABOLIC PANEL: CPT | Performed by: EMERGENCY MEDICINE

## 2023-08-14 PROCEDURE — 99223 1ST HOSP IP/OBS HIGH 75: CPT | Mod: ,,, | Performed by: STUDENT IN AN ORGANIZED HEALTH CARE EDUCATION/TRAINING PROGRAM

## 2023-08-14 PROCEDURE — 81003 URINALYSIS AUTO W/O SCOPE: CPT | Performed by: EMERGENCY MEDICINE

## 2023-08-14 PROCEDURE — 73701 CT LEG (TIBIA-FIBULA) WITH CONTRAST LEFT: ICD-10-PCS | Mod: 26,LT,, | Performed by: RADIOLOGY

## 2023-08-14 PROCEDURE — 73080 X-RAY EXAM OF ELBOW: CPT | Mod: 26,RT,, | Performed by: RADIOLOGY

## 2023-08-14 RX ORDER — SODIUM CHLORIDE, SODIUM LACTATE, POTASSIUM CHLORIDE, CALCIUM CHLORIDE 600; 310; 30; 20 MG/100ML; MG/100ML; MG/100ML; MG/100ML
INJECTION, SOLUTION INTRAVENOUS CONTINUOUS
Status: DISCONTINUED | OUTPATIENT
Start: 2023-08-14 | End: 2023-08-15

## 2023-08-14 RX ORDER — VANCOMYCIN 2 GRAM/500 ML IN 0.9 % SODIUM CHLORIDE INTRAVENOUS
2000
Status: DISCONTINUED | OUTPATIENT
Start: 2023-08-14 | End: 2023-08-14

## 2023-08-14 RX ORDER — SODIUM CHLORIDE 0.9 % (FLUSH) 0.9 %
10 SYRINGE (ML) INJECTION EVERY 12 HOURS PRN
Status: DISCONTINUED | OUTPATIENT
Start: 2023-08-14 | End: 2023-08-16 | Stop reason: HOSPADM

## 2023-08-14 RX ORDER — IBUPROFEN 200 MG
16 TABLET ORAL
Status: DISCONTINUED | OUTPATIENT
Start: 2023-08-14 | End: 2023-08-16 | Stop reason: HOSPADM

## 2023-08-14 RX ORDER — OXYCODONE HYDROCHLORIDE 5 MG/1
5 TABLET ORAL EVERY 6 HOURS PRN
Status: DISCONTINUED | OUTPATIENT
Start: 2023-08-14 | End: 2023-08-15

## 2023-08-14 RX ORDER — ENOXAPARIN SODIUM 100 MG/ML
40 INJECTION SUBCUTANEOUS EVERY 24 HOURS
Status: DISCONTINUED | OUTPATIENT
Start: 2023-08-14 | End: 2023-08-16 | Stop reason: HOSPADM

## 2023-08-14 RX ORDER — PROCHLORPERAZINE EDISYLATE 5 MG/ML
5 INJECTION INTRAMUSCULAR; INTRAVENOUS EVERY 6 HOURS PRN
Status: DISCONTINUED | OUTPATIENT
Start: 2023-08-14 | End: 2023-08-16 | Stop reason: HOSPADM

## 2023-08-14 RX ORDER — ENOXAPARIN SODIUM 100 MG/ML
40 INJECTION SUBCUTANEOUS EVERY 24 HOURS
Status: DISCONTINUED | OUTPATIENT
Start: 2023-08-15 | End: 2023-08-14

## 2023-08-14 RX ORDER — IBUPROFEN 200 MG
24 TABLET ORAL
Status: DISCONTINUED | OUTPATIENT
Start: 2023-08-14 | End: 2023-08-16 | Stop reason: HOSPADM

## 2023-08-14 RX ORDER — ONDANSETRON 4 MG/1
8 TABLET, ORALLY DISINTEGRATING ORAL EVERY 8 HOURS PRN
Status: DISCONTINUED | OUTPATIENT
Start: 2023-08-14 | End: 2023-08-16 | Stop reason: HOSPADM

## 2023-08-14 RX ORDER — VANCOMYCIN 2 GRAM/500 ML IN 0.9 % SODIUM CHLORIDE INTRAVENOUS
2000
Status: COMPLETED | OUTPATIENT
Start: 2023-08-14 | End: 2023-08-14

## 2023-08-14 RX ORDER — MORPHINE SULFATE 2 MG/ML
1 INJECTION, SOLUTION INTRAMUSCULAR; INTRAVENOUS EVERY 4 HOURS PRN
Status: DISCONTINUED | OUTPATIENT
Start: 2023-08-14 | End: 2023-08-15

## 2023-08-14 RX ORDER — VANCOMYCIN 2 GRAM/500 ML IN 0.9 % SODIUM CHLORIDE INTRAVENOUS
2000
Status: DISCONTINUED | OUTPATIENT
Start: 2023-08-14 | End: 2023-08-16 | Stop reason: HOSPADM

## 2023-08-14 RX ORDER — METOPROLOL TARTRATE 1 MG/ML
5 INJECTION, SOLUTION INTRAVENOUS ONCE AS NEEDED
Status: DISCONTINUED | OUTPATIENT
Start: 2023-08-14 | End: 2023-08-16 | Stop reason: HOSPADM

## 2023-08-14 RX ORDER — INSULIN ASPART 100 [IU]/ML
1-10 INJECTION, SOLUTION INTRAVENOUS; SUBCUTANEOUS
Status: DISCONTINUED | OUTPATIENT
Start: 2023-08-14 | End: 2023-08-16 | Stop reason: HOSPADM

## 2023-08-14 RX ORDER — GLUCAGON 1 MG
1 KIT INJECTION
Status: DISCONTINUED | OUTPATIENT
Start: 2023-08-14 | End: 2023-08-16 | Stop reason: HOSPADM

## 2023-08-14 RX ORDER — NALOXONE HCL 0.4 MG/ML
0.02 VIAL (ML) INJECTION
Status: DISCONTINUED | OUTPATIENT
Start: 2023-08-14 | End: 2023-08-16 | Stop reason: HOSPADM

## 2023-08-14 RX ORDER — ACETAMINOPHEN 500 MG
1000 TABLET ORAL
Status: COMPLETED | OUTPATIENT
Start: 2023-08-14 | End: 2023-08-14

## 2023-08-14 RX ADMIN — ENOXAPARIN SODIUM 40 MG: 100 INJECTION SUBCUTANEOUS at 04:08

## 2023-08-14 RX ADMIN — CEFTRIAXONE SODIUM 1 G: 1 INJECTION, POWDER, FOR SOLUTION INTRAMUSCULAR; INTRAVENOUS at 05:08

## 2023-08-14 RX ADMIN — SODIUM CHLORIDE, POTASSIUM CHLORIDE, SODIUM LACTATE AND CALCIUM CHLORIDE: 600; 310; 30; 20 INJECTION, SOLUTION INTRAVENOUS at 10:08

## 2023-08-14 RX ADMIN — MORPHINE SULFATE 1 MG: 2 INJECTION, SOLUTION INTRAMUSCULAR; INTRAVENOUS at 08:08

## 2023-08-14 RX ADMIN — SODIUM CHLORIDE 1000 ML: 9 INJECTION, SOLUTION INTRAVENOUS at 06:08

## 2023-08-14 RX ADMIN — CEFEPIME 2 G: 2 INJECTION, POWDER, FOR SOLUTION INTRAVENOUS at 11:08

## 2023-08-14 RX ADMIN — INSULIN DETEMIR 35 UNITS: 100 INJECTION, SOLUTION SUBCUTANEOUS at 01:08

## 2023-08-14 RX ADMIN — OXYCODONE HYDROCHLORIDE 5 MG: 5 TABLET ORAL at 10:08

## 2023-08-14 RX ADMIN — OXYCODONE HYDROCHLORIDE 5 MG: 5 TABLET ORAL at 04:08

## 2023-08-14 RX ADMIN — SODIUM CHLORIDE, POTASSIUM CHLORIDE, SODIUM LACTATE AND CALCIUM CHLORIDE 1000 ML: 600; 310; 30; 20 INJECTION, SOLUTION INTRAVENOUS at 06:08

## 2023-08-14 RX ADMIN — CEFEPIME 2 G: 2 INJECTION, POWDER, FOR SOLUTION INTRAVENOUS at 04:08

## 2023-08-14 RX ADMIN — VANCOMYCIN HYDROCHLORIDE 2000 MG: 1 INJECTION, POWDER, LYOPHILIZED, FOR SOLUTION INTRAVENOUS at 05:08

## 2023-08-14 RX ADMIN — IOHEXOL 100 ML: 350 INJECTION, SOLUTION INTRAVENOUS at 08:08

## 2023-08-14 RX ADMIN — VANCOMYCIN HYDROCHLORIDE 2000 MG: 1 INJECTION, POWDER, LYOPHILIZED, FOR SOLUTION INTRAVENOUS at 06:08

## 2023-08-14 RX ADMIN — SODIUM CHLORIDE, POTASSIUM CHLORIDE, SODIUM LACTATE AND CALCIUM CHLORIDE: 600; 310; 30; 20 INJECTION, SOLUTION INTRAVENOUS at 11:08

## 2023-08-14 RX ADMIN — INSULIN ASPART 6 UNITS: 100 INJECTION, SOLUTION INTRAVENOUS; SUBCUTANEOUS at 11:08

## 2023-08-14 RX ADMIN — ACETAMINOPHEN 1000 MG: 500 TABLET ORAL at 05:08

## 2023-08-14 RX ADMIN — SODIUM CHLORIDE 1000 ML: 9 INJECTION, SOLUTION INTRAVENOUS at 05:08

## 2023-08-14 RX ADMIN — INSULIN ASPART 6 UNITS: 100 INJECTION, SOLUTION INTRAVENOUS; SUBCUTANEOUS at 04:08

## 2023-08-14 RX ADMIN — INSULIN ASPART 3 UNITS: 100 INJECTION, SOLUTION INTRAVENOUS; SUBCUTANEOUS at 10:08

## 2023-08-14 RX ADMIN — CEFEPIME 2 G: 2 INJECTION, POWDER, FOR SOLUTION INTRAVENOUS at 09:08

## 2023-08-14 NOTE — ASSESSMENT & PLAN NOTE
Patient's FSGs are uncontrolled due to hyperglycemia on current medication regimen.  Last A1c reviewed-   Lab Results   Component Value Date    HGBA1C 11.2 (H) 08/14/2023     Most recent fingerstick glucose reviewed-   Recent Labs   Lab 08/14/23  0451 08/14/23  1137   POCTGLUCOSE 251* 288*     Current correctional scale  Medium  Maintain anti-hyperglycemic dose as follows-   Antihyperglycemics (From admission, onward)    Start     Stop Route Frequency Ordered    08/14/23 1230  insulin detemir U-100 (Levemir) pen 35 Units         -- SubQ Nightly 08/14/23 1227    08/14/23 1016  insulin aspart U-100 pen 1-10 Units         -- SubQ Before meals & nightly PRN 08/14/23 0917        Hold Oral hypoglycemics while patient is in the hospital.

## 2023-08-14 NOTE — SUBJECTIVE & OBJECTIVE
"Past Medical History:   Diagnosis Date    A-fib     Anticoagulant long-term use     Arthritis     Diabetes mellitus     Hypertension        Past Surgical History:   Procedure Laterality Date    BELOW KNEE AMPUTATION OF LOWER EXTREMITY Right     "About 15 years"       Review of patient's allergies indicates:   Allergen Reactions    Amitriptyline      Vivid dreams( bad)       No current facility-administered medications on file prior to encounter.     Current Outpatient Medications on File Prior to Encounter   Medication Sig    baclofen (LIORESAL) 10 MG tablet Take 10 mg by mouth 3 (three) times daily.    doxepin (SINEQUAN) 10 MG capsule Take 1 capsule (10 mg total) by mouth every evening. (Patient not taking: Reported on 2023)    gabapentin (NEURONTIN) 600 MG tablet     HYDROcodone-acetaminophen (NORCO)  mg per tablet Take 1 tablet by mouth 2 (two) times daily as needed.    ibuprofen (ADVIL,MOTRIN) 800 MG tablet SMARTSI Tablet(s) By Mouth Every 12 Hours PRN    insulin aspart U-100 (NOVOLOG) 100 unit/mL injection INJECT 50 UNITS UNDER THE SKIN TWICE DAILY BEFORE A MEAL    insulin detemir U-100, Levemir, (LEVEMIR FLEXTOUCH U100 INSULIN) 100 unit/mL (3 mL) InPn pen Inject 70 Units into the skin 2 (two) times daily.    oxyCODONE (ROXICODONE) 10 mg Tab immediate release tablet Take 10 mg by mouth 2 (two) times daily as needed.    oxyCODONE-acetaminophen (PERCOCET)  mg per tablet Take 1 tablet by mouth 2 (two) times daily as needed.    pen needle, diabetic 31 gauge x 1/4" Ndle Use daily with victoza    tadalafiL (CIALIS) 5 MG tablet Take 1 tablet (5 mg total) by mouth daily as needed for Erectile Dysfunction. (Patient not taking: Reported on 2023)     Family History    Non-contributory       Tobacco Use    Smoking status: Never    Smokeless tobacco: Current     Types: Chew   Substance and Sexual Activity    Alcohol use: Not Currently    Drug use: No    Sexual activity: Yes     Partners: Female "     Review of Systems   All other systems reviewed and are negative.    Objective:     Vital Signs (Most Recent):  Temp: 98.6 °F (37 °C) (08/14/23 1126)  Pulse: 84 (08/14/23 1100)  Resp: (!) 25 (08/14/23 1100)  BP: (!) 136/90 (08/14/23 1100)  SpO2: 97 % (08/14/23 0933) Vital Signs (24h Range):  Temp:  [98.6 °F (37 °C)-101.3 °F (38.5 °C)] 98.6 °F (37 °C)  Pulse:  [] 84  Resp:  [9-28] 25  SpO2:  [92 %-98 %] 97 %  BP: ()/(39-90) 136/90     Weight: (!) 154.2 kg (340 lb)  Body mass index is 39.29 kg/m².     Physical Exam     Vitals reviewed  General: NAD, Obese, unkempt  Head: NC/AT  Eyes: EOMI, ADELA  Cardiovascular: Pulses intact distally, Regular rate, Irregular rhthym  Pulmonary: Increased RR, No respiratory distress  Gi: Soft, Non-tender  Extremities: Warm, Right BKA appears to be without infection. LLE swelling and redness. Chronic wounds lateral left calf. No necrotic tissue present. No purulent drainage. Left foot wound with minimal drainage. Redness of left foot.  Skin: Warm, dry  Neuro: Alert, Oriented x3, No focal Deficit  Psych: Appropriate mood and affect          Significant Labs: All pertinent labs within the past 24 hours have been reviewed.    Significant Imaging: I have reviewed all pertinent imaging results/findings within the past 24 hours.

## 2023-08-14 NOTE — H&P
"[facility] Medicine  History & Physical    Patient Name: Alfred Villarreal  MRN: [id number]  Patient Class: IP- Inpatient  Admission Date: 8/14/2023  Attending Physician: Star Dang MD   Primary Care Provider: Vahid Craig MD         Patient information was obtained from patient and ER records.     Subjective:     Principal Problem:Severe sepsis    Chief Complaint:   Chief Complaint   Patient presents with    Fall     Per EMS patient patietn had a ground level fall getting out of bed. Negative LOC. Patient c\o pain to the mid back and shoulders. Patient has a right BKA and a bandage to the left lower leg        HPI: 61 yo w/ hx of IDDM2, Chronic Afib not on AC per patient preference, Right BKA, Chronic LLE wound presenting after all at home. Patient was attempting to transfer from bed to wheelchair when he fell. Has been having fever and increased redness and swelling of LLE for past 24h. Follows with Dr Stringer outpatient. No NVD. No CP,SOB. Complaining of low back pain. No weakness or numbness. Patient stopped AC for afib 2-3 years ago due to recurrent bleeding episodes.       Past Medical History:   Diagnosis Date    A-fib     Anticoagulant long-term use     Arthritis     Diabetes mellitus     Hypertension        Past Surgical History:   Procedure Laterality Date    BELOW KNEE AMPUTATION OF LOWER EXTREMITY Right     "About 15 years"       Review of patient's allergies indicates:   Allergen Reactions    Amitriptyline      Vivid dreams( bad)       No current facility-administered medications on file prior to encounter.     Current Outpatient Medications on File Prior to Encounter   Medication Sig    baclofen (LIORESAL) 10 MG tablet Take 10 mg by mouth 3 (three) times daily.    doxepin (SINEQUAN) 10 MG capsule Take 1 capsule (10 mg total) by mouth every evening. (Patient not taking: Reported on 7/12/2023)    gabapentin (NEURONTIN) 600 MG tablet     " "HYDROcodone-acetaminophen (NORCO)  mg per tablet Take 1 tablet by mouth 2 (two) times daily as needed.    ibuprofen (ADVIL,MOTRIN) 800 MG tablet SMARTSI Tablet(s) By Mouth Every 12 Hours PRN    insulin aspart U-100 (NOVOLOG) 100 unit/mL injection INJECT 50 UNITS UNDER THE SKIN TWICE DAILY BEFORE A MEAL    insulin detemir U-100, Levemir, (LEVEMIR FLEXTOUCH U100 INSULIN) 100 unit/mL (3 mL) InPn pen Inject 70 Units into the skin 2 (two) times daily.    oxyCODONE (ROXICODONE) 10 mg Tab immediate release tablet Take 10 mg by mouth 2 (two) times daily as needed.    oxyCODONE-acetaminophen (PERCOCET)  mg per tablet Take 1 tablet by mouth 2 (two) times daily as needed.    pen needle, diabetic 31 gauge x 1/4" Ndle Use daily with victoza    tadalafiL (CIALIS) 5 MG tablet Take 1 tablet (5 mg total) by mouth daily as needed for Erectile Dysfunction. (Patient not taking: Reported on 2023)     Family History    Non-contributory       Tobacco Use    Smoking status: Never    Smokeless tobacco: Current     Types: Chew   Substance and Sexual Activity    Alcohol use: Not Currently    Drug use: No    Sexual activity: Yes     Partners: Female     Review of Systems   All other systems reviewed and are negative.    Objective:     Vital Signs (Most Recent):  Temp: 98.6 °F (37 °C) (23 1126)  Pulse: 84 (23 1100)  Resp: (!) 25 (23 1100)  BP: (!) 136/90 (23 1100)  SpO2: 97 % (23 0933) Vital Signs (24h Range):  Temp:  [98.6 °F (37 °C)-101.3 °F (38.5 °C)] 98.6 °F (37 °C)  Pulse:  [] 84  Resp:  [9-28] 25  SpO2:  [92 %-98 %] 97 %  BP: ()/(39-90) 136/90     Weight: (!) 154.2 kg (340 lb)  Body mass index is 39.29 kg/m².     Physical Exam     Vitals reviewed  General: NAD, Obese, unkempt  Head: NC/AT  Eyes: EOMI, ADELA  Cardiovascular: Pulses intact distally, Regular rate, Irregular rhthym  Pulmonary: Increased RR, No respiratory distress  Gi: Soft, Non-tender  Extremities: " Warm, Right BKA appears to be without infection. LLE swelling and redness. Chronic wounds lateral left calf. No necrotic tissue present. No purulent drainage. Left foot wound with minimal drainage. Redness of left foot.  Skin: Warm, dry  Neuro: Alert, Oriented x3, No focal Deficit  Psych: Appropriate mood and affect          Significant Labs: All pertinent labs within the past 24 hours have been reviewed.    Significant Imaging: I have reviewed all pertinent imaging results/findings within the past 24 hours.    Assessment/Plan:     * Severe sepsis  This patient does have evidence of infective focus  My overall impression is sepsis.  Source: Skin and Soft Tissue (location LLE and left foot )  Antibiotics given-   Antibiotics (72h ago, onward)    Start     Stop Route Frequency Ordered    08/14/23 1600  ceFEPIme (MAXIPIME) 2 g in dextrose 5 % in water (D5W) 100 mL IVPB (MB+)         -- IV Every 8 hours (non-standard times) 08/14/23 1014    08/14/23 1400  vancomycin 2 g in 0.9% sodium chloride 500 mL IVPB         -- IV Every 12 hours (non-standard times) 08/14/23 1025    08/14/23 1113  vancomycin - pharmacy to dose  (vancomycin IVPB (PEDS and ADULTS))        See Hyperspace for full Linked Orders Report.    -- IV pharmacy to manage frequency 08/14/23 1014        Latest lactate reviewed-  Recent Labs   Lab 08/14/23  0526   LACTATE 1.3     Organ dysfunction indicated by none    Fluid challenge Ideal Body Weight- The patient's ideal body weight is Ideal body weight: 91.4 kg (201 lb 8 oz) which will be used to calculate fluid bolus of 30 ml/kg for treatment of septic shock.      Post- resuscitation assessment Yes Perfusion exam was performed within 6 hours of septic shock presentation after bolus shows Adequate tissue perfusion assessed by non-invasive monitoring       Will Not start Pressors- Levophed for MAP of 65  Source control achieved by: IV abx  Podiatry consulted  Elevate extremity  Alfred borders of cellulitis  q6h  neurovascular checks  F/U cultures    Atrial fibrillation with RVR  Afib w/ RVR at presentation  Currently rate controlled on no meds  Patient not interested in restarting full dose AC- Explained risks and benefits. Patient seems to understand    Diabetes mellitus, type 2  Patient's FSGs are uncontrolled due to hyperglycemia on current medication regimen.  Last A1c reviewed-   Lab Results   Component Value Date    HGBA1C 11.2 (H) 08/14/2023     Most recent fingerstick glucose reviewed-   Recent Labs   Lab 08/14/23  0451 08/14/23  1137   POCTGLUCOSE 251* 288*     Current correctional scale  Medium  Maintain anti-hyperglycemic dose as follows-   Antihyperglycemics (From admission, onward)    Start     Stop Route Frequency Ordered    08/14/23 1230  insulin detemir U-100 (Levemir) pen 35 Units         -- SubQ Nightly 08/14/23 1227    08/14/23 1016  insulin aspart U-100 pen 1-10 Units         -- SubQ Before meals & nightly PRN 08/14/23 0917        Hold Oral hypoglycemics while patient is in the hospital.      VTE Risk Mitigation (From admission, onward)         Ordered     enoxaparin injection 40 mg  Every 24 hours         08/14/23 1304     IP VTE HIGH RISK PATIENT  Once         08/14/23 0917     Place sequential compression device  Until discontinued         08/14/23 0917                           Star Dang MD  Department of [facility] Medicine  [place] - Intensive Care

## 2023-08-14 NOTE — ED PROVIDER NOTES
"Encounter Date: 8/14/2023       History     Chief Complaint   Patient presents with    Fall     Per EMS patient jacobn had a ground level fall getting out of bed. Negative LOC. Patient c\o pain to the mid back and shoulders. Patient has a right BKA and a bandage to the left lower leg     62-year-old male status post below-the-knee amputation of the right leg presents with a fall about an hour prior to presentation.  Patient was trying to transition to his motorized wheelchair when he fell onto his right side complaining of right elbow right shoulder and whole spine pain.  His left lower extremity is wrapped in gauze and appears to be covered in stool.  EMS states the entire house was filled with pockets of urine that was very filthy.    The history is provided by the patient and the EMS personnel. No  was used.     Review of patient's allergies indicates:   Allergen Reactions    Amitriptyline      Vivid dreams( bad)     Past Medical History:   Diagnosis Date    A-fib     Anticoagulant long-term use     Arthritis     Diabetes mellitus     Hypertension      Past Surgical History:   Procedure Laterality Date    BELOW KNEE AMPUTATION OF LOWER EXTREMITY Right     "About 15 years"     History reviewed. No pertinent family history.  Social History     Tobacco Use    Smoking status: Never    Smokeless tobacco: Current     Types: Chew   Substance Use Topics    Alcohol use: Not Currently    Drug use: No     Review of Systems   Constitutional:  Negative for fever.   HENT:  Negative for sore throat.    Respiratory:  Negative for shortness of breath.    Cardiovascular:  Negative for chest pain.   Gastrointestinal:  Negative for nausea.   Genitourinary:  Negative for dysuria.   Musculoskeletal:  Negative for back pain.   Skin:  Negative for rash.   Neurological:  Negative for weakness.   Hematological:  Does not bruise/bleed easily.   All other systems reviewed and are negative.      Physical Exam     Initial " Vitals   BP Pulse Resp Temp SpO2   08/14/23 0440 08/14/23 0440 08/14/23 0440 08/14/23 0456 08/14/23 0456   (!) 151/75 (!) 122 16 (!) 101.3 °F (38.5 °C) 97 %      MAP       --                Physical Exam    Nursing note and vitals reviewed.  Constitutional: He appears well-developed and well-nourished.   HENT:   Head: Normocephalic and atraumatic.   Eyes: EOM are normal. Pupils are equal, round, and reactive to light.   Neck:   Normal range of motion.  Cardiovascular:  Normal rate and regular rhythm.           Pulmonary/Chest: Breath sounds normal.   Abdominal: Abdomen is soft.   Musculoskeletal:      Cervical back: Normal range of motion.      Comments: Patient complains of midthoracic and lower lumbar bony spine tenderness, tenderness to palpation over the right elbow and right shoulder, of note there are no deformities skin tears or skin changes to these areas there is no step-offs.  Patient was able to transition from the EMS gurney to the Emergency gurney without assistance.     Neurological: He is alert and oriented to person, place, and time. GCS score is 15. GCS eye subscore is 4. GCS verbal subscore is 5. GCS motor subscore is 6.   Skin: Skin is warm. Capillary refill takes less than 2 seconds.   Psychiatric: He has a normal mood and affect.         ED Course   Critical Care    Date/Time: 8/14/2023 11:00 PM    Performed by: Victoria Ruelas MD  Authorized by: Victoria Ruelas MD  Direct patient critical care time: 10 minutes  Additional history critical care time: 10 minutes  Ordering / reviewing critical care time: 10 minutes  Documentation critical care time: 10 minutes  Total critical care time (exclusive of procedural time) : 40 minutes  Critical care time was exclusive of separately billable procedures and treating other patients and teaching time.  Critical care was necessary to treat or prevent imminent or life-threatening deterioration of the following conditions: circulatory failure and  sepsis.  Critical care was time spent personally by me on the following activities: blood draw for specimens, development of treatment plan with patient or surrogate, examination of patient, obtaining history from patient or surrogate, ordering and performing treatments and interventions, ordering and review of laboratory studies, ordering and review of radiographic studies, re-evaluation of patient's condition, review of old charts, interpretation of cardiac output measurements and evaluation of patient's response to treatment.        Labs Reviewed   CBC W/ AUTO DIFFERENTIAL - Abnormal; Notable for the following components:       Result Value    WBC 19.37 (*)     RBC 4.52 (*)     Hemoglobin 12.9 (*)     Hematocrit 37.9 (*)     Gran # (ANC) 18.1 (*)     Immature Grans (Abs) 0.10 (*)     Lymph # 0.5 (*)     Gran % 93.4 (*)     Lymph % 2.7 (*)     Mono % 2.8 (*)     All other components within normal limits   COMPREHENSIVE METABOLIC PANEL - Abnormal; Notable for the following components:    Sodium 131 (*)     Glucose 277 (*)     Total Protein 8.6 (*)     Albumin 2.7 (*)     All other components within normal limits   URINALYSIS, REFLEX TO URINE CULTURE - Abnormal; Notable for the following components:    Glucose, UA 2+ (*)     All other components within normal limits    Narrative:     Preferred Collection Type->Urine, Clean Catch  Specimen Source->Urine   HEMOGLOBIN A1C - Abnormal; Notable for the following components:    Hemoglobin A1C 11.2 (*)     Estimated Avg Glucose 275 (*)     All other components within normal limits   POCT GLUCOSE - Abnormal; Notable for the following components:    POCT Glucose 251 (*)     All other components within normal limits   LACTIC ACID, PLASMA   PROTIME-INR   APTT        ECG Results              EKG 12-lead (Final result)  Result time 08/14/23 10:23:29      Final result by Interface, Lab In [facility] (08/14/23 10:23:29)                   Narrative:    Test Reason : W19.XXXA,    Vent.  Rate : 135 BPM     Atrial Rate : 141 BPM     P-R Int : 000 ms          QRS Dur : 094 ms      QT Int : 324 ms       P-R-T Axes : 000 072 093 degrees     QTc Int : 486 ms    Atrial fibrillation with rapid ventricular response  Marked ST abnormality, possible inferior subendocardial injury  Abnormal ECG  When compared with ECG of 16-JAN-2015 04:22,  ST now depressed in Inferior leads  ST now depressed in Anterior-lateral leads  Nonspecific T wave abnormality now evident in Inferior leads  with rate increase  Confirmed by Edd Funk MD ([id number]) on 8/14/2023 10:23:18 AM    Referred By: [email or web address]   SELF           Confirmed By:Edd Funk MD                                  Imaging Results              CT Leg (Tibia-Fibula) Wtih Contrast Left (Final result)  Result time 08/14/23 08:56:36      Final result by Aide Caputo MD (08/14/23 08:56:36)                   Impression:      There is circumferential soft tissue edema and the skin thickening involving the lower leg concerning for diffuse cellulitis.  No discrete fluid collection is seen.    There are findings suggesting venous insufficiency of the left lower leg.    There is atherosclerotic disease of the vasculature of the left lower leg.      Electronically signed by: Aide Caputo MD  Date:    08/14/2023  Time:    08:56               Narrative:    EXAMINATION:  CT LEG (TIBIA-FIBULA) WITH CONTRAST LEFT    CLINICAL HISTORY:  Soft tissue infection suspected, lower leg, xray done;    TECHNIQUE:  Spiral CT images of the lower leg were acquired.    COMPARISON:  None    FINDINGS:  There is circumferential soft tissue edema and skin thickening involving the left lower leg.  No discrete drainable fluid collection is identified.  Fat planes within the musculature identified.  The major vascular structures show atherosclerotic disease of the major vessels.  The osseous structures are without evidence fracture.    There are prominent tributaries of the greater saphenous vein.                                        CT Lumbar Spine Without Contrast (Final result)  Result time 08/14/23 07:37:20      Final result by Xu Traylor MD (08/14/23 07:37:20)                   Impression:      1. There is multilevel degenerative change discussed in detail by level above.  These findings are present in the setting of a spinal canal which appears small on a developmental basis.  There is some degree of spinal canal and foraminal stenosis at multiple levels.  There is however no acute fracture or traumatic subluxation.  Note: No preliminary interpretation was available at the time of dictation.      Electronically signed by: Xu Traylor MD  Date:    08/14/2023  Time:    07:37               Narrative:    EXAMINATION:  CT LUMBAR SPINE WITHOUT CONTRAST    CLINICAL HISTORY:  Back trauma, no prior imaging (Age >= 16y);    TECHNIQUE:  Low-dose axial, sagittal and coronal reformations are obtained through the lumbar spine.  Contrast was not administered.    COMPARISON:  Lumbar spine MRI dated 01/27/2015    FINDINGS:  Vertebral column: There is degenerative change which will be described by level.  The vertebral bodies maintain normal height.  There is no acute fracture.  There is moderate disc space narrowing at the L3-4 level.  There is trace retrolisthesis of L4 on L5 which is exaggerated by osteophyte formation and unchanged.  There is mild disc space narrowing at this level.  There is multilevel anterior endplate osteophyte formation.    Spinal canal, conus, epidural space: The spinal canal appears small on a developmental basis.  There is no abnormal epidural mass or hematoma.    Findings by level:    T12-L1: There is anterior osteophyte formation.  There is a minimal disc bulge and mild facet joint arthropathy but there is no significant spinal canal or foraminal stenosis.    L1-2: There is a diffuse disc bulge eccentric to the left.  There is mild facet joint arthropathy.  There is stable  mild spinal canal and left foraminal stenosis.    L2-3: There is a mild diffuse disc bulge.  There is facet joint arthropathy.  There is mild-to-moderate spinal stenosis with only mild bilateral foraminal stenosis.    L3-4: There is disc space narrowing.  There is left greater than right facet joint arthropathy with ligamentum flavum thickening.  There is a disc bulge with osteophytic ridging.  There is moderate spinal canal and left lateral recess stenosis with moderate-to-marked left and mild to moderate right foraminal stenosis without significant change.    L4-5: There is trace retrolisthesis, mild disc space narrowing, left greater than right facet joint arthropathy.  There is a disc bulge with osteophytic ridging.  There is mild spinal stenosis with moderate left and mild right foraminal stenosis.    L5-S1: There is bilateral facet joint arthropathy.  There is partial sacralization of L5 on the right.  There is a mild disc bulge.  There is no significant spinal canal or foraminal stenosis.    Soft tissues, other: The prevertebral soft tissues are grossly normal.  There is scattered atherosclerosis.  There is no hydronephrosis.                                       CT Thoracic Spine Without Contrast (Final result)  Result time 08/14/23 07:43:31      Final result by Xu Traylor MD (08/14/23 07:43:31)                   Impression:      1. There is degenerative change but there is no acute fracture or traumatic subluxation.  Note: A preliminary interpretation is not available at the time of dictation.      Electronically signed by: Xu Traylor MD  Date:    08/14/2023  Time:    07:43               Narrative:    EXAMINATION:  CT THORACIC SPINE WITHOUT CONTRAST    CLINICAL HISTORY:  Back trauma, no prior imaging (Age >= 16y);    TECHNIQUE:  Unenhanced axial images were obtained through the thoracic spine.  Sagittal and coronal reformatted images were created.  The study is reviewed in bone and soft  tissue windows.    COMPARISON:  Plain films of the thoracic spine dated 12/04/2013, CT lumbar spine obtained concurrently    FINDINGS:  Vertebral column: There is multilevel degenerative change with disc space narrowing and bridging osteophyte formation throughout the mid and lower thoracic spine again suggesting changes of diffuse idiopathic skeletal hyperostosis.  Vertebral body height is preserved.  There is no fracture.  Alignment is normal.  There are chronic Schmorl's nodes at several endplates.  There is also degenerative change in the included lower cervical spine with marked disc space narrowing at the C5-6 through C7-T1 levels where there is also endplate sclerosis and osteophyte formation.    Spinal canal and contents: The spinal canal may be at least borderline small on a developmental basis.  There is no obvious abnormal epidural mass, fluid collection or hematoma.  There is no significant spinal stenosis suspected.    There is no large disc extrusion at any level.  There is no significant spinal stenosis or significant bony foraminal stenosis.    The prevertebral soft tissues are grossly normal.  There is scattered atherosclerosis.  There is a calcification in the right lobe of the liver which may represent old granulomatous disease.  There are dependent opacities in the posterior lungs but there is no pleural effusion or focal consolidation or contusion but this is a limited evaluation of the lungs.                                       X-Ray Foot Complete Left (Final result)  Result time 08/14/23 08:09:57      Final result by Aide Caputo MD (08/14/23 08:09:57)                   Impression:      Overall the foot appears similar to 07/12/2023.  The patient is status post amputation of the 4th and 5th digits.  There is no evidence bony erosion.      Electronically signed by: Aide Caputo MD  Date:    08/14/2023  Time:    08:09               Narrative:    EXAMINATION:  XR FOOT COMPLETE 3 VIEW  LEFT    CLINICAL HISTORY:  .  Unspecified fall, initial encounter    TECHNIQUE:  AP, lateral and oblique views of the left foot were performed.    COMPARISON:  07/12/2023    FINDINGS:  The patient is status post amputation of the 4th and 5th digits unchanged from prior exam.  There is no evidence acute fracture nor dislocation.  The adjacent soft tissues show swelling.                                       X-Ray Pelvis Routine AP (Final result)  Result time 08/14/23 08:12:40      Final result by Aide Caputo MD (08/14/23 08:12:40)                   Impression:      There are degenerative changes of the left hip.      Electronically signed by: Aide Caputo MD  Date:    08/14/2023  Time:    08:12               Narrative:    EXAMINATION:  XR PELVIS ROUTINE AP    CLINICAL HISTORY:  Unspecified fall, initial encounter    TECHNIQUE:  AP view of the pelvis was performed.    COMPARISON:  None.    FINDINGS:  Both hips are located.  There is joint space narrowing adjacent sclerosis and the left greater than right hip.  No fractures visualized on the single view.                                       X-Ray Shoulder Trauma Right (Final result)  Result time 08/14/23 08:09:22      Final result by Aide Caputo MD (08/14/23 08:09:22)                   Impression:      There is no evidence acute bony injury of the right shoulder.      Electronically signed by: Aide Caputo MD  Date:    08/14/2023  Time:    08:09               Narrative:    EXAMINATION:  XR SHOULDER TRAUMA 3 VIEW RIGHT    CLINICAL HISTORY:  Unspecified fall, initial encounter    TECHNIQUE:  Three or four views of the right shoulder were performed.    COMPARISON:  10/17/2021    FINDINGS:  There is hypertrophic osteoarthritic changes of the right acromioclavicular joint.  The adjacent soft tissues are unremarkable.  There is no evidence fracture or dislocation.                                       X-Ray Elbow Complete Right (Final result)  Result  time 08/14/23 08:07:53      Final result by Aide Caputo MD (08/14/23 08:07:53)                   Impression:      There is no evidence acute bony injury of the right elbow.      Electronically signed by: Aide Caputo MD  Date:    08/14/2023  Time:    08:07               Narrative:    EXAMINATION:  XR ELBOW COMPLETE 3 VIEW RIGHT    CLINICAL HISTORY:  . Unspecified fall, initial encounter    TECHNIQUE:  AP, lateral, and oblique views of the right elbow were performed.    COMPARISON:  None    FINDINGS:  There is no evidence fracture nor malalignment.  The adjacent soft tissues are unremarkable.                                       Medications   vancomycin (VANCOCIN) 1,000 mg injection (has no administration in time range)   sodium chloride 0.9% bolus 1,000 mL 1,000 mL (0 mLs Intravenous Stopped 8/14/23 0606)   cefTRIAXone (ROCEPHIN) 1 g in dextrose 5 % in water (D5W) 100 mL IVPB (MB+) (0 g Intravenous Stopped 8/14/23 0556)   acetaminophen tablet 1,000 mg (1,000 mg Oral Given 8/14/23 0543)   lactated ringers bolus 1,000 mL (0 mLs Intravenous Stopped 8/14/23 0656)   vancomycin 2 g in 0.9% sodium chloride 500 mL IVPB (0 mg Intravenous Stopped 8/14/23 0813)   sodium chloride 0.9% bolus 1,000 mL 1,000 mL (0 mLs Intravenous Stopped 8/14/23 0813)   ceFEPIme (MAXIPIME) 2 g in dextrose 5 % in water (D5W) 100 mL IVPB (MB+) (0 g Intravenous Stopped 8/14/23 1003)   iohexoL (OMNIPAQUE 350) injection 100 mL (100 mLs Intravenous Given 8/14/23 0834)   potassium chloride SA CR tablet 40 mEq (40 mEq Oral Given 8/15/23 0741)   magnesium sulfate 2g in water 50mL IVPB (premix) (0 g Intravenous Stopped 8/15/23 0945)     Medical Decision Making:   Initial Assessment:   Fall, infection  Differential Diagnosis:   Fall, fracture, infection, cellulitis, among others  Clinical Tests:   Lab Tests: Ordered  Radiological Study: Ordered  ED Management:  Patient febrile, tachycardic with AFib with RVR with rates in the 130s.  We will give  Rocephin ordered blood cultures ordered lactate and fluids initiated.  --------------------  Dr. Xie addendum  I assumed care of patient at shift change at 0600.  Lab work currently pending results. See H&P above details.  Blood pressure 106/45 temperature 100.8° meeting SIRS criteria  Physical exam is significant for malodorous smell of left lower extremity wounds concerning for wound infection;cellulitis, and concern for source of infection. The wound bed however is well-appearing with beefy red surface.    Lab work returned significant for leukocytosis with neutrophilic predominance, lactic acid is 1.3.  Added vancomycin for MRSA coverage  -Patient was re-evaluated in temperature has improved to 99.3, blood pressure has also improved after 3 L IV fluid bolus.  Ordered additional 2 L for a total of 3 L IV fluids for 30 ml/kg (ideal body weight)  Imaging pending results               ED Course as of 09/26/23 2216   Mon Aug 14, 2023   0700 Febrile up to 101, leukocytosis white count 19.37, lactic acid 1.3   Source of infection likely right lower extremity chronic wounds.  Concerning for sepsis [ES]      ED Course User Index  [ES] Wilver Xie MD                 Clinical Impression:   Final diagnoses:  [W19.XXXA] Fall  [A41.9] Sepsis, due to unspecified organism, unspecified whether acute organ dysfunction present (Primary)  [I48.91] Atrial fibrillation with RVR        ED Disposition Condition    Admit                 Victoria Ruelas MD  08/17/23 0517       Victoria Ruelas MD  09/26/23 2216

## 2023-08-14 NOTE — PROGRESS NOTES
"Pharmacokinetic Initial Assessment: IV Vancomycin    Assessment/Plan:    Initiate intravenous vancomycin with loading dose of 2000 mg once followed by a maintenance dose of vancomycin 2000 mg IV every 12 hours  Desired empiric serum trough concentration is 10 to 15 mcg/mL  Draw vancomycin trough level 30 min prior to 4th dose on 08/15/ at approximately 1400  Pharmacy will continue to follow and monitor vancomycin.      Please contact pharmacy at extension [phone] with any questions regarding this assessment.     Thank you for the consult,   Giorgi Susy       Patient brief summary:  Alfred Villarreal is a 62 y.o. male initiated on antimicrobial therapy with IV Vancomycin for treatment of suspected skin & soft tissue infection    Drug Allergies:   Review of patient's allergies indicates:   Allergen Reactions    Amitriptyline      Vivid dreams( bad)       Actual Body Weight:   154.2 Kg    Renal Function:   Estimated Creatinine Clearance: 142 mL/min (based on SCr of 0.9 mg/dL).,     Dialysis Method (if applicable):  N/A    CBC (last 72 hours):  Recent Labs   Lab Result Units 08/14/23  0526   WBC K/uL 19.37*   Hemoglobin g/dL 12.9*   Hemoglobin A1C % 11.2*   Hematocrit % 37.9*   Platelets K/uL 223   Gran % % 93.4*   Lymph % % 2.7*   Mono % % 2.8*   Eosinophil % % 0.3   Basophil % % 0.3   Differential Method  Automated       Metabolic Panel (last 72 hours):  Recent Labs   Lab Result Units 08/14/23  0526 08/14/23  0824   Sodium mmol/L 131*  --    Potassium mmol/L 3.5  --    Chloride mmol/L 98  --    CO2 mmol/L 25  --    Glucose mg/dL 277*  --    Glucose, UA   --  2+*   BUN mg/dL 10  --    Creatinine mg/dL 0.9  --    Albumin g/dL 2.7*  --    Total Bilirubin mg/dL 0.7  --    Alkaline Phosphatase U/L 109  --    AST U/L 14  --    ALT U/L 10  --        Drug levels (last 3 results):  No results for input(s): "VANCOMYCINRA", "VANCORANDOM", "VANCOMYCINPE", "VANCOPEAK", "VANCOMYCINTR", "VANCOTROUGH" in the last 72 " hours.    Microbiologic Results:  Microbiology Results (last 7 days)       Procedure Component Value Units Date/Time    Blood culture #1 **CANNOT BE ORDERED STAT** [[id number]] Collected: 08/14/23 0526    Order Status: Sent Specimen: Blood from Peripheral, Hand, Right Updated: 08/14/23 0526    Blood culture #2 **CANNOT BE ORDERED STAT** [[id number]] Collected: 08/14/23 0526    Order Status: Sent Specimen: Blood from Peripheral, Hand, Left Updated: 08/14/23 0526

## 2023-08-14 NOTE — ASSESSMENT & PLAN NOTE
Afib w/ RVR at presentation  Currently rate controlled on no meds  Patient not interested in restarting full dose AC- Explained risks and benefits. Patient seems to understand

## 2023-08-14 NOTE — HPI
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 Patient was attempting to transfer from bed to wheelchair when he fell. Has been having fever and increased redness and swelling of LLE for past 24h. Follows with Dr Stringer outpatient. No NVD. No CP,SOB. Complaining of low back pain. No weakness or numbness. Patient stopped AC for afib 2-3 years ago due to recurrent bleeding episodes.    Eye Protection Verbiage: Before proceeding with the stage, a plastic scleral shield was inserted. The globe was anesthetized with a few drops of 1% lidocaine with 1:100,000 epinephrine. Then, an appropriate sized scleral shield was chosen and coated with lacrilube ointment. The shield was gently inserted and left in place for the duration of each stage. After the stage was completed, the shield was gently removed.

## 2023-08-14 NOTE — ASSESSMENT & PLAN NOTE
This patient does have evidence of infective focus  My overall impression is sepsis.  Source: Skin and Soft Tissue (location LLE and left foot )  Antibiotics given-   Antibiotics (72h ago, onward)    Start     Stop Route Frequency Ordered    08/14/23 1600  ceFEPIme (MAXIPIME) 2 g in dextrose 5 % in water (D5W) 100 mL IVPB (MB+)         -- IV Every 8 hours (non-standard times) 08/14/23 1014    08/14/23 1400  vancomycin 2 g in 0.9% sodium chloride 500 mL IVPB         -- IV Every 12 hours (non-standard times) 08/14/23 1025    08/14/23 1113  vancomycin - pharmacy to dose  (vancomycin IVPB (PEDS and ADULTS))        See Hyperspace for full Linked Orders Report.    -- IV pharmacy to manage frequency 08/14/23 1014        Latest lactate reviewed-  Recent Labs   Lab 08/14/23  0526   LACTATE 1.3     Organ dysfunction indicated by none    Fluid challenge Ideal Body Weight- The patient's ideal body weight is Ideal body weight: 91.4 kg (201 lb 8 oz) which will be used to calculate fluid bolus of 30 ml/kg for treatment of septic shock.      Post- resuscitation assessment Yes Perfusion exam was performed within 6 hours of septic shock presentation after bolus shows Adequate tissue perfusion assessed by non-invasive monitoring       Will Not start Pressors- Levophed for MAP of 65  Source control achieved by: IV abx  Podiatry consulted  Elevate extremity  Alfred borders of cellulitis  q6h neurovascular checks  F/U cultures

## 2023-08-14 NOTE — CONSULTS
Consults  [facility] Intensive Care  Podiatry  H&P    Patient Name: Alfred Villarreal  MRN: [id number]  Admission Date: 8/14/2023  Attending Physician: Star Dang MD  Primary Care Provider: Vahid Craig MD     Subjective:     History of Present Illness:  Patient presents today for initial inpatient consultation due to severe infection involving the left lower extremity.  Patient has a history of previous culture positive for Providencia E coli staph and strep.  Patient states he was doing okay up until about 2 days ago he states he has been changing the dressing as directed every day he still had some drainage on the left lower extremity he states the outside ulcerations on the left lower extremity have gotten larger however about 2 days ago he started to feel very bad very weak got the chills and stated he could barely stand up to go to the kitchen it was that time that he went to the emergency department for evaluation.  Patient has had chronic ulcerations on the left lower extremity with a history of previous below-the-knee amputation right.  Patient is supposed to maintain a nonweightbearing status however he does have a history of weight-bearing as it is difficult for him to get around and not put weight on the left lower extremity due to previous right lower extremity amputation.    Scheduled Meds:   ceFEPime (MAXIPIME) IVPB  2 g Intravenous Q8H    enoxparin  40 mg Subcutaneous Q24H (prophylaxis, 1700)    insulin detemir U-100  35 Units Subcutaneous QHS    vancomycin (VANCOCIN) IV (PEDS and ADULTS)  2,000 mg Intravenous Q12H     Continuous Infusions:   lactated ringers 100 mL/hr at 08/14/23 1057     PRN Meds:glucagon (human recombinant), glucose, glucose, insulin aspart U-100, metoprolol, morphine, naloxone, ondansetron, oxyCODONE, prochlorperazine, sodium chloride 0.9%, Pharmacy to dose Vancomycin consult **AND** vancomycin - pharmacy to dose    Review of patient's allergies indicates:   Allergen  "Reactions    Amitriptyline      Vivid dreams( bad)        Past Medical History:   Diagnosis Date    A-fib     Anticoagulant long-term use     Arthritis     Diabetes mellitus     Hypertension      Past Surgical History:   Procedure Laterality Date    BELOW KNEE AMPUTATION OF LOWER EXTREMITY Right     "About 15 years"       Family History    None       Tobacco Use    Smoking status: Never    Smokeless tobacco: Current     Types: Chew   Substance and Sexual Activity    Alcohol use: Not Currently    Drug use: No    Sexual activity: Yes     Partners: Female     Review of Systems   Constitutional:  Positive for fatigue.   Musculoskeletal:  Positive for arthralgias, back pain, gait problem and joint swelling.   Skin:  Positive for color change and wound.   All other systems reviewed and are negative.    Objective:     Vital Signs (Most Recent):  Temp: 99.6 °F (37.6 °C) (08/14/23 1649)  Pulse: 87 (08/14/23 1649)  Resp: (!) 21 (08/14/23 1649)  BP: (!) 169/83 (08/14/23 1649)  SpO2: (!) 94 % (08/14/23 1513) Vital Signs (24h Range):  Temp:  [98.6 °F (37 °C)-101.3 °F (38.5 °C)] 99.6 °F (37.6 °C)  Pulse:  [] 87  Resp:  [9-28] 21  SpO2:  [92 %-98 %] 94 %  BP: ()/(39-90) 169/83     Weight: (!) 154.2 kg (340 lb)  Body mass index is 39.29 kg/m².    Foot Exam    Left Foot/Ankle      Inspection and Palpation  Skin Exam: maceration, ulcer and erythema;     Neurovascular  Dorsalis pedis: 1+  Posterior tibial: 0        Physical Exam  Vitals and nursing note reviewed.   Constitutional:       Appearance: He is ill-appearing.   Cardiovascular:      Pulses:           Dorsalis pedis pulses are 1+ on the left side.        Posterior tibial pulses are 0 on the left side.   Pulmonary:      Effort: Pulmonary effort is normal.   Musculoskeletal:         General: Swelling, tenderness and deformity present.      Left lower leg: Deformity present. 3+ Edema present.      Left foot: Decreased range of motion and decreased capillary refill. " Deformity present.        Legs:         Feet:       Right Lower Extremity: Right leg is amputated below knee.   Feet:      Left foot:      Protective Sensation: 5 sites tested.  2 sites sensed.      Skin integrity: Ulcer, skin breakdown, erythema and warmth present.   Skin:     Capillary Refill: Capillary refill takes 2 to 3 seconds.      Findings: Erythema present.   Neurological:      Mental Status: He is alert.      Sensory: Sensory deficit present.   Psychiatric:         Mood and Affect: Mood normal.         Behavior: Behavior normal.           Laboratory:  All pertinent labs reviewed within the last 24 hours.    Diagnostic Results:  I have reviewed all pertinent imaging results/findings within the past 24 hours.    Clinical Findings:            Assessment/Plan:     Active Diagnoses:    Diagnosis Date Noted POA    PRINCIPAL PROBLEM:  Severe sepsis [A41.9, R65.20] 08/14/2023 Yes    Atrial fibrillation with RVR [I48.91] 01/29/2015 Yes    Diabetes mellitus, type 2 [E11.9] 01/16/2015 Yes     Chronic      Problems Resolved During this Admission:       Patient presents today for initial inpatient consultation due to severe infection involving the left lower extremity.  Patient has a history of previous culture positive for Providencia E coli staph and strep.  Patient states he was doing okay up until about 2 days ago he states he has been changing the dressing as directed every day he still had some drainage on the left lower extremity he states the outside ulcerations on the left lower extremity have gotten larger however about 2 days ago he started to feel very bad very weak got the chills and stated he could barely stand up to go to the kitchen it was that time that he went to the emergency department for evaluation.  Patient has had chronic ulcerations on the left lower extremity with a history of previous below-the-knee amputation right.  Patient is supposed to maintain a nonweightbearing status however he does  have a history of weight-bearing as it is difficult for him to get around and not put weight on the left lower extremity due to previous right lower extremity amputation.  Many times the patient is supposed to follow up with me in 2-3 weeks however we do not see the patient for 2 months at a time and typically he only comes back into the office when his situation has gotten progressively worse.  On evaluation patient has heavy drainage with serous drainage emitting from all ulceration sites including the plantar left heel and the lateral aspect of the left lower extremity these areas of breakdown have gotten significantly larger the patient's heel is approximately 6 cm long by 4 cm wide by 4 mm deep there is a large area of ulceration encompassing approximately 15 cm long by 6 cm wide on the lateral aspect of the left lower extremity.  All of these areas display significant infection the patient has also previously had multiple bouts of Pseudomonas.  Following evaluation and initial inpatient consultation I have put in orders for nursing to scrub the areas with a Betadine scrub allow this to sit in place for 10-15 minutes rinse with Dakin solution and apply ascetic acid wet-to-dry dressing on all wound sites this is to be changed daily.  Patient was in understanding and agreement with everything discussed I did perform both aerobic and anaerobic culture and sensitivity on the wound sites from the patient's left heel.  No excisional debridement was necessary upon evaluation today patient states that he is already feeling better since he is come into the hospital.  I will follow up with the patient the dressings will be changed daily patient is currently on Maxipime and vancomycin which I feel are good choices pending culture and sensitivity.  Patient's WBCs at the time of admission were 19.37 and the patient's glucose was 277.  I will continue to follow up with the patient adjust antibiotics as appropriate and  continue localized wound care.  Patient does have a lot more swelling in the left lower extremity today in comparison to previous evaluation this is significantly increased since I saw the patient approximately a month ago.  Total face-to-face time including discussion evaluation treatment discussion of treatment options treatment plan extensive chart review review of the patient's imaging studies which do not display signs of osteomyelitis of the patient's left foot and extensive chart review and documentation of today's visit equaled 60 minutes.  This note was created using M*Voice[employer] voice recognition software that occasionally misinterpreted phrases or words.     Gary Stringer, ANNIE  Podiatry  [facility]

## 2023-08-14 NOTE — ED NOTES
Assumed care of pt. Pt is lying right lateral recumbent. Breathing is even and unlabored. He is sleeping and is easily arousable. Pt states that he wants something for pain at this time. Pain is 3/10 on the pain scale. Pt inadvertently removed right hand IV. A new line is started. Pt has no other expressed needs at this time. Will continue to monitor.

## 2023-08-14 NOTE — ED NOTES
Pt presents to the ED for fall when trying to get into their electric scooter. Pt states they are weak. Pt feels warm to the touch. LLE wounds wrapped with soiled dressing. Pt is ill appearing. Mild distress noted. AAOx4 Will continue to monitor.

## 2023-08-15 LAB
ALBUMIN SERPL BCP-MCNC: 2.5 G/DL (ref 3.5–5.2)
ALP SERPL-CCNC: 97 U/L (ref 55–135)
ALT SERPL W/O P-5'-P-CCNC: 12 U/L (ref 10–44)
ANION GAP SERPL CALC-SCNC: 7 MMOL/L (ref 8–16)
AST SERPL-CCNC: 23 U/L (ref 10–40)
BASOPHILS # BLD AUTO: 0.04 K/UL (ref 0–0.2)
BASOPHILS NFR BLD: 0.3 % (ref 0–1.9)
BILIRUB SERPL-MCNC: 0.5 MG/DL (ref 0.1–1)
BUN SERPL-MCNC: 7 MG/DL (ref 8–23)
CALCIUM SERPL-MCNC: 8.6 MG/DL (ref 8.7–10.5)
CHLORIDE SERPL-SCNC: 100 MMOL/L (ref 95–110)
CO2 SERPL-SCNC: 26 MMOL/L (ref 23–29)
CREAT SERPL-MCNC: 0.8 MG/DL (ref 0.5–1.4)
DIFFERENTIAL METHOD: ABNORMAL
EOSINOPHIL # BLD AUTO: 0.2 K/UL (ref 0–0.5)
EOSINOPHIL NFR BLD: 2 % (ref 0–8)
ERYTHROCYTE [DISTWIDTH] IN BLOOD BY AUTOMATED COUNT: 13.1 % (ref 11.5–14.5)
EST. GFR  (NO RACE VARIABLE): >60 ML/MIN/1.73 M^2
GLUCOSE SERPL-MCNC: 291 MG/DL (ref 70–110)
HCT VFR BLD AUTO: 33.6 % (ref 40–54)
HGB BLD-MCNC: 11.5 G/DL (ref 14–18)
IMM GRANULOCYTES # BLD AUTO: 0.05 K/UL (ref 0–0.04)
IMM GRANULOCYTES NFR BLD AUTO: 0.4 % (ref 0–0.5)
LYMPHOCYTES # BLD AUTO: 1.6 K/UL (ref 1–4.8)
LYMPHOCYTES NFR BLD: 13.3 % (ref 18–48)
MAGNESIUM SERPL-MCNC: 1.6 MG/DL (ref 1.6–2.6)
MCH RBC QN AUTO: 29 PG (ref 27–31)
MCHC RBC AUTO-ENTMCNC: 34.2 G/DL (ref 32–36)
MCV RBC AUTO: 85 FL (ref 82–98)
MONOCYTES # BLD AUTO: 0.6 K/UL (ref 0.3–1)
MONOCYTES NFR BLD: 4.6 % (ref 4–15)
NEUTROPHILS # BLD AUTO: 9.4 K/UL (ref 1.8–7.7)
NEUTROPHILS NFR BLD: 79.4 % (ref 38–73)
NRBC BLD-RTO: 0 /100 WBC
PLATELET # BLD AUTO: 196 K/UL (ref 150–450)
PMV BLD AUTO: 10.2 FL (ref 9.2–12.9)
POCT GLUCOSE: 267 MG/DL (ref 70–110)
POCT GLUCOSE: 268 MG/DL (ref 70–110)
POCT GLUCOSE: 292 MG/DL (ref 70–110)
POCT GLUCOSE: 323 MG/DL (ref 70–110)
POCT GLUCOSE: 355 MG/DL (ref 70–110)
POTASSIUM SERPL-SCNC: 3.3 MMOL/L (ref 3.5–5.1)
PROT SERPL-MCNC: 8.2 G/DL (ref 6–8.4)
RBC # BLD AUTO: 3.96 M/UL (ref 4.6–6.2)
SODIUM SERPL-SCNC: 133 MMOL/L (ref 136–145)
VANCOMYCIN TROUGH SERPL-MCNC: 10.4 UG/ML (ref 10–22)
WBC # BLD AUTO: 11.87 K/UL (ref 3.9–12.7)

## 2023-08-15 PROCEDURE — 99233 SBSQ HOSP IP/OBS HIGH 50: CPT | Mod: ,,, | Performed by: STUDENT IN AN ORGANIZED HEALTH CARE EDUCATION/TRAINING PROGRAM

## 2023-08-15 PROCEDURE — 96372 THER/PROPH/DIAG INJ SC/IM: CPT | Performed by: STUDENT IN AN ORGANIZED HEALTH CARE EDUCATION/TRAINING PROGRAM

## 2023-08-15 PROCEDURE — 80053 COMPREHEN METABOLIC PANEL: CPT | Performed by: STUDENT IN AN ORGANIZED HEALTH CARE EDUCATION/TRAINING PROGRAM

## 2023-08-15 PROCEDURE — 80202 ASSAY OF VANCOMYCIN: CPT | Performed by: STUDENT IN AN ORGANIZED HEALTH CARE EDUCATION/TRAINING PROGRAM

## 2023-08-15 PROCEDURE — 63600175 PHARM REV CODE 636 W HCPCS: Mod: UD | Performed by: STUDENT IN AN ORGANIZED HEALTH CARE EDUCATION/TRAINING PROGRAM

## 2023-08-15 PROCEDURE — 99233 PR SUBSEQUENT HOSPITAL CARE,LEVL III: ICD-10-PCS | Mod: ,,, | Performed by: STUDENT IN AN ORGANIZED HEALTH CARE EDUCATION/TRAINING PROGRAM

## 2023-08-15 PROCEDURE — 36415 COLL VENOUS BLD VENIPUNCTURE: CPT | Performed by: STUDENT IN AN ORGANIZED HEALTH CARE EDUCATION/TRAINING PROGRAM

## 2023-08-15 PROCEDURE — 25000003 PHARM REV CODE 250: Performed by: INTERNAL MEDICINE

## 2023-08-15 PROCEDURE — 63600175 PHARM REV CODE 636 W HCPCS: Performed by: INTERNAL MEDICINE

## 2023-08-15 PROCEDURE — 85025 COMPLETE CBC W/AUTO DIFF WBC: CPT | Performed by: STUDENT IN AN ORGANIZED HEALTH CARE EDUCATION/TRAINING PROGRAM

## 2023-08-15 PROCEDURE — 25000003 PHARM REV CODE 250: Performed by: STUDENT IN AN ORGANIZED HEALTH CARE EDUCATION/TRAINING PROGRAM

## 2023-08-15 PROCEDURE — 96376 TX/PRO/DX INJ SAME DRUG ADON: CPT

## 2023-08-15 PROCEDURE — 83735 ASSAY OF MAGNESIUM: CPT | Performed by: STUDENT IN AN ORGANIZED HEALTH CARE EDUCATION/TRAINING PROGRAM

## 2023-08-15 PROCEDURE — G0378 HOSPITAL OBSERVATION PER HR: HCPCS

## 2023-08-15 PROCEDURE — 96361 HYDRATE IV INFUSION ADD-ON: CPT

## 2023-08-15 PROCEDURE — 96366 THER/PROPH/DIAG IV INF ADDON: CPT

## 2023-08-15 RX ORDER — MAGNESIUM SULFATE HEPTAHYDRATE 40 MG/ML
2 INJECTION, SOLUTION INTRAVENOUS ONCE
Status: COMPLETED | OUTPATIENT
Start: 2023-08-15 | End: 2023-08-15

## 2023-08-15 RX ORDER — POTASSIUM CHLORIDE 20 MEQ/1
40 TABLET, EXTENDED RELEASE ORAL ONCE
Status: COMPLETED | OUTPATIENT
Start: 2023-08-15 | End: 2023-08-15

## 2023-08-15 RX ORDER — VANCOMYCIN HYDROCHLORIDE 1 G/20ML
INJECTION, POWDER, LYOPHILIZED, FOR SOLUTION INTRAVENOUS
Status: DISPENSED
Start: 2023-08-15 | End: 2023-08-15

## 2023-08-15 RX ORDER — MORPHINE SULFATE 2 MG/ML
4 INJECTION, SOLUTION INTRAMUSCULAR; INTRAVENOUS EVERY 4 HOURS PRN
Status: DISCONTINUED | OUTPATIENT
Start: 2023-08-15 | End: 2023-08-16 | Stop reason: HOSPADM

## 2023-08-15 RX ORDER — OXYCODONE HYDROCHLORIDE 5 MG/1
10 TABLET ORAL EVERY 6 HOURS PRN
Status: DISCONTINUED | OUTPATIENT
Start: 2023-08-15 | End: 2023-08-16 | Stop reason: HOSPADM

## 2023-08-15 RX ORDER — METOPROLOL TARTRATE 25 MG/1
12.5 TABLET ORAL 2 TIMES DAILY
Status: DISCONTINUED | OUTPATIENT
Start: 2023-08-15 | End: 2023-08-16

## 2023-08-15 RX ADMIN — CEFEPIME 2 G: 2 INJECTION, POWDER, FOR SOLUTION INTRAVENOUS at 11:08

## 2023-08-15 RX ADMIN — CEFEPIME 2 G: 2 INJECTION, POWDER, FOR SOLUTION INTRAVENOUS at 03:08

## 2023-08-15 RX ADMIN — VANCOMYCIN HYDROCHLORIDE 2000 MG: 1 INJECTION, POWDER, LYOPHILIZED, FOR SOLUTION INTRAVENOUS at 06:08

## 2023-08-15 RX ADMIN — INSULIN ASPART 6 UNITS: 100 INJECTION, SOLUTION INTRAVENOUS; SUBCUTANEOUS at 07:08

## 2023-08-15 RX ADMIN — MORPHINE SULFATE 4 MG: 2 INJECTION, SOLUTION INTRAMUSCULAR; INTRAVENOUS at 12:08

## 2023-08-15 RX ADMIN — OXYCODONE HYDROCHLORIDE 10 MG: 5 TABLET ORAL at 08:08

## 2023-08-15 RX ADMIN — INSULIN ASPART 10 UNITS: 100 INJECTION, SOLUTION INTRAVENOUS; SUBCUTANEOUS at 11:08

## 2023-08-15 RX ADMIN — METOPROLOL TARTRATE 12.5 MG: 25 TABLET, FILM COATED ORAL at 08:08

## 2023-08-15 RX ADMIN — OXYCODONE HYDROCHLORIDE 10 MG: 5 TABLET ORAL at 02:08

## 2023-08-15 RX ADMIN — INSULIN ASPART 8 UNITS: 100 INJECTION, SOLUTION INTRAVENOUS; SUBCUTANEOUS at 04:08

## 2023-08-15 RX ADMIN — MORPHINE SULFATE 4 MG: 2 INJECTION, SOLUTION INTRAMUSCULAR; INTRAVENOUS at 09:08

## 2023-08-15 RX ADMIN — MAGNESIUM SULFATE HEPTAHYDRATE 2 G: 40 INJECTION, SOLUTION INTRAVENOUS at 07:08

## 2023-08-15 RX ADMIN — POTASSIUM CHLORIDE 40 MEQ: 1500 TABLET, EXTENDED RELEASE ORAL at 07:08

## 2023-08-15 RX ADMIN — ENOXAPARIN SODIUM 40 MG: 100 INJECTION SUBCUTANEOUS at 04:08

## 2023-08-15 RX ADMIN — CEFEPIME 2 G: 2 INJECTION, POWDER, FOR SOLUTION INTRAVENOUS at 09:08

## 2023-08-15 RX ADMIN — INSULIN ASPART 3 UNITS: 100 INJECTION, SOLUTION INTRAVENOUS; SUBCUTANEOUS at 09:08

## 2023-08-15 NOTE — ASSESSMENT & PLAN NOTE
Afib w/ RVR at presentation  Currently rate controlled on no meds- starting low dose beta blocker  Patient not interested in restarting full dose AC- Explained risks and benefits. Patient seems to understand

## 2023-08-15 NOTE — CARE UPDATE
"Per nursing communication, "The patient in room 12 is requesting something stronger for pain. States that he takes Percocet 10mg tid for pain and the oxycodone 5mg nor the morphine 1mg has helped."    Adjusted up Oxy IR to 10mg and Morphine to 4mg iv        Current Facility-Administered Medications:     ceFEPIme (MAXIPIME) 2 g in dextrose 5 % in water (D5W) 100 mL IVPB (MB+), 2 g, Intravenous, Q8H, Star Dang MD, Stopped at 08/15/23 0024    enoxaparin injection 40 mg, 40 mg, Subcutaneous, Q24H (prophylaxis, 1700), Star Dang MD, 40 mg at 08/14/23 1646    glucagon (human recombinant) injection 1 mg, 1 mg, Intramuscular, PRN, Star Dang MD    glucose chewable tablet 16 g, 16 g, Oral, PRN, Star Dang MD    glucose chewable tablet 24 g, 24 g, Oral, PRN, Star Dang MD    insulin aspart U-100 pen 1-10 Units, 1-10 Units, Subcutaneous, QID (AC + HS) PRN, Star Dang MD, 3 Units at 08/14/23 2239    insulin detemir U-100 (Levemir) pen 35 Units, 35 Units, Subcutaneous, QHS, LAURA Quintanilla MD, 35 Units at 08/14/23 2238    lactated ringers infusion, , Intravenous, Continuous, Star Dang MD, Last Rate: 100 mL/hr at 08/14/23 2335, New Bag at 08/14/23 2335    metoprolol injection 5 mg, 5 mg, Intravenous, Once PRN, Star Dang MD    morphine injection 1 mg, 1 mg, Intravenous, Q4H PRN, Star Dang MD, 1 mg at 08/14/23 2024    naloxone 0.4 mg/mL injection 0.02 mg, 0.02 mg, Intravenous, PRN, Star Dang MD    ondansetron disintegrating tablet 8 mg, 8 mg, Oral, Q8H PRN, Star Dang MD    oxyCODONE immediate release tablet 5 mg, 5 mg, Oral, Q6H PRN, Star Dang MD, 5 mg at 08/14/23 2237    prochlorperazine injection Soln 5 mg, 5 mg, Intravenous, Q6H PRN, Star Dang MD    sodium chloride 0.9% flush 10 mL, 10 mL, Intravenous, Q12H PRN, Star Dang MD    Pharmacy to dose Vancomycin consult, , " , Once **AND** vancomycin - pharmacy to dose, , Intravenous, pharmacy to manage frequency, Star Dang MD    vancomycin 2 g in 0.9% sodium chloride 500 mL IVPB, 2,000 mg, Intravenous, Q12H, Star Dang MD, Stopped at 08/14/23 1900

## 2023-08-15 NOTE — ASSESSMENT & PLAN NOTE
Patient's FSGs are uncontrolled due to hyperglycemia on current medication regimen.  Last A1c reviewed-   Lab Results   Component Value Date    HGBA1C 11.2 (H) 08/14/2023     Most recent fingerstick glucose reviewed-   Recent Labs   Lab 08/14/23  1642 08/14/23  2201   POCTGLUCOSE 274* 268*     Current correctional scale  Medium  Maintain anti-hyperglycemic dose as follows-   Antihyperglycemics (From admission, onward)    Start     Stop Route Frequency Ordered    08/15/23 2100  insulin detemir U-100 (Levemir) pen 45 Units         -- SubQ Nightly 08/15/23 0658    08/14/23 1016  insulin aspart U-100 pen 1-10 Units         -- SubQ Before meals & nightly PRN 08/14/23 0917        Hold Oral hypoglycemics while patient is in the hospital.

## 2023-08-15 NOTE — ASSESSMENT & PLAN NOTE
This patient does have evidence of infective focus  My overall impression is sepsis.  Source: Skin and Soft Tissue (location LLE and left foot )  Antibiotics given-   Antibiotics (72h ago, onward)    Start     Stop Route Frequency Ordered    08/15/23 0633  vancomycin (VANCOCIN) 1,000 mg injection        Note to Pharmacy: Created by cabinet override    08/15/23 1844   08/15/23 0633    08/14/23 1800  vancomycin 2 g in 0.9% sodium chloride 500 mL IVPB         -- IV Every 12 hours (non-standard times) 08/14/23 1342    08/14/23 1600  ceFEPIme (MAXIPIME) 2 g in dextrose 5 % in water (D5W) 100 mL IVPB (MB+)         -- IV Every 8 hours (non-standard times) 08/14/23 1014    08/14/23 1113  vancomycin - pharmacy to dose  (vancomycin IVPB (PEDS and ADULTS))        See Hyperspace for full Linked Orders Report.    -- IV pharmacy to manage frequency 08/14/23 1014        Latest lactate reviewed-  Recent Labs   Lab 08/14/23  0526   LACTATE 1.3     Organ dysfunction indicated by none    Fluid challenge Ideal Body Weight- The patient's ideal body weight is Ideal body weight: 91.4 kg (201 lb 8 oz) which will be used to calculate fluid bolus of 30 ml/kg for treatment of septic shock.      Post- resuscitation assessment Yes Perfusion exam was performed within 6 hours of septic shock presentation after bolus shows Adequate tissue perfusion assessed by non-invasive monitoring       Will Not start Pressors- Levophed for MAP of 65  Source control achieved by: IV abx  Podiatry consulted- appreciate recommendations  Elevate extremity  Alfred borders of cellulitis  q6h neurovascular checks  F/U cultures   none

## 2023-08-15 NOTE — SUBJECTIVE & OBJECTIVE
Interval History: Overnight no acute events. Patient improving.    Review of Systems   All other systems reviewed and are negative.    Objective:     Vital Signs (Most Recent):  Temp: 98.6 °F (37 °C) (08/15/23 1111)  Pulse: 72 (08/15/23 1111)  Resp: 16 (08/15/23 1247)  BP: (!) 179/84 (08/15/23 1111)  SpO2: 98 % (08/15/23 1111) Vital Signs (24h Range):  Temp:  [97.7 °F (36.5 °C)-99.6 °F (37.6 °C)] 98.6 °F (37 °C)  Pulse:  [72-93] 72  Resp:  [15-21] 16  SpO2:  [94 %-99 %] 98 %  BP: (148-181)/(76-91) 179/84     Weight: (!) 154.2 kg (340 lb)  Body mass index is 39.29 kg/m².    Intake/Output Summary (Last 24 hours) at 8/15/2023 1306  Last data filed at 8/15/2023 1219  Gross per 24 hour   Intake 1394.9 ml   Output 2600 ml   Net -1205.1 ml         Physical Exam      Vitals reviewed  General: NAD, Well developed, Well Nourished  Head: NC/AT  Eyes: EOMI, ADELA  Cardiovascular: Pulses intact distally, Regular Rate and irregularrhythm  Pulmonary: Normal Respiratory Rate, No respiratory distress  Gi: Soft, Non-tender  Extremities: Warm, LLE wrapped with surgiacal dressing. Clean Dry Intact   Skin: Warm, dry  Neuro: Alert, Oriented x3, No focal Deficit  Psych: Appropriate mood and affect      Significant Labs: All pertinent labs within the past 24 hours have been reviewed.    Significant Imaging: I have reviewed all pertinent imaging results/findings within the past 24 hours.

## 2023-08-15 NOTE — CARE UPDATE
"Per nursing communication, "Good evening the patient in room 12 has an order for insulin levemir and insulin asapart that began this afternoon 8/14. The levemir is scheduled to proceed nightly 8/15 with no order for admission tonight and the aspart is PRN. His current accucheck is 268. Do you want me to cover with just the aspart or with both the aspart and levemir."    -On diabetic diet  -Changed Levemir to start now  -Cover with SSI as well  "

## 2023-08-15 NOTE — PLAN OF CARE
Problem: Adult Inpatient Plan of Care  Goal: Plan of Care Review  Outcome: Ongoing, Progressing  Goal: Patient-Specific Goal (Individualized)  Outcome: Ongoing, Progressing  Goal: Absence of Hospital-Acquired Illness or Injury  Outcome: Ongoing, Progressing  Goal: Optimal Comfort and Wellbeing  Outcome: Ongoing, Progressing  Goal: Readiness for Transition of Care  Outcome: Ongoing, Progressing     Problem: Adjustment to Illness (Sepsis/Septic Shock)  Goal: Optimal Coping  Outcome: Ongoing, Progressing     Problem: Bleeding (Sepsis/Septic Shock)  Goal: Absence of Bleeding  Outcome: Ongoing, Progressing     Problem: Infection Progression (Sepsis/Septic Shock)  Goal: Absence of Infection Signs and Symptoms  Outcome: Ongoing, Progressing     Problem: Nutrition Impaired (Sepsis/Septic Shock)  Goal: Optimal Nutrition Intake  Outcome: Ongoing, Progressing     Problem: Skin Injury Risk Increased  Goal: Skin Health and Integrity  Outcome: Ongoing, Progressing     Problem: Impaired Wound Healing  Goal: Optimal Wound Healing  Outcome: Ongoing, Progressing

## 2023-08-15 NOTE — PROGRESS NOTES
[facility] Medicine  Progress Note    Patient Name: Alfred Villarreal  MRN: [id number]  Patient Class: IP- Inpatient   Admission Date: 8/14/2023  Length of Stay: 1 days  Attending Physician: Star Dang MD  Primary Care Provider: Vahid Craig MD        Subjective:     Principal Problem:Severe sepsis        HPI:  63 yo w/ hx of IDDM2, Chronic Afib not on AC per patient preference, Right BKA, Chronic LLE wound presenting after all at home. Patient was attempting to transfer from bed to wheelchair when he fell. Has been having fever and increased redness and swelling of LLE for past 24h. Follows with Dr Stringer outpatient. No NVD. No CP,SOB. Complaining of low back pain. No weakness or numbness. Patient stopped AC for afib 2-3 years ago due to recurrent bleeding episodes.       Overview/Hospital Course:  No notes on file    Interval History: Overnight no acute events. Patient improving.    Review of Systems   All other systems reviewed and are negative.    Objective:     Vital Signs (Most Recent):  Temp: 98.6 °F (37 °C) (08/15/23 1111)  Pulse: 72 (08/15/23 1111)  Resp: 16 (08/15/23 1247)  BP: (!) 179/84 (08/15/23 1111)  SpO2: 98 % (08/15/23 1111) Vital Signs (24h Range):  Temp:  [97.7 °F (36.5 °C)-99.6 °F (37.6 °C)] 98.6 °F (37 °C)  Pulse:  [72-93] 72  Resp:  [15-21] 16  SpO2:  [94 %-99 %] 98 %  BP: (148-181)/(76-91) 179/84     Weight: (!) 154.2 kg (340 lb)  Body mass index is 39.29 kg/m².    Intake/Output Summary (Last 24 hours) at 8/15/2023 1306  Last data filed at 8/15/2023 1219  Gross per 24 hour   Intake 1394.9 ml   Output 2600 ml   Net -1205.1 ml         Physical Exam      Vitals reviewed  General: NAD, Well developed, Well Nourished  Head: NC/AT  Eyes: EOMI, ADELA  Cardiovascular: Pulses intact distally, Regular Rate and irregularrhythm  Pulmonary: Normal Respiratory Rate, No respiratory distress  Gi: Soft, Non-tender  Extremities: Warm, LLE wrapped with surgiacal dressing. Clean  Dry Intact   Skin: Warm, dry  Neuro: Alert, Oriented x3, No focal Deficit  Psych: Appropriate mood and affect      Significant Labs: All pertinent labs within the past 24 hours have been reviewed.    Significant Imaging: I have reviewed all pertinent imaging results/findings within the past 24 hours.      Assessment/Plan:      * Severe sepsis  This patient does have evidence of infective focus  My overall impression is sepsis.  Source: Skin and Soft Tissue (location LLE and left foot )  Antibiotics given-   Antibiotics (72h ago, onward)    Start     Stop Route Frequency Ordered    08/15/23 0633  vancomycin (VANCOCIN) 1,000 mg injection        Note to Pharmacy: Created by cabinet override    08/15/23 1844   08/15/23 0633    08/14/23 1800  vancomycin 2 g in 0.9% sodium chloride 500 mL IVPB         -- IV Every 12 hours (non-standard times) 08/14/23 1342    08/14/23 1600  ceFEPIme (MAXIPIME) 2 g in dextrose 5 % in water (D5W) 100 mL IVPB (MB+)         -- IV Every 8 hours (non-standard times) 08/14/23 1014    08/14/23 1113  vancomycin - pharmacy to dose  (vancomycin IVPB (PEDS and ADULTS))        See Hyperspace for full Linked Orders Report.    -- IV pharmacy to manage frequency 08/14/23 1014        Latest lactate reviewed-  Recent Labs   Lab 08/14/23  0526   LACTATE 1.3     Organ dysfunction indicated by none    Fluid challenge Ideal Body Weight- The patient's ideal body weight is Ideal body weight: 91.4 kg (201 lb 8 oz) which will be used to calculate fluid bolus of 30 ml/kg for treatment of septic shock.      Post- resuscitation assessment Yes Perfusion exam was performed within 6 hours of septic shock presentation after bolus shows Adequate tissue perfusion assessed by non-invasive monitoring       Will Not start Pressors- Levophed for MAP of 65  Source control achieved by: IV abx  Podiatry consulted- appreciate recommendations  Elevate extremity  Alfred borders of cellulitis  q6h neurovascular checks  F/U  cultures    Atrial fibrillation with RVR  Afib w/ RVR at presentation  Currently rate controlled on no meds- starting low dose beta blocker  Patient not interested in restarting full dose AC- Explained risks and benefits. Patient seems to understand    Diabetes mellitus, type 2  Patient's FSGs are uncontrolled due to hyperglycemia on current medication regimen.  Last A1c reviewed-   Lab Results   Component Value Date    HGBA1C 11.2 (H) 08/14/2023     Most recent fingerstick glucose reviewed-   Recent Labs   Lab 08/14/23  1642 08/14/23  2201   POCTGLUCOSE 274* 268*     Current correctional scale  Medium  Maintain anti-hyperglycemic dose as follows-   Antihyperglycemics (From admission, onward)    Start     Stop Route Frequency Ordered    08/15/23 2100  insulin detemir U-100 (Levemir) pen 45 Units         -- SubQ Nightly 08/15/23 0658    08/14/23 1016  insulin aspart U-100 pen 1-10 Units         -- SubQ Before meals & nightly PRN 08/14/23 0917        Hold Oral hypoglycemics while patient is in the hospital.      VTE Risk Mitigation (From admission, onward)         Ordered     enoxaparin injection 40 mg  Every 24 hours         08/14/23 1636     IP VTE HIGH RISK PATIENT  Once         08/14/23 0917     Place sequential compression device  Until discontinued         08/14/23 0917                Discharge Planning   XIMENA:      Code Status: Full Code   Is the patient medically ready for discharge?:     Reason for patient still in hospital (select all that apply): Treatment                     Star Dang MD  Department of [facility] Medicine   [facility] Intensive Care

## 2023-08-16 VITALS
HEIGHT: 78 IN | BODY MASS INDEX: 36.45 KG/M2 | WEIGHT: 315 LBS | OXYGEN SATURATION: 94 % | SYSTOLIC BLOOD PRESSURE: 171 MMHG | TEMPERATURE: 97 F | RESPIRATION RATE: 20 BRPM | DIASTOLIC BLOOD PRESSURE: 81 MMHG | HEART RATE: 84 BPM

## 2023-08-16 LAB
ALBUMIN SERPL BCP-MCNC: 2.6 G/DL (ref 3.5–5.2)
ALP SERPL-CCNC: 108 U/L (ref 55–135)
ALT SERPL W/O P-5'-P-CCNC: 13 U/L (ref 10–44)
ANION GAP SERPL CALC-SCNC: 7 MMOL/L (ref 8–16)
AST SERPL-CCNC: 26 U/L (ref 10–40)
BASOPHILS # BLD AUTO: 0.04 K/UL (ref 0–0.2)
BASOPHILS NFR BLD: 0.4 % (ref 0–1.9)
BILIRUB SERPL-MCNC: 0.5 MG/DL (ref 0.1–1)
BUN SERPL-MCNC: 8 MG/DL (ref 8–23)
CALCIUM SERPL-MCNC: 8.9 MG/DL (ref 8.7–10.5)
CHLORIDE SERPL-SCNC: 98 MMOL/L (ref 95–110)
CO2 SERPL-SCNC: 26 MMOL/L (ref 23–29)
CREAT SERPL-MCNC: 0.9 MG/DL (ref 0.5–1.4)
DIFFERENTIAL METHOD: ABNORMAL
EOSINOPHIL # BLD AUTO: 0.3 K/UL (ref 0–0.5)
EOSINOPHIL NFR BLD: 3 % (ref 0–8)
ERYTHROCYTE [DISTWIDTH] IN BLOOD BY AUTOMATED COUNT: 13.1 % (ref 11.5–14.5)
EST. GFR  (NO RACE VARIABLE): >60 ML/MIN/1.73 M^2
GLUCOSE SERPL-MCNC: 294 MG/DL (ref 70–110)
HCT VFR BLD AUTO: 35.7 % (ref 40–54)
HGB BLD-MCNC: 12 G/DL (ref 14–18)
IMM GRANULOCYTES # BLD AUTO: 0.02 K/UL (ref 0–0.04)
IMM GRANULOCYTES NFR BLD AUTO: 0.2 % (ref 0–0.5)
LYMPHOCYTES # BLD AUTO: 1.6 K/UL (ref 1–4.8)
LYMPHOCYTES NFR BLD: 16.1 % (ref 18–48)
MAGNESIUM SERPL-MCNC: 1.7 MG/DL (ref 1.6–2.6)
MCH RBC QN AUTO: 28.5 PG (ref 27–31)
MCHC RBC AUTO-ENTMCNC: 33.6 G/DL (ref 32–36)
MCV RBC AUTO: 85 FL (ref 82–98)
MONOCYTES # BLD AUTO: 0.6 K/UL (ref 0.3–1)
MONOCYTES NFR BLD: 5.7 % (ref 4–15)
NEUTROPHILS # BLD AUTO: 7.4 K/UL (ref 1.8–7.7)
NEUTROPHILS NFR BLD: 74.6 % (ref 38–73)
NRBC BLD-RTO: 0 /100 WBC
PLATELET # BLD AUTO: 211 K/UL (ref 150–450)
PMV BLD AUTO: 10.4 FL (ref 9.2–12.9)
POCT GLUCOSE: 264 MG/DL (ref 70–110)
POTASSIUM SERPL-SCNC: 3.9 MMOL/L (ref 3.5–5.1)
PROT SERPL-MCNC: 8.6 G/DL (ref 6–8.4)
RBC # BLD AUTO: 4.21 M/UL (ref 4.6–6.2)
SODIUM SERPL-SCNC: 131 MMOL/L (ref 136–145)
WBC # BLD AUTO: 9.9 K/UL (ref 3.9–12.7)

## 2023-08-16 PROCEDURE — 99238 HOSP IP/OBS DSCHRG MGMT 30/<: CPT | Mod: ,,, | Performed by: STUDENT IN AN ORGANIZED HEALTH CARE EDUCATION/TRAINING PROGRAM

## 2023-08-16 PROCEDURE — 96376 TX/PRO/DX INJ SAME DRUG ADON: CPT

## 2023-08-16 PROCEDURE — 63600175 PHARM REV CODE 636 W HCPCS: Performed by: INTERNAL MEDICINE

## 2023-08-16 PROCEDURE — G0378 HOSPITAL OBSERVATION PER HR: HCPCS

## 2023-08-16 PROCEDURE — 85025 COMPLETE CBC W/AUTO DIFF WBC: CPT | Performed by: STUDENT IN AN ORGANIZED HEALTH CARE EDUCATION/TRAINING PROGRAM

## 2023-08-16 PROCEDURE — 96366 THER/PROPH/DIAG IV INF ADDON: CPT

## 2023-08-16 PROCEDURE — 83735 ASSAY OF MAGNESIUM: CPT | Performed by: STUDENT IN AN ORGANIZED HEALTH CARE EDUCATION/TRAINING PROGRAM

## 2023-08-16 PROCEDURE — 99214 PR OFFICE/OUTPT VISIT, EST, LEVL IV, 30-39 MIN: ICD-10-PCS | Mod: ,,, | Performed by: PODIATRIST

## 2023-08-16 PROCEDURE — 99214 OFFICE O/P EST MOD 30 MIN: CPT | Mod: ,,, | Performed by: PODIATRIST

## 2023-08-16 PROCEDURE — 25000003 PHARM REV CODE 250: Performed by: INTERNAL MEDICINE

## 2023-08-16 PROCEDURE — 25000003 PHARM REV CODE 250: Performed by: STUDENT IN AN ORGANIZED HEALTH CARE EDUCATION/TRAINING PROGRAM

## 2023-08-16 PROCEDURE — 63600175 PHARM REV CODE 636 W HCPCS: Mod: UD | Performed by: STUDENT IN AN ORGANIZED HEALTH CARE EDUCATION/TRAINING PROGRAM

## 2023-08-16 PROCEDURE — 99238 PR HOSPITAL DISCHARGE DAY,<30 MIN: ICD-10-PCS | Mod: ,,, | Performed by: STUDENT IN AN ORGANIZED HEALTH CARE EDUCATION/TRAINING PROGRAM

## 2023-08-16 PROCEDURE — 80053 COMPREHEN METABOLIC PANEL: CPT | Performed by: STUDENT IN AN ORGANIZED HEALTH CARE EDUCATION/TRAINING PROGRAM

## 2023-08-16 RX ORDER — CIPROFLOXACIN 750 MG/1
750 TABLET, FILM COATED ORAL EVERY 12 HOURS
Qty: 20 TABLET | Refills: 0 | Status: SHIPPED | OUTPATIENT
Start: 2023-08-16 | End: 2023-08-26

## 2023-08-16 RX ORDER — OXYCODONE AND ACETAMINOPHEN 10; 325 MG/1; MG/1
1 TABLET ORAL EVERY 8 HOURS PRN
Qty: 15 TABLET | Refills: 0 | Status: SHIPPED | OUTPATIENT
Start: 2023-08-16 | End: 2023-08-21

## 2023-08-16 RX ORDER — CARVEDILOL 3.12 MG/1
6.25 TABLET ORAL 2 TIMES DAILY
Status: DISCONTINUED | OUTPATIENT
Start: 2023-08-16 | End: 2023-08-16 | Stop reason: HOSPADM

## 2023-08-16 RX ORDER — CLINDAMYCIN HYDROCHLORIDE 150 MG/1
450 CAPSULE ORAL EVERY 8 HOURS
Qty: 90 CAPSULE | Refills: 0 | Status: SHIPPED | OUTPATIENT
Start: 2023-08-16 | End: 2023-08-26

## 2023-08-16 RX ORDER — CARVEDILOL 6.25 MG/1
6.25 TABLET ORAL 2 TIMES DAILY
Qty: 60 TABLET | Refills: 11 | Status: SHIPPED | OUTPATIENT
Start: 2023-08-16 | End: 2023-09-13

## 2023-08-16 RX ORDER — NALOXONE HYDROCHLORIDE 4 MG/.1ML
1 SPRAY NASAL ONCE
Qty: 1 EACH | Refills: 0 | Status: SHIPPED | OUTPATIENT
Start: 2023-08-16 | End: 2023-08-16

## 2023-08-16 RX ADMIN — CARVEDILOL 6.25 MG: 3.12 TABLET, FILM COATED ORAL at 08:08

## 2023-08-16 RX ADMIN — OXYCODONE HYDROCHLORIDE 10 MG: 5 TABLET ORAL at 03:08

## 2023-08-16 RX ADMIN — INSULIN ASPART 6 UNITS: 100 INJECTION, SOLUTION INTRAVENOUS; SUBCUTANEOUS at 08:08

## 2023-08-16 RX ADMIN — CEFEPIME 2 G: 2 INJECTION, POWDER, FOR SOLUTION INTRAVENOUS at 08:08

## 2023-08-16 RX ADMIN — VANCOMYCIN HYDROCHLORIDE 2000 MG: 1 INJECTION, POWDER, LYOPHILIZED, FOR SOLUTION INTRAVENOUS at 05:08

## 2023-08-16 RX ADMIN — OXYCODONE HYDROCHLORIDE 10 MG: 5 TABLET ORAL at 09:08

## 2023-08-16 RX ADMIN — MORPHINE SULFATE 4 MG: 2 INJECTION, SOLUTION INTRAMUSCULAR; INTRAVENOUS at 05:08

## 2023-08-16 NOTE — PLAN OF CARE
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]  Cellulitis of foot [L03.119]  Abscess of paraspinal muscles [M60.08]  Pain [R52]  Fall [W19.XXXA]  Anticoagulant long-term use [Z79.01]  Abscess in epidural space of L2-L5 lumbar spine [G06.1]  Diabetic foot infection [E11.628, L08.9]  Osteomyelitis of lumbar vertebra [M46.26]  Sepsis due to urinary tract infection [A41.9, N39.0]  Chest pain [R07.9]  Atrial fibrillation with RVR [I48.91]  Abscess of axilla, left [L02.412]  Hx of amputation [Z89.9]  Comprehensive diabetic foot examination, type 2 DM, encounter for [E11.9]  Abscess of bursa of left foot [M71.072]  Skin ulcer of left foot with fat layer exposed [L97.522]  Decreased functional mobility and endurance [Z74.09]  Cellulitis and abscess of left leg [L03.116, L02.416]  Sepsis, due to unspecified organism, unspecified whether acute organ dysfunction present [A41.9]  Ulcer of left foot with fat layer exposed [L97.522]    Admission Date: 8/14/2023  Expected Discharge Date:   Assessment completed with patient who was alert and oriented. Patient lives alone and uses a wheelchair for mobility, he also has a built in shower bench. Patient does not see any specialty doctors, does not have any home health or OPCM services and does not attend any outpatient therapies. Patient does drive self to errands and appointments. Patient stated that he does not have help at home and will be caring for his own recovery upon discharge. Patient denies any additional needs at this time. Case management will continue to follow.  Transition of Care Barriers: None    Payor: [place]ICA[place] / Plan: [place] [facility] COMMUNITY PLAN [place] / Product Type: Managed Medicaid /     Extended Emergency Contact  Information  Primary Emergency Contact: Maura Riley  Mobile Phone: [phone]  Relation: Sister  Preferred language: English   needed? No    Discharge Plan A: Home  Discharge Plan B: Home      [id number] - [place], [place] - [address]  [address]  [place] [address]  Phone: [phone] Fax: [phone]    [facility] - [facility], [place] - [address]  [address]  [facility] [place] [address]  Phone: [phone] Fax: [phone]      Initial Assessment (most recent)       Adult Discharge Assessment - 08/16/23 0921          Discharge Assessment    Assessment Type Discharge Planning Assessment     Confirmed/corrected address, phone number and insurance Yes     Confirmed Demographics Correct on Facesheet     Source of Information patient     Does patient/caregiver understand observation status Yes     Communicated XIMENA with patient/caregiver Date not available/Unable to determine     Reason For Admission psoas muscle abscess     People in Home alone     Do you expect to return to your current living situation? Yes     Do you have help at home or someone to help you manage your care at home? No     Prior to hospitilization cognitive status: Unable to Assess     Current cognitive status: Alert/Oriented     Equipment Currently Used at Home wheelchair     Readmission within 30 days? No     Patient currently being followed by outpatient case management? No     Do you currently have service(s) that help you manage your care at home? No     Do you have prescription coverage? Yes     Who is going to help you get home at discharge? self     How do you get to doctors appointments? car, drives self     Are you on dialysis? No     Do you take coumadin? No     DME Needed Upon Discharge  none     Discharge Plan discussed with: Patient     Transition of Care Barriers None     Discharge Plan A Home     Discharge Plan B Home        Physical  Activity    On average, how many days per week do you engage in moderate to strenuous exercise (like a brisk walk)? 0 days     On average, how many minutes do you engage in exercise at this level? 0 min        Financial Resource Strain    How hard is it for you to pay for the very basics like food, housing, medical care, and heating? Hard        Housing Stability    In the last 12 months, was there a time when you were not able to pay the mortgage or rent on time? No     In the last 12 months, was there a time when you did not have a steady place to sleep or slept in a shelter (including now)? No        Transportation Needs    In the past 12 months, has lack of transportation kept you from medical appointments or from getting medications? No     In the past 12 months, has lack of transportation kept you from meetings, work, or from getting things needed for daily living? No        Food Insecurity    Within the past 12 months, you worried that your food would run out before you got the money to buy more. Never true     Within the past 12 months, the food you bought just didn't last and you didn't have money to get more. Never true        Stress    Do you feel stress - tense, restless, nervous, or anxious, or unable to sleep at night because your mind is troubled all the time - these days? Rather much        Social Connections    In a typical week, how many times do you talk on the phone with family, friends, or neighbors? Never     How often do you get together with friends or relatives? Never     How often do you attend Zoroastrianism or Christian services? Never     Do you belong to any clubs or organizations such as Zoroastrianism groups, unions, fraternal or athletic groups, or school groups? No     How often do you attend meetings of the clubs or organizations you belong to? Never     Are you , , , , never , or living with a partner? Never         Alcohol Use    Q1: How often do you  have a drink containing alcohol? Never     Q2: How many drinks containing alcohol do you have on a typical day when you are drinking? Patient does not drink     Q3: How often do you have six or more drinks on one occasion? Never

## 2023-08-16 NOTE — PROGRESS NOTES
Pharmacokinetic Assessment Follow Up: IV Vancomycin    Vancomycin serum concentration assessment(s):    The trough level was drawn correctly and can be used to guide therapy at this time. The measurement is within the desired definitive target range of 10 to 15 mcg/mL.    Vancomycin Regimen Plan:    Continue regimen to Vancomycin 2000 mg IV every 12 hours with next serum trough concentration measured at 0600 prior to fourth dose on 8/17/23.    Drug levels (last 3 results):  Recent Labs   Lab Result Units 08/15/23  1715   Vancomycin-Trough ug/mL 10.4       Pharmacy will continue to follow and monitor vancomycin.    Please contact pharmacy at extension [phone] for questions regarding this assessment.    Thank you for the consult,   Rui Do, KhaiD       Patient brief summary:  Alfred Villarreal is a 62 y.o. male initiated on antimicrobial therapy with IV Vancomycin for treatment of skin & soft tissue infection      Drug Allergies:   Review of patient's allergies indicates:   Allergen Reactions    Amitriptyline      Vivid dreams( bad)       Actual Body Weight:   154.2 kg    Renal Function:   Estimated Creatinine Clearance: 159.8 mL/min (based on SCr of 0.8 mg/dL).,     Dialysis Method (if applicable):  N/A    CBC (last 72 hours):  Recent Labs   Lab Result Units 08/14/23  0526 08/15/23  0345   WBC K/uL 19.37* 11.87   Hemoglobin g/dL 12.9* 11.5*   Hemoglobin A1C % 11.2*  --    Hematocrit % 37.9* 33.6*   Platelets K/uL 223 196   Gran % % 93.4* 79.4*   Lymph % % 2.7* 13.3*   Mono % % 2.8* 4.6   Eosinophil % % 0.3 2.0   Basophil % % 0.3 0.3   Differential Method  Automated Automated       Metabolic Panel (last 72 hours):  Recent Labs   Lab Result Units 08/14/23  0526 08/14/23  0824 08/15/23  0345   Sodium mmol/L 131*  --  133*   Potassium mmol/L 3.5  --  3.3*   Chloride mmol/L 98  --  100   CO2 mmol/L 25  --  26   Glucose mg/dL 277*  --  291*   Glucose, UA   --  2+*  --    BUN mg/dL 10  --  7*   Creatinine mg/dL 0.9  --  0.8    Albumin g/dL 2.7*  --  2.5*   Total Bilirubin mg/dL 0.7  --  0.5   Alkaline Phosphatase U/L 109  --  97   AST U/L 14  --  23   ALT U/L 10  --  12   Magnesium mg/dL  --   --  1.6       Vancomycin Administrations:  vancomycin given in the last 96 hours                     vancomycin 2 g in 0.9% sodium chloride 500 mL IVPB (mg) 2,000 mg New Bag 08/15/23 1810     2,000 mg New Bag  0658     2,000 mg New Bag 08/14/23 1700    vancomycin 2 g in 0.9% sodium chloride 500 mL IVPB (mg) 2,000 mg New Bag 08/14/23 0600                    Microbiologic Results:  Microbiology Results (last 7 days)       Procedure Component Value Units Date/Time    Blood culture #1 **CANNOT BE ORDERED STAT** [[id number]] Collected: 08/14/23 0526    Order Status: Completed Specimen: Blood from Peripheral, Hand, Right Updated: 08/15/23 0115     Blood Culture, Routine No Growth to date    Blood culture #2 **CANNOT BE ORDERED STAT** [[id number]] Collected: 08/14/23 0526    Order Status: Completed Specimen: Blood from Peripheral, Hand, Left Updated: 08/15/23 0115     Blood Culture, Routine No Growth to date    Aerobic culture [[id number]] Collected: 08/14/23 1803    Order Status: Sent Specimen: Wound from Foot, Left Updated: 08/14/23 2235    Culture, Anaerobic [[id number]] Collected: 08/14/23 1803    Order Status: Sent Specimen: Wound from Foot, Left Updated: 08/14/23 2235

## 2023-08-16 NOTE — PLAN OF CARE
[place] - Intensive Care  Discharge Final Note    Primary Care Provider: Vahid Craig MD    Expected Discharge Date: 8/16/2023    Verbal follow up appointment with Dr Stringer provided to patient. Someone from Dr Craig's office will call with appointment. Demonstrated understanding by verbal feedback. His ride can be here in 10min. Nurse in meeting & staff will let her know to do his paperwork. Home health not set up at this time as Dr Stringer likes to set up once he wants them to do dressing changes.  Denies any other needs at this time.     Final Discharge Note (most recent)       Final Note - 08/16/23 1032          Final Note    Assessment Type Final Discharge Note     Anticipated Discharge Disposition Home or Self Care     What phone number can be called within the next 1-3 days to see how you are doing after discharge? [phone]     Hospital Resources/Appts/Education Provided Appointments scheduled and added to AVS        Post-Acute Status    Discharge Delays None known at this time                     Important Message from Medicare             Contact Info       Gary Stringer DPM   Specialty: Podiatry    [place] - [facility]  [address] [place] [address]       Next Steps: Go on 8/23/2023    Instructions: appointment Wednesday August 23rd at 2:30pm for podiatry follow up    Vahid Craig MD   Specialty: Family Medicine   Relationship: PCP - General    [address] [place] [address]   Phone: [phone]       Next Steps: Go to    Instructions: someone will call with appointment

## 2023-08-16 NOTE — PROGRESS NOTES
[facility] Intensive Care  Podiatry  Progress Note    Patient Name: Alfred Villarreal  MRN: [id number]  Admission Date: 8/14/2023  Hospital Length of Stay: 1 days  Attending Physician: Star Dang MD  Primary Care Provider: Vahid Craig MD     Subjective:     Interval History:  Patient presents today for follow-up multiple area of ulceration left lower extremity.  Patient states he is feeling better although is still somewhat weak he complains of constant back pain.        Scheduled Meds:   carvediloL  6.25 mg Oral BID    ceFEPime (MAXIPIME) IVPB  2 g Intravenous Q8H    enoxparin  40 mg Subcutaneous Q24H (prophylaxis, 1700)    insulin detemir U-100  55 Units Subcutaneous QHS    vancomycin (VANCOCIN) IV (PEDS and ADULTS)  2,000 mg Intravenous Q12H     Continuous Infusions:  PRN Meds:glucagon (human recombinant), glucose, glucose, insulin aspart U-100, metoprolol, morphine, naloxone, ondansetron, oxyCODONE, prochlorperazine, sodium chloride 0.9%, Pharmacy to dose Vancomycin consult **AND** vancomycin - pharmacy to dose    Review of Systems  Objective:     Vital Signs (Most Recent):  Temp: 97.3 °F (36.3 °C) (08/16/23 0718)  Pulse: 84 (08/16/23 0718)  Resp: 20 (08/16/23 0718)  BP: (!) 171/81 (08/16/23 0718)  SpO2: (!) 94 % (08/16/23 0718) Vital Signs (24h Range):  Temp:  [97.3 °F (36.3 °C)-98.8 °F (37.1 °C)] 97.3 °F (36.3 °C)  Pulse:  [72-84] 84  Resp:  [7-20] 20  SpO2:  [94 %-98 %] 94 %  BP: (148-179)/(81-87) 171/81     Weight: (!) 154.2 kg (340 lb)  Body mass index is 39.29 kg/m².    Foot Exam  Foot Exam     Left Foot/Ankle       Inspection and Palpation  Skin Exam: maceration, ulcer and erythema;      Neurovascular  Dorsalis pedis: 1+  Posterior tibial: 0           Physical Exam  Vitals and nursing note reviewed.   Constitutional:       Appearance: He is ill-appearing.   Cardiovascular:      Pulses:           Dorsalis pedis pulses are 1+ on the left side.        Posterior tibial pulses are 0 on the left  side.   Pulmonary:      Effort: Pulmonary effort is normal.   Musculoskeletal:         General: Swelling, tenderness and deformity present.      Left lower leg: Deformity present. 3+ Edema present.      Left foot: Decreased range of motion and decreased capillary refill. Deformity present.        Legs:         Feet:       Right Lower Extremity: Right leg is amputated below knee.   Feet:      Left foot:      Protective Sensation: 5 sites tested.  2 sites sensed.      Skin integrity: Ulcer, skin breakdown, erythema and warmth present.   Skin:     Capillary Refill: Capillary refill takes 2 to 3 seconds.      Findings: Erythema present.   Neurological:      Mental Status: He is alert.      Sensory: Sensory deficit present.   Psychiatric:         Mood and Affect: Mood normal.         Behavior: Behavior normal.         Laboratory:  All pertinent labs reviewed within the last 24 hours.    Diagnostic Results:  None    Clinical Findings:          Assessment/Plan:     Active Diagnoses:    Diagnosis Date Noted POA    PRINCIPAL PROBLEM:  Severe sepsis [A41.9, R65.20] 08/14/2023 Yes    Atrial fibrillation with RVR [I48.91] 01/29/2015 Yes    Diabetes mellitus, type 2 [E11.9] 01/16/2015 Yes     Chronic      Problems Resolved During this Admission:     Patient presents today for follow-up multiple area of ulceration left lower extremity.  Patient states he is feeling better although is still somewhat weak he complains of constant back pain.On evaluation patient has moderate drainage with serous drainage emitting from all ulceration sites including the plantar left heel and the lateral aspect of the left lower extremity these areas of breakdown have gotten significantly larger the patient's heel is approximately 6 cm long by 4 cm wide by 4 mm deep there is a large area of ulceration encompassing approximately 15 cm long by 6 cm wide on the lateral aspect of the left lower extremity.  All of these areas display significant infection  the patient has also previously had multiple bouts of Pseudomonas.  Following evaluation and initial inpatient consultation I have put in orders for nursing to scrub the areas with a Betadine scrub allow this to sit in place for 10-15 minutes rinse with Dakin solution and apply Dakin solution wet-to-dry dressing on all wound sites this is to be changed daily.  Culture and sensitivities are still pending.  No excisional debridement was necessary upon evaluation today patient states that he is already feeling better since he is come into the hospital.  Patient is currently on Maxipime and vancomycin pending culture and sensitivity antibiotics will be changed.  All wound sites are stable since the patient's hospital admission I will continue current wound care therapy as the patient appears to be responding to it very well certainly the patient maintaining a nonweightbearing status while in the hospital has benefitted the left lower extremity.  Patient left lower extremity is currently stable from my standpoint the patient is okay for discharge whenever the hospitalist is ready and we will continue outpatient wound care.  Patient does have significant reduced edema in the left lower extremity in compared to initial admission.  Will follow wound care provided today.This note was created using [employer] voice recognition software that occasionally misinterpreted phrases or words.       Gary Stringer DPM  Podiatry  [place] - Intensive Care

## 2023-08-16 NOTE — PLAN OF CARE
Problem: Adult Inpatient Plan of Care  Goal: Plan of Care Review  Outcome: Ongoing, Progressing     Problem: Adult Inpatient Plan of Care  Goal: Patient-Specific Goal (Individualized)  Outcome: Ongoing, Progressing     Problem: Adult Inpatient Plan of Care  Goal: Absence of Hospital-Acquired Illness or Injury  Outcome: Ongoing, Progressing     Problem: Bleeding (Sepsis/Septic Shock)  Goal: Absence of Bleeding  Outcome: Ongoing, Progressing

## 2023-08-16 NOTE — NURSING
Wound care and wet to dry dressing performed per Mds orders on pt. Sterile saline used as wet base as opposed to Acetic Acid due to acetic acid not being available. Sterile technique utilized. Pt tolerated well

## 2023-08-16 NOTE — PLAN OF CARE
Patient discharge home self care, patient refused home health. All discharge instructions and education giving to patient, verbal understanding received. Dressing changed to left lower ext per myself before discharge. Patient discharge via w/c to family.

## 2023-08-17 ENCOUNTER — TELEPHONE (OUTPATIENT)
Dept: PODIATRY | Facility: CLINIC | Age: 63
End: 2023-08-17
Payer: MEDICAID

## 2023-08-17 NOTE — TELEPHONE ENCOUNTER
He will contact pain management regarding pain medication.    Spoke with Sarah with [facility] ([phone]) She may be able to provide HH but will need order and all normal demographic information. They will run and let us know if it is accepted.     Fax# [phone]
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
## 2023-08-17 NOTE — DISCHARGE SUMMARY
[facility]  Discharge Summary      Patient Name: Alfred Villarreal  MRN: [id number]  ALOK: [id number]  Patient Class: OP- Observation  Admission Date: 8/14/2023  Hospital Length of Stay: 1 days  Discharge Date and Time: 8/16/2023 11:15 AM  Attending Physician: No att. providers found   Discharging Provider: Star Dang MD  Primary Care Provider: Vahid Craig MD    Primary Care Team: Networked reference to record PCT     HPI:   63 yo w/ hx of IDDM2, Chronic Afib not on AC per patient preference, Right BKA, Chronic LLE wound presenting after all at home. Patient was attempting to transfer from bed to wheelchair when he fell. Has been having fever and increased redness and swelling of LLE for past 24h. Follows with Dr Stringer outpatient. No NVD. No CP,SOB. Complaining of low back pain. No weakness or numbness. Patient stopped AC for afib 2-3 years ago due to recurrent bleeding episodes.       * No surgery found *      Hospital Course:   Patient admitted for severe sepsis 2/2 left leg polymicrobial diabetic foot wound with cellulitis and infected wounds extended up left leg. Also with Afib w/ RVR at triage. Patient given IVF, IV abx. Podiatry consulted who obtained wound cultures and provided wound care to patient. Patient improved clinically. Received max benefit from inpatient stay. Cleared by podiatry for discharge. Provided discharge instructions and return precautions. Placed on PO abx.        Goals of Care Treatment Preferences:  Code Status: Full Code      Consults:     No new Assessment & Plan notes have been filed under this hospital service since the last note was generated.  Service: Hospital Medicine    Final Active Diagnoses:    Diagnosis Date Noted POA    PRINCIPAL PROBLEM:  Severe sepsis [A41.9, R65.20] 08/14/2023 Yes    Atrial fibrillation with RVR [I48.91] 01/29/2015 Yes    Diabetes mellitus, type 2 [E11.9] 01/16/2015 Yes     Chronic      Problems Resolved  During this Admission:       Discharged Condition: good    Disposition: Home or Self Care    Follow Up:   Follow-up Information     Gary Stringer DPM. Go on 8/23/2023.    Specialty: Podiatry  Why: appointment Wednesday August 23rd at 2:30pm for podiatry follow up  Contact information:  [address] [place] [address]             Vahid Craig MD. Go to.    Specialty: Family Medicine  Why: someone will call with appointment  Contact information:  [address] [facility] [facility] [place] [address]  [phone]                       Patient Instructions:      Diet Cardiac     Diet diabetic     Notify your health care provider if you experience any of the following:  temperature >100.4     Notify your health care provider if you experience any of the following:  severe uncontrolled pain     Activity as tolerated       Significant Diagnostic Studies:   Recent Results (from the past 168 hour(s))   POCT glucose    Collection Time: 08/14/23  4:51 AM   Result Value Ref Range    POCT Glucose 251 (H) 70 - 110 mg/dL   Complete Blood Count (CBC)    Collection Time: 08/14/23  5:26 AM   Result Value Ref Range    WBC 19.37 (H) 3.90 - 12.70 K/uL    RBC 4.52 (L) 4.60 - 6.20 M/uL    Hemoglobin 12.9 (L) 14.0 - 18.0 g/dL    Hematocrit 37.9 (L) 40.0 - 54.0 %    MCV 84 82 - 98 fL    MCH 28.5 27.0 - 31.0 pg    MCHC 34.0 32.0 - 36.0 g/dL    RDW 12.9 11.5 - 14.5 %    Platelets 223 150 - 450 K/uL    MPV 9.9 9.2 - 12.9 fL    Immature Granulocytes 0.5 0.0 - 0.5 %    Gran # (ANC) 18.1 (H) 1.8 - 7.7 K/uL    Immature Grans (Abs) 0.10 (H) 0.00 - 0.04 K/uL    Lymph # 0.5 (L) 1.0 - 4.8 K/uL    Mono # 0.5 0.3 - 1.0 K/uL    Eos # 0.1 0.0 - 0.5 K/uL    Baso # 0.05 0.00 - 0.20 K/uL    nRBC 0 0 /100 WBC    Gran % 93.4 (H) 38.0 - 73.0 %    Lymph % 2.7 (L) 18.0 - 48.0 %    Mono % 2.8 (L) 4.0 - 15.0 %    Eosinophil % 0.3 0.0 - 8.0 %    Basophil % 0.3 0.0 - 1.9 %    Differential Method Automated    Comprehensive Metabolic Panel (CMP)     Collection Time: 08/14/23  5:26 AM   Result Value Ref Range    Sodium 131 (L) 136 - 145 mmol/L    Potassium 3.5 3.5 - 5.1 mmol/L    Chloride 98 95 - 110 mmol/L    CO2 25 23 - 29 mmol/L    Glucose 277 (H) 70 - 110 mg/dL    BUN 10 8 - 23 mg/dL    Creatinine 0.9 0.5 - 1.4 mg/dL    Calcium 8.8 8.7 - 10.5 mg/dL    Total Protein 8.6 (H) 6.0 - 8.4 g/dL    Albumin 2.7 (L) 3.5 - 5.2 g/dL    Total Bilirubin 0.7 0.1 - 1.0 mg/dL    Alkaline Phosphatase 109 55 - 135 U/L    AST 14 10 - 40 U/L    ALT 10 10 - 44 U/L    eGFR >60.0 >60 mL/min/1.73 m^2    Anion Gap 8 8 - 16 mmol/L   Blood culture #2 **CANNOT BE ORDERED STAT**    Collection Time: 08/14/23  5:26 AM    Specimen: Peripheral, Hand, Left; Blood   Result Value Ref Range    Blood Culture, Routine No Growth to date     Blood Culture, Routine No Growth to date     Blood Culture, Routine No Growth to date    Blood culture #1 **CANNOT BE ORDERED STAT**    Collection Time: 08/14/23  5:26 AM    Specimen: Peripheral, Hand, Right; Blood   Result Value Ref Range    Blood Culture, Routine No Growth to date     Blood Culture, Routine No Growth to date     Blood Culture, Routine No Growth to date    Lactic acid, plasma    Collection Time: 08/14/23  5:26 AM   Result Value Ref Range    Lactate (Lactic Acid) 1.3 0.5 - 2.2 mmol/L   Protime-INR    Collection Time: 08/14/23  5:26 AM   Result Value Ref Range    Prothrombin Time 11.3 9.0 - 12.5 sec    INR 1.1 0.8 - 1.2   APTT    Collection Time: 08/14/23  5:26 AM   Result Value Ref Range    aPTT 25.8 21.0 - 32.0 sec   Hemoglobin A1c    Collection Time: 08/14/23  5:26 AM   Result Value Ref Range    Hemoglobin A1C 11.2 (H) 4.0 - 5.6 %    Estimated Avg Glucose 275 (H) 68 - 131 mg/dL   Urinalysis, Reflex to Urine Culture Urine, Clean Catch    Collection Time: 08/14/23  8:24 AM    Specimen: Urine   Result Value Ref Range    Specimen UA Urine, Unspecified     Color, UA Yellow Yellow, Straw, Key    Appearance, UA Clear Clear    pH, UA 6.0 5.0 -  8.0    Specific Gravity, UA 1.010 1.005 - 1.030    Protein, UA Negative Negative    Glucose, UA 2+ (A) Negative    Ketones, UA Negative Negative    Bilirubin (UA) Negative Negative    Occult Blood UA Negative Negative    Nitrite, UA Negative Negative    Urobilinogen, UA 1.0 Negative EU/dL    Leukocytes, UA Negative Negative   POCT glucose    Collection Time: 08/14/23 11:37 AM   Result Value Ref Range    POCT Glucose 288 (H) 70 - 110 mg/dL   POCT glucose    Collection Time: 08/14/23  4:42 PM   Result Value Ref Range    POCT Glucose 274 (H) 70 - 110 mg/dL   Aerobic culture    Collection Time: 08/14/23  6:03 PM    Specimen: Foot, Left; Wound   Result Value Ref Range    Aerobic Bacterial Culture 08/16/2023     Aerobic Bacterial Culture (A)      STAPHYLOCOCCUS AUREUS  Many  Susceptibility pending      Aerobic Bacterial Culture (A)      GRAM NEGATIVE CIERA  Few  Identification and susceptibility pending      Aerobic Bacterial Culture (A)      STREPTOCOCCUS DYSGALACTIAE  Moderate  Beta-hemolytic streptococci are routinely susceptible to   penicillins,cephalosporins and carbapenems.  Susceptibility testing not routinely performed     Culture, Anaerobic    Collection Time: 08/14/23  6:03 PM    Specimen: Foot, Left; Wound   Result Value Ref Range    Anaerobic Culture Culture in progress    POCT glucose    Collection Time: 08/14/23 10:01 PM   Result Value Ref Range    POCT Glucose 268 (H) 70 - 110 mg/dL   CBC Auto Differential    Collection Time: 08/15/23  3:45 AM   Result Value Ref Range    WBC 11.87 3.90 - 12.70 K/uL    RBC 3.96 (L) 4.60 - 6.20 M/uL    Hemoglobin 11.5 (L) 14.0 - 18.0 g/dL    Hematocrit 33.6 (L) 40.0 - 54.0 %    MCV 85 82 - 98 fL    MCH 29.0 27.0 - 31.0 pg    MCHC 34.2 32.0 - 36.0 g/dL    RDW 13.1 11.5 - 14.5 %    Platelets 196 150 - 450 K/uL    MPV 10.2 9.2 - 12.9 fL    Immature Granulocytes 0.4 0.0 - 0.5 %    Gran # (ANC) 9.4 (H) 1.8 - 7.7 K/uL    Immature Grans (Abs) 0.05 (H) 0.00 - 0.04 K/uL    Lymph # 1.6  1.0 - 4.8 K/uL    Mono # 0.6 0.3 - 1.0 K/uL    Eos # 0.2 0.0 - 0.5 K/uL    Baso # 0.04 0.00 - 0.20 K/uL    nRBC 0 0 /100 WBC    Gran % 79.4 (H) 38.0 - 73.0 %    Lymph % 13.3 (L) 18.0 - 48.0 %    Mono % 4.6 4.0 - 15.0 %    Eosinophil % 2.0 0.0 - 8.0 %    Basophil % 0.3 0.0 - 1.9 %    Differential Method Automated    Comprehensive Metabolic Panel    Collection Time: 08/15/23  3:45 AM   Result Value Ref Range    Sodium 133 (L) 136 - 145 mmol/L    Potassium 3.3 (L) 3.5 - 5.1 mmol/L    Chloride 100 95 - 110 mmol/L    CO2 26 23 - 29 mmol/L    Glucose 291 (H) 70 - 110 mg/dL    BUN 7 (L) 8 - 23 mg/dL    Creatinine 0.8 0.5 - 1.4 mg/dL    Calcium 8.6 (L) 8.7 - 10.5 mg/dL    Total Protein 8.2 6.0 - 8.4 g/dL    Albumin 2.5 (L) 3.5 - 5.2 g/dL    Total Bilirubin 0.5 0.1 - 1.0 mg/dL    Alkaline Phosphatase 97 55 - 135 U/L    AST 23 10 - 40 U/L    ALT 12 10 - 44 U/L    eGFR >60.0 >60 mL/min/1.73 m^2    Anion Gap 7 (L) 8 - 16 mmol/L   Magnesium    Collection Time: 08/15/23  3:45 AM   Result Value Ref Range    Magnesium 1.6 1.6 - 2.6 mg/dL   POCT glucose    Collection Time: 08/15/23  7:03 AM   Result Value Ref Range    POCT Glucose 292 (H) 70 - 110 mg/dL   POCT glucose    Collection Time: 08/15/23 11:12 AM   Result Value Ref Range    POCT Glucose 355 (H) 70 - 110 mg/dL   POCT glucose    Collection Time: 08/15/23  4:09 PM   Result Value Ref Range    POCT Glucose 323 (H) 70 - 110 mg/dL   VANCOMYCIN, TROUGH    Collection Time: 08/15/23  5:15 PM   Result Value Ref Range    Vancomycin-Trough 10.4 10.0 - 22.0 ug/mL   POCT glucose    Collection Time: 08/15/23  8:52 PM   Result Value Ref Range    POCT Glucose 267 (H) 70 - 110 mg/dL   CBC Auto Differential    Collection Time: 08/16/23  3:56 AM   Result Value Ref Range    WBC 9.90 3.90 - 12.70 K/uL    RBC 4.21 (L) 4.60 - 6.20 M/uL    Hemoglobin 12.0 (L) 14.0 - 18.0 g/dL    Hematocrit 35.7 (L) 40.0 - 54.0 %    MCV 85 82 - 98 fL    MCH 28.5 27.0 - 31.0 pg    MCHC 33.6 32.0 - 36.0 g/dL    RDW  13.1 11.5 - 14.5 %    Platelets 211 150 - 450 K/uL    MPV 10.4 9.2 - 12.9 fL    Immature Granulocytes 0.2 0.0 - 0.5 %    Gran # (ANC) 7.4 1.8 - 7.7 K/uL    Immature Grans (Abs) 0.02 0.00 - 0.04 K/uL    Lymph # 1.6 1.0 - 4.8 K/uL    Mono # 0.6 0.3 - 1.0 K/uL    Eos # 0.3 0.0 - 0.5 K/uL    Baso # 0.04 0.00 - 0.20 K/uL    nRBC 0 0 /100 WBC    Gran % 74.6 (H) 38.0 - 73.0 %    Lymph % 16.1 (L) 18.0 - 48.0 %    Mono % 5.7 4.0 - 15.0 %    Eosinophil % 3.0 0.0 - 8.0 %    Basophil % 0.4 0.0 - 1.9 %    Differential Method Automated    Comprehensive Metabolic Panel    Collection Time: 08/16/23  3:56 AM   Result Value Ref Range    Sodium 131 (L) 136 - 145 mmol/L    Potassium 3.9 3.5 - 5.1 mmol/L    Chloride 98 95 - 110 mmol/L    CO2 26 23 - 29 mmol/L    Glucose 294 (H) 70 - 110 mg/dL    BUN 8 8 - 23 mg/dL    Creatinine 0.9 0.5 - 1.4 mg/dL    Calcium 8.9 8.7 - 10.5 mg/dL    Total Protein 8.6 (H) 6.0 - 8.4 g/dL    Albumin 2.6 (L) 3.5 - 5.2 g/dL    Total Bilirubin 0.5 0.1 - 1.0 mg/dL    Alkaline Phosphatase 108 55 - 135 U/L    AST 26 10 - 40 U/L    ALT 13 10 - 44 U/L    eGFR >60.0 >60 mL/min/1.73 m^2    Anion Gap 7 (L) 8 - 16 mmol/L   Magnesium    Collection Time: 08/16/23  3:56 AM   Result Value Ref Range    Magnesium 1.7 1.6 - 2.6 mg/dL   POCT glucose    Collection Time: 08/16/23  7:15 AM   Result Value Ref Range    POCT Glucose 264 (H) 70 - 110 mg/dL         Pending Diagnostic Studies:     None         Medications:  Reconciled Home Medications:      Medication List      START taking these medications    carvediloL 6.25 MG tablet  Commonly known as: COREG  Take 1 tablet (6.25 mg total) by mouth 2 (two) times daily.     ciprofloxacin HCl 750 MG tablet  Commonly known as: CIPRO  Take 1 tablet (750 mg total) by mouth every 12 (twelve) hours. for 10 days     clindamycin 150 MG capsule  Commonly known as: CLEOCIN  Take 3 capsules (450 mg total) by mouth every 8 (eight) hours. for 10 days        CHANGE how you take these medications   "  oxyCODONE-acetaminophen  mg per tablet  Commonly known as: PERCOCET  Take 1 tablet by mouth every 8 (eight) hours as needed for Pain.  What changed:   · when to take this  · reasons to take this        CONTINUE taking these medications    baclofen 10 MG tablet  Commonly known as: LIORESAL  Take 10 mg by mouth 3 (three) times daily.     gabapentin 600 MG tablet  Commonly known as: NEURONTIN     ibuprofen 800 MG tablet  Commonly known as: ADVIL,MOTRIN  SMARTSI Tablet(s) By Mouth Every 12 Hours PRN     insulin aspart U-100 100 unit/mL injection  Commonly known as: NovoLOG  INJECT 50 UNITS UNDER THE SKIN TWICE DAILY BEFORE A MEAL     LEVEMIR FLEXTOUCH U100 INSULIN 100 unit/mL (3 mL) Inpn pen  Generic drug: insulin detemir U-100 (Levemir)  Inject 70 Units into the skin 2 (two) times daily.     pen needle, diabetic 31 gauge x 1/4" Ndle  Use daily with victoza        STOP taking these medications    doxepin 10 MG capsule  Commonly known as: SINEQUAN     HYDROcodone-acetaminophen  mg per tablet  Commonly known as: NORCO     oxyCODONE 10 mg Tab immediate release tablet  Commonly known as: ROXICODONE        ASK your doctor about these medications    naloxone 4 mg/actuation Spry  Commonly known as: NARCAN  1 spray (4 mg total) by Nasal route once. for 1 dose  Ask about: Should I take this medication?     tadalafiL 5 MG tablet  Commonly known as: CIALIS  Take 1 tablet (5 mg total) by mouth daily as needed for Erectile Dysfunction.            Indwelling Lines/Drains at time of discharge:   Lines/Drains/Airways     None                 Time spent on the discharge of patient: 15 minutes         Star Dang MD  Department of Hospital Medicine  [facility] - Intensive Care  "
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
## 2023-08-17 NOTE — TELEPHONE ENCOUNTER
Done, all reviewed with patient and he has decided he doesn't want HH. Would like to continue with medical supplies.

## 2023-08-17 NOTE — HOSPITAL COURSE
Patient admitted for severe sepsis 2/2 left leg polymicrobial diabetic foot wound with cellulitis and infected wounds extended up left leg. Also with Afib w/ RVR at triage. Patient given IVF, IV abx. Podiatry consulted who obtained wound cultures and provided wound care to patient. Patient improved clinically. Received max benefit from inpatient stay. Cleared by podiatry for discharge. Provided discharge instructions and return precautions. Placed on PO abx.

## 2023-08-19 LAB
BACTERIA BLD CULT: NORMAL
BACTERIA BLD CULT: NORMAL
BACTERIA SPEC AEROBE CULT: ABNORMAL

## 2023-08-21 ENCOUNTER — TELEPHONE (OUTPATIENT)
Dept: PODIATRY | Facility: CLINIC | Age: 63
End: 2023-08-21
Payer: MEDICAID

## 2023-08-21 LAB — BACTERIA SPEC ANAEROBE CULT: NORMAL

## 2023-08-21 NOTE — TELEPHONE ENCOUNTER
----- Message from Gary Stringer DPM sent at 8/19/2023 11:40 AM CDT -----  Per the patient's culture and sensitivity he is to remain on both Cipro and clindamycin.  ----- Message -----  From: [facility]  Sent: 8/16/2023  12:40 PM CDT  To: Gary Stringer DPM

## 2023-08-22 NOTE — TELEPHONE ENCOUNTER
LVM to remain on both antibiotics per culture results. Any questions please call office ant [phone].

## 2023-08-23 ENCOUNTER — OFFICE VISIT (OUTPATIENT)
Dept: PODIATRY | Facility: CLINIC | Age: 63
End: 2023-08-23
Payer: MEDICAID

## 2023-08-23 VITALS
HEART RATE: 85 BPM | BODY MASS INDEX: 36.45 KG/M2 | RESPIRATION RATE: 18 BRPM | SYSTOLIC BLOOD PRESSURE: 168 MMHG | WEIGHT: 315 LBS | DIASTOLIC BLOOD PRESSURE: 87 MMHG | HEIGHT: 78 IN

## 2023-08-23 DIAGNOSIS — Z89.9 HX OF AMPUTATION: ICD-10-CM

## 2023-08-23 DIAGNOSIS — M71.072 ABSCESS OF BURSA OF LEFT FOOT: Primary | ICD-10-CM

## 2023-08-23 DIAGNOSIS — E11.49 TYPE II DIABETES MELLITUS WITH NEUROLOGICAL MANIFESTATIONS: ICD-10-CM

## 2023-08-23 DIAGNOSIS — E11.9 COMPREHENSIVE DIABETIC FOOT EXAMINATION, TYPE 2 DM, ENCOUNTER FOR: ICD-10-CM

## 2023-08-23 DIAGNOSIS — Z74.09 DECREASED FUNCTIONAL MOBILITY AND ENDURANCE: ICD-10-CM

## 2023-08-23 PROCEDURE — 99215 PR OFFICE/OUTPT VISIT, EST, LEVL V, 40-54 MIN: ICD-10-PCS | Mod: S$PBB,,, | Performed by: PODIATRIST

## 2023-08-23 PROCEDURE — 1159F MED LIST DOCD IN RCRD: CPT | Mod: CPTII,,, | Performed by: PODIATRIST

## 2023-08-23 PROCEDURE — 1159F PR MEDICATION LIST DOCUMENTED IN MEDICAL RECORD: ICD-10-PCS | Mod: CPTII,,, | Performed by: PODIATRIST

## 2023-08-23 PROCEDURE — 3077F PR MOST RECENT SYSTOLIC BLOOD PRESSURE >= 140 MM HG: ICD-10-PCS | Mod: CPTII,,, | Performed by: PODIATRIST

## 2023-08-23 PROCEDURE — 3008F BODY MASS INDEX DOCD: CPT | Mod: CPTII,,, | Performed by: PODIATRIST

## 2023-08-23 PROCEDURE — 1160F PR REVIEW ALL MEDS BY PRESCRIBER/CLIN PHARMACIST DOCUMENTED: ICD-10-PCS | Mod: CPTII,,, | Performed by: PODIATRIST

## 2023-08-23 PROCEDURE — 99214 OFFICE O/P EST MOD 30 MIN: CPT | Mod: PBBFAC | Performed by: PODIATRIST

## 2023-08-23 PROCEDURE — 1160F RVW MEDS BY RX/DR IN RCRD: CPT | Mod: CPTII,,, | Performed by: PODIATRIST

## 2023-08-23 PROCEDURE — 99999 PR PBB SHADOW E&M-EST. PATIENT-LVL IV: ICD-10-PCS | Mod: PBBFAC,,, | Performed by: PODIATRIST

## 2023-08-23 PROCEDURE — 3008F PR BODY MASS INDEX (BMI) DOCUMENTED: ICD-10-PCS | Mod: CPTII,,, | Performed by: PODIATRIST

## 2023-08-23 PROCEDURE — 3046F HEMOGLOBIN A1C LEVEL >9.0%: CPT | Mod: CPTII,,, | Performed by: PODIATRIST

## 2023-08-23 PROCEDURE — 3079F DIAST BP 80-89 MM HG: CPT | Mod: CPTII,,, | Performed by: PODIATRIST

## 2023-08-23 PROCEDURE — 3046F PR MOST RECENT HEMOGLOBIN A1C LEVEL > 9.0%: ICD-10-PCS | Mod: CPTII,,, | Performed by: PODIATRIST

## 2023-08-23 PROCEDURE — 3079F PR MOST RECENT DIASTOLIC BLOOD PRESSURE 80-89 MM HG: ICD-10-PCS | Mod: CPTII,,, | Performed by: PODIATRIST

## 2023-08-23 PROCEDURE — 3077F SYST BP >= 140 MM HG: CPT | Mod: CPTII,,, | Performed by: PODIATRIST

## 2023-08-23 PROCEDURE — 99999 PR PBB SHADOW E&M-EST. PATIENT-LVL IV: CPT | Mod: PBBFAC,,, | Performed by: PODIATRIST

## 2023-08-23 PROCEDURE — 99215 OFFICE O/P EST HI 40 MIN: CPT | Mod: S$PBB,,, | Performed by: PODIATRIST

## 2023-08-23 RX ORDER — SULFAMETHOXAZOLE AND TRIMETHOPRIM 800; 160 MG/1; MG/1
1 TABLET ORAL 2 TIMES DAILY
Qty: 28 TABLET | Refills: 1 | Status: SHIPPED | OUTPATIENT
Start: 2023-08-23 | End: 2023-09-13 | Stop reason: SDUPTHER

## 2023-08-23 RX ORDER — NALOXONE HYDROCHLORIDE 4 MG/.1ML
SPRAY NASAL
COMMUNITY
Start: 2023-08-16

## 2023-08-23 RX ORDER — TIZANIDINE 4 MG/1
4 TABLET ORAL 2 TIMES DAILY
COMMUNITY
Start: 2023-08-07 | End: 2023-08-23

## 2023-08-24 ENCOUNTER — TELEPHONE (OUTPATIENT)
Dept: PODIATRY | Facility: CLINIC | Age: 63
End: 2023-08-24
Payer: MEDICAID

## 2023-08-24 NOTE — TELEPHONE ENCOUNTER
----- Message from Renetta Mittal sent at 8/24/2023 11:48 AM CDT -----  Regarding: Needs return call  Type: Needs Medical Advice  Alfred  Best Call Back Number: [phone]    Additional Information: Pt was just trying to confirm he picked up the correct medication

## 2023-08-24 NOTE — TELEPHONE ENCOUNTER
Confirmed with patient that Bactrim was the antibiotic he is to start. Patient confirmed that is what he picked up at the pharmacy.
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
## 2023-08-30 ENCOUNTER — TELEPHONE (OUTPATIENT)
Dept: FAMILY MEDICINE | Facility: CLINIC | Age: 63
End: 2023-08-30
Payer: MEDICAID

## 2023-08-30 NOTE — TELEPHONE ENCOUNTER
----- Message from Beatrice Mallory, Patient Care Assistant sent at 8/30/2023 12:44 PM CDT -----  Contact: self  Pt  over slept and missed his f/u appt. Pt would like a afternoon because he has rouble sleeping at night. Please call back to advise [phone]  thanks

## 2023-09-06 ENCOUNTER — TELEPHONE (OUTPATIENT)
Dept: PODIATRY | Facility: CLINIC | Age: 63
End: 2023-09-06
Payer: MEDICAID

## 2023-09-06 NOTE — TELEPHONE ENCOUNTER
Patient moved to 3:00. Only other appointment available. Patient thinks he should be able to make it. Transportation issues.

## 2023-09-06 NOTE — TELEPHONE ENCOUNTER
----- Message from Conrad Young sent at 9/6/2023 11:38 AM CDT -----  Contact: self  Type: Sooner Appointment Request        Caller is requesting a sooner appointment. Caller declined first available appointment listed below. Caller will not accept being placed on the waitlist and is requesting a message be sent to doctor.        Name of Caller: patient   Best Call Back Number: [phone]  Additional Information: Pt states he wants to change his appt time to 10 am instead If possible. Thanks

## 2023-09-13 ENCOUNTER — OFFICE VISIT (OUTPATIENT)
Dept: PODIATRY | Facility: CLINIC | Age: 63
End: 2023-09-13
Payer: MEDICAID

## 2023-09-13 VITALS
RESPIRATION RATE: 18 BRPM | HEART RATE: 97 BPM | WEIGHT: 315 LBS | SYSTOLIC BLOOD PRESSURE: 134 MMHG | BODY MASS INDEX: 36.45 KG/M2 | DIASTOLIC BLOOD PRESSURE: 77 MMHG | HEIGHT: 78 IN

## 2023-09-13 DIAGNOSIS — E11.49 TYPE II DIABETES MELLITUS WITH NEUROLOGICAL MANIFESTATIONS: ICD-10-CM

## 2023-09-13 DIAGNOSIS — E11.9 COMPREHENSIVE DIABETIC FOOT EXAMINATION, TYPE 2 DM, ENCOUNTER FOR: ICD-10-CM

## 2023-09-13 DIAGNOSIS — L97.522 SKIN ULCER OF LEFT FOOT WITH FAT LAYER EXPOSED: Primary | ICD-10-CM

## 2023-09-13 DIAGNOSIS — Z89.9 HX OF AMPUTATION: ICD-10-CM

## 2023-09-13 DIAGNOSIS — L97.522 ULCER OF LEFT FOOT WITH FAT LAYER EXPOSED: ICD-10-CM

## 2023-09-13 PROCEDURE — 1160F PR REVIEW ALL MEDS BY PRESCRIBER/CLIN PHARMACIST DOCUMENTED: ICD-10-PCS | Mod: CPTII,,, | Performed by: PODIATRIST

## 2023-09-13 PROCEDURE — 1160F RVW MEDS BY RX/DR IN RCRD: CPT | Mod: CPTII,,, | Performed by: PODIATRIST

## 2023-09-13 PROCEDURE — 3075F PR MOST RECENT SYSTOLIC BLOOD PRESS GE 130-139MM HG: ICD-10-PCS | Mod: CPTII,,, | Performed by: PODIATRIST

## 2023-09-13 PROCEDURE — 3075F SYST BP GE 130 - 139MM HG: CPT | Mod: CPTII,,, | Performed by: PODIATRIST

## 2023-09-13 PROCEDURE — 3008F BODY MASS INDEX DOCD: CPT | Mod: CPTII,,, | Performed by: PODIATRIST

## 2023-09-13 PROCEDURE — 3046F PR MOST RECENT HEMOGLOBIN A1C LEVEL > 9.0%: ICD-10-PCS | Mod: CPTII,,, | Performed by: PODIATRIST

## 2023-09-13 PROCEDURE — 99999 PR PBB SHADOW E&M-EST. PATIENT-LVL IV: ICD-10-PCS | Mod: PBBFAC,,, | Performed by: PODIATRIST

## 2023-09-13 PROCEDURE — 1159F MED LIST DOCD IN RCRD: CPT | Mod: CPTII,,, | Performed by: PODIATRIST

## 2023-09-13 PROCEDURE — 3046F HEMOGLOBIN A1C LEVEL >9.0%: CPT | Mod: CPTII,,, | Performed by: PODIATRIST

## 2023-09-13 PROCEDURE — 99215 PR OFFICE/OUTPT VISIT, EST, LEVL V, 40-54 MIN: ICD-10-PCS | Mod: S$PBB,,, | Performed by: PODIATRIST

## 2023-09-13 PROCEDURE — 3078F DIAST BP <80 MM HG: CPT | Mod: CPTII,,, | Performed by: PODIATRIST

## 2023-09-13 PROCEDURE — 1159F PR MEDICATION LIST DOCUMENTED IN MEDICAL RECORD: ICD-10-PCS | Mod: CPTII,,, | Performed by: PODIATRIST

## 2023-09-13 PROCEDURE — 99214 OFFICE O/P EST MOD 30 MIN: CPT | Mod: PBBFAC | Performed by: PODIATRIST

## 2023-09-13 PROCEDURE — 3078F PR MOST RECENT DIASTOLIC BLOOD PRESSURE < 80 MM HG: ICD-10-PCS | Mod: CPTII,,, | Performed by: PODIATRIST

## 2023-09-13 PROCEDURE — 99999 PR PBB SHADOW E&M-EST. PATIENT-LVL IV: CPT | Mod: PBBFAC,,, | Performed by: PODIATRIST

## 2023-09-13 PROCEDURE — 99215 OFFICE O/P EST HI 40 MIN: CPT | Mod: S$PBB,,, | Performed by: PODIATRIST

## 2023-09-13 PROCEDURE — 3008F PR BODY MASS INDEX (BMI) DOCUMENTED: ICD-10-PCS | Mod: CPTII,,, | Performed by: PODIATRIST

## 2023-09-13 RX ORDER — GABAPENTIN 600 MG/1
600 TABLET ORAL 3 TIMES DAILY
COMMUNITY
Start: 2023-09-12 | End: 2024-02-14 | Stop reason: SDUPTHER

## 2023-09-13 RX ORDER — SULFAMETHOXAZOLE AND TRIMETHOPRIM 800; 160 MG/1; MG/1
1 TABLET ORAL 2 TIMES DAILY
Qty: 28 TABLET | Refills: 0 | Status: SHIPPED | OUTPATIENT
Start: 2023-09-13 | End: 2023-10-17

## 2023-09-13 RX ORDER — OXYCODONE AND ACETAMINOPHEN 10; 325 MG/1; MG/1
1 TABLET ORAL
COMMUNITY
Start: 2023-09-12
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
## 2023-10-16 ENCOUNTER — OFFICE VISIT (OUTPATIENT)
Dept: PODIATRY | Facility: CLINIC | Age: 63
End: 2023-10-16
Payer: MEDICAID

## 2023-10-16 ENCOUNTER — HOSPITAL ENCOUNTER (OUTPATIENT)
Dept: RADIOLOGY | Facility: HOSPITAL | Age: 63
Discharge: HOME | End: 2023-10-16
Attending: PODIATRIST
Payer: MEDICAID

## 2023-10-16 VITALS
WEIGHT: 315 LBS | BODY MASS INDEX: 36.45 KG/M2 | HEIGHT: 78 IN | HEART RATE: 71 BPM | SYSTOLIC BLOOD PRESSURE: 143 MMHG | DIASTOLIC BLOOD PRESSURE: 79 MMHG

## 2023-10-16 DIAGNOSIS — E11.49 TYPE II DIABETES MELLITUS WITH NEUROLOGICAL MANIFESTATIONS: ICD-10-CM

## 2023-10-16 DIAGNOSIS — L97.522 SKIN ULCER OF LEFT FOOT WITH FAT LAYER EXPOSED: ICD-10-CM

## 2023-10-16 DIAGNOSIS — L97.522 ULCER OF LEFT FOOT WITH FAT LAYER EXPOSED: ICD-10-CM

## 2023-10-16 DIAGNOSIS — L97.522 ULCER OF LEFT FOOT WITH FAT LAYER EXPOSED: Primary | ICD-10-CM

## 2023-10-16 DIAGNOSIS — Z89.9 HX OF AMPUTATION: ICD-10-CM

## 2023-10-16 DIAGNOSIS — E11.9 COMPREHENSIVE DIABETIC FOOT EXAMINATION, TYPE 2 DM, ENCOUNTER FOR: ICD-10-CM

## 2023-10-16 PROCEDURE — 3078F DIAST BP <80 MM HG: CPT | Mod: CPTII,,, | Performed by: PODIATRIST

## 2023-10-16 PROCEDURE — 99999 PR PBB SHADOW E&M-EST. PATIENT-LVL III: CPT | Mod: PBBFAC,,, | Performed by: PODIATRIST

## 2023-10-16 PROCEDURE — 1159F PR MEDICATION LIST DOCUMENTED IN MEDICAL RECORD: ICD-10-PCS | Mod: CPTII,,, | Performed by: PODIATRIST

## 2023-10-16 PROCEDURE — 3008F BODY MASS INDEX DOCD: CPT | Mod: CPTII,,, | Performed by: PODIATRIST

## 2023-10-16 PROCEDURE — 1160F RVW MEDS BY RX/DR IN RCRD: CPT | Mod: CPTII,,, | Performed by: PODIATRIST

## 2023-10-16 PROCEDURE — 3078F PR MOST RECENT DIASTOLIC BLOOD PRESSURE < 80 MM HG: ICD-10-PCS | Mod: CPTII,,, | Performed by: PODIATRIST

## 2023-10-16 PROCEDURE — 3077F SYST BP >= 140 MM HG: CPT | Mod: CPTII,,, | Performed by: PODIATRIST

## 2023-10-16 PROCEDURE — 99215 PR OFFICE/OUTPT VISIT, EST, LEVL V, 40-54 MIN: ICD-10-PCS | Mod: S$PBB,,, | Performed by: PODIATRIST

## 2023-10-16 PROCEDURE — 3046F HEMOGLOBIN A1C LEVEL >9.0%: CPT | Mod: CPTII,,, | Performed by: PODIATRIST

## 2023-10-16 PROCEDURE — 73630 XR FOOT COMPLETE 3 VIEW LEFT: ICD-10-PCS | Mod: 26,LT,, | Performed by: RADIOLOGY

## 2023-10-16 PROCEDURE — 3008F PR BODY MASS INDEX (BMI) DOCUMENTED: ICD-10-PCS | Mod: CPTII,,, | Performed by: PODIATRIST

## 2023-10-16 PROCEDURE — 99215 OFFICE O/P EST HI 40 MIN: CPT | Mod: S$PBB,,, | Performed by: PODIATRIST

## 2023-10-16 PROCEDURE — 99999 PR PBB SHADOW E&M-EST. PATIENT-LVL III: ICD-10-PCS | Mod: PBBFAC,,, | Performed by: PODIATRIST

## 2023-10-16 PROCEDURE — 1160F PR REVIEW ALL MEDS BY PRESCRIBER/CLIN PHARMACIST DOCUMENTED: ICD-10-PCS | Mod: CPTII,,, | Performed by: PODIATRIST

## 2023-10-16 PROCEDURE — 3077F PR MOST RECENT SYSTOLIC BLOOD PRESSURE >= 140 MM HG: ICD-10-PCS | Mod: CPTII,,, | Performed by: PODIATRIST

## 2023-10-16 PROCEDURE — 3046F PR MOST RECENT HEMOGLOBIN A1C LEVEL > 9.0%: ICD-10-PCS | Mod: CPTII,,, | Performed by: PODIATRIST

## 2023-10-16 PROCEDURE — 73630 X-RAY EXAM OF FOOT: CPT | Mod: TC,LT

## 2023-10-16 PROCEDURE — 1159F MED LIST DOCD IN RCRD: CPT | Mod: CPTII,,, | Performed by: PODIATRIST

## 2023-10-16 PROCEDURE — 99213 OFFICE O/P EST LOW 20 MIN: CPT | Mod: PBBFAC | Performed by: PODIATRIST

## 2023-10-16 PROCEDURE — 73630 X-RAY EXAM OF FOOT: CPT | Mod: 26,LT,, | Performed by: RADIOLOGY
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
## 2023-11-10 ENCOUNTER — LAB VISIT (OUTPATIENT)
Dept: LAB | Facility: HOSPITAL | Age: 63
End: 2023-11-10
Attending: FAMILY MEDICINE
Payer: MEDICAID

## 2023-11-10 ENCOUNTER — OFFICE VISIT (OUTPATIENT)
Dept: FAMILY MEDICINE | Facility: CLINIC | Age: 63
End: 2023-11-10
Payer: MEDICAID

## 2023-11-10 VITALS
HEART RATE: 78 BPM | OXYGEN SATURATION: 97 % | RESPIRATION RATE: 12 BRPM | SYSTOLIC BLOOD PRESSURE: 132 MMHG | DIASTOLIC BLOOD PRESSURE: 62 MMHG

## 2023-11-10 DIAGNOSIS — E11.621 TYPE 2 DIABETES MELLITUS WITH FOOT ULCER, WITH LONG-TERM CURRENT USE OF INSULIN: Primary | ICD-10-CM

## 2023-11-10 DIAGNOSIS — N52.8 OTHER MALE ERECTILE DYSFUNCTION: ICD-10-CM

## 2023-11-10 DIAGNOSIS — L97.509 TYPE 2 DIABETES MELLITUS WITH FOOT ULCER, WITH LONG-TERM CURRENT USE OF INSULIN: Primary | ICD-10-CM

## 2023-11-10 DIAGNOSIS — Z79.4 TYPE 2 DIABETES MELLITUS WITH FOOT ULCER, WITH LONG-TERM CURRENT USE OF INSULIN: ICD-10-CM

## 2023-11-10 DIAGNOSIS — E11.621 TYPE 2 DIABETES MELLITUS WITH FOOT ULCER, WITH LONG-TERM CURRENT USE OF INSULIN: ICD-10-CM

## 2023-11-10 DIAGNOSIS — Z79.4 TYPE 2 DIABETES MELLITUS WITH FOOT ULCER, WITH LONG-TERM CURRENT USE OF INSULIN: Primary | ICD-10-CM

## 2023-11-10 DIAGNOSIS — L97.509 TYPE 2 DIABETES MELLITUS WITH FOOT ULCER, WITH LONG-TERM CURRENT USE OF INSULIN: ICD-10-CM

## 2023-11-10 LAB
ALBUMIN SERPL BCP-MCNC: 3.2 G/DL (ref 3.5–5.2)
ALP SERPL-CCNC: 91 U/L (ref 55–135)
ALT SERPL W/O P-5'-P-CCNC: 6 U/L (ref 10–44)
ANION GAP SERPL CALC-SCNC: 8 MMOL/L (ref 8–16)
AST SERPL-CCNC: 10 U/L (ref 10–40)
BASOPHILS # BLD AUTO: 0.05 K/UL (ref 0–0.2)
BASOPHILS NFR BLD: 0.4 % (ref 0–1.9)
BILIRUB SERPL-MCNC: 0.7 MG/DL (ref 0.1–1)
BUN SERPL-MCNC: 8 MG/DL (ref 8–23)
CALCIUM SERPL-MCNC: 8.6 MG/DL (ref 8.7–10.5)
CHLORIDE SERPL-SCNC: 99 MMOL/L (ref 95–110)
CO2 SERPL-SCNC: 24 MMOL/L (ref 23–29)
CREAT SERPL-MCNC: 0.8 MG/DL (ref 0.5–1.4)
DIFFERENTIAL METHOD: ABNORMAL
EOSINOPHIL # BLD AUTO: 0.2 K/UL (ref 0–0.5)
EOSINOPHIL NFR BLD: 1.9 % (ref 0–8)
ERYTHROCYTE [DISTWIDTH] IN BLOOD BY AUTOMATED COUNT: 13 % (ref 11.5–14.5)
EST. GFR  (NO RACE VARIABLE): >60 ML/MIN/1.73 M^2
ESTIMATED AVG GLUCOSE: 249 MG/DL (ref 68–131)
GLUCOSE SERPL-MCNC: 273 MG/DL (ref 70–110)
HBA1C MFR BLD: 10.3 % (ref 4–5.6)
HCT VFR BLD AUTO: 36.9 % (ref 40–54)
HGB BLD-MCNC: 12.3 G/DL (ref 14–18)
IMM GRANULOCYTES # BLD AUTO: 0.03 K/UL (ref 0–0.04)
IMM GRANULOCYTES NFR BLD AUTO: 0.2 % (ref 0–0.5)
LYMPHOCYTES # BLD AUTO: 2 K/UL (ref 1–4.8)
LYMPHOCYTES NFR BLD: 15.9 % (ref 18–48)
MCH RBC QN AUTO: 28.8 PG (ref 27–31)
MCHC RBC AUTO-ENTMCNC: 33.3 G/DL (ref 32–36)
MCV RBC AUTO: 86 FL (ref 82–98)
MONOCYTES # BLD AUTO: 0.5 K/UL (ref 0.3–1)
MONOCYTES NFR BLD: 3.9 % (ref 4–15)
NEUTROPHILS # BLD AUTO: 9.6 K/UL (ref 1.8–7.7)
NEUTROPHILS NFR BLD: 77.7 % (ref 38–73)
NRBC BLD-RTO: 0 /100 WBC
PLATELET # BLD AUTO: 231 K/UL (ref 150–450)
PMV BLD AUTO: 9.7 FL (ref 9.2–12.9)
POTASSIUM SERPL-SCNC: 4 MMOL/L (ref 3.5–5.1)
PROT SERPL-MCNC: 9 G/DL (ref 6–8.4)
RBC # BLD AUTO: 4.27 M/UL (ref 4.6–6.2)
SODIUM SERPL-SCNC: 131 MMOL/L (ref 136–145)
TESTOST SERPL-MCNC: 163 NG/DL (ref 304–1227)
WBC # BLD AUTO: 12.41 K/UL (ref 3.9–12.7)

## 2023-11-10 PROCEDURE — 1159F PR MEDICATION LIST DOCUMENTED IN MEDICAL RECORD: ICD-10-PCS | Mod: CPTII,,, | Performed by: FAMILY MEDICINE

## 2023-11-10 PROCEDURE — 85025 COMPLETE CBC W/AUTO DIFF WBC: CPT | Performed by: FAMILY MEDICINE

## 2023-11-10 PROCEDURE — 83036 HEMOGLOBIN GLYCOSYLATED A1C: CPT | Performed by: FAMILY MEDICINE

## 2023-11-10 PROCEDURE — 99214 OFFICE O/P EST MOD 30 MIN: CPT | Mod: S$PBB,,, | Performed by: FAMILY MEDICINE

## 2023-11-10 PROCEDURE — 3078F DIAST BP <80 MM HG: CPT | Mod: CPTII,,, | Performed by: FAMILY MEDICINE

## 2023-11-10 PROCEDURE — 99213 OFFICE O/P EST LOW 20 MIN: CPT | Mod: PBBFAC | Performed by: FAMILY MEDICINE

## 2023-11-10 PROCEDURE — 3075F SYST BP GE 130 - 139MM HG: CPT | Mod: CPTII,,, | Performed by: FAMILY MEDICINE

## 2023-11-10 PROCEDURE — 3046F HEMOGLOBIN A1C LEVEL >9.0%: CPT | Mod: CPTII,,, | Performed by: FAMILY MEDICINE

## 2023-11-10 PROCEDURE — 3075F PR MOST RECENT SYSTOLIC BLOOD PRESS GE 130-139MM HG: ICD-10-PCS | Mod: CPTII,,, | Performed by: FAMILY MEDICINE

## 2023-11-10 PROCEDURE — 84403 ASSAY OF TOTAL TESTOSTERONE: CPT | Performed by: FAMILY MEDICINE

## 2023-11-10 PROCEDURE — 80053 COMPREHEN METABOLIC PANEL: CPT | Performed by: FAMILY MEDICINE

## 2023-11-10 PROCEDURE — 3078F PR MOST RECENT DIASTOLIC BLOOD PRESSURE < 80 MM HG: ICD-10-PCS | Mod: CPTII,,, | Performed by: FAMILY MEDICINE

## 2023-11-10 PROCEDURE — 1159F MED LIST DOCD IN RCRD: CPT | Mod: CPTII,,, | Performed by: FAMILY MEDICINE

## 2023-11-10 PROCEDURE — 3046F PR MOST RECENT HEMOGLOBIN A1C LEVEL > 9.0%: ICD-10-PCS | Mod: CPTII,,, | Performed by: FAMILY MEDICINE

## 2023-11-10 PROCEDURE — 36415 COLL VENOUS BLD VENIPUNCTURE: CPT | Performed by: FAMILY MEDICINE

## 2023-11-10 PROCEDURE — 99214 PR OFFICE/OUTPT VISIT, EST, LEVL IV, 30-39 MIN: ICD-10-PCS | Mod: S$PBB,,, | Performed by: FAMILY MEDICINE

## 2023-11-10 PROCEDURE — 99999 PR PBB SHADOW E&M-EST. PATIENT-LVL III: CPT | Mod: PBBFAC,,, | Performed by: FAMILY MEDICINE

## 2023-11-10 PROCEDURE — 99999 PR PBB SHADOW E&M-EST. PATIENT-LVL III: ICD-10-PCS | Mod: PBBFAC,,, | Performed by: FAMILY MEDICINE

## 2023-11-10 NOTE — PROGRESS NOTES
Subjective:       Patient ID: Alfred Villarreal is a 62 y.o. male.    Chief Complaint: Eye Problem (Needs see eye doctor, also having trouble hearing ) and Erectile Dysfunction    Mr Villarreal is a 61 yo male with c/o decreased hearing and erectile dysfunction. He has had decreased hearing for over a year and has not seen an ENT    Eye Problem     Erectile Dysfunction      Review of Systems   HENT:  Positive for hearing loss.    Respiratory: Negative.     Cardiovascular: Negative.    Gastrointestinal: Negative.    Genitourinary:         Ed   Skin:         Multiple stasis and pressure ulcer on lower extremities       Patient Active Problem List   Diagnosis    Abscess in epidural space of L2-L5 lumbar spine    HTN (hypertension)    Diabetes mellitus, type 2    Psoas muscle abscess    Osteomyelitis of lumbar vertebra    Ulcer of left foot    Atrial fibrillation    Anticoagulant long-term use    Hyponatremia    Uncontrolled diabetes mellitus with foot ulcer    Rotator cuff (capsule) sprain and strain    Septic arthritis of shoulder, left    Abscess of paraspinal muscles    Abscess of axilla, left    Urosepsis    Atrial fibrillation with RVR    Arthritis, septic, shoulder    Pain    Hx of amputation    Type II diabetes mellitus with neurological manifestations    Skin ulcer of left foot with fat layer exposed    Diabetic foot infection    Abscess of bursa of left foot    Cellulitis of foot    Decreased functional mobility and endurance    Comprehensive diabetic foot examination, type 2 DM, encounter for    Cellulitis and abscess of left leg    Severe sepsis    Other male erectile dysfunction       Objective:      Physical Exam  Constitutional:       Appearance: Normal appearance.   HENT:      Head: Normocephalic.      Mouth/Throat:      Mouth: Mucous membranes are moist.   Eyes:      Pupils: Pupils are equal, round, and reactive to light.   Cardiovascular:      Rate and Rhythm: Normal rate and regular rhythm.   Musculoskeletal:       Comments: Ambulates with wheelchair, has right BKA and left foot with 4th and 5th toe amputated, currently with pressure ulcers to left foot   Skin:     General: Skin is warm and dry.   Neurological:      General: No focal deficit present.      Mental Status: He is alert and oriented to person, place, and time.   Psychiatric:         Mood and Affect: Mood normal.         Lab Results   Component Value Date    WBC 9.90 08/16/2023    HGB 12.0 (L) 08/16/2023    HCT 35.7 (L) 08/16/2023     08/16/2023    CHOL 134 11/18/2022    TRIG 58 11/18/2022    HDL 30 (L) 11/18/2022    ALT 13 08/16/2023    AST 26 08/16/2023     (L) 08/16/2023    K 3.9 08/16/2023    CL 98 08/16/2023    CREATININE 0.9 08/16/2023    BUN 8 08/16/2023    CO2 26 08/16/2023    INR 1.1 08/14/2023    HGBA1C 11.2 (H) 08/14/2023     The 10-year ASCVD risk score (Micheal NEFF, et al., 2019) is: 18.2%    Values used to calculate the score:      Age: 62 years      Sex: Male      Is Non- : No      Diabetic: Yes      Tobacco smoker: No      Systolic Blood Pressure: 132 mmHg      Is BP treated: No      HDL Cholesterol: 30 mg/dL      Total Cholesterol: 134 mg/dL  Visit Vitals  /62 (BP Location: Left arm, Patient Position: Sitting, BP Method: Large (Manual))   Pulse 78   Resp 12   SpO2 97%      Assessment:       1. Type 2 diabetes mellitus with foot ulcer, with long-term current use of insulin    2. Other male erectile dysfunction        Plan:       1. Type 2 diabetes mellitus with foot ulcer, with long-term current use of insulin  -     CBC auto differential; Future; Expected date: 11/10/2023  -     Hemoglobin A1C; Future; Expected date: 11/10/2023  -     Comprehensive metabolic panel; Future; Expected date: 11/10/2023    2. Other male erectile dysfunction  Assessment & Plan:  Check testosterone level and if normal, will try cialis or other ED med    Orders:  -     Testosterone; Future; Expected date: 11/10/2023       Follow up  in about 3 months (around 2/10/2024), or if symptoms worsen or fail to improve.      Future Appointments       Date Provider Specialty Appt Notes    11/20/2023 Gary Stringer, [facility] 1 month f/u

## 2023-11-10 NOTE — PROGRESS NOTES
Patient is having trouble seeing and reading, has appointment with opthalmology on 11/27, also having issues hearing, patient is also having erectile dysfunction issues

## 2023-11-13 ENCOUNTER — TELEPHONE (OUTPATIENT)
Dept: FAMILY MEDICINE | Facility: CLINIC | Age: 63
End: 2023-11-13
Payer: MEDICAID

## 2023-11-13 PROBLEM — R65.20 SEVERE SEPSIS: Status: RESOLVED | Noted: 2023-08-14 | Resolved: 2023-11-13

## 2023-11-13 PROBLEM — A41.9 SEVERE SEPSIS: Status: RESOLVED | Noted: 2023-08-14 | Resolved: 2023-11-13

## 2023-11-13 NOTE — TELEPHONE ENCOUNTER
Spoke with patient regarding labs and provider recommendations, patient declines urology referral at this time, made a 3 month appointment for follow up with provider

## 2023-11-20 ENCOUNTER — OFFICE VISIT (OUTPATIENT)
Dept: PODIATRY | Facility: CLINIC | Age: 63
End: 2023-11-20
Payer: MEDICAID

## 2023-11-20 ENCOUNTER — HOSPITAL ENCOUNTER (OUTPATIENT)
Dept: RADIOLOGY | Facility: HOSPITAL | Age: 63
Discharge: HOME | End: 2023-11-20
Attending: PODIATRIST
Payer: MEDICAID

## 2023-11-20 VITALS
DIASTOLIC BLOOD PRESSURE: 89 MMHG | WEIGHT: 315 LBS | HEIGHT: 78 IN | SYSTOLIC BLOOD PRESSURE: 128 MMHG | HEART RATE: 79 BPM | BODY MASS INDEX: 36.45 KG/M2

## 2023-11-20 DIAGNOSIS — E11.9 COMPREHENSIVE DIABETIC FOOT EXAMINATION, TYPE 2 DM, ENCOUNTER FOR: ICD-10-CM

## 2023-11-20 DIAGNOSIS — L97.522 ULCER OF LEFT FOOT WITH FAT LAYER EXPOSED: ICD-10-CM

## 2023-11-20 DIAGNOSIS — L97.522 ULCER OF LEFT FOOT WITH FAT LAYER EXPOSED: Primary | ICD-10-CM

## 2023-11-20 DIAGNOSIS — M71.072 ABSCESS OF BURSA OF LEFT FOOT: ICD-10-CM

## 2023-11-20 DIAGNOSIS — E11.49 TYPE II DIABETES MELLITUS WITH NEUROLOGICAL MANIFESTATIONS: ICD-10-CM

## 2023-11-20 PROCEDURE — 99214 OFFICE O/P EST MOD 30 MIN: CPT | Mod: PBBFAC | Performed by: PODIATRIST

## 2023-11-20 PROCEDURE — 87077 CULTURE AEROBIC IDENTIFY: CPT | Performed by: PODIATRIST

## 2023-11-20 PROCEDURE — 73630 X-RAY EXAM OF FOOT: CPT | Mod: 26,LT,, | Performed by: RADIOLOGY

## 2023-11-20 PROCEDURE — 1159F PR MEDICATION LIST DOCUMENTED IN MEDICAL RECORD: ICD-10-PCS | Mod: CPTII,,, | Performed by: PODIATRIST

## 2023-11-20 PROCEDURE — 99215 PR OFFICE/OUTPT VISIT, EST, LEVL V, 40-54 MIN: ICD-10-PCS | Mod: S$PBB,,, | Performed by: PODIATRIST

## 2023-11-20 PROCEDURE — 3079F PR MOST RECENT DIASTOLIC BLOOD PRESSURE 80-89 MM HG: ICD-10-PCS | Mod: CPTII,,, | Performed by: PODIATRIST

## 2023-11-20 PROCEDURE — 3079F DIAST BP 80-89 MM HG: CPT | Mod: CPTII,,, | Performed by: PODIATRIST

## 2023-11-20 PROCEDURE — 1159F MED LIST DOCD IN RCRD: CPT | Mod: CPTII,,, | Performed by: PODIATRIST

## 2023-11-20 PROCEDURE — 1160F PR REVIEW ALL MEDS BY PRESCRIBER/CLIN PHARMACIST DOCUMENTED: ICD-10-PCS | Mod: CPTII,,, | Performed by: PODIATRIST

## 2023-11-20 PROCEDURE — 3074F SYST BP LT 130 MM HG: CPT | Mod: CPTII,,, | Performed by: PODIATRIST

## 2023-11-20 PROCEDURE — 87070 CULTURE OTHR SPECIMN AEROBIC: CPT | Performed by: PODIATRIST

## 2023-11-20 PROCEDURE — 87075 CULTR BACTERIA EXCEPT BLOOD: CPT | Performed by: PODIATRIST

## 2023-11-20 PROCEDURE — 99215 OFFICE O/P EST HI 40 MIN: CPT | Mod: S$PBB,,, | Performed by: PODIATRIST

## 2023-11-20 PROCEDURE — 99999 PR PBB SHADOW E&M-EST. PATIENT-LVL IV: ICD-10-PCS | Mod: PBBFAC,,, | Performed by: PODIATRIST

## 2023-11-20 PROCEDURE — 1160F RVW MEDS BY RX/DR IN RCRD: CPT | Mod: CPTII,,, | Performed by: PODIATRIST

## 2023-11-20 PROCEDURE — 99999 PR PBB SHADOW E&M-EST. PATIENT-LVL IV: CPT | Mod: PBBFAC,,, | Performed by: PODIATRIST

## 2023-11-20 PROCEDURE — 3008F BODY MASS INDEX DOCD: CPT | Mod: CPTII,,, | Performed by: PODIATRIST

## 2023-11-20 PROCEDURE — 87186 SC STD MICRODIL/AGAR DIL: CPT | Mod: 59 | Performed by: PODIATRIST

## 2023-11-20 PROCEDURE — 3008F PR BODY MASS INDEX (BMI) DOCUMENTED: ICD-10-PCS | Mod: CPTII,,, | Performed by: PODIATRIST

## 2023-11-20 PROCEDURE — 3074F PR MOST RECENT SYSTOLIC BLOOD PRESSURE < 130 MM HG: ICD-10-PCS | Mod: CPTII,,, | Performed by: PODIATRIST

## 2023-11-20 PROCEDURE — 3046F PR MOST RECENT HEMOGLOBIN A1C LEVEL > 9.0%: ICD-10-PCS | Mod: CPTII,,, | Performed by: PODIATRIST

## 2023-11-20 PROCEDURE — 3046F HEMOGLOBIN A1C LEVEL >9.0%: CPT | Mod: CPTII,,, | Performed by: PODIATRIST

## 2023-11-20 PROCEDURE — 73630 X-RAY EXAM OF FOOT: CPT | Mod: TC,LT

## 2023-11-20 PROCEDURE — 73630 XR FOOT COMPLETE 3 VIEW LEFT: ICD-10-PCS | Mod: 26,LT,, | Performed by: RADIOLOGY

## 2023-11-21 NOTE — PROGRESS NOTES
"Subjective:       Patient ID: Alfred Villarreal is a 63 y.o. male.    Chief Complaint: Foot Ulcer     Patient presents for follow-up today multiple areas of breakdown and ulceration left.      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 No pertinent family history.  Social History     Socioeconomic History    Marital status:    Tobacco Use    Smoking status: Never    Smokeless tobacco: Current     Types: Chew   Substance and Sexual Activity    Alcohol use: Not Currently    Drug use: No    Sexual activity: Yes     Partners: Female     Social Determinants of Health     Financial Resource Strain: High Risk (8/16/2023)    Overall Financial Resource Strain (CARDIA)     Difficulty of Paying Living Expenses: Hard   Food Insecurity: No Food Insecurity (8/16/2023)    Hunger Vital Sign     Worried About Running Out of Food in the Last Year: Never true     Ran Out of Food in the Last Year: Never true   Transportation Needs: No Transportation Needs (8/16/2023)    PRAPARE - Transportation     Lack of Transportation (Medical): No     Lack of Transportation (Non-Medical): No   Physical Activity: Inactive (8/16/2023)    Exercise Vital Sign     Days of Exercise per Week: 0 days     Minutes of Exercise per Session: 0 min   Stress: Stress Concern Present (8/16/2023)    [employer] [place] [employer] - Occupational Stress Questionnaire     Feeling of Stress : Rather much   Social Connections: Socially Isolated (8/16/2023)    Social Connection and Isolation Panel [NHANES]     Frequency of Communication with Friends and Family: Never     Frequency of Social Gatherings with Friends and Family: Never     Attends Advent Services: Never     Active " "Member of Clubs or Organizations: No     Attends Club or Organization Meetings: Never     Marital Status: Never    Housing Stability: Unknown (2023)    Housing Stability Vital Sign     Unable to Pay for Housing in the Last Year: No     Unstable Housing in the Last Year: No       Current Outpatient Medications   Medication Sig Dispense Refill    gabapentin (NEURONTIN) 600 MG tablet Take 600 mg by mouth 3 (three) times daily.      ibuprofen (ADVIL,MOTRIN) 800 MG tablet SMARTSI Tablet(s) By Mouth Every 12 Hours PRN      insulin aspart U-100 (NOVOLOG) 100 unit/mL injection INJECT 50 UNITS UNDER THE SKIN TWICE DAILY BEFORE A MEAL 90 mL 0    insulin detemir U-100, Levemir, (LEVEMIR FLEXTOUCH U100 INSULIN) 100 unit/mL (3 mL) InPn pen Inject 70 Units into the skin 2 (two) times daily. 42 mL 11    naloxone (NARCAN) 4 mg/actuation Spry SMARTSIG:Both Nares      oxyCODONE-acetaminophen (PERCOCET)  mg per tablet Take 1 tablet by mouth.      pen needle, diabetic 31 gauge x 1/4" Ndle Use daily with victoza 100 each 3     No current facility-administered medications for this visit.     Review of patient's allergies indicates:   Allergen Reactions    Amitriptyline      Vivid dreams( bad)       Review of Systems   Musculoskeletal:  Positive for arthralgias, back pain, gait problem and joint swelling.   Skin:  Positive for color change and wound.   Neurological:  Positive for numbness.   All other systems reviewed and are negative.      Objective:      Vitals:    23 1431   BP: 128/89   BP Location: Left arm   Patient Position: Sitting   Pulse: 79   Weight: (!) 147.4 kg (325 lb)   Height: 6' 6" (1.981 m)       Physical Exam  Vitals and nursing note reviewed.   Constitutional:       General: He is in acute distress.      Appearance: Normal appearance. He is well-developed.   Cardiovascular:      Pulses:           Dorsalis pedis pulses are 1+ on the left side.        Posterior tibial pulses are 0 on the left " side.   Pulmonary:      Effort: Pulmonary effort is normal.   Musculoskeletal:      Left lower leg: Edema present.      Left foot: Deformity present.        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   Feet:      Left foot:      Protective Sensation: 4 sites tested.   1 site sensed.     Skin integrity: Ulcer, skin breakdown, erythema and warmth present.   Skin:     Capillary Refill: Capillary refill takes more than 3 seconds.      Findings: Erythema and lesion present.   Neurological:      Mental Status: He is alert.      Sensory: Sensory deficit present.      Deep Tendon Reflexes: Reflexes abnormal.   Psychiatric:         Mood and Affect: Mood normal.         Behavior: Behavior normal.         Thought Content: Thought content normal.         Judgment: Judgment normal.                                  Assessment:       1. Ulcer of left foot with fat layer exposed    2. Abscess of bursa of left foot    3. Type II diabetes mellitus with neurological manifestations    4. Comprehensive diabetic foot examination, type 2 DM, encounter for        Plan:         Patient presents today follow-up of multiple sites of ulceration on the patient's left lower extremity he has had a previous amputation of the right lower extremity has an extensive history of osteomyelitis.    Patient has had a chronic ulceration on his left foot for some time and had been showing signs of improvement but has subsequently gotten worse due to weight-bearing on the patient's left heel.  Patient does use his left heel frequently to transfer however he states he was out shooting pool for about 6 hours the other night it is very likely this excessive pressure weight-bearing has caused the ulceration to become significantly larger with a newly noted necrotic area.  Culture and sensitivity was performed both aerobic and anaerobic of the large ulceration and developing the patient's left heel.  I am going to wait to put the  patient on antibiotics pending culture and sensitivity.  Heel ulceration itself has considerably increased in size it is 13 cm long by 8 cm wide by 5 mm deep.    Patient requires extensive wound care due to the shear size of the ulceration sites with associated nonexcisional debridement.  Patient needs to continue the ascetic at said wet-to-dry dressings after he scrub the area with a PCMX scrub and rinses with Dakin's the Dakin wet-to-dry needs to be applied this includes the wounds on the posterior aspect of the left lower extremity.    Plan follow-up will be 3 weeks unless the patient has has any problems questions or concerns he is not only been doing the Dakin solution wet-to-dry but he is also been cleaning the area with that as well as Dakin solution patient dispensed P cm X scrub so that he can scrub this area at the time of wound care.  Patient will be contacted with culture results and placed on appropriate antibiotics pending culture and sensitivity being finalized.   I do plan to follow up with the patient in 3 weeks.  Patient was dispensed Dakin solution ascetic acid and P cm X scrub as well as dressing supplies.  Total time including discussion evaluation treatment discussion of treatment options treatment plan wound care including non excisional wound debridement of all locations and discussion of treatment plan as well as review of the patient's chart and documentation of today's visit equaled 45 minutes.  Extensive time as needed for visit with the patient due to the Sheer size and number of the ulceration sites requiring all of these to be cleaned non excisionally debrided and wound care provided.  Patient understands the consequences of putting pressure on the foot and increasing size of the ulceration with subsequent infection which could lead to a lower extremity amputation.  X-rays reviewed today while a chronic ulceration is visualized in the soft tissue it does not appear to have affected  bone there are no erosive changes currently noted involving the calcaneus.  This note was created using Assembly voice recognition software that occasionally misinterpreted phrases or words.

## 2023-11-23 DIAGNOSIS — E11.9 COMPREHENSIVE DIABETIC FOOT EXAMINATION, TYPE 2 DM, ENCOUNTER FOR: ICD-10-CM

## 2023-11-23 DIAGNOSIS — E11.49 TYPE II DIABETES MELLITUS WITH NEUROLOGICAL MANIFESTATIONS: ICD-10-CM

## 2023-11-23 DIAGNOSIS — L97.522 ULCER OF LEFT FOOT WITH FAT LAYER EXPOSED: Primary | ICD-10-CM

## 2023-11-23 RX ORDER — SULFAMETHOXAZOLE AND TRIMETHOPRIM 800; 160 MG/1; MG/1
1 TABLET ORAL 2 TIMES DAILY
Qty: 28 TABLET | Refills: 0 | Status: SHIPPED | OUTPATIENT
Start: 2023-11-23 | End: 2023-12-07

## 2023-11-24 ENCOUNTER — TELEPHONE (OUTPATIENT)
Dept: PODIATRY | Facility: CLINIC | Age: 63
End: 2023-11-24
Payer: MEDICAID

## 2023-11-24 LAB
BACTERIA SPEC AEROBE CULT: ABNORMAL

## 2023-11-24 RX ORDER — LEVOFLOXACIN 500 MG/1
500 TABLET, FILM COATED ORAL DAILY
Qty: 14 TABLET | Refills: 0 | Status: SHIPPED | OUTPATIENT
Start: 2023-11-24 | End: 2023-12-08

## 2023-11-24 NOTE — TELEPHONE ENCOUNTER
----- Message from Gary Stringer DPM sent at 11/23/2023  6:45 PM CST -----  Please call the patient and advise his culture was positive for Proteus I have sent in a prescription for Bactrim which I want him to start immediately.  ----- Message -----  From: [facility]  Sent: 11/22/2023   7:37 AM CST  To: Gary Stringer DPM

## 2023-11-24 NOTE — TELEPHONE ENCOUNTER
----- Message from Gary Stringer DPM sent at 11/24/2023  4:03 PM CST -----  Please call the patient and advise his culture had an additional bacteria present so he is going to need to take Levaquin in addition to the Bactrim so a prescription was sent in for that so now he will be taking Levaquin and Bactrim as directed.  ----- Message -----  From: [facility]  Sent: 11/22/2023   7:37 AM CST  To: Gary Stringer DPM

## 2023-11-26 LAB — BACTERIA SPEC ANAEROBE CULT: NORMAL

## 2023-11-27 ENCOUNTER — TELEPHONE (OUTPATIENT)
Dept: FAMILY MEDICINE | Facility: CLINIC | Age: 63
End: 2023-11-27
Payer: MEDICAID

## 2023-11-27 NOTE — TELEPHONE ENCOUNTER
----- Message from Dorcas Valdez sent at 11/27/2023 12:32 PM CST -----  Contact: Patient  Type:  Needs Medical Advice    Who Called: Patient       Would the patient rather a call back or a response via [facility]? Call    Best Call Back Number: [phone] (home)     Additional Information: Patient is requesting a suggestion for optometry. Please call to advise

## 2024-01-10 ENCOUNTER — TELEPHONE (OUTPATIENT)
Dept: PODIATRY | Facility: CLINIC | Age: 64
End: 2024-01-10
Payer: MEDICAID

## 2024-01-10 NOTE — TELEPHONE ENCOUNTER
----- Message from Mariah Richard sent at 1/10/2024  3:21 PM CST -----  Contact: self  Type: Needs Medical Advice  Who Called:  pt  Best Call Back Number: [phone]   Additional Information: pt would like dr munoz to call him

## 2024-01-12 NOTE — TELEPHONE ENCOUNTER
----- Message from Irene Montes sent at 1/12/2024  9:53 AM CST -----  Type:  RX Refill Request    Who Called: pt  Refill or New Rx: Refill  RX Name and Strength: insulin aspart U-100 (NOVOLOG) 100 unit/mL injection  How is the patient currently taking it? (ex. 1XDay): as directed  Is this a 30 day or 90 day RX: 90  Preferred Pharmacy with phone number:   [facility], [place] - [address] [address]  Phone: [phone] Fax: [phone]      Local or Mail Order: Local  Ordering Provider: Rafa  Would the patient rather a call back or a response via [facility]? Call back  Best Call Back Number: [phone]  Additional Information: pl call bk to advise thanks

## 2024-01-15 RX ORDER — INSULIN ASPART 100 [IU]/ML
INJECTION, SOLUTION INTRAVENOUS; SUBCUTANEOUS
Qty: 90 ML | Refills: 0 | Status: SHIPPED | OUTPATIENT
Start: 2024-01-15 | End: 2024-02-14 | Stop reason: SDUPTHER

## 2024-01-22 ENCOUNTER — OFFICE VISIT (OUTPATIENT)
Dept: PODIATRY | Facility: CLINIC | Age: 64
End: 2024-01-22
Payer: MEDICAID

## 2024-01-22 ENCOUNTER — HOSPITAL ENCOUNTER (OUTPATIENT)
Dept: RADIOLOGY | Facility: HOSPITAL | Age: 64
Discharge: HOME | End: 2024-01-22
Attending: PODIATRIST
Payer: MEDICAID

## 2024-01-22 VITALS
RESPIRATION RATE: 18 BRPM | SYSTOLIC BLOOD PRESSURE: 164 MMHG | WEIGHT: 315 LBS | BODY MASS INDEX: 36.45 KG/M2 | DIASTOLIC BLOOD PRESSURE: 89 MMHG | HEART RATE: 92 BPM | HEIGHT: 78 IN

## 2024-01-22 DIAGNOSIS — M71.072 ABSCESS OF BURSA OF LEFT FOOT: ICD-10-CM

## 2024-01-22 DIAGNOSIS — L97.522 ULCER OF LEFT FOOT WITH FAT LAYER EXPOSED: ICD-10-CM

## 2024-01-22 DIAGNOSIS — E11.49 TYPE II DIABETES MELLITUS WITH NEUROLOGICAL MANIFESTATIONS: ICD-10-CM

## 2024-01-22 DIAGNOSIS — L97.522 ULCER OF LEFT FOOT WITH FAT LAYER EXPOSED: Primary | ICD-10-CM

## 2024-01-22 DIAGNOSIS — E11.9 COMPREHENSIVE DIABETIC FOOT EXAMINATION, TYPE 2 DM, ENCOUNTER FOR: ICD-10-CM

## 2024-01-22 PROCEDURE — 99999 PR PBB SHADOW E&M-EST. PATIENT-LVL IV: CPT | Mod: PBBFAC,,, | Performed by: PODIATRIST

## 2024-01-22 PROCEDURE — 99214 OFFICE O/P EST MOD 30 MIN: CPT | Mod: PBBFAC | Performed by: PODIATRIST

## 2024-01-22 PROCEDURE — 87147 CULTURE TYPE IMMUNOLOGIC: CPT | Performed by: PODIATRIST

## 2024-01-22 PROCEDURE — 87077 CULTURE AEROBIC IDENTIFY: CPT | Performed by: PODIATRIST

## 2024-01-22 PROCEDURE — 87070 CULTURE OTHR SPECIMN AEROBIC: CPT | Performed by: PODIATRIST

## 2024-01-22 PROCEDURE — 1159F MED LIST DOCD IN RCRD: CPT | Mod: CPTII,,, | Performed by: PODIATRIST

## 2024-01-22 PROCEDURE — 73630 X-RAY EXAM OF FOOT: CPT | Mod: TC,LT

## 2024-01-22 PROCEDURE — 99215 OFFICE O/P EST HI 40 MIN: CPT | Mod: S$PBB,,, | Performed by: PODIATRIST

## 2024-01-22 PROCEDURE — 1160F RVW MEDS BY RX/DR IN RCRD: CPT | Mod: CPTII,,, | Performed by: PODIATRIST

## 2024-01-22 PROCEDURE — 73630 X-RAY EXAM OF FOOT: CPT | Mod: 26,LT,, | Performed by: RADIOLOGY

## 2024-01-22 PROCEDURE — 3079F DIAST BP 80-89 MM HG: CPT | Mod: CPTII,,, | Performed by: PODIATRIST

## 2024-01-22 PROCEDURE — 3077F SYST BP >= 140 MM HG: CPT | Mod: CPTII,,, | Performed by: PODIATRIST

## 2024-01-22 PROCEDURE — 3008F BODY MASS INDEX DOCD: CPT | Mod: CPTII,,, | Performed by: PODIATRIST

## 2024-01-22 PROCEDURE — 87184 SC STD DISK METHOD PER PLATE: CPT | Performed by: PODIATRIST

## 2024-01-22 PROCEDURE — 87186 SC STD MICRODIL/AGAR DIL: CPT | Mod: 59 | Performed by: PODIATRIST

## 2024-01-22 RX ORDER — TIZANIDINE 4 MG/1
4 TABLET ORAL 2 TIMES DAILY
COMMUNITY
Start: 2024-01-02

## 2024-01-23 NOTE — PROGRESS NOTES
"Subjective:       Patient ID: Alfred Villarreal is a 63 y.o. male.    Chief Complaint: Follow-up, Foot Ulcer, Abscess, Foot Pain, and Diabetes Mellitus     Patient presents for follow-up today multiple areas of breakdown and ulceration left.      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 No pertinent family history.  Social History     Socioeconomic History    Marital status:    Tobacco Use    Smoking status: Never    Smokeless tobacco: Current     Types: Chew   Substance and Sexual Activity    Alcohol use: Not Currently    Drug use: No    Sexual activity: Yes     Partners: Female     Social Determinants of Health     Financial Resource Strain: High Risk (8/16/2023)    Overall Financial Resource Strain (CARDIA)     Difficulty of Paying Living Expenses: Hard   Food Insecurity: No Food Insecurity (8/16/2023)    Hunger Vital Sign     Worried About Running Out of Food in the Last Year: Never true     Ran Out of Food in the Last Year: Never true   Transportation Needs: No Transportation Needs (8/16/2023)    PRAPARE - Transportation     Lack of Transportation (Medical): No     Lack of Transportation (Non-Medical): No   Physical Activity: Inactive (8/16/2023)    Exercise Vital Sign     Days of Exercise per Week: 0 days     Minutes of Exercise per Session: 0 min   Stress: Stress Concern Present (8/16/2023)    [place] [employer] - Occupational Stress Questionnaire     Feeling of Stress : Rather much   Social Connections: Socially Isolated (8/16/2023)    Social Connection and Isolation Panel [NHANES]     Frequency of Communication with Friends and Family: Never     Frequency of Social Gatherings with Friends and Family: " "Never     Attends Jewish Services: Never     Active Member of Clubs or Organizations: No     Attends Club or Organization Meetings: Never     Marital Status: Never    Housing Stability: Unknown (2023)    Housing Stability Vital Sign     Unable to Pay for Housing in the Last Year: No     Unstable Housing in the Last Year: No       Current Outpatient Medications   Medication Sig Dispense Refill    gabapentin (NEURONTIN) 600 MG tablet Take 600 mg by mouth 3 (three) times daily.      ibuprofen (ADVIL,MOTRIN) 800 MG tablet SMARTSI Tablet(s) By Mouth Every 12 Hours PRN      insulin aspart U-100 (NOVOLOG) 100 unit/mL injection INJECT 50 UNITS UNDER THE SKIN TWICE DAILY BEFORE A MEAL 90 mL 0    insulin detemir U-100, Levemir, (LEVEMIR FLEXTOUCH U100 INSULIN) 100 unit/mL (3 mL) InPn pen Inject 70 Units into the skin 2 (two) times daily. 42 mL 11    oxyCODONE-acetaminophen (PERCOCET)  mg per tablet Take 1 tablet by mouth.      pen needle, diabetic 31 gauge x 1/4" Ndle Use daily with victoza 100 each 3    tiZANidine (ZANAFLEX) 4 MG tablet Take 4 mg by mouth 2 (two) times daily.      naloxone (NARCAN) 4 mg/actuation Spry SMARTSIG:Both Nares       No current facility-administered medications for this visit.     Review of patient's allergies indicates:   Allergen Reactions    Amitriptyline      Vivid dreams( bad)       Review of Systems   Musculoskeletal:  Positive for arthralgias, back pain, gait problem and joint swelling.   Skin:  Positive for color change and wound.   Neurological:  Positive for numbness.   All other systems reviewed and are negative.      Objective:      Vitals:    24 1331   BP: (!) 164/89   Pulse: 92   Resp: 18   Weight: (!) 147.4 kg (325 lb)   Height: 6' 6" (1.981 m)       Physical Exam  Vitals and nursing note reviewed.   Constitutional:       General: He is in acute distress.      Appearance: Normal appearance. He is well-developed.   Cardiovascular:      Pulses:           " Dorsalis pedis pulses are 1+ on the left side.        Posterior tibial pulses are 0 on the left side.   Pulmonary:      Effort: Pulmonary effort is normal.   Musculoskeletal:      Left lower leg: Edema present.      Left foot: Deformity present.        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   Feet:      Left foot:      Protective Sensation: 4 sites tested.   1 site sensed.     Skin integrity: Ulcer, skin breakdown, erythema and warmth present.   Skin:     Capillary Refill: Capillary refill takes more than 3 seconds.      Findings: Erythema and lesion present.   Neurological:      Mental Status: He is alert.      Sensory: Sensory deficit present.      Deep Tendon Reflexes: Reflexes abnormal.   Psychiatric:         Mood and Affect: Mood normal.         Behavior: Behavior normal.         Thought Content: Thought content normal.         Judgment: Judgment normal.                                                                    Assessment:       1. Ulcer of left foot with fat layer exposed    2. Abscess of bursa of left foot    3. Type II diabetes mellitus with neurological manifestations    4. Comprehensive diabetic foot examination, type 2 DM, encounter for        Plan:         Patient presents today follow-up of multiple sites of ulceration on the patient's left lower extremity he has had a previous amputation of the right lower extremity has an extensive history of osteomyelitis.    Patient has had a chronic ulceration on his left foot for some time and had been showing signs of improvement but has subsequently gotten worse due to weight-bearing on the patient's left heel.    I am going to wait to put the patient on antibiotics pending culture and sensitivity.  Heel ulceration itself has considerably increased in size it is 16 cm long by 10 cm wide by 7 mm deep.    Patient requires extensive wound care due to the shear size of the ulceration sites with associated nonexcisional  debridement.  All ulceration sites have gotten progressively worse patient is not doing anything to help his own situation was supposed to be seen for a 3 week follow-up that was a proximally 2 months ago he clearly is not taking good care of this he has not changing the dressing as directed and keeping the area clean and we know that he has not staying off of the left lower extremity.  Patient was encouraged to be compliant follow my instructions he was very down today seemed very depressed over his current living situation and I have advised the patient if he does not take better care of the left lower extremity he is going to end up having a left lower extremity amputation he has already had a right lower extremity amputation.  Culture and sensitivity was performed today patient also had a new area 3rd digit left where he had hit the toe with his power scooter.  I do plan to follow up with the patient in 4 weeks.  Patient will be scrubbing and cleaning all sites with a P cm X scrub with additional Hibiclens applied I want this to sit for 10 minutes be rinsed with Dakin solution dried thoroughly and just a well-padded thick protective dressing applied this has to be changed every day I am okay with the patient airing out the wounds in between dressing changes and if they are not drying out he needs to apply Betadine which was also dispensed to him today.  Patient will be contacted when his culture and sensitivity has finalized and we know what antibiotics he needs to be on.  Extensive time as needed for visit with the patient due to the Sheer size and number of the ulceration sites requiring all of these to be cleaned non excisionally debrided and wound care provided.  Patient understands the consequences of putting pressure on the foot and increasing size of the ulceration with subsequent infection which could lead to a lower extremity amputation.  X-rays reviewed today while a chronic ulceration is visualized in  the soft tissue it does not appear to have affected bone there are no erosive changes currently noted involving the calcaneus.  Patient was dispensed supplies today but I have advised the patient we can not continue to give him the supplies that he needs he is going to have to obtain these on his own.  Total face-to-face time including discussion evaluation treatment extensive wound care review of chart documentation of today's visit equaled 45 minutes.  Patient had requested that we contact his pain management provider to request additional pain medication because of his foot situation I have advised the patient that is for his pain management provider to determine we would not be contacting them he has asked this multiple times in the past.  We do not provide the patient any controlled substances.  This note was created using [employer] voice recognition software that occasionally misinterpreted phrases or words.

## 2024-01-27 RX ORDER — LEVOFLOXACIN 750 MG/1
750 TABLET ORAL DAILY
Qty: 21 TABLET | Refills: 0 | Status: SHIPPED | OUTPATIENT
Start: 2024-01-27 | End: 2024-02-17

## 2024-01-29 ENCOUNTER — TELEPHONE (OUTPATIENT)
Dept: PODIATRY | Facility: CLINIC | Age: 64
End: 2024-01-29
Payer: MEDICAID

## 2024-01-29 LAB
BACTERIA SPEC AEROBE CULT: ABNORMAL

## 2024-01-29 NOTE — TELEPHONE ENCOUNTER
Patient given results and verbalized understanding. Patient doesn't want to consider lower extremity amputation at this time.

## 2024-02-02 ENCOUNTER — TELEPHONE (OUTPATIENT)
Dept: ADMINISTRATIVE | Facility: HOSPITAL | Age: 64
End: 2024-02-02
Payer: MEDICAID

## 2024-02-02 ENCOUNTER — PATIENT OUTREACH (OUTPATIENT)
Dept: ADMINISTRATIVE | Facility: HOSPITAL | Age: 64
End: 2024-02-02
Payer: MEDICAID

## 2024-02-02 DIAGNOSIS — Z12.11 COLON CANCER SCREENING: Primary | ICD-10-CM

## 2024-02-02 DIAGNOSIS — E11.9 TYPE 2 DIABETES MELLITUS WITHOUT COMPLICATION, UNSPECIFIED WHETHER LONG TERM INSULIN USE: ICD-10-CM

## 2024-02-02 NOTE — PROGRESS NOTES
Population Health Chart Review & Patient Outreach Details    Outreach Performed: YES Telephone Successful    Additional Pop Health Notes:           Updates Requested / Reviewed:      Updated Care Coordination Note, Care Everywhere, , External Sources: [facility] and [facility], and Immunizations Reconciliation Completed or Queried: [place]         Health Maintenance Topics Overdue:    Health Maintenance Due   Topic Date Due    COVID-19 Vaccine (1) Never done    Pneumococcal Vaccines (Age 0-64) (1 of 2 - PCV) Never done    TETANUS VACCINE  09/18/2005    Shingles Vaccine (1 of 2) Never done    RSV Vaccine (Age 60+ and Pregnant patients) (1 - 1-dose 60+ series) Never done    Diabetes Urine Screening  07/20/2022    Colorectal Cancer Screening  08/04/2022    Eye Exam  09/01/2022    Lipid Panel  11/18/2023    Hemoglobin A1c  02/10/2024         Health Maintenance Topic(s) Outreach Outcomes & Actions Taken:    Colorectal Cancer Screening - Outreach Outcomes & Actions Taken  : Patient outreach , Fit Kit order pending    Eye Exam - Outreach Outcomes & Actions Taken  : Patient outreach    Lab(s) - Outreach Outcomes & Actions Taken  : Patient outreach, lab orders pending

## 2024-02-03 ENCOUNTER — NURSE TRIAGE (OUTPATIENT)
Dept: ADMINISTRATIVE | Facility: CLINIC | Age: 64
End: 2024-02-03
Payer: MEDICAID

## 2024-02-03 NOTE — TELEPHONE ENCOUNTER
Patient states history of Right Leg Amputation in 2006 and chronic phantom pain. Patient states he uses Gabapentin and Oxycodone to manage pain but pain is occurring more often. Patient states pain level is 10/10.    Patient advised to Go to ED Now and to have another adult drive to ED. Patient also advised to contact the O Triage Service for any worsening symptoms. Patient states understanding of care advice.    Reason for Disposition   Patient sounds very sick or weak to the triager    Additional Information   Negative: Looks like a broken bone or dislocated joint (e.g., crooked or deformed)   Negative: Sounds like a life-threatening emergency to the triager   Negative: Followed a leg injury   Negative: Leg swelling is main symptom   Negative: Back pain radiating (shooting) into leg(s)   Negative: Knee pain is main symptom   Negative: Ankle pain is main symptom   Negative: Pregnant   Negative: Postpartum (from 0 to 6 weeks after delivery)   Negative: Chest pain   Negative: Difficulty breathing   Negative: Entire foot is cool or blue in comparison to other side   Negative: Unable to walk   Negative: [1] Red area or streak AND [2] fever   Negative: [1] Swollen joint AND [2] fever   Negative: [1] Cast on leg or ankle AND [2] now increased pain    Protocols used: Leg Pain-A-AH

## 2024-02-05 ENCOUNTER — TELEPHONE (OUTPATIENT)
Dept: FAMILY MEDICINE | Facility: CLINIC | Age: 64
End: 2024-02-05
Payer: MEDICAID

## 2024-02-05 NOTE — TELEPHONE ENCOUNTER
----- Message from Susan Marks, Patient Care Assistant sent at 2/5/2024  9:46 AM CST -----  Regarding: advice  Contact: pt  Type: Needs Medical Advice    Who Called:  pt     Best Call Back Number: [phone] (home)     Additional Information: pt states he would like a callback regarding orders for a wheelchair. Please call to advise. Thanks!

## 2024-02-06 ENCOUNTER — TELEPHONE (OUTPATIENT)
Dept: FAMILY MEDICINE | Facility: CLINIC | Age: 64
End: 2024-02-06
Payer: MEDICAID

## 2024-02-06 NOTE — TELEPHONE ENCOUNTER
Pt seen in office in November. Pt reports wheel chair broken, company sent fax to office for new order. Please advise if received.

## 2024-02-06 NOTE — TELEPHONE ENCOUNTER
----- Message from Fernandez Jacobsen sent at 2/6/2024 11:00 AM CST -----  Contact: pt  Type: Needs Medical Advice  Who Called:  pt  Best Call Back Number: [phone]    Additional Information: Pt is calling the office regarding wheelchair.Please call back and advise.

## 2024-02-07 ENCOUNTER — TELEPHONE (OUTPATIENT)
Dept: FAMILY MEDICINE | Facility: CLINIC | Age: 64
End: 2024-02-07
Payer: MEDICAID

## 2024-02-07 NOTE — TELEPHONE ENCOUNTER
----- Message from Renetta Mittal sent at 2/7/2024  4:24 PM CST -----  Regarding: Needs return call  Type: Needs Medical Advice  Who Called:  Alfred    Jovani Call Back Number: [phone]    Additional Information: Pt is needing to speak to the office regarding his wheelchair and a new order being sent tot the company he said it is broken please call to enrique

## 2024-02-14 ENCOUNTER — OFFICE VISIT (OUTPATIENT)
Dept: FAMILY MEDICINE | Facility: CLINIC | Age: 64
End: 2024-02-14
Payer: MEDICAID

## 2024-02-14 VITALS
HEART RATE: 80 BPM | WEIGHT: 315 LBS | SYSTOLIC BLOOD PRESSURE: 132 MMHG | DIASTOLIC BLOOD PRESSURE: 64 MMHG | BODY MASS INDEX: 36.45 KG/M2 | HEIGHT: 78 IN | OXYGEN SATURATION: 96 %

## 2024-02-14 DIAGNOSIS — Z13.220 ENCOUNTER FOR LIPID SCREENING FOR CARDIOVASCULAR DISEASE: ICD-10-CM

## 2024-02-14 DIAGNOSIS — E87.1 HYPONATREMIA: ICD-10-CM

## 2024-02-14 DIAGNOSIS — L03.116 CELLULITIS AND ABSCESS OF LEFT LEG: ICD-10-CM

## 2024-02-14 DIAGNOSIS — E55.9 VITAMIN D DEFICIENCY: ICD-10-CM

## 2024-02-14 DIAGNOSIS — L97.509 TYPE 2 DIABETES MELLITUS WITH FOOT ULCER, WITH LONG-TERM CURRENT USE OF INSULIN: Primary | ICD-10-CM

## 2024-02-14 DIAGNOSIS — I10 PRIMARY HYPERTENSION: Chronic | ICD-10-CM

## 2024-02-14 DIAGNOSIS — I48.11 LONGSTANDING PERSISTENT ATRIAL FIBRILLATION: ICD-10-CM

## 2024-02-14 DIAGNOSIS — E11.621 TYPE 2 DIABETES MELLITUS WITH FOOT ULCER, WITH LONG-TERM CURRENT USE OF INSULIN: Primary | ICD-10-CM

## 2024-02-14 DIAGNOSIS — E11.49 TYPE II DIABETES MELLITUS WITH NEUROLOGICAL MANIFESTATIONS: ICD-10-CM

## 2024-02-14 DIAGNOSIS — L02.416 CELLULITIS AND ABSCESS OF LEFT LEG: ICD-10-CM

## 2024-02-14 DIAGNOSIS — Z13.6 ENCOUNTER FOR LIPID SCREENING FOR CARDIOVASCULAR DISEASE: ICD-10-CM

## 2024-02-14 DIAGNOSIS — Z79.4 TYPE 2 DIABETES MELLITUS WITH FOOT ULCER, WITH LONG-TERM CURRENT USE OF INSULIN: Primary | ICD-10-CM

## 2024-02-14 DIAGNOSIS — R53.83 FATIGUE, UNSPECIFIED TYPE: ICD-10-CM

## 2024-02-14 DIAGNOSIS — N52.8 OTHER MALE ERECTILE DYSFUNCTION: ICD-10-CM

## 2024-02-14 PROCEDURE — 3008F BODY MASS INDEX DOCD: CPT | Mod: CPTII,,, | Performed by: FAMILY MEDICINE

## 2024-02-14 PROCEDURE — 99999 PR PBB SHADOW E&M-EST. PATIENT-LVL IV: CPT | Mod: PBBFAC,,, | Performed by: FAMILY MEDICINE

## 2024-02-14 PROCEDURE — 3075F SYST BP GE 130 - 139MM HG: CPT | Mod: CPTII,,, | Performed by: FAMILY MEDICINE

## 2024-02-14 PROCEDURE — 99214 OFFICE O/P EST MOD 30 MIN: CPT | Mod: PBBFAC | Performed by: FAMILY MEDICINE

## 2024-02-14 PROCEDURE — 99214 OFFICE O/P EST MOD 30 MIN: CPT | Mod: S$PBB,,, | Performed by: FAMILY MEDICINE

## 2024-02-14 PROCEDURE — 1159F MED LIST DOCD IN RCRD: CPT | Mod: CPTII,,, | Performed by: FAMILY MEDICINE

## 2024-02-14 PROCEDURE — 3078F DIAST BP <80 MM HG: CPT | Mod: CPTII,,, | Performed by: FAMILY MEDICINE

## 2024-02-14 PROCEDURE — 1160F RVW MEDS BY RX/DR IN RCRD: CPT | Mod: CPTII,,, | Performed by: FAMILY MEDICINE

## 2024-02-14 RX ORDER — PEN NEEDLE, DIABETIC 29 G X1/2"
NEEDLE, DISPOSABLE MISCELLANEOUS
Qty: 100 EACH | Refills: 3 | Status: SHIPPED | OUTPATIENT
Start: 2024-02-14

## 2024-02-14 RX ORDER — INSULIN ASPART 100 [IU]/ML
INJECTION, SOLUTION INTRAVENOUS; SUBCUTANEOUS
Qty: 90 ML | Refills: 0 | Status: SHIPPED | OUTPATIENT
Start: 2024-02-14

## 2024-02-14 RX ORDER — INSULIN DETEMIR 100 [IU]/ML
70 INJECTION, SOLUTION SUBCUTANEOUS 2 TIMES DAILY
Qty: 42 ML | Refills: 11 | Status: SHIPPED | OUTPATIENT
Start: 2024-02-14 | End: 2025-02-13

## 2024-02-14 RX ORDER — GABAPENTIN 600 MG/1
600 TABLET ORAL 3 TIMES DAILY
Qty: 270 TABLET | Refills: 3 | Status: SHIPPED | OUTPATIENT
Start: 2024-02-14

## 2024-02-14 NOTE — PROGRESS NOTES
Subjective:       Patient ID: Alfred Villarreal is a 63 y.o. male.    Chief Complaint: Follow-up    Mr Alfred Villarreal is a 63-year-old male with hypertension, type 2 diabetes, atrial fibrillation, peripheral neuropathy secondary to type 2 diabetes, and morbid obesity who is here today to do his routine medical follow-up.  He is followed by Dr Gary Stringer, podiatry, for multiple pressure ulcers on his left foot and lateral lower left leg.  He has a right BKA, so he ambulates by wheelchair.  He has requesting a referral to wound care.    Follow-up  Associated symptoms include arthralgias.     Review of Systems   Musculoskeletal:  Positive for arthralgias, back pain and gait problem.        LEFT LOWER EXTREMITY WITH MULTIPLE WOUNDS INCLUDING A LARGE HEEL ULCER, 3 CRATERING WOUNDS ON THE LEFT LATERAL LOWER EXTREMITY OOZING SEROSANGUINEOUS FLUID       Patient Active Problem List   Diagnosis    Abscess in epidural space of L2-L5 lumbar spine    HTN (hypertension)    Diabetes mellitus, type 2    Psoas muscle abscess    Osteomyelitis of lumbar vertebra    Ulcer of left foot    Atrial fibrillation    Anticoagulant long-term use    Hyponatremia    Uncontrolled diabetes mellitus with foot ulcer    Rotator cuff (capsule) sprain and strain    Septic arthritis of shoulder, left    Abscess of paraspinal muscles    Abscess of axilla, left    Urosepsis    Atrial fibrillation with RVR    Arthritis, septic, shoulder    Pain    Hx of amputation    Type II diabetes mellitus with neurological manifestations    Skin ulcer of left foot with fat layer exposed    Diabetic foot infection    Abscess of bursa of left foot    Cellulitis of foot    Decreased functional mobility and endurance    Comprehensive diabetic foot examination, type 2 DM, encounter for    Cellulitis and abscess of left leg    Other male erectile dysfunction       Objective:      Physical Exam  Constitutional:       Appearance: Normal appearance. He is obese.   HENT:      Head:  "Normocephalic.   Eyes:      Pupils: Pupils are equal, round, and reactive to light.   Cardiovascular:      Rate and Rhythm: Normal rate and regular rhythm.   Pulmonary:      Effort: Pulmonary effort is normal.      Breath sounds: Normal breath sounds.   Abdominal:      General: Abdomen is flat.   Musculoskeletal:      Comments: RIGHT BKA  LEFT LOWER EXTREMITY WITH MULTIPLE OPEN WOUNDS OOZING SEROSANGUINEOUS FLUID.   Skin:     General: Skin is warm.   Neurological:      General: No focal deficit present.      Mental Status: He is alert and oriented to person, place, and time.   Psychiatric:         Mood and Affect: Mood normal.         Behavior: Behavior normal.         Thought Content: Thought content normal.         Lab Results   Component Value Date    WBC 12.41 11/10/2023    HGB 12.3 (L) 11/10/2023    HCT 36.9 (L) 11/10/2023     11/10/2023    CHOL 134 11/18/2022    TRIG 58 11/18/2022    HDL 30 (L) 11/18/2022    ALT 6 (L) 11/10/2023    AST 10 11/10/2023     (L) 11/10/2023    K 4.0 11/10/2023    CL 99 11/10/2023    CREATININE 0.8 11/10/2023    BUN 8 11/10/2023    CO2 24 11/10/2023    INR 1.1 08/14/2023    HGBA1C 10.3 (H) 11/10/2023     The 10-year ASCVD risk score (Micheal NEFF, et al., 2019) is: 19.7%    Values used to calculate the score:      Age: 63 years      Sex: Male      Is Non- : No      Diabetic: Yes      Tobacco smoker: No      Systolic Blood Pressure: 132 mmHg      Is BP treated: No      HDL Cholesterol: 30 mg/dL      Total Cholesterol: 134 mg/dL  Visit Vitals  /64 (BP Location: Left arm, Patient Position: Sitting, BP Method: Large (Manual))   Pulse 80   Ht 6' 6" (1.981 m)   Wt (!) 147.4 kg (325 lb)   SpO2 96%   BMI 37.56 kg/m²      Assessment:       1. Type 2 diabetes mellitus with foot ulcer, with long-term current use of insulin    2. Other male erectile dysfunction    3. Primary hypertension    4. Longstanding persistent atrial fibrillation    5. Hyponatremia  " "  6. Type II diabetes mellitus with neurological manifestations    7. Vitamin D deficiency    8. Fatigue, unspecified type    9. Encounter for lipid screening for cardiovascular disease    10. Cellulitis and abscess of left leg        Plan:       1. Type 2 diabetes mellitus with foot ulcer, with long-term current use of insulin  -     Hemoglobin A1C; Future; Expected date: 02/14/2024  -     TSH; Future; Expected date: 02/14/2024  -     Microalbumin/Creatinine Ratio, Urine; Future; Expected date: 02/14/2024  -     Cancel: Ambulatory referral/consult to Wound Clinic; Future; Expected date: 02/21/2024  -     Ambulatory referral/consult to Wound Clinic; Future; Expected date: 02/21/2024  -     insulin aspart U-100 (NOVOLOG) 100 unit/mL injection; INJECT 50 UNITS UNDER THE SKIN TWICE DAILY BEFORE A MEAL  Dispense: 90 mL; Refill: 0  -     insulin detemir U-100, Levemir, (LEVEMIR FLEXTOUCH U100 INSULIN) 100 unit/mL (3 mL) InPn pen; Inject 70 Units into the skin 2 (two) times daily.  Dispense: 42 mL; Refill: 11  -     pen needle, diabetic 31 gauge x 1/4" Ndle; Use daily with victoza  Dispense: 100 each; Refill: 3    2. Other male erectile dysfunction    3. Primary hypertension  -     CBC Auto Differential; Future; Expected date: 02/14/2024  -     Comprehensive Metabolic Panel; Future; Expected date: 02/14/2024  -     Microalbumin/Creatinine Ratio, Urine; Future; Expected date: 02/14/2024    4. Longstanding persistent atrial fibrillation    5. Hyponatremia  -     Comprehensive Metabolic Panel; Future; Expected date: 02/14/2024    6. Type II diabetes mellitus with neurological manifestations  -     Hemoglobin A1C; Future; Expected date: 02/14/2024  -     gabapentin (NEURONTIN) 600 MG tablet; Take 1 tablet (600 mg total) by mouth 3 (three) times daily.  Dispense: 270 tablet; Refill: 3    7. Vitamin D deficiency  -     Vitamin D; Future; Expected date: 02/14/2024    8. Fatigue, unspecified type  -     Vitamin B12; Future; " Expected date: 02/14/2024    9. Encounter for lipid screening for cardiovascular disease  -     Lipid Panel; Future; Expected date: 02/14/2024    10. Cellulitis and abscess of left leg  -     Cancel: Ambulatory referral/consult to Wound Clinic; Future; Expected date: 02/21/2024  -     Ambulatory referral/consult to Wound Clinic; Future; Expected date: 02/21/2024       Follow up in about 6 months (around 8/14/2024), or if symptoms worsen or fail to improve.      Future Appointments       Date Provider Specialty Appt Notes    2/14/2024  Lab Labs    2/21/2024 Gary Stringer, [facility]iat[facility] 1 month f/u

## 2024-02-19 ENCOUNTER — TELEPHONE (OUTPATIENT)
Dept: FAMILY MEDICINE | Facility: CLINIC | Age: 64
End: 2024-02-19
Payer: MEDICAID

## 2024-02-19 NOTE — TELEPHONE ENCOUNTER
----- Message from Cinthia Lyman LPN sent at 2/15/2024  2:25 PM CST -----    ----- Message -----  From: Brent Boyce  Sent: 2/15/2024   2:24 PM CST  To: Afshin ALEJO Staff    Type: Needs Medical Advice  Who Called:  Patricia/ [facility]     [facility] Call Back Number: [phone]  Additional Information: Caller states that she would like a callback regarding the status of a fax that was sent in January

## 2024-02-23 ENCOUNTER — TELEPHONE (OUTPATIENT)
Dept: FAMILY MEDICINE | Facility: CLINIC | Age: 64
End: 2024-02-23
Payer: MEDICAID

## 2024-02-23 NOTE — TELEPHONE ENCOUNTER
----- Message from Brent Boyce sent at 2/23/2024 10:38 AM CST -----  Type: Needs Medical Advice  Who Called:  Patient    Best Call Back Number:  [phone]  Additional Information: Patient states that he would like a callback regarding needing wheelchair repair.

## 2024-02-23 NOTE — TELEPHONE ENCOUNTER
----- Message from Lalitha Cardozo sent at 2/23/2024  3:01 PM CST -----  Contact: pt  Type: Needs Medical Advice         Who Called: pt  Best Call Back Number:[phone]  Additional Information: Requesting a call back regarding  pt returned call from office and asking for the office to call her back please   Please Advise- Thank you

## 2024-02-27 ENCOUNTER — TELEPHONE (OUTPATIENT)
Dept: FAMILY MEDICINE | Facility: CLINIC | Age: 64
End: 2024-02-27
Payer: MEDICAID

## 2024-02-27 NOTE — TELEPHONE ENCOUNTER
----- Message from Cinthia Lyman LPN sent at 2/26/2024  2:42 PM CST -----  Regarding: FW: wheelchair  Contact: pt    ----- Message -----  From: Kendy Zapata  Sent: 2/26/2024   2:40 PM CST  To: Afshin ALEJO Staff  Subject: wheelchair                                       Type:  Needs Medical Advice    Who Called: pt    Symptoms (please be specific): pt's wheelchair is broken     Would the patient rather a call back or a response via [facility]? Call back    Best Call Back Number:   [phone]    Additional Information: pt sts wheelchair is broken.  Pt sts that [employer] is waitng for a call from the doctor for authorization.  Phone number for them is  [phone]    fax for them          Please advise.  Thank you.

## 2024-02-27 NOTE — TELEPHONE ENCOUNTER
Called [employer]. They stated they need patient`s progress noted from recent visit. Confirmed fax number to send requested paperwork.

## 2024-02-27 NOTE — TELEPHONE ENCOUNTER
----- Message from Cinthia Lyman LPN sent at 2/26/2024  2:42 PM CST -----  Regarding: FW: wheelchair  Contact: pt    ----- Message -----  From: Kendy Zapata  Sent: 2/26/2024   2:40 PM CST  To: Afshin ALEJO Staff  Subject: wheelchair                                       Type:  Needs Medical Advice    Who Called: pt    Symptoms (please be specific): pt's wheelchair is broken     Would the patient rather a call back or a response via [facility]? Call back    Best Call Back Number:   [phone]    Additional Information: pt sts wheelchair is broken.  Pt sts that [employer] is waitng for a call from the doctor for authorization.  Phone number for them is  [phone]    fax for them          Please advise.  Thank you.

## 2024-02-28 ENCOUNTER — TELEPHONE (OUTPATIENT)
Dept: DIABETES | Facility: CLINIC | Age: 64
End: 2024-02-28
Payer: MEDICAID

## 2024-04-01 ENCOUNTER — DOCUMENT SCAN (OUTPATIENT)
Dept: HOME HEALTH SERVICES | Facility: HOSPITAL | Age: 64
End: 2024-04-01
Payer: MEDICAID

## 2024-04-03 DIAGNOSIS — E11.9 TYPE 2 DIABETES MELLITUS WITHOUT COMPLICATION, UNSPECIFIED WHETHER LONG TERM INSULIN USE: ICD-10-CM

## 2024-04-11 ENCOUNTER — TELEPHONE (OUTPATIENT)
Dept: FAMILY MEDICINE | Facility: CLINIC | Age: 64
End: 2024-04-11
Payer: MEDICAID

## 2024-04-11 ENCOUNTER — DOCUMENT SCAN (OUTPATIENT)
Dept: HOME HEALTH SERVICES | Facility: HOSPITAL | Age: 64
End: 2024-04-11
Payer: MEDICAID

## 2024-04-11 NOTE — TELEPHONE ENCOUNTER
"Spoke with Tavie.Informed Tavie  does not show patient on oxycodone.  Helen states, " [place] [facility] pull what they had on the paperwork. The patient told me it was oxy" MD notified.   "

## 2024-04-11 NOTE — TELEPHONE ENCOUNTER
Pt has been receiving wound care due to wound.   Helen reports it shows on patient paperwork from wound care oxycodone.   However,  would show if prescribed to pt

## 2024-04-11 NOTE — TELEPHONE ENCOUNTER
----- Message from Terri Oviedo sent at 4/11/2024 10:52 AM CDT -----  Type: Needs Medical Advice  Who Called:  Helen from   Pharmacy name and phone #:    [id number] - [place], [place] - [address]  [address]  [place] [address]  Phone: [phone] Fax: [phone]    [facility] - [facility], [place] - [address]  [address] [address]  Phone: [phone] Fax: [phone]      Best Call Back Number: [phone]  Additional Information: Helen is calling the office for the nurse to call her back in regards to the pt's medications. Please call back to advise. Thanks!

## 2024-04-11 NOTE — TELEPHONE ENCOUNTER
Spoke with Helen Home health nurse. Helen would like to know who prescribes patient Oxycodone as pt is snorting medication. Please advise as I do not show active on pt chart

## 2024-04-12 ENCOUNTER — DOCUMENT SCAN (OUTPATIENT)
Dept: HOME HEALTH SERVICES | Facility: HOSPITAL | Age: 64
End: 2024-04-12
Payer: MEDICAID

## 2024-04-17 ENCOUNTER — TELEPHONE (OUTPATIENT)
Dept: FAMILY MEDICINE | Facility: CLINIC | Age: 64
End: 2024-04-17
Payer: MEDICAID

## 2024-04-17 DIAGNOSIS — L02.416 CELLULITIS AND ABSCESS OF LEFT LEG: Primary | ICD-10-CM

## 2024-04-17 DIAGNOSIS — L03.116 CELLULITIS AND ABSCESS OF LEFT LEG: Primary | ICD-10-CM

## 2024-04-17 DIAGNOSIS — Z89.9 HX OF AMPUTATION: ICD-10-CM

## 2024-04-17 DIAGNOSIS — L97.509 TYPE 2 DIABETES MELLITUS WITH FOOT ULCER, WITH LONG-TERM CURRENT USE OF INSULIN: ICD-10-CM

## 2024-04-17 DIAGNOSIS — Z79.4 TYPE 2 DIABETES MELLITUS WITH FOOT ULCER, WITH LONG-TERM CURRENT USE OF INSULIN: ICD-10-CM

## 2024-04-17 DIAGNOSIS — E11.621 TYPE 2 DIABETES MELLITUS WITH FOOT ULCER, WITH LONG-TERM CURRENT USE OF INSULIN: ICD-10-CM

## 2024-04-17 NOTE — TELEPHONE ENCOUNTER
----- Message from Fernandez Jacobsen sent at 4/17/2024  4:13 PM CDT -----  Contact: pt  Type: Needs Medical Advice  Who Called:  pt  Best Call Back Number: [phone]    Additional Information: Pt is calling the office trying to get in touch with the nurse to give the number to home health care [phone] piedad buck.Please call back and advise.

## 2024-04-18 ENCOUNTER — TELEPHONE (OUTPATIENT)
Dept: FAMILY MEDICINE | Facility: CLINIC | Age: 64
End: 2024-04-18
Payer: MEDICAID

## 2024-04-18 ENCOUNTER — TELEPHONE (OUTPATIENT)
Dept: PODIATRY | Facility: CLINIC | Age: 64
End: 2024-04-18
Payer: MEDICAID

## 2024-04-18 NOTE — TELEPHONE ENCOUNTER
----- Message from Renetta Mittal sent at 4/18/2024 11:37 AM CDT -----  Regarding: Needs sooner appt  Type:  Sooner Appointment Request    Caller is requesting a sooner appointment.  Caller declined first available appointment listed below.  Caller will not accept being placed on the waitlist and is requesting a message be sent to doctor.    Name of Caller:  PT  When is the first available appointment?  May 1st  Symptoms:  pt has ulcer on his foot that is causing him pain  Would the patient rather a call back or a response via [facility]? callback  Best Call Back Number:  [phone]    Additional Information:

## 2024-04-18 NOTE — TELEPHONE ENCOUNTER
"Patient advised provider would not be seeing him. Patient stated, " Well he do didn't do anything anyway." Patient then hung up phone.   "

## 2024-04-18 NOTE — TELEPHONE ENCOUNTER
Spoke with Carine at [facility] about the order requesting supplies.  The patient has not been seen in several months and has cancelled his follow up appointments. Advised you would not be signing orders for supplies. Carine said she would let the patient know.

## 2024-04-18 NOTE — TELEPHONE ENCOUNTER
----- Message from Ludmila Kingsley sent at 4/18/2024 11:20 AM CDT -----  Type:  Needs Medical Advice    Who Called: [facility] Call Back Number: [phone] fax [phone]   Additional Information:  Requesting call back  wants  to check the status of the order that was fax on 4/15     please advise thank you

## 2024-04-18 NOTE — TELEPHONE ENCOUNTER
----- Message from Latosha Barnard sent at 4/18/2024 10:55 AM CDT -----  Contact: Brady  Type: Needs Medical Advice    Who Called: Brady/ [facility]  Best Call Back Number:[phone]  Additional  Information:  Calling to report they are unable to accept pt referral, due to him discharged previously for inappropriate behavior  Please Advise- Thank you

## 2024-06-04 ENCOUNTER — HOSPITAL ENCOUNTER (EMERGENCY)
Facility: HOSPITAL | Age: 64
Discharge: LEFT BEFORE DISCHARGE | End: 2024-06-04
Payer: MEDICAID

## 2024-06-04 VITALS
HEART RATE: 92 BPM | RESPIRATION RATE: 20 BRPM | WEIGHT: 315 LBS | OXYGEN SATURATION: 97 % | TEMPERATURE: 98 F | HEIGHT: 78 IN | SYSTOLIC BLOOD PRESSURE: 166 MMHG | DIASTOLIC BLOOD PRESSURE: 74 MMHG | BODY MASS INDEX: 36.45 KG/M2

## 2024-06-04 PROCEDURE — 99900041 HC LEFT WITHOUT BEING SEEN- EMERGENCY

## 2024-06-18 ENCOUNTER — TELEPHONE (OUTPATIENT)
Dept: FAMILY MEDICINE | Facility: CLINIC | Age: 64
End: 2024-06-18
Payer: MEDICAID

## 2024-06-18 NOTE — TELEPHONE ENCOUNTER
----- Message from Michael Connell sent at 6/18/2024  2:50 PM CDT -----  Regarding: Needs a PA  Type:  Needs Medical Advice    Who Called: Pt    Symptoms (please be specific): PA needed         Would the patient rather a call back or a response via [facility]? Call back    Best Call Back Number: [phone]      Additional Information: Sts he needs someone to call him back he needs a PA done.   Please advise -- Thank you

## 2024-06-19 ENCOUNTER — TELEPHONE (OUTPATIENT)
Dept: FAMILY MEDICINE | Facility: CLINIC | Age: 64
End: 2024-06-19
Payer: MEDICAID

## 2024-06-19 NOTE — TELEPHONE ENCOUNTER
----- Message from Nano Jones sent at 6/19/2024  3:33 PM CDT -----  Type:  Patient Returning Call    Who Called:  pt   Who Left Message for Patient:  Cinthia  Does the patient know what this is regarding?:  yes   Best Call Back Number:  [phone]  Additional Information:  pt stated he would like a call back asap pt stated he never received call back and would like status asap please ensure to back asap thanks!

## 2024-06-25 ENCOUNTER — OFFICE VISIT (OUTPATIENT)
Dept: FAMILY MEDICINE | Facility: CLINIC | Age: 64
End: 2024-06-25
Payer: MEDICAID

## 2024-06-25 VITALS
HEART RATE: 75 BPM | DIASTOLIC BLOOD PRESSURE: 76 MMHG | OXYGEN SATURATION: 95 % | BODY MASS INDEX: 36.98 KG/M2 | HEIGHT: 78 IN | RESPIRATION RATE: 14 BRPM | SYSTOLIC BLOOD PRESSURE: 132 MMHG

## 2024-06-25 DIAGNOSIS — Z89.511 HISTORY OF AMPUTATION BELOW KNEE, RIGHT: Primary | ICD-10-CM

## 2024-06-25 PROCEDURE — 99999 PR PBB SHADOW E&M-EST. PATIENT-LVL III: CPT | Mod: PBBFAC,,, | Performed by: FAMILY MEDICINE

## 2024-06-25 PROCEDURE — 99213 OFFICE O/P EST LOW 20 MIN: CPT | Mod: PBBFAC | Performed by: FAMILY MEDICINE

## 2024-06-25 PROCEDURE — 3075F SYST BP GE 130 - 139MM HG: CPT | Mod: CPTII,,, | Performed by: FAMILY MEDICINE

## 2024-06-25 PROCEDURE — 3008F BODY MASS INDEX DOCD: CPT | Mod: CPTII,,, | Performed by: FAMILY MEDICINE

## 2024-06-25 PROCEDURE — 3078F DIAST BP <80 MM HG: CPT | Mod: CPTII,,, | Performed by: FAMILY MEDICINE

## 2024-06-25 PROCEDURE — 99211 OFF/OP EST MAY X REQ PHY/QHP: CPT | Mod: S$PBB,,, | Performed by: FAMILY MEDICINE

## 2024-06-25 PROCEDURE — 1159F MED LIST DOCD IN RCRD: CPT | Mod: CPTII,,, | Performed by: FAMILY MEDICINE

## 2024-06-25 NOTE — PROGRESS NOTES
"Subjective:       Patient ID: Alfred Villarreal is a 63 y.o. male.    Chief Complaint: pa for prosthetics      Past Medical History:   Diagnosis Date    A-fib     Anticoagulant long-term use     Arthritis     Diabetes mellitus     Hypertension     Other male erectile dysfunction 11/10/2023       Past Surgical History:   Procedure Laterality Date    BELOW KNEE AMPUTATION OF LOWER EXTREMITY Right     "About 15 years"        Social History     Socioeconomic History    Marital status:    Tobacco Use    Smoking status: Never    Smokeless tobacco: Current     Types: Chew   Substance and Sexual Activity    Alcohol use: Not Currently    Drug use: No    Sexual activity: Yes     Partners: Female     Social Determinants of Health     Financial Resource Strain: High Risk (8/16/2023)    Overall Financial Resource Strain (CARDIA)     Difficulty of Paying Living Expenses: Hard   Food Insecurity: No Food Insecurity (8/16/2023)    Hunger Vital Sign     Worried About Running Out of Food in the Last Year: Never true     Ran Out of Food in the Last Year: Never true   Transportation Needs: No Transportation Needs (8/16/2023)    PRAPARE - Transportation     Lack of Transportation (Medical): No     Lack of Transportation (Non-Medical): No   Physical Activity: Inactive (8/16/2023)    Exercise Vital Sign     Days of Exercise per Week: 0 days     Minutes of Exercise per Session: 0 min   Stress: Stress Concern Present (8/16/2023)    [employer] - Occupational Stress Questionnaire     Feeling of Stress : Rather much   Housing Stability: Unknown (8/16/2023)    Housing Stability Vital Sign     Unable to Pay for Housing in the Last Year: No     Unstable Housing in the Last Year: No       No family history on file.    Review of patient's allergies indicates:   Allergen Reactions    Amitriptyline      Vivid dreams( bad)          Current Outpatient Medications:     gabapentin (NEURONTIN) 600 MG tablet, Take 1 tablet " "(600 mg total) by mouth 3 (three) times daily., Disp: 270 tablet, Rfl: 3    ibuprofen (ADVIL,MOTRIN) 800 MG tablet, SMARTSI Tablet(s) By Mouth Every 12 Hours PRN, Disp: , Rfl:     insulin aspart U-100 (NOVOLOG) 100 unit/mL injection, INJECT 50 UNITS UNDER THE SKIN TWICE DAILY BEFORE A MEAL, Disp: 90 mL, Rfl: 0    insulin detemir U-100, Levemir, (LEVEMIR FLEXTOUCH U100 INSULIN) 100 unit/mL (3 mL) InPn pen, Inject 70 Units into the skin 2 (two) times daily., Disp: 42 mL, Rfl: 11    naloxone (NARCAN) 4 mg/actuation Spry, SMARTSIG:Both Nares, Disp: , Rfl:     oxyCODONE-acetaminophen (PERCOCET)  mg per tablet, Take 1 tablet by mouth., Disp: , Rfl:     pen needle, diabetic 31 gauge x 1/4" Ndle, Use daily with victoza, Disp: 100 each, Rfl: 3    tiZANidine (ZANAFLEX) 4 MG tablet, Take 4 mg by mouth 2 (two) times daily. (Patient not taking: Reported on 2024), Disp: , Rfl:     HPI  Review of Systems   Musculoskeletal:  Positive for arthralgias, gait problem and myalgias.        Right BKA   Skin:  Positive for wound.        Chronic pressure ulcers on bilateral lower extremities       Objective:      Physical Exam  Constitutional:       Appearance: Normal appearance. He is obese.      Comments: Wheelchair bound due to right BKA and chronic wounds on the left   HENT:      Mouth/Throat:      Mouth: Mucous membranes are moist.      Comments: Patient with a large amount of tobacco in his mouth.  He was notify that [facility] does not allow tobacco on the premises and for any future visits he needs to keep his tobacco in the truck  Eyes:      Extraocular Movements: Extraocular movements intact.      Pupils: Pupils are equal, round, and reactive to light.   Musculoskeletal:      Comments: Right BKA, stasis ulcers on left   Neurological:      Mental Status: He is alert.         Assessment:       1. History of amputation below knee, right        Plan:         History of amputation below knee, right    Needs PA for prosthetic " fitting, does not have the paperwork    Risks, benefits, and side effects were discussed with the patient. All questions were answered to the fullest satisfaction of the patient, and pt verbalized understanding and agreement to treatment plan. Pt was to call with any new or worsening symptoms, or present to the ER.        Sandy Pepe MD Patient presented to the office and wanted me to do a prior authorization for the fitting of his prosthetic leg.  He did not know the name of the prosthetic company that the order was supposed to go to.  I explained to Mr. Villarreal that I would have to have the name of the prosthetic company in order to place the order for a fitting for a prosthetic limb.  Since he did not know the name of his prosthetic company ,I could not place an order for fitting a prosthetic limb    He then asked me to give him refills on his bandaging supplies for his chronic stasis ulcers in his legs.  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  I asked where did he want to get his bandaging supplies and what type band itching supplies did he need to continue his wound care at home.  He did not know the answered to either 1 of those questions so I could not place the order for supplies at the time he was in clinic.    He states that he came to the office unprepared and he would come back again next week with the information so I can properly order his prosthetic leg fitting and his bandaging supplies.  Therefore he did not have a physical exam was essentially not seen.  He does have an appointment to come back next week

## 2024-08-09 ENCOUNTER — TELEPHONE (OUTPATIENT)
Dept: FAMILY MEDICINE | Facility: CLINIC | Age: 64
End: 2024-08-09
Payer: MEDICAID

## 2024-08-13 ENCOUNTER — TELEPHONE (OUTPATIENT)
Dept: FAMILY MEDICINE | Facility: CLINIC | Age: 64
End: 2024-08-13
Payer: MEDICAID

## 2024-08-13 NOTE — TELEPHONE ENCOUNTER
Order form received from [facility] for reorder of wound supplies. Pt needs face to face. Spoke with pt via phone. Scheduled for 8/15 at 3:30. Pt states that he has visit with pain mgmt in [place] same day. Will call if unable to make it to PCP appt. All understanding. Order form given to PCP MA until visit.

## 2024-08-15 ENCOUNTER — TELEPHONE (OUTPATIENT)
Dept: FAMILY MEDICINE | Facility: CLINIC | Age: 64
End: 2024-08-15
Payer: MEDICAID

## 2024-08-15 ENCOUNTER — OFFICE VISIT (OUTPATIENT)
Dept: FAMILY MEDICINE | Facility: CLINIC | Age: 64
End: 2024-08-15
Payer: MEDICAID

## 2024-08-15 VITALS
DIASTOLIC BLOOD PRESSURE: 62 MMHG | WEIGHT: 315 LBS | HEIGHT: 78 IN | HEART RATE: 84 BPM | BODY MASS INDEX: 36.45 KG/M2 | RESPIRATION RATE: 14 BRPM | OXYGEN SATURATION: 95 % | SYSTOLIC BLOOD PRESSURE: 116 MMHG

## 2024-08-15 DIAGNOSIS — Z79.4 TYPE 2 DIABETES MELLITUS WITH FOOT ULCER, WITH LONG-TERM CURRENT USE OF INSULIN: ICD-10-CM

## 2024-08-15 DIAGNOSIS — L97.509 TYPE 2 DIABETES MELLITUS WITH FOOT ULCER, WITH LONG-TERM CURRENT USE OF INSULIN: ICD-10-CM

## 2024-08-15 DIAGNOSIS — E11.621 TYPE 2 DIABETES MELLITUS WITH FOOT ULCER, WITH LONG-TERM CURRENT USE OF INSULIN: ICD-10-CM

## 2024-08-15 PROCEDURE — 1159F MED LIST DOCD IN RCRD: CPT | Mod: CPTII,,, | Performed by: FAMILY MEDICINE

## 2024-08-15 PROCEDURE — 3074F SYST BP LT 130 MM HG: CPT | Mod: CPTII,,, | Performed by: FAMILY MEDICINE

## 2024-08-15 PROCEDURE — 99213 OFFICE O/P EST LOW 20 MIN: CPT | Mod: S$PBB,,, | Performed by: FAMILY MEDICINE

## 2024-08-15 PROCEDURE — 3008F BODY MASS INDEX DOCD: CPT | Mod: CPTII,,, | Performed by: FAMILY MEDICINE

## 2024-08-15 PROCEDURE — 99213 OFFICE O/P EST LOW 20 MIN: CPT | Mod: PBBFAC | Performed by: FAMILY MEDICINE

## 2024-08-15 PROCEDURE — 3078F DIAST BP <80 MM HG: CPT | Mod: CPTII,,, | Performed by: FAMILY MEDICINE

## 2024-08-15 PROCEDURE — 99999 PR PBB SHADOW E&M-EST. PATIENT-LVL III: CPT | Mod: PBBFAC,,, | Performed by: FAMILY MEDICINE

## 2024-08-15 RX ORDER — INSULIN ASPART 100 [IU]/ML
INJECTION, SOLUTION INTRAVENOUS; SUBCUTANEOUS
Qty: 90 ML | Refills: 0 | Status: SHIPPED | OUTPATIENT
Start: 2024-08-15

## 2024-08-15 NOTE — PROGRESS NOTES
"Subjective:       Patient ID: Alfred Villarreal is a 63 y.o. male.    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     "About 15 years"        Social History     Socioeconomic History    Marital status:    Tobacco Use    Smoking status: Never    Smokeless tobacco: Current     Types: Chew   Substance and Sexual Activity    Alcohol use: Not Currently    Drug use: No    Sexual activity: Yes     Partners: Female     Social Determinants of Health     Financial Resource Strain: High Risk (8/16/2023)    Overall Financial Resource Strain (CARDIA)     Difficulty of Paying Living Expenses: Hard   Food Insecurity: No Food Insecurity (8/16/2023)    Hunger Vital Sign     Worried About Running Out of Food in the Last Year: Never true     Ran Out of Food in the Last Year: Never true   Transportation Needs: No Transportation Needs (8/16/2023)    PRAPARE - Transportation     Lack of Transportation (Medical): No     Lack of Transportation (Non-Medical): No   Physical Activity: Inactive (8/16/2023)    Exercise Vital Sign     Days of Exercise per Week: 0 days     Minutes of Exercise per Session: 0 min   Stress: Stress Concern Present (8/16/2023)    [employer] - Occupational Stress Questionnaire     Feeling of Stress : Rather much   Housing Stability: Unknown (8/16/2023)    Housing Stability Vital Sign     Unable to Pay for Housing in the Last Year: No     Unstable Housing in the Last Year: No       No family history on file.    Review of patient's allergies indicates:   Allergen Reactions    Amitriptyline      Vivid dreams( bad)          Current Outpatient Medications:     gabapentin (NEURONTIN) " "600 MG tablet, Take 1 tablet (600 mg total) by mouth 3 (three) times daily., Disp: 270 tablet, Rfl: 3    ibuprofen (ADVIL,MOTRIN) 800 MG tablet, SMARTSI Tablet(s) By Mouth Every 12 Hours PRN, Disp: , Rfl:     insulin detemir U-100, Levemir, (LEVEMIR FLEXTOUCH U100 INSULIN) 100 unit/mL (3 mL) InPn pen, Inject 70 Units into the skin 2 (two) times daily., Disp: 42 mL, Rfl: 11    naloxone (NARCAN) 4 mg/actuation Spry, SMARTSIG:Both Nares, Disp: , Rfl:     oxyCODONE-acetaminophen (PERCOCET)  mg per tablet, Take 1 tablet by mouth., Disp: , Rfl:     pen needle, diabetic 31 gauge x 1/4" Ndle, Use daily with victoza, Disp: 100 each, Rfl: 3    insulin aspart U-100 (NOVOLOG) 100 unit/mL injection, INJECT 50 UNITS UNDER THE SKIN TWICE DAILY BEFORE A MEAL, Disp: 90 mL, Rfl: 0    tiZANidine (ZANAFLEX) 4 MG tablet, Take 4 mg by mouth 2 (two) times daily. (Patient not taking: Reported on 2024), Disp: , Rfl:       Alfred Villarreal is a 63-year-old male who presents today for a bandage change.  He did have home health that came over a few times a week to do his bandage changes, but he was fired from home health after snorting his medication in front of the nurse.  She felt unsafe in his home and refused to return.  He was also seeing Podiatry, but states that he quit going because the podiatrist has not made improvements in his overall progress of healing with his diabetic foot  and leg ulcers.  He currently does his own bandage changes but it is getting too expensive for him.  He presents today wanting me to change his bandages.  I explained to the patient that we do not have the supplies  To do wound care on his wounds nor change his current bandages.  We do not carry those cons of supplies in the family medicine department.  I did give him some gauze in iodine swabs to be able to do it at home, and I talked him into going to a different podiatrist.  He is a noncompliant  patient.  He was not compliant with his office " visits, his medications, nor with his specialty appointments.      Review of Systems    Objective:      Physical Exam  Constitutional:       Appearance: He is obese.      Comments:   Unkempt, malodorous.  This is his usual presentation however   HENT:      Mouth/Throat:      Mouth: Mucous membranes are moist.   Cardiovascular:      Rate and Rhythm: Normal rate.   Pulmonary:      Effort: Pulmonary effort is normal.      Breath sounds: Normal breath sounds.   Musculoskeletal:      Comments:  History of right BKA     Left lower extremity with diabetic and stasis ulcers on his leg and foot.  See media tab for photos     Ambulates with wheelchair   Skin:     Comments:  Diabetic and stasis ulcerations present on left lower extremity and left foot, these are chronic and persistent   Neurological:      Mental Status: He is alert and oriented to person, place, and time. Mental status is at baseline.         Assessment:       1. Type 2 diabetes mellitus with foot ulcer, with long-term current use of insulin        Plan:         Type 2 diabetes mellitus with foot ulcer, with long-term current use of insulin  -     insulin aspart U-100 (NOVOLOG) 100 unit/mL injection; INJECT 50 UNITS UNDER THE SKIN TWICE DAILY BEFORE A MEAL  Dispense: 90 mL; Refill: 0  -     Ambulatory referral/consult to Podiatry; Future; Expected date: 08/22/2024        Risks, benefits, and side effects were discussed with the patient. All questions were answered to the fullest satisfaction of the patient, and pt verbalized understanding and agreement to treatment plan. Pt was to call with any new or worsening symptoms, or present to the ER.        Sandy Pepe MD

## 2024-08-15 NOTE — TELEPHONE ENCOUNTER
----- Message from Emmie Cotton sent at 8/15/2024  1:17 PM CDT -----  .Type: Patient Call Back    Who called: Self     What is the request in detail: Can he come in early for his appointment     Can the clinic reply by [facility]? No     Would the patient rather a call back or a response via [facility]? Call Back     Best call back number: .[phone] (home)       Additional Information:

## 2024-08-26 ENCOUNTER — TELEPHONE (OUTPATIENT)
Dept: FAMILY MEDICINE | Facility: CLINIC | Age: 64
End: 2024-08-26
Payer: MEDICAID

## 2024-08-26 NOTE — TELEPHONE ENCOUNTER
----- Message from Latosha Barnard sent at 8/26/2024  4:38 PM CDT -----  Contact: Carine  Type: Needs Medical Advice    Who Called: [facility]   Best Call Back Number: [phone]  Additional  Information:  Requesting to get most recent progress notes faxed to FAX# [phone]. Regarding updated order for wound care supplies  Please Advise- Thank you

## 2024-08-29 ENCOUNTER — PATIENT OUTREACH (OUTPATIENT)
Dept: ADMINISTRATIVE | Facility: HOSPITAL | Age: 64
End: 2024-08-29
Payer: MEDICAID

## 2024-08-29 NOTE — PROGRESS NOTES
Population Health Chart Review & Patient Outreach Details      Additional [facility] Health Notes:               Updates Requested / Reviewed:      Updated Care Coordination Note, Care Everywhere, and Immunizations Reconciliation Completed or Queried: [facility] Topics Overdue:      [facility] Score: 5     Colon Cancer Screening  Urine Screening  Eye Exam  Hemoglobin A1c  Lipid Panel    Pneumonia Vaccine  Tetanus Vaccine  Shingles/Zoster Vaccine  RSV Vaccine                  Health Maintenance Topic(s) Outreach Outcomes & Actions Taken:    Lab(s) - Outreach Outcomes & Actions Taken  : Patient does not have transportation. Patient stated that if he can get a ride, he will go to the hospital to complete lab work.

## 2024-09-07 ENCOUNTER — HOSPITAL ENCOUNTER (EMERGENCY)
Facility: HOSPITAL | Age: 64
Discharge: HOME | End: 2024-09-07
Payer: MEDICAID

## 2024-09-07 VITALS
SYSTOLIC BLOOD PRESSURE: 181 MMHG | DIASTOLIC BLOOD PRESSURE: 84 MMHG | OXYGEN SATURATION: 97 % | HEIGHT: 78 IN | HEART RATE: 89 BPM | WEIGHT: 315 LBS | RESPIRATION RATE: 19 BRPM | BODY MASS INDEX: 36.45 KG/M2 | TEMPERATURE: 98 F

## 2024-09-07 DIAGNOSIS — S62.521A CLOSED DISPLACED FRACTURE OF DISTAL PHALANX OF RIGHT THUMB, INITIAL ENCOUNTER: Primary | ICD-10-CM

## 2024-09-07 PROCEDURE — 73130 X-RAY EXAM OF HAND: CPT | Mod: TC,RT

## 2024-09-07 PROCEDURE — 29125 APPL SHORT ARM SPLINT STATIC: CPT | Mod: RT

## 2024-09-07 PROCEDURE — 25000003 PHARM REV CODE 250: Performed by: NURSE PRACTITIONER

## 2024-09-07 PROCEDURE — 73130 X-RAY EXAM OF HAND: CPT | Mod: 26,RT,, | Performed by: RADIOLOGY

## 2024-09-07 PROCEDURE — 99283 EMERGENCY DEPT VISIT LOW MDM: CPT | Mod: 25

## 2024-09-07 RX ORDER — HYDROCODONE BITARTRATE AND ACETAMINOPHEN 10; 325 MG/1; MG/1
1 TABLET ORAL
Status: COMPLETED | OUTPATIENT
Start: 2024-09-07 | End: 2024-09-07

## 2024-09-07 RX ADMIN — HYDROCODONE BITARTRATE AND ACETAMINOPHEN 1 TABLET: 10; 325 TABLET ORAL at 09:09

## 2024-09-08 NOTE — ED NOTES
Thumb splint applied to right hand. Tolerated well. CNS intact pre and post splinting. States feels better immobilized. Preparing for discharge.

## 2024-09-08 NOTE — ED NOTES
D/c patient to home. No RX given due to patient having pain medications already at home. Follow up explained with orthopedics . Patient already has appointment scheduled for Tuesday of this week. Instructed patient to keep splint intact until cleared by orthopedics. Voices understanding of treatment plan. Escorted to lobby to complete d/c process.

## 2024-09-08 NOTE — ED PROVIDER NOTES
"Encounter Date: 9/7/2024       History     Chief Complaint   Patient presents with    Hand Pain     Right thumb pain, fell from wheelchair attempting to catch himself     63-year-old male comes in with complaints of thumb pain.  States he was falling from his wheelchair and tried to catch himself.  Caused the injury to his right thumb.        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  Social History     Tobacco Use    Smoking status: Never    Smokeless tobacco: Current     Types: Chew   Substance Use Topics    Alcohol use: Not Currently    Drug use: No     Review of Systems   Musculoskeletal:         Right thumb pain       Physical Exam     Initial Vitals [09/07/24 2032]   BP Pulse Resp Temp SpO2   (!) 186/90 90 19 98.1 °F (36.7 °C) 98 %      MAP       --         Physical Exam    Nursing note and vitals reviewed.  Constitutional: He appears well-developed and well-nourished.   HENT:   Head: Normocephalic and atraumatic.   Eyes: EOM are normal.   Neck: Neck supple.   Cardiovascular:  Normal rate and regular rhythm.           Pulmonary/Chest: Breath sounds normal.   Musculoskeletal:         General: Normal range of motion.      Cervical back: Neck supple.      Comments: Bruising around PIP joint right thumb.  No nail bed involvement.  Pain on palpation or movement.  Capillary refill good     Neurological: He is alert and oriented to person, place, and time.   Skin: Skin is warm and dry. Capillary refill takes less than 2 seconds.   Psychiatric: He has a normal mood and affect. Thought content normal.         ED Course "   Procedures  Labs Reviewed - No data to display       Imaging Results              X-Ray Hand 3 view Right (Final result)  Result time 09/07/24 21:03:25      Final result by Cristi Sanchez MD (09/07/24 21:03:25)                   Impression:      Acute minimally displaced intra-articular fracture involving the base of the right thumb proximal phalanx.      Electronically signed by: Cristi Sanchez MD  Date:    09/07/2024  Time:    21:03               Narrative:    EXAMINATION:  XR HAND COMPLETE 3 VIEW RIGHT    CLINICAL HISTORY:  injury;    TECHNIQUE:  PA, lateral, and oblique views of the right hand were performed.    COMPARISON:  Right hand series 02/12/2007    FINDINGS:  There is an acute minimally displaced intra-articular fracture involving the base of the thumb proximal phalanx.  There is a remote deformity of the distal 5th metacarpal.  There are mild degenerative changes present.  No unexpected retained radiopaque foreign body identified.  Prominent vascular calcifications are present.                                       Medications   HYDROcodone-acetaminophen  mg per tablet 1 tablet (1 tablet Oral Given 9/7/24 2150)     Medical Decision Making  63-year-old male with some pain after catching himself while falling from his wheelchair.    Differential diagnoses: Contusion, strain, fracture    Amount and/or Complexity of Data Reviewed  Radiology: ordered. Decision-making details documented in ED Course.    Risk  OTC drugs.  Prescription drug management.  Risk Details: Patient is currently on pain management therefore does not require pain medication for discharge               ED Course as of 09/07/24 2157   Sat Sep 07, 2024   2109 X-Ray Hand 3 view Right [NP]   2110 Acute intra-articular fracture base of the right thumb proximal phalanx  [NP]   2146 Splint applied per nurse. NV intact after application [NP]      ED Course User Index  [NP] Jenny Burkett, FNP                             Clinical  Impression:  Final diagnoses:  [T10.353R] Closed displaced fracture of distal phalanx of right thumb, initial encounter (Primary)                 Jenny Burkett, FNP  09/07/24 8666

## 2024-09-18 ENCOUNTER — OFFICE VISIT (OUTPATIENT)
Dept: PODIATRY | Facility: CLINIC | Age: 64
End: 2024-09-18
Payer: MEDICAID

## 2024-09-18 VITALS — HEIGHT: 78 IN | BODY MASS INDEX: 36.45 KG/M2 | WEIGHT: 315 LBS

## 2024-09-18 DIAGNOSIS — L97.522 ULCER OF LEFT FOOT WITH FAT LAYER EXPOSED: ICD-10-CM

## 2024-09-18 DIAGNOSIS — E11.49 TYPE II DIABETES MELLITUS WITH NEUROLOGICAL MANIFESTATIONS: Primary | ICD-10-CM

## 2024-09-18 DIAGNOSIS — Z89.9 HX OF AMPUTATION: ICD-10-CM

## 2024-09-18 PROCEDURE — 3060F POS MICROALBUMINURIA REV: CPT | Mod: CPTII,S$GLB,, | Performed by: PODIATRIST

## 2024-09-18 PROCEDURE — 1160F RVW MEDS BY RX/DR IN RCRD: CPT | Mod: CPTII,S$GLB,, | Performed by: PODIATRIST

## 2024-09-18 PROCEDURE — 3066F NEPHROPATHY DOC TX: CPT | Mod: CPTII,S$GLB,, | Performed by: PODIATRIST

## 2024-09-18 PROCEDURE — 99213 OFFICE O/P EST LOW 20 MIN: CPT | Mod: 25,S$GLB,, | Performed by: PODIATRIST

## 2024-09-18 PROCEDURE — 3008F BODY MASS INDEX DOCD: CPT | Mod: CPTII,S$GLB,, | Performed by: PODIATRIST

## 2024-09-18 PROCEDURE — 3046F HEMOGLOBIN A1C LEVEL >9.0%: CPT | Mod: CPTII,S$GLB,, | Performed by: PODIATRIST

## 2024-09-18 PROCEDURE — 11045 DBRDMT SUBQ TISS EACH ADDL: CPT | Mod: S$GLB,,, | Performed by: PODIATRIST

## 2024-09-18 PROCEDURE — 11042 DBRDMT SUBQ TIS 1ST 20SQCM/<: CPT | Mod: S$GLB,,, | Performed by: PODIATRIST

## 2024-09-18 PROCEDURE — 1159F MED LIST DOCD IN RCRD: CPT | Mod: CPTII,S$GLB,, | Performed by: PODIATRIST

## 2024-09-19 ENCOUNTER — LAB VISIT (OUTPATIENT)
Dept: LAB | Facility: HOSPITAL | Age: 64
End: 2024-09-19
Attending: FAMILY MEDICINE
Payer: MEDICAID

## 2024-09-19 DIAGNOSIS — R53.83 FATIGUE, UNSPECIFIED TYPE: ICD-10-CM

## 2024-09-19 DIAGNOSIS — L97.509 TYPE 2 DIABETES MELLITUS WITH FOOT ULCER, WITH LONG-TERM CURRENT USE OF INSULIN: ICD-10-CM

## 2024-09-19 DIAGNOSIS — E11.621 TYPE 2 DIABETES MELLITUS WITH FOOT ULCER, WITH LONG-TERM CURRENT USE OF INSULIN: ICD-10-CM

## 2024-09-19 DIAGNOSIS — E11.49 TYPE II DIABETES MELLITUS WITH NEUROLOGICAL MANIFESTATIONS: ICD-10-CM

## 2024-09-19 DIAGNOSIS — I10 PRIMARY HYPERTENSION: Chronic | ICD-10-CM

## 2024-09-19 DIAGNOSIS — E87.1 HYPONATREMIA: ICD-10-CM

## 2024-09-19 DIAGNOSIS — Z13.6 ENCOUNTER FOR LIPID SCREENING FOR CARDIOVASCULAR DISEASE: ICD-10-CM

## 2024-09-19 DIAGNOSIS — E55.9 VITAMIN D DEFICIENCY: ICD-10-CM

## 2024-09-19 DIAGNOSIS — Z13.220 ENCOUNTER FOR LIPID SCREENING FOR CARDIOVASCULAR DISEASE: ICD-10-CM

## 2024-09-19 DIAGNOSIS — Z79.4 TYPE 2 DIABETES MELLITUS WITH FOOT ULCER, WITH LONG-TERM CURRENT USE OF INSULIN: ICD-10-CM

## 2024-09-19 LAB
ALBUMIN SERPL BCP-MCNC: 3.1 G/DL (ref 3.5–5.2)
ALP SERPL-CCNC: 115 U/L (ref 55–135)
ALT SERPL W/O P-5'-P-CCNC: 11 U/L (ref 10–44)
ANION GAP SERPL CALC-SCNC: 9 MMOL/L (ref 8–16)
AST SERPL-CCNC: 17 U/L (ref 10–40)
BASOPHILS # BLD AUTO: 0.04 K/UL (ref 0–0.2)
BASOPHILS NFR BLD: 0.4 % (ref 0–1.9)
BILIRUB SERPL-MCNC: 0.8 MG/DL (ref 0.1–1)
BUN SERPL-MCNC: 6 MG/DL (ref 8–23)
CALCIUM SERPL-MCNC: 9.2 MG/DL (ref 8.7–10.5)
CHLORIDE SERPL-SCNC: 99 MMOL/L (ref 95–110)
CHOLEST SERPL-MCNC: 134 MG/DL (ref 120–199)
CHOLEST/HDLC SERPL: 4.2 {RATIO} (ref 2–5)
CO2 SERPL-SCNC: 26 MMOL/L (ref 23–29)
CREAT SERPL-MCNC: 0.8 MG/DL (ref 0.5–1.4)
DIFFERENTIAL METHOD BLD: ABNORMAL
EOSINOPHIL # BLD AUTO: 0.1 K/UL (ref 0–0.5)
EOSINOPHIL NFR BLD: 0.5 % (ref 0–8)
ERYTHROCYTE [DISTWIDTH] IN BLOOD BY AUTOMATED COUNT: 12.8 % (ref 11.5–14.5)
EST. GFR  (NO RACE VARIABLE): >60 ML/MIN/1.73 M^2
ESTIMATED AVG GLUCOSE: 275 MG/DL (ref 68–131)
GLUCOSE SERPL-MCNC: 315 MG/DL (ref 70–110)
HBA1C MFR BLD: 11.2 % (ref 4–5.6)
HCT VFR BLD AUTO: 38 % (ref 40–54)
HDLC SERPL-MCNC: 32 MG/DL (ref 40–75)
HDLC SERPL: 23.9 % (ref 20–50)
HGB BLD-MCNC: 12.6 G/DL (ref 14–18)
IMM GRANULOCYTES # BLD AUTO: 0.05 K/UL (ref 0–0.04)
IMM GRANULOCYTES NFR BLD AUTO: 0.5 % (ref 0–0.5)
LDLC SERPL CALC-MCNC: 91.6 MG/DL (ref 63–159)
LYMPHOCYTES # BLD AUTO: 1.1 K/UL (ref 1–4.8)
LYMPHOCYTES NFR BLD: 11 % (ref 18–48)
MCH RBC QN AUTO: 28.4 PG (ref 27–31)
MCHC RBC AUTO-ENTMCNC: 33.2 G/DL (ref 32–36)
MCV RBC AUTO: 86 FL (ref 82–98)
MONOCYTES # BLD AUTO: 0.5 K/UL (ref 0.3–1)
MONOCYTES NFR BLD: 5.2 % (ref 4–15)
NEUTROPHILS # BLD AUTO: 8.3 K/UL (ref 1.8–7.7)
NEUTROPHILS NFR BLD: 82.4 % (ref 38–73)
NONHDLC SERPL-MCNC: 102 MG/DL
NRBC BLD-RTO: 0 /100 WBC
PLATELET # BLD AUTO: 252 K/UL (ref 150–450)
PMV BLD AUTO: 9.9 FL (ref 9.2–12.9)
POTASSIUM SERPL-SCNC: 4 MMOL/L (ref 3.5–5.1)
PROT SERPL-MCNC: 9.2 G/DL (ref 6–8.4)
RBC # BLD AUTO: 4.43 M/UL (ref 4.6–6.2)
SODIUM SERPL-SCNC: 134 MMOL/L (ref 136–145)
TRIGL SERPL-MCNC: 52 MG/DL (ref 30–150)
TSH SERPL DL<=0.005 MIU/L-ACNC: 1.02 UIU/ML (ref 0.4–4)
WBC # BLD AUTO: 10.02 K/UL (ref 3.9–12.7)

## 2024-09-19 PROCEDURE — 83036 HEMOGLOBIN GLYCOSYLATED A1C: CPT | Performed by: FAMILY MEDICINE

## 2024-09-19 PROCEDURE — 82306 VITAMIN D 25 HYDROXY: CPT | Performed by: FAMILY MEDICINE

## 2024-09-19 PROCEDURE — 84443 ASSAY THYROID STIM HORMONE: CPT | Performed by: FAMILY MEDICINE

## 2024-09-19 PROCEDURE — 85025 COMPLETE CBC W/AUTO DIFF WBC: CPT | Performed by: FAMILY MEDICINE

## 2024-09-19 PROCEDURE — 80053 COMPREHEN METABOLIC PANEL: CPT | Performed by: FAMILY MEDICINE

## 2024-09-19 PROCEDURE — 36415 COLL VENOUS BLD VENIPUNCTURE: CPT | Performed by: FAMILY MEDICINE

## 2024-09-19 PROCEDURE — 80061 LIPID PANEL: CPT | Performed by: FAMILY MEDICINE

## 2024-09-19 PROCEDURE — 82607 VITAMIN B-12: CPT | Performed by: FAMILY MEDICINE

## 2024-09-19 RX ORDER — INSULIN ASPART 100 [IU]/ML
INJECTION, SOLUTION INTRAVENOUS; SUBCUTANEOUS
Qty: 90 ML | Refills: 0 | Status: SHIPPED | OUTPATIENT
Start: 2024-09-19 | End: 2024-09-20 | Stop reason: SDUPTHER

## 2024-09-19 NOTE — TELEPHONE ENCOUNTER
Refill Routing Note   Medication(s) are not appropriate for processing by [facility] for the following reason(s):        ED/Hospital Visit since last OV with provider  Required labs outdated    [facility] action(s):  Defer     Requires labs : Yes             Appointments  past 12m or future 3m with PCP    Date Provider   Last Visit   8/15/2024 Sandy Pepe MD   Next Visit   Visit date not found Sandy Pepe MD   ED visits in past 90 days: 1        Note composed:1:45 PM 09/19/2024

## 2024-09-19 NOTE — TELEPHONE ENCOUNTER
----- Message from Raysa Velez sent at 9/19/2024  1:29 PM CDT -----  Regarding: refill  Name of caller:tatianna       What is the requesting detail: pt is requesting a refill for insulin aspart U-100 (NOVOLOG) 100 unit/mL injection. Please advise       [id number] - [place], [place] - [address] [facility] [address]          Can the clinic reply by [facility]:       What number to call back: [phone]

## 2024-09-19 NOTE — TELEPHONE ENCOUNTER
Care Due:                  Date            Visit Type   Department     Provider  --------------------------------------------------------------------------------                                EP -                              PRIMARY      [facility] 2ND FLOOR  Last Visit: 08-      [facility]  Next Visit: None Scheduled  None         None Found                                                            Last  Test          Frequency    Reason                     Performed    Due Date  --------------------------------------------------------------------------------    Cr..........  12 months..  insulin..................  11-   11-    HBA1C.......  6 months...  insulin..................  11-   05-    [facility] Embedded Care Due Messages. Reference number: [id number].   9/19/2024 1:33:47 PM CDT

## 2024-09-20 ENCOUNTER — TELEPHONE (OUTPATIENT)
Dept: FAMILY MEDICINE | Facility: CLINIC | Age: 64
End: 2024-09-20
Payer: MEDICAID

## 2024-09-20 DIAGNOSIS — Z79.4 TYPE 2 DIABETES MELLITUS WITH FOOT ULCER, WITH LONG-TERM CURRENT USE OF INSULIN: ICD-10-CM

## 2024-09-20 DIAGNOSIS — L97.509 TYPE 2 DIABETES MELLITUS WITH FOOT ULCER, WITH LONG-TERM CURRENT USE OF INSULIN: ICD-10-CM

## 2024-09-20 DIAGNOSIS — E55.9 VITAMIN D DEFICIENCY: Primary | ICD-10-CM

## 2024-09-20 DIAGNOSIS — E11.621 TYPE 2 DIABETES MELLITUS WITH FOOT ULCER, WITH LONG-TERM CURRENT USE OF INSULIN: ICD-10-CM

## 2024-09-20 LAB
25(OH)D3+25(OH)D2 SERPL-MCNC: 21 NG/ML (ref 30–96)
VIT B12 SERPL-MCNC: 407 PG/ML (ref 210–950)

## 2024-09-20 RX ORDER — ERGOCALCIFEROL 1.25 MG/1
50000 CAPSULE ORAL
Qty: 13 CAPSULE | Refills: 3 | Status: SHIPPED | OUTPATIENT
Start: 2024-09-20

## 2024-09-20 NOTE — TELEPHONE ENCOUNTER
Called patient in regards to lab results. Patient was notified that vitamin D was sent to his pharmacy.     ----- Message from Sandy Pepe MD sent at 9/20/2024  7:46 AM CDT -----  There Are multiple abnormalities in  routine labs for this noncompliant non controlled diabetic.   will discuss with patient on his next office visit.  Vitamin-D will be sent to the pharmacy

## 2024-09-20 NOTE — TELEPHONE ENCOUNTER
No care due was identified.  [facility] Embedded Care Due Messages. Reference number: [id number].   9/20/2024 1:31:35 PM CDT

## 2024-09-20 NOTE — TELEPHONE ENCOUNTER
----- Message from Zenaida Wisdom sent at 9/20/2024  1:26 PM CDT -----  Name of Who is calling :IVONNE LIN [[id number]]        What is the request in detail:  Pt would like a call back in regards to his insular astar. Please assist       Can the clinic reply by [facility]:  no          What number to call back if not in [facility]: [phone]   Urinary retention due to benign prostatic hyperplasia

## 2024-09-21 RX ORDER — INSULIN ASPART 100 [IU]/ML
INJECTION, SOLUTION INTRAVENOUS; SUBCUTANEOUS
Qty: 90 ML | Refills: 0 | Status: SHIPPED | OUTPATIENT
Start: 2024-09-21

## 2024-09-21 NOTE — TELEPHONE ENCOUNTER
Refill Decision Note   Alfred Cherelle  is requesting a refill authorization.  Brief Assessment and Rationale for Refill:  Approve     Medication Therapy Plan:  previous approval not received by pharmacy. Resent      Comments:     Note composed:5:16 AM 09/21/2024

## 2024-10-07 NOTE — PROGRESS NOTES
Subjective:     Patient ID: Alfred Villarreal is a 63 y.o. male    Chief Complaint: Foot Ulcer       Alfred is a 63 y.o. male who presents to the clinic for evaluation and treatment of high risk feet. Alfred has a past medical history of A-fib, Anticoagulant long-term use, Arthritis, Diabetes mellitus, Hypertension, and Other male erectile dysfunction (11/10/2023). The patient's chief complaint is diabetic foot ulcer, left lateral ankle and left heel. This patient has documented high risk feet requiring routine maintenance secondary to diabetes mellitis and those secondary complications of diabetes, as mentioned..    PCP: Sandy Pepe MD    Date Last Seen by PCP: 08/15/2024    Current shoe gear:  Affected Foot: N/A     Unaffected Foot: N/A    History of Trauma: negative  Sign of Infection: none    Hemoglobin A1C   Date Value Ref Range Status   09/19/2024 11.2 (H) 4.0 - 5.6 % Final     Comment:     ADA Screening Guidelines:  5.7-6.4%  Consistent with prediabetes  >or=6.5%  Consistent with diabetes    High levels of fetal hemoglobin interfere with the HbA1C  assay. Heterozygous hemoglobin variants (HbS, HgC, etc)do  not significantly interfere with this assay.   However, presence of multiple variants may affect accuracy.     11/10/2023 10.3 (H) 4.0 - 5.6 % Final     Comment:     ADA Screening Guidelines:  5.7-6.4%  Consistent with prediabetes  >or=6.5%  Consistent with diabetes    High levels of fetal hemoglobin interfere with the HbA1C  assay. Heterozygous hemoglobin variants (HbS, HgC, etc)do  not significantly interfere with this assay.   However, presence of multiple variants may affect accuracy.     08/14/2023 11.2 (H) 4.0 - 5.6 % Final     Comment:     ADA Screening Guidelines:  5.7-6.4%  Consistent with prediabetes  >or=6.5%  Consistent with diabetes    High levels of fetal hemoglobin interfere with the HbA1C  assay. Heterozygous hemoglobin variants (HbS, HgC, etc)do  not significantly interfere with this assay.    However, presence of multiple variants may affect accuracy.         Review of Systems   Constitutional: Negative.  Negative for chills and fever.   Respiratory:  Negative for cough and shortness of breath.    Cardiovascular:  Positive for leg swelling (left). Negative for chest pain.   Gastrointestinal:  Negative for diarrhea, nausea and vomiting.   Neurological:  Positive for tingling (left LE).        Objective:   Physical Exam  Vitals reviewed.   Constitutional:       General: He is not in acute distress.     Appearance: Normal appearance. He is not ill-appearing.   HENT:      Head: Normocephalic.      Nose: Nose normal.   Cardiovascular:      Pulses:           Dorsalis pedis pulses are 1+ on the left side.   Pulmonary:      Effort: Pulmonary effort is normal. No respiratory distress.   Feet:      Left foot:      Skin integrity: Ulcer present.   Skin:     Capillary Refill: Capillary refill takes 2 to 3 seconds.   Neurological:      Mental Status: He is alert and oriented to person, place, and time.   Psychiatric:         Mood and Affect: Mood normal.         Behavior: Behavior normal.         Thought Content: Thought content normal.        Foot Exam    General  General Appearance: appears stated age and healthy   Orientation: alert and oriented to person, place, and time   Affect: appropriate       Left Foot/Ankle      Inspection and Palpation  Tenderness: none   Swelling: (left LE)  Arch: normal  Skin Exam: ulcer;     Neurovascular  Dorsalis pedis: 1+  Saphenous nerve sensation: absent  Tibial nerve sensation: absent  Superficial peroneal nerve sensation: absent  Deep peroneal nerve sensation: absent  Sural nerve sensation: absent    Muscle Strength  Ankle dorsiflexion: 4  Ankle plantar flexion: 4  Ankle inversion: 4  Ankle eversion: 4      Dermatologic:  Ulceration noted to the plantar left heel measuring 12.5 x 7.0 x 0.3 cm with serous drainage and 10% fibrous/90% granular base, ulceration noted to the  lateral left ankle measuring 9.0 x 8.0 x 0.2 cm with 20% fibrotic base 80% granular base with serosanguineous drainage no signs of infection     Assessment:         1. Type II diabetes mellitus with neurological manifestations    2. Ulcer of left foot with fat layer exposed    3. Hx of amputation       Plan:     Alfred Villarreal was seen today for   Chief Complaint   Patient presents with    Foot Ulcer       Assessment & Plan           Wound Debridement    Date/Time: 9/18/2024 1:15 PM    Performed by: Thao Duff DPM  Authorized by: Thao Duff DPM    Consent Done?:  Yes (Verbal)    Wound Details:    Location:  Left ankle       Length (cm):  9       Area (sq cm):  72       Width (cm):  8       Percent Debrided (%):  100       Depth (cm):  0.2       Total Area Debrided (sq cm):  72    Depth of debridement:  Subcutaneous tissue    Devitalized tissue debrided:  Biofilm and Fibrin  Bleeding:  Minimal  Hemostasis Achieved: Yes  Method Used:  Pressure    Additional wounds:  1    2nd Wound Details:     Location:  Left foot    Location:  Left Heel    Location:  Left Heel       Length (cm):  12.5       Area (sq cm):  87.5       Width (cm):  7       Percent Debrided (%):  100       Depth (cm):  0.3       Total Area Debrided (sq cm):  87.5    Depth of debridement:  Subcutaneous tissue    Devitalized tissue debrided:  Biofilm, Callus, Fibrin and Exudate    Instruments:  Blade  Bleeding:  Minimal  Hemostasis Achieved: Yes    Method Used:  Pressure  Patient tolerance:  Patient tolerated the procedure well with no immediate complications  1st Wound Pain Assessment: 6  2nd Wound Pain Assessment: 6       1. Patient was examined and evaluated.    2. Patient made aware of open wounds to the left ankle and left plantar heel.  Patient was advised to continue with daily or every-other-day dressing changes to the area with application of the ABD pad, Betadine, dry sterile dressing, and some form of compression.  Patient was  advised to decrease the amount of ambulation on the left foot for reduction pressure to the area.  Patient had dressing supplies ordered to be sent to his home.  Patient was advised to monitor area for signs of infection.  Patient was also advised to monitor for constitutional symptoms.  Patient report constitutional symptoms to primary care physician, [facility], [facility], or local urgent care center.   Patient was advised to continue with daily foot monitoring.  3. Patient will continue efforts proper glycemic control, lowering hemoglobin A1c, adherence to diabetic medication regimen  4. Patient was advised to follow up in 1-2 weeks but prefers to follow-up in approximately 3 weeks p.r.n. foot complaints          Yenny Duff DPM  [address] [place] [address]  [phone]      This note was created using [employer] direct voice recognition software. Note may have occasional typographical errors that may not have been identified and edited despite initial review prior to signing.

## 2024-10-18 ENCOUNTER — OFFICE VISIT (OUTPATIENT)
Dept: PODIATRY | Facility: CLINIC | Age: 64
End: 2024-10-18
Payer: MEDICAID

## 2024-10-18 VITALS — HEIGHT: 78 IN | WEIGHT: 315 LBS | BODY MASS INDEX: 36.45 KG/M2

## 2024-10-18 DIAGNOSIS — Z89.9 HX OF AMPUTATION: ICD-10-CM

## 2024-10-18 DIAGNOSIS — E11.49 TYPE II DIABETES MELLITUS WITH NEUROLOGICAL MANIFESTATIONS: ICD-10-CM

## 2024-10-18 DIAGNOSIS — I73.9 PERIPHERAL VASCULAR DISEASE: Primary | ICD-10-CM

## 2024-10-18 DIAGNOSIS — L97.522 ULCER OF LEFT FOOT WITH FAT LAYER EXPOSED: ICD-10-CM

## 2024-10-22 NOTE — PROGRESS NOTES
Subjective:     Patient ID: Alfred Villarreal is a 63 y.o. male    Chief Complaint: Wound Care       Alfred is a 63 y.o. male who presents to the clinic for evaluation and treatment of high risk feet. Alfred has a past medical history of A-fib, Anticoagulant long-term use, Arthritis, Diabetes mellitus, Hypertension, and Other male erectile dysfunction (11/10/2023). The patient's chief complaint is diabetic foot ulcer, left ankle and heel. This patient has documented high risk feet requiring routine maintenance secondary to diabetes mellitis and those secondary complications of diabetes, as mentioned..    PCP: Sandy Pepe MD        Current shoe gear:  Affected Foot: N/A     Unaffected Foot: N/A    History of Trauma: negative  Sign of Infection: none    Hemoglobin A1C   Date Value Ref Range Status   09/19/2024 11.2 (H) 4.0 - 5.6 % Final     Comment:     ADA Screening Guidelines:  5.7-6.4%  Consistent with prediabetes  >or=6.5%  Consistent with diabetes    High levels of fetal hemoglobin interfere with the HbA1C  assay. Heterozygous hemoglobin variants (HbS, HgC, etc)do  not significantly interfere with this assay.   However, presence of multiple variants may affect accuracy.     11/10/2023 10.3 (H) 4.0 - 5.6 % Final     Comment:     ADA Screening Guidelines:  5.7-6.4%  Consistent with prediabetes  >or=6.5%  Consistent with diabetes    High levels of fetal hemoglobin interfere with the HbA1C  assay. Heterozygous hemoglobin variants (HbS, HgC, etc)do  not significantly interfere with this assay.   However, presence of multiple variants may affect accuracy.     08/14/2023 11.2 (H) 4.0 - 5.6 % Final     Comment:     ADA Screening Guidelines:  5.7-6.4%  Consistent with prediabetes  >or=6.5%  Consistent with diabetes    High levels of fetal hemoglobin interfere with the HbA1C  assay. Heterozygous hemoglobin variants (HbS, HgC, etc)do  not significantly interfere with this assay.   However, presence of multiple variants may  affect accuracy.         Review of Systems   Constitutional: Negative.  Negative for chills and fever.   Respiratory:  Negative for cough and shortness of breath.    Cardiovascular:  Positive for leg swelling. Negative for chest pain.   Gastrointestinal:  Negative for diarrhea, nausea and vomiting.   Neurological:  Positive for tingling.        Objective:   Physical Exam  Vitals reviewed.   Constitutional:       General: He is not in acute distress.     Appearance: Normal appearance. He is not ill-appearing.   HENT:      Head: Normocephalic.      Nose: Nose normal.   Cardiovascular:      Pulses:           Dorsalis pedis pulses are 1+ on the left side.   Pulmonary:      Effort: Pulmonary effort is normal. No respiratory distress.   Feet:      Left foot:      Skin integrity: Ulcer present.   Skin:     Capillary Refill: Capillary refill takes 2 to 3 seconds.   Neurological:      Mental Status: He is alert and oriented to person, place, and time.   Psychiatric:         Mood and Affect: Mood normal.         Behavior: Behavior normal.         Thought Content: Thought content normal.        Foot Exam    General  General Appearance: appears stated age and healthy   Orientation: alert and oriented to person, place, and time   Affect: appropriate   Gait: antalgic   Assistance: wheelchair use       Left Foot/Ankle      Inspection and Palpation  Tenderness: none   Swelling: (Left lower extremity)  Skin Exam: drainage and ulcer;     Neurovascular  Dorsalis pedis: 1+  Saphenous nerve sensation: diminished  Tibial nerve sensation: diminished  Superficial peroneal nerve sensation: diminished  Deep peroneal nerve sensation: diminished  Sural nerve sensation: absent    Muscle Strength  Ankle dorsiflexion: 5  Ankle plantar flexion: 5  Ankle inversion: 5  Ankle eversion: 5  Great toe extension: 5  Great toe flexion: 5       Dermatologic:  Ulceration noted to the plantar left heel measuring 12.3 x 7.0 x 0.3 cm with serous drainage and  10% fibrous/90% granular base, ulceration noted to the lateral left ankle measuring 9.0 x 7.5 x 0.2 cm with 20% fibrotic base 80% granular base with serosanguineous drainage no signs of infection        Assessment:         1. Peripheral vascular disease    2. Type II diabetes mellitus with neurological manifestations    3. Hx of amputation    4. Ulcer of left foot with fat layer exposed       Plan:     Alfred Villarreal was seen today for   Chief Complaint   Patient presents with    Wound Care       Assessment & Plan           Wound Debridement    Date/Time: 10/18/2024 4:00 PM    Performed by: Thao Duff DPM  Authorized by: Thao Duff DPM    Consent Done?:  Yes (Verbal)    Wound Details:    Location:  Left foot    Location:  Left Heel       Length (cm):  12.3       Area (sq cm):  86.1       Width (cm):  7       Percent Debrided (%):  100       Depth (cm):  0.3       Total Area Debrided (sq cm):  86.1    Depth of debridement:  Subcutaneous tissue    Devitalized tissue debrided:  Biofilm, Callus, Fibrin and Exudate    Instruments:  Blade  Bleeding:  Minimal  Hemostasis Achieved: Yes  Method Used:  Pressure    Additional wounds:  1    2nd Wound Details:     Location:  Left foot    Location:  Left Ankle    Location:  Left Ankle       Length (cm):  9       Area (sq cm):  67.5       Width (cm):  7.5       Percent Debrided (%):  100       Depth (cm):  0.2       Total Area Debrided (sq cm):  67.5    Depth of debridement:  Subcutaneous tissue    Devitalized tissue debrided:  Biofilm, Callus and Exudate    Instruments:  Blade  Bleeding:  Minimal  Hemostasis Achieved: Yes    Method Used:  Pressure  Patient tolerance:  Patient tolerated the procedure well with no immediate complications  1st Wound Pain Assessment: 0  2nd Wound Pain Assessment: 0       1. Patient was examined and evaluated.    2. Patient made aware of continued presence of wounds to the left ankle and plantar left heel.  Patient was advised to  continue with once daily dressing changes to the area.  Patient states that he has possibly only going to change bandages once every 2 days.  Patient was advised to clean the area thoroughly with Betadine and applied dry sterile bandage as well as some form of compression to the left lower extremity.  Patient was advised to decrease the amount of moisture/water allowed to the left lower extremity.  Patient was also advised to decrease the amount of ambulation to the left foot.  Patient was encouraged to continue attempts at obtainment of a shoe that can fit the left foot even width its current swelling.  3. Patient was advised to continue with daily foot monitoring.  Patient will continue efforts proper glycemic control, lowering hemoglobin A1c, adherence to diabetic medication regimen.  Patient was advised of constitutional symptoms.  Patient was advised to report any constitutional symptoms to local urgent care center, primary care for sensation, or [facility] [facility].  4. Patient had Unna boot applied to the left lower extremity.  Patient was advised to keep this dressing clean, dry, and intact for 1-3 days.  Patient made aware that we did reorder dressing supplies for continued home dressing changes through secondary dressing supply company. Patient was advised of the need for more frequent office visits periods patient states this secondary to increased caused and distance he can only come once every couple of months.    5. Patient will follow-up in 6 weeks per his request or p.r.n. for complaints       Yenny Duff DPM  [id number] [facility] [address]  [place] [address] [phone]      This note was created using [employer] direct voice recognition software. Note may have occasional typographical errors that may not have been identified and edited despite initial review prior to signing.

## 2024-11-04 NOTE — PROCEDURES
Procedures  UNNA BOOT APPLICATION LEFT LOWER EXTREMITY    Patient had a 4 in unna boot elastic conforming bandage applied to the left lower extremity from just proximal to the metatarsophalangeal joint region extending proximally in his when she will compressive manner to above the ankle.  Next patient had roll gauze and CoFlex applied in a similar yet non restrictive manner.  Patient was advised to keep this dressing clean, dry, and intact for 1-3 days.

## 2024-11-20 DIAGNOSIS — L97.509 TYPE 2 DIABETES MELLITUS WITH FOOT ULCER, WITH LONG-TERM CURRENT USE OF INSULIN: ICD-10-CM

## 2024-11-20 DIAGNOSIS — E11.621 TYPE 2 DIABETES MELLITUS WITH FOOT ULCER, WITH LONG-TERM CURRENT USE OF INSULIN: ICD-10-CM

## 2024-11-20 DIAGNOSIS — Z79.4 TYPE 2 DIABETES MELLITUS WITH FOOT ULCER, WITH LONG-TERM CURRENT USE OF INSULIN: ICD-10-CM

## 2024-11-20 RX ORDER — INSULIN ASPART 100 [IU]/ML
INJECTION, SOLUTION INTRAVENOUS; SUBCUTANEOUS
Qty: 90 ML | Refills: 0 | Status: SHIPPED | OUTPATIENT
Start: 2024-11-20

## 2024-11-20 NOTE — TELEPHONE ENCOUNTER
No care due was identified.  Health [facility] Embedded Care Due Messages. Reference number: [id number].   11/20/2024 8:02:41 AM CST

## 2025-01-31 ENCOUNTER — NURSE TRIAGE (OUTPATIENT)
Dept: ADMINISTRATIVE | Facility: CLINIC | Age: 65
End: 2025-01-31
Payer: MEDICAID

## 2025-01-31 ENCOUNTER — OFFICE VISIT (OUTPATIENT)
Dept: FAMILY MEDICINE | Facility: CLINIC | Age: 65
End: 2025-01-31
Payer: MEDICAID

## 2025-01-31 VITALS
SYSTOLIC BLOOD PRESSURE: 134 MMHG | WEIGHT: 315 LBS | DIASTOLIC BLOOD PRESSURE: 66 MMHG | RESPIRATION RATE: 16 BRPM | BODY MASS INDEX: 36.45 KG/M2 | HEIGHT: 78 IN | HEART RATE: 79 BPM | OXYGEN SATURATION: 99 %

## 2025-01-31 DIAGNOSIS — N50.89 ENLARGED TESTICLE: ICD-10-CM

## 2025-01-31 DIAGNOSIS — L97.509 TYPE 2 DIABETES MELLITUS WITH FOOT ULCER, WITH LONG-TERM CURRENT USE OF INSULIN: ICD-10-CM

## 2025-01-31 DIAGNOSIS — E11.621 TYPE 2 DIABETES MELLITUS WITH FOOT ULCER, WITH LONG-TERM CURRENT USE OF INSULIN: ICD-10-CM

## 2025-01-31 DIAGNOSIS — K40.90 NON-RECURRENT UNILATERAL INGUINAL HERNIA WITHOUT OBSTRUCTION OR GANGRENE: ICD-10-CM

## 2025-01-31 DIAGNOSIS — R73.09 ELEVATED GLUCOSE: Primary | ICD-10-CM

## 2025-01-31 DIAGNOSIS — E11.59 CONTROLLED TYPE 2 DIABETES MELLITUS WITH OTHER CIRCULATORY COMPLICATION, UNSPECIFIED WHETHER LONG TERM INSULIN USE: Primary | ICD-10-CM

## 2025-01-31 DIAGNOSIS — Z79.4 TYPE 2 DIABETES MELLITUS WITH FOOT ULCER, WITH LONG-TERM CURRENT USE OF INSULIN: ICD-10-CM

## 2025-01-31 LAB — GLUCOSE SERPL-MCNC: 158 MG/DL (ref 70–110)

## 2025-01-31 PROCEDURE — G2211 COMPLEX E/M VISIT ADD ON: HCPCS | Mod: S$PBB,,, | Performed by: FAMILY MEDICINE

## 2025-01-31 PROCEDURE — 3075F SYST BP GE 130 - 139MM HG: CPT | Mod: CPTII,,, | Performed by: FAMILY MEDICINE

## 2025-01-31 PROCEDURE — 99999PBSHW POCT GLUCOSE, HAND-HELD DEVICE: Mod: PBBFAC,,,

## 2025-01-31 PROCEDURE — 3008F BODY MASS INDEX DOCD: CPT | Mod: CPTII,,, | Performed by: FAMILY MEDICINE

## 2025-01-31 PROCEDURE — 1159F MED LIST DOCD IN RCRD: CPT | Mod: CPTII,,, | Performed by: FAMILY MEDICINE

## 2025-01-31 PROCEDURE — 99999 PR PBB SHADOW E&M-EST. PATIENT-LVL IV: CPT | Mod: PBBFAC,,, | Performed by: FAMILY MEDICINE

## 2025-01-31 PROCEDURE — 3078F DIAST BP <80 MM HG: CPT | Mod: CPTII,,, | Performed by: FAMILY MEDICINE

## 2025-01-31 PROCEDURE — 99214 OFFICE O/P EST MOD 30 MIN: CPT | Mod: PBBFAC | Performed by: FAMILY MEDICINE

## 2025-01-31 PROCEDURE — 99214 OFFICE O/P EST MOD 30 MIN: CPT | Mod: S$PBB,,, | Performed by: FAMILY MEDICINE

## 2025-01-31 PROCEDURE — 82962 GLUCOSE BLOOD TEST: CPT | Mod: PBBFAC | Performed by: FAMILY MEDICINE

## 2025-01-31 NOTE — TELEPHONE ENCOUNTER
Reason for Disposition   Health information question, no triage required and triager able to answer question     See notes, no symptoms for Triage, Last two BS in normal range. Nurses Judgement: Office appointment set today.    Protocols used: Information Only Call - No Triage-A-  Pt states at 10 pm yesterday his blood sugar was 590. States he took his 50 units of insulin. States at 0430 his blood sugar was 93 and he felt nauseated. States his blood sugar is normally 200-250. BS now is 107. He denies any symptoms now. Denies States he takes 50 units of Novolog twice daily and 70 units of Levemir twice daily.     Pt states he is currently receiving treatment for a foot ulcer. Asked pt if he has received diabetic education, he said he has not. Pt instructed to see his PCP or the first available Provider today. Appointment set with pt's PCP Dr. Pepe for 1100 today. This message has been routed to Pt's PCP and the Diabetic Management support staff. Pt instructed to call OOC back if he develops any symptoms before his appointment, or for BS 70 or greater than 200. Pt verbalized understanding and has no questions or concerns.

## 2025-01-31 NOTE — PROGRESS NOTES
Patient ID: Alfred Villarreal is a 64 y.o. male.    Chief Complaint: Blood Sugar Problem (Glucose was 590 last night, took insulin, at 430 this morning glucose was 480, took insulin, one hr later glucose was 93) and Groin Swelling (States has a rupture on his scrotum, )    History of Present Illness    CHIEF COMPLAINT:  Alfred presents today with elevated glucose    DIABETES:  He has a history of diabetes with poor compliance in management. His glucose was 315 mg/dL and HbA1c was 11.2%.    GENITOURINARY:  He reports gradual onset of inguinal/testicular issue developing over 4-6 days.    LABS:  Labs from 9/19/24 showed mild anemia (hemoglobin 12.6 g/dL, hematocrit 38%), B12 407 pg/mL, sodium 134 mEq/L, and low HDL at 32 mg/dL. Vitamin D was low at 21 ng/mL.      ROS:  ROS as indicated in HPI.         Physical Exam    General: No acute distress. Well-developed. Well-nourished.  Eyes: EOMI. Sclerae anicteric.  HENT: Normocephalic. Atraumatic. Nares patent. Moist oral mucosa.  Cardiovascular: Regular rate. Regular rhythm. No murmurs. No rubs. No gallops. Normal S1, S2.  Respiratory: Normal respiratory effort. Clear to auscultation bilaterally. No rales. No rhonchi. No wheezing.  Musculoskeletal: No  obvious deformity.  Extremities: No lower extremity edema.  Neurological: Alert & oriented x3. No slurred speech. Normal gait.  Psychiatric: Normal mood. Normal affect. Good insight. Good judgment.  Skin: Warm. Dry. No rash.         Assessment & Plan    IMPRESSION:  - Assessed patient's elevated glucose levels and overall diabetic management  - Evaluated complaint of elevated blood sugar  - Determined need for inguinal ultrasound to investigate potential hernia  - Considered urgency of surgical consultation based on symptoms    DIABETES:  - Noted the patient has been diabetic for some time and is very noncompliant.  - Reviewed last labs from 9/19/24 showing glucose of 315 and hemoglobin A1C of 11.2%.  - Observed the patient presents  today with elevated glucose and is complaining of high sugar.  - Measured current glucose reading at 158.    ANEMIA:  - Reviewed last labs from 9/19/24 showing mild anemia with hemoglobin 12.6, hematocrit 38, and B12 407.    HYPERLIPIDEMIA:  - Noted lipid panel was acceptable except for HDL at 32.    VITAMIN D DEFICIENCY:  - Observed Vitamin D was low at 21.    POTENTIAL HERNIA:  - Ordered inguinal ultrasound to evaluate for potential hernia.  - Referred the patient to Dr. Garzon, general surgeon, for evaluation of potential hernia.    TESTICULAR TORSION RISK:  - Explained potential risks of testicular torsion and the need for immediate medical attention if pain becomes excruciating.  - Instructed the patient to contact the office or go to the emergency room if pain becomes excruciating, as this may indicate testicular torsion requiring emergency care.         Plan:          Elevated glucose  -     POCT Glucose, Hand-Held Device    Type 2 diabetes mellitus with foot ulcer, with long-term current use of insulin    Enlarged testicle  -     US Abdomen Complete; Future; Expected date: 01/31/2025  -     Ambulatory referral/consult to General Surgery; Future; Expected date: 02/07/2025    Non-recurrent unilateral inguinal hernia without obstruction or gangrene  -     US Abdomen Complete; Future; Expected date: 01/31/2025  -     Ambulatory referral/consult to General Surgery; Future; Expected date: 02/07/2025        Follow up in about 3 months (around 4/30/2025), or if symptoms worsen or fail to improve.    This note was generated with the assistance of ambient listening technology. Verbal consent was obtained by the patient and accompanying visitor(s) for the recording of patient appointment to facilitate this note. I attest to having reviewed and edited the generated note for accuracy, though some syntax or spelling errors may persist. Please contact the author of this note for any clarification.

## 2025-02-05 ENCOUNTER — HOSPITAL ENCOUNTER (OUTPATIENT)
Dept: RADIOLOGY | Facility: HOSPITAL | Age: 65
Discharge: HOME | End: 2025-02-05
Attending: FAMILY MEDICINE
Payer: MEDICAID

## 2025-02-05 DIAGNOSIS — N50.89 ENLARGED TESTICLE: ICD-10-CM

## 2025-02-05 DIAGNOSIS — K40.90 NON-RECURRENT UNILATERAL INGUINAL HERNIA WITHOUT OBSTRUCTION OR GANGRENE: ICD-10-CM

## 2025-02-05 PROCEDURE — 93975 VASCULAR STUDY: CPT | Mod: TC

## 2025-02-05 PROCEDURE — 93975 VASCULAR STUDY: CPT | Mod: 26,,, | Performed by: RADIOLOGY

## 2025-02-05 PROCEDURE — 76870 US EXAM SCROTUM: CPT | Mod: 26,,, | Performed by: RADIOLOGY

## 2025-02-06 ENCOUNTER — TELEPHONE (OUTPATIENT)
Dept: FAMILY MEDICINE | Facility: CLINIC | Age: 65
End: 2025-02-06
Payer: MEDICAID

## 2025-02-06 DIAGNOSIS — N50.89 TESTICULAR MASS: Primary | ICD-10-CM

## 2025-02-06 NOTE — TELEPHONE ENCOUNTER
----- Message from  sent at 2/6/2025 12:41 PM CST -----  Contact: Self  Type: Needs Medical Advice    Who Called:  Patient    Best Call Back Number: [phone]    Additional Information: Patient is requesting a call back to see if Dr Pepe received the results from his US he had done yesterday.

## 2025-02-07 ENCOUNTER — TELEPHONE (OUTPATIENT)
Dept: SURGERY | Facility: CLINIC | Age: 65
End: 2025-02-07
Payer: MEDICAID

## 2025-02-07 NOTE — TELEPHONE ENCOUNTER
----- Message from Cmxtwenty sent at 2/7/2025 10:53 AM CST -----  Type:  Sooner Apoointment Request    Caller is requesting a sooner appointment.  Caller declined first available appointment listed below.  Caller will not accept being placed on the waitlist and is requesting a message be sent to doctor.  Name of Caller:the patient  When is the first available appointment? 2/7  Symptoms:referral Dr. Pepe- Inguinal hernia  Would the patient rather a call back or a response via [facility]? call back/[facility]  Best Call Back Number:[phone]   Additional Information: R/S 2/7 appt due to transportation  Thanks

## 2025-02-07 NOTE — TELEPHONE ENCOUNTER
Writer spoke to pt, pt was rescheduled to 02/13/2025 for consult with Dr. Fierro due to transportation

## 2025-02-11 ENCOUNTER — OFFICE VISIT (OUTPATIENT)
Dept: PODIATRY | Facility: CLINIC | Age: 65
End: 2025-02-11
Payer: MEDICAID

## 2025-02-11 VITALS — BODY MASS INDEX: 38.14 KG/M2 | HEIGHT: 78 IN

## 2025-02-11 DIAGNOSIS — E11.49 TYPE II DIABETES MELLITUS WITH NEUROLOGICAL MANIFESTATIONS: ICD-10-CM

## 2025-02-11 DIAGNOSIS — I73.9 PERIPHERAL VASCULAR DISEASE: ICD-10-CM

## 2025-02-11 DIAGNOSIS — E11.9 COMPREHENSIVE DIABETIC FOOT EXAMINATION, TYPE 2 DM, ENCOUNTER FOR: Primary | ICD-10-CM

## 2025-02-11 DIAGNOSIS — Z89.9 HX OF AMPUTATION: ICD-10-CM

## 2025-02-11 DIAGNOSIS — L97.522 ULCER OF LEFT FOOT WITH FAT LAYER EXPOSED: ICD-10-CM

## 2025-02-11 PROCEDURE — 11042 DBRDMT SUBQ TIS 1ST 20SQCM/<: CPT | Mod: S$GLB,,, | Performed by: PODIATRIST

## 2025-02-11 PROCEDURE — 11045 DBRDMT SUBQ TISS EACH ADDL: CPT | Mod: S$GLB,,, | Performed by: PODIATRIST

## 2025-02-11 PROCEDURE — 1159F MED LIST DOCD IN RCRD: CPT | Mod: CPTII,S$GLB,, | Performed by: PODIATRIST

## 2025-02-11 PROCEDURE — 3008F BODY MASS INDEX DOCD: CPT | Mod: CPTII,S$GLB,, | Performed by: PODIATRIST

## 2025-02-11 PROCEDURE — 1160F RVW MEDS BY RX/DR IN RCRD: CPT | Mod: CPTII,S$GLB,, | Performed by: PODIATRIST

## 2025-02-11 PROCEDURE — 99214 OFFICE O/P EST MOD 30 MIN: CPT | Mod: 25,S$GLB,, | Performed by: PODIATRIST

## 2025-02-11 RX ORDER — COLLAGENASE SANTYL 250 [ARB'U]/G
OINTMENT TOPICAL DAILY
Qty: 30 G | Refills: 0 | Status: SHIPPED | OUTPATIENT
Start: 2025-02-11 | End: 2025-03-13

## 2025-02-19 ENCOUNTER — TELEPHONE (OUTPATIENT)
Dept: FAMILY MEDICINE | Facility: CLINIC | Age: 65
End: 2025-02-19
Payer: MEDICAID

## 2025-02-19 ENCOUNTER — TELEPHONE (OUTPATIENT)
Dept: UROLOGY | Facility: CLINIC | Age: 65
End: 2025-02-19
Payer: MEDICAID

## 2025-02-19 DIAGNOSIS — N50.89 TESTICULAR MASS: Primary | ICD-10-CM

## 2025-02-19 DIAGNOSIS — Z79.4 TYPE 2 DIABETES MELLITUS WITH FOOT ULCER, WITH LONG-TERM CURRENT USE OF INSULIN: ICD-10-CM

## 2025-02-19 DIAGNOSIS — L97.509 TYPE 2 DIABETES MELLITUS WITH FOOT ULCER, WITH LONG-TERM CURRENT USE OF INSULIN: ICD-10-CM

## 2025-02-19 DIAGNOSIS — E11.621 TYPE 2 DIABETES MELLITUS WITH FOOT ULCER, WITH LONG-TERM CURRENT USE OF INSULIN: ICD-10-CM

## 2025-02-19 RX ORDER — PEN NEEDLE, DIABETIC 29 G X1/2"
NEEDLE, DISPOSABLE MISCELLANEOUS
Qty: 100 EACH | Refills: 3 | Status: SHIPPED | OUTPATIENT
Start: 2025-02-19

## 2025-02-19 NOTE — TELEPHONE ENCOUNTER
URGENT REQUEST:     Priority: Urgent     Primary reason for referral:   Men's Health - BPH(Enlarged Prostate, Weak Stream); Elevated PSA, Erectile Dysfunction; Low T (Testosterone); Infertility; Vasectomy; Vasectomy Reversal; Penile Prosthesis/Replacement; Circumcision   Primary reason for referral:   Other - Pain or Swelling (Penile, Scrotal Groin); Renal cyst/Angiomyolipoma     Patient asking for an appointment ASAP. Patient has to transportation to any other providers.     Patient would like a call back.     Contact Information   [phone]       Called pt informed will need to call [place] urologist   Pt david

## 2025-02-19 NOTE — TELEPHONE ENCOUNTER
----- Message from Christoph sent at 2/19/2025  3:45 PM CST -----  Type: Needs Medical AdviceWho Called:  PtBest Call Back Number: [phone] Information: Pt states that he got that wrong number for demetria yoon, at [phone]. Stated that the number was out of service. Please call back to advise, Thanks!

## 2025-02-20 ENCOUNTER — TELEPHONE (OUTPATIENT)
Dept: FAMILY MEDICINE | Facility: CLINIC | Age: 65
End: 2025-02-20
Payer: MEDICAID

## 2025-02-20 NOTE — TELEPHONE ENCOUNTER
----- Message from Na sent at 2/20/2025  4:27 PM CST -----  Type:  Patient Returning CallWho Called:  pt Who Left Message for Patient:  Does the patient know what this is regarding?:  yesBest Call Back Number:  [phone] Additional Information:   Please call back to advise. Thanks!

## 2025-02-20 NOTE — TELEPHONE ENCOUNTER
"Spoke w/ pt  Pt states, 'I have not been contacted for the urology referral.this was suppose to be done a week ago.  " Informed pt He refused to schedule referral w/ Dr. Pinzon's office. Dr. Almendarez's office will reach out to schedule, he will need to allow at least 5-7 business days for the office to process his referral. Pt stated, " I guess I will sit here and just die"  Recommended pt to reach out to Dr. Almendarez's office to schedule or contact the main hospital [facility] to be transferred to Dr. Almendarez  Pt voiced understanding.   "

## 2025-02-26 DIAGNOSIS — L97.509 TYPE 2 DIABETES MELLITUS WITH FOOT ULCER, WITH LONG-TERM CURRENT USE OF INSULIN: ICD-10-CM

## 2025-02-26 DIAGNOSIS — Z79.4 TYPE 2 DIABETES MELLITUS WITH FOOT ULCER, WITH LONG-TERM CURRENT USE OF INSULIN: ICD-10-CM

## 2025-02-26 DIAGNOSIS — E11.621 TYPE 2 DIABETES MELLITUS WITH FOOT ULCER, WITH LONG-TERM CURRENT USE OF INSULIN: ICD-10-CM

## 2025-02-26 RX ORDER — INSULIN DETEMIR 100 [IU]/ML
70 INJECTION, SOLUTION SUBCUTANEOUS 2 TIMES DAILY
Qty: 42 ML | Refills: 11 | Status: SHIPPED | OUTPATIENT
Start: 2025-02-26 | End: 2026-02-26

## 2025-02-26 RX ORDER — ROSUVASTATIN CALCIUM 10 MG/1
10 TABLET, COATED ORAL DAILY
Qty: 90 TABLET | Refills: 3 | Status: SHIPPED | OUTPATIENT
Start: 2025-02-26 | End: 2026-02-26

## 2025-03-03 NOTE — PROGRESS NOTES
Subjective:     Patient ID: Alfred Villarreal is a 64 y.o. male    Chief Complaint: Wound Care       Alfred is a 64 y.o. male who presents to the clinic for evaluation and treatment of high risk feet. Alfred has a past medical history of A-fib, Anticoagulant long-term use, Arthritis, Diabetes mellitus, Hypertension, and Other male erectile dysfunction (11/10/2023). The patient's chief complaint is long, thick toenails. This patient has documented high risk feet requiring routine maintenance secondary to peripheral vascular disease.    PCP: Sandy Pepe MD    Date Last Seen by PCP:  01/31/2025    Current shoe gear:  Affected Foot: N/A     Unaffected Foot: Tennis shoes    Hemoglobin A1C   Date Value Ref Range Status   09/19/2024 11.2 (H) 4.0 - 5.6 % Final     Comment:     ADA Screening Guidelines:  5.7-6.4%  Consistent with prediabetes  >or=6.5%  Consistent with diabetes    High levels of fetal hemoglobin interfere with the HbA1C  assay. Heterozygous hemoglobin variants (HbS, HgC, etc)do  not significantly interfere with this assay.   However, presence of multiple variants may affect accuracy.     11/10/2023 10.3 (H) 4.0 - 5.6 % Final     Comment:     ADA Screening Guidelines:  5.7-6.4%  Consistent with prediabetes  >or=6.5%  Consistent with diabetes    High levels of fetal hemoglobin interfere with the HbA1C  assay. Heterozygous hemoglobin variants (HbS, HgC, etc)do  not significantly interfere with this assay.   However, presence of multiple variants may affect accuracy.     08/14/2023 11.2 (H) 4.0 - 5.6 % Final     Comment:     ADA Screening Guidelines:  5.7-6.4%  Consistent with prediabetes  >or=6.5%  Consistent with diabetes    High levels of fetal hemoglobin interfere with the HbA1C  assay. Heterozygous hemoglobin variants (HbS, HgC, etc)do  not significantly interfere with this assay.   However, presence of multiple variants may affect accuracy.         Review of Systems   Constitutional: Negative.  Negative for  chills and fever.   Respiratory:  Negative for cough and shortness of breath.    Cardiovascular:  Positive for leg swelling. Negative for chest pain.   Gastrointestinal:  Negative for diarrhea, nausea and vomiting.   Neurological:  Positive for tingling.        Objective:   Physical Exam  Vitals reviewed.   Constitutional:       General: He is not in acute distress.     Appearance: Normal appearance. He is not ill-appearing.   HENT:      Head: Normocephalic.      Nose: Nose normal.   Cardiovascular:      Pulses:           Dorsalis pedis pulses are 1+ on the left side.        Posterior tibial pulses are 1+ on the left side.   Pulmonary:      Effort: Pulmonary effort is normal. No respiratory distress.   Feet:      Left foot:      Skin integrity: Ulcer (Large ulceration noted to the left lateral heel measures 17.7 x 10.0 x 0.2 cm with 3rd and 5% fibrotic base in noted serosanguineous drainage) present.   Skin:     Capillary Refill: Capillary refill takes 2 to 3 seconds.   Neurological:      Mental Status: He is alert and oriented to person, place, and time.   Psychiatric:         Mood and Affect: Mood normal.         Behavior: Behavior normal.         Thought Content: Thought content normal.        Foot Exam    General  General Appearance: appears stated age and healthy   Orientation: alert and oriented to person, place, and time   Affect: appropriate   Assistance: wheelchair use       Left Foot/Ankle      Inspection and Palpation  Skin Exam: ulcer (Large ulceration noted to the left lateral heel measures 17.7 x 10.0 x 0.2 cm with 3rd and 5% fibrotic base in noted serosanguineous drainage);     Neurovascular  Dorsalis pedis: 1+  Posterior tibial: 1+    Muscle Strength  Ankle dorsiflexion: 4+  Ankle plantar flexion: 4+  Ankle inversion: 4+  Ankle eversion: 4+      Musculoskeletal: Right BKA noted   Vascular:  +2 nonpitting edema noted to the left ankle with visual signs of venous stasis dermatitis  Assessment:          1. Comprehensive diabetic foot examination, type 2 DM, encounter for    2. Hx of amputation    3. Type II diabetes mellitus with neurological manifestations    4. Peripheral vascular disease    5. Ulcer of left foot with fat layer exposed       Plan:     Alfred Villarreal was seen today for   Chief Complaint   Patient presents with    Wound Care       Assessment & Plan           Wound Debridement    Date/Time: 2/11/2025 3:30 PM    Performed by: Thao Duff DPM  Authorized by: Thao Duff DPM    Consent Done?:  Yes (Verbal)    Wound Details:    Location:  Left foot    Location:  Left Heel       Length (cm):  17.7       Width (cm):  10       Depth (cm):  0.3       Area (sq cm):  139.02       Percent Debrided (%):  100       Total Area Debrided (sq cm):  139.02    Depth of debridement:  Subcutaneous tissue    Devitalized tissue debrided:  Biofilm, Callus and Fibrin    Instruments:  Blade  Bleeding:  Minimal  Hemostasis Achieved: Yes  Method Used:  Pressure  Patient tolerance:  Patient tolerated the procedure well with no immediate complications  1st Wound Pain Assessment: 6       1. Patient was examined and evaluated.    2. Patient made aware continued presence of large ulceration left heel.  Patient made aware of the ulceration does look  the prior appointments and shows no signs of infection.  Patient was advised to continue with daily dressing changes to the area with Betadine paint, application of Santyl, application of ABD pad, roll gauze and a compressive dressing.    3. Discussed with patient etiology of peripheral vascular disease.  Patient was advised elevate lower extremity rest consider daily use of compression stockings.  Patient will monitor dietary salt intake and water consumption.    4. Patient was advised to continue with daily foot monitoring.  Patient will continue efforts proper glycemic control, lowering hemoglobin A1c, adherence to diabetic medication regimen  5. Patient had  wound care supplies reordered.  6. Patient was suggested to follow-up in 2 weeks patient states he can not follow-up for 4 weeks or p.r.n. foot complaints      Yenny Duff DPM  [id number] [address] [id number] [phone]      This note was created using [employer] direct voice recognition software. Note may have occasional typographical errors that may not have been identified and edited despite initial review prior to signing.

## 2025-03-08 ENCOUNTER — HOSPITAL ENCOUNTER (EMERGENCY)
Facility: HOSPITAL | Age: 65
Discharge: HOME | End: 2025-03-08
Attending: STUDENT IN AN ORGANIZED HEALTH CARE EDUCATION/TRAINING PROGRAM
Payer: MEDICAID

## 2025-03-08 VITALS
RESPIRATION RATE: 17 BRPM | SYSTOLIC BLOOD PRESSURE: 124 MMHG | HEART RATE: 101 BPM | DIASTOLIC BLOOD PRESSURE: 67 MMHG | OXYGEN SATURATION: 97 % | TEMPERATURE: 98 F

## 2025-03-08 DIAGNOSIS — E11.622 DIABETIC ULCER OF ANKLE: ICD-10-CM

## 2025-03-08 DIAGNOSIS — L97.309 DIABETIC ULCER OF ANKLE: ICD-10-CM

## 2025-03-08 DIAGNOSIS — L97.529 ULCER OF LEFT FOOT, UNSPECIFIED ULCER STAGE: Primary | ICD-10-CM

## 2025-03-08 PROCEDURE — 73630 X-RAY EXAM OF FOOT: CPT | Mod: TC,LT

## 2025-03-08 PROCEDURE — 25000003 PHARM REV CODE 250: Performed by: STUDENT IN AN ORGANIZED HEALTH CARE EDUCATION/TRAINING PROGRAM

## 2025-03-08 PROCEDURE — 73630 X-RAY EXAM OF FOOT: CPT | Mod: 26,LT,, | Performed by: RADIOLOGY

## 2025-03-08 PROCEDURE — 99283 EMERGENCY DEPT VISIT LOW MDM: CPT | Mod: 25

## 2025-03-08 RX ORDER — HYDROCODONE BITARTRATE AND ACETAMINOPHEN 7.5; 325 MG/1; MG/1
1 TABLET ORAL ONCE
Refills: 0 | Status: COMPLETED | OUTPATIENT
Start: 2025-03-08 | End: 2025-03-08

## 2025-03-08 RX ORDER — KETOROLAC TROMETHAMINE 30 MG/ML
30 INJECTION, SOLUTION INTRAMUSCULAR; INTRAVENOUS
Status: DISCONTINUED | OUTPATIENT
Start: 2025-03-08 | End: 2025-03-08

## 2025-03-08 RX ORDER — SULFAMETHOXAZOLE AND TRIMETHOPRIM 800; 160 MG/1; MG/1
1 TABLET ORAL 2 TIMES DAILY
Qty: 14 TABLET | Refills: 0 | Status: SHIPPED | OUTPATIENT
Start: 2025-03-08 | End: 2025-03-15

## 2025-03-08 RX ADMIN — HYDROCODONE BITARTRATE AND ACETAMINOPHEN 1 TABLET: 7.5; 325 TABLET ORAL at 03:03

## 2025-03-08 NOTE — ED PROVIDER NOTES
"Encounter Date: 3/8/2025       History     Chief Complaint   Patient presents with    Skin Ulcer     Left foot ulcer that is actively oozing serosanguinous fluid.     64-year-old male with a history of diabetes with right BKA, chronic ulcers on the sole of the left foot, as well as chronic sores on the left lower leg.   He presents to ED with complaints of bleeding from ulcers on his left foot.  He does not remember hitting the foot, or any recent trauma.  He reports previous history of spontaneous bleeding from the ulcer.  He follows with podiatrist Dr. Duff in [place].  Currently not on any blood thinners, or antibiotics.    The history is provided by the patient. No  was used.     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  Social History[1]  Review of Systems   Constitutional: Negative.    HENT: Negative.     Eyes: Negative.    Respiratory: Negative.     Cardiovascular: Negative.    Gastrointestinal: Negative.    Endocrine: Negative.    Genitourinary: Negative.    Musculoskeletal: Negative.    Skin:         Diabetic wounds   Neurological: Negative.    Hematological: Negative.    Psychiatric/Behavioral: Negative.     All other systems reviewed and are negative.      Physical Exam     Initial Vitals [03/08/25 0221]   BP Pulse Resp Temp SpO2   120/89 90 18 97.7 °F (36.5 °C) 95 %      MAP       --         Physical Exam    Nursing note and vitals reviewed.  Constitutional: He appears well-developed.   HENT:   Head: Normocephalic.   Eyes: Pupils are equal, round, and reactive to light.   Neck:   Normal " range of motion.  Cardiovascular:  Normal rate.           Pulmonary/Chest: Breath sounds normal.   Abdominal: Abdomen is soft. Bowel sounds are normal.   Musculoskeletal:      Cervical back: Normal range of motion.      Comments: Active bleeding from chronic wounds to left lower foot  There is appropriate healthy granulation tissue of the wound bed     Neurological: He is alert and oriented to person, place, and time. GCS score is 15. GCS eye subscore is 4. GCS verbal subscore is 5. GCS motor subscore is 6.         ED Course   Procedures  Labs Reviewed - No data to display       Imaging Results              X-Ray Foot Complete Left (Final result)  Result time 03/08/25 03:38:58      Final result by Alan Waters MD (03/08/25 03:38:58)                   Impression:      Chronic abnormal findings, as above, similar compared to prior.    Chronic 5 mm radiopaque foreign body in the soft tissues adjacent to the 3rd MTP, unchanged from prior.      Electronically signed by: Alan Waters MD  Date:    03/08/2025  Time:    03:38               Narrative:    EXAMINATION:  XR FOOT COMPLETE 3 VIEW LEFT    CLINICAL HISTORY:  .  Type 2 diabetes mellitus with other skin ulcer    TECHNIQUE:  AP, lateral and oblique views of the left foot were performed.    COMPARISON:  01/22/2024.    FINDINGS:  Chronic deformity of the left foot is seen, similar to prior.  Remote 4th and 5th ray amputations.  Chronic hammertoe deformities and degenerative changes, similar to prior.  Moderate degenerative change 1st MTP, similar to prior.  Chronic 5 mm radiopaque foreign body in the soft tissues adjacent to the 3rd MTP, unchanged from prior.  Soft tissue ulceration in the plantar aspect of the foot below the calcaneus, similar compared to prior.  Prominent calcaneal spur appears unchanged.  Diffuse soft tissue swelling/edema in the foot, similar to prior.                                       Medications   HYDROcodone-acetaminophen 7.5-325 mg  per tablet 1 tablet (1 tablet Oral Given 3/8/25 0331)     Medical Decision Making  64-year-old male with a history of diabetes with right BKA, chronic ulcers on the sole of the left foot, as well as chronic sores on the left lower leg.   He presents to ED with complaints of bleeding from ulcers on his left foot.  He does not remember hitting the foot, or any recent trauma.  He reports previous history of spontaneous bleeding from the ulcer.  He follows with podiatrist Dr. Duff in [place].  Currently not on any blood thinners, or antibiotics.  --------------  X-ray shows chronic findings without osteomyelitis  Bleeding stopped with pressure dressings in the ED  Patient was instructed to follow up with his podiatrist and was given strict return precautions to the ED. The patient voiced understanding and agreed with the plan        Amount and/or Complexity of Data Reviewed  Radiology: ordered.    Risk  Prescription drug management.                                      Clinical Impression:  Final diagnoses:  [E11.622, L97.309] Diabetic ulcer of ankle  [L97.529] Ulcer of left foot, unspecified ulcer stage (Primary)          ED Disposition Condition    Discharge Stable          ED Prescriptions       Medication Sig Dispense Start Date End Date Auth. Provider    sulfamethoxazole-trimethoprim 800-160mg (BACTRIM DS) 800-160 mg Tab Take 1 tablet by mouth 2 (two) times daily. for 7 days 14 tablet 3/8/2025 3/15/2025 Wilver Xie MD          Follow-up Information       Follow up With Specialties Details Why Contact Info    Sandy Pepe MD Family Medicine  As needed [address] [place] [id number] [phone]                   [1]   Social History  Tobacco Use    Smoking status: Never    Smokeless tobacco: Current     Types: Chew   Substance Use Topics    Alcohol use: Not Currently    Drug use: No        Wilver Xie MD  03/08/25 0495

## 2025-03-08 NOTE — ED TRIAGE NOTES
Presents with left foot/heel bleeding, onset 30 mins PTA. The patient states he changed his bandage on the left foot/heel wound 2 hours prior to the bleeding episode. The patient was unable to control the bleeding at home with pressure. A moderate-sized open wound is noted with mildly saturated gauze dressing. The patient denies any shortness of breath or lightheadedness. No blood thinner use, Right BKA noted

## 2025-03-11 ENCOUNTER — OFFICE VISIT (OUTPATIENT)
Dept: PODIATRY | Facility: CLINIC | Age: 65
End: 2025-03-11
Payer: MEDICAID

## 2025-03-11 VITALS — HEIGHT: 78 IN | BODY MASS INDEX: 38.14 KG/M2

## 2025-03-11 DIAGNOSIS — E11.49 TYPE II DIABETES MELLITUS WITH NEUROLOGICAL MANIFESTATIONS: ICD-10-CM

## 2025-03-11 DIAGNOSIS — Z89.9 HX OF AMPUTATION: Primary | ICD-10-CM

## 2025-03-11 DIAGNOSIS — L97.522 ULCER OF LEFT FOOT WITH FAT LAYER EXPOSED: ICD-10-CM

## 2025-03-11 DIAGNOSIS — I73.9 PERIPHERAL VASCULAR DISEASE: ICD-10-CM

## 2025-03-11 NOTE — PROGRESS NOTES
Subjective:     Patient ID: Alfred Villarreal is a 64 y.o. male    Chief Complaint: Wound Care       Alfred is a 64 y.o. male who presents to the clinic for evaluation and treatment of high risk feet. Alfred has a past medical history of A-fib, Anticoagulant long-term use, Arthritis, Diabetes mellitus, Hypertension, and Other male erectile dysfunction (11/10/2023). The patient's chief complaint is diabetic foot ulcer, lateral aspect of the left rearfoot and ankle. This patient has documented high risk feet requiring routine maintenance secondary to peripheral vascular disease.    PCP: Sandy Pepe MD    Date Last Seen by PCP: 01/31/2025    Current shoe gear:  Affected Foot: N/A     Unaffected Foot: N/A    History of Trauma: negative  Sign of Infection: none    Hemoglobin A1C   Date Value Ref Range Status   09/19/2024 11.2 (H) 4.0 - 5.6 % Final     Comment:     ADA Screening Guidelines:  5.7-6.4%  Consistent with prediabetes  >or=6.5%  Consistent with diabetes    High levels of fetal hemoglobin interfere with the HbA1C  assay. Heterozygous hemoglobin variants (HbS, HgC, etc)do  not significantly interfere with this assay.   However, presence of multiple variants may affect accuracy.     11/10/2023 10.3 (H) 4.0 - 5.6 % Final     Comment:     ADA Screening Guidelines:  5.7-6.4%  Consistent with prediabetes  >or=6.5%  Consistent with diabetes    High levels of fetal hemoglobin interfere with the HbA1C  assay. Heterozygous hemoglobin variants (HbS, HgC, etc)do  not significantly interfere with this assay.   However, presence of multiple variants may affect accuracy.     08/14/2023 11.2 (H) 4.0 - 5.6 % Final     Comment:     ADA Screening Guidelines:  5.7-6.4%  Consistent with prediabetes  >or=6.5%  Consistent with diabetes    High levels of fetal hemoglobin interfere with the HbA1C  assay. Heterozygous hemoglobin variants (HbS, HgC, etc)do  not significantly interfere with this assay.   However, presence of multiple variants  may affect accuracy.         Review of Systems   Constitutional: Negative.  Negative for chills and fever.   Respiratory:  Negative for cough and shortness of breath.    Cardiovascular:  Positive for leg swelling. Negative for chest pain.   Gastrointestinal:  Negative for diarrhea, nausea and vomiting.   Neurological:  Positive for tingling.        Objective:   Physical Exam  Vitals reviewed.   Constitutional:       General: He is not in acute distress.     Appearance: Normal appearance. He is not ill-appearing.   HENT:      Head: Normocephalic.      Nose: Nose normal.   Cardiovascular:      Pulses:           Dorsalis pedis pulses are 1+ on the left side.        Posterior tibial pulses are 1+ on the left side.   Pulmonary:      Effort: Pulmonary effort is normal. No respiratory distress.   Feet:      Left foot:      Skin integrity: Ulcer present.   Skin:     Capillary Refill: Capillary refill takes 2 to 3 seconds.   Neurological:      Mental Status: He is alert and oriented to person, place, and time.   Psychiatric:         Mood and Affect: Mood normal.         Behavior: Behavior normal.         Thought Content: Thought content normal.        Foot Exam    General  General Appearance: appears stated age and healthy   Orientation: alert and oriented to person, place, and time   Affect: appropriate   Assistance: wheelchair use       Left Foot/Ankle      Inspection and Palpation  Tenderness: calcaneus tenderness   Swelling: dorsum   Skin Exam: ulcer;     Neurovascular  Dorsalis pedis: 1+  Posterior tibial: 1+  Saphenous nerve sensation: diminished  Tibial nerve sensation: diminished  Superficial peroneal nerve sensation: diminished  Deep peroneal nerve sensation: diminished  Sural nerve sensation: diminished    Muscle Strength  Ankle dorsiflexion: 5  Ankle plantar flexion: 5  Ankle inversion: 5  Ankle eversion: 5  Great toe extension: 5  Great toe flexion: 5      Dermatologic: Diabetic ulcer noted to the plantar lateral  and posterior left heel measures 1110 x 9.0 x 0.4 has a hyperkeratotic and macerated perimeter with 25% fibrosis centrally and 75% granular base with noted serosanguineous drainage and mild odor  Musculoskeletal evidence of previous right BKA     Assessment:         1. Hx of amputation    2. Ulcer of left foot with fat layer exposed    3. Peripheral vascular disease    4. Type II diabetes mellitus with neurological manifestations       Plan:     Alfred Villarreal was seen today for   Chief Complaint   Patient presents with    Wound Care       Assessment & Plan           Wound Debridement    Date/Time: 3/11/2025 3:00 PM    Performed by: Thao Duff DPM  Authorized by: Thao Duff DPM    Consent Done?:  Yes (Verbal)    Wound Details:    Location:  Left foot    Location:  Left Heel       Length (cm):  11       Width (cm):  9       Depth (cm):  0.4       Area (sq cm):  77.75       Percent Debrided (%):  100       Total Area Debrided (sq cm):  77.75    Depth of debridement:  Subcutaneous tissue    Devitalized tissue debrided:  Biofilm, Callus, Fibrin and Exudate    Instruments:  Blade  Bleeding:  Minimal  Hemostasis Achieved: Yes  Method Used:  Pressure  Patient tolerance:  Patient tolerated the procedure well with no immediate complications  1st Wound Pain Assessment: 10       1. Patient was examined and evaluated.    2. Patient made aware continued presence of diabetic ulcer/venous stasis ulcer/pressure ulcer to the plantar left heel.  Patient was advised to discontinue with any soaking of the wound.  Patient will continue with daily dressing changes with Betadine wet-to-dry and a dry sterile bandage.  Patient made aware that we were unable to get Santyl approved through his insurance.  Patient was advised to elevate the left lower extremity end of days.  Patient was advised to decrease the amount of pressure applied to the left lower extremity as well.  Patient will finish oral antibiotics/Bactrim previously  dispensed.  3. Patient was advised to continue with daily foot monitoring.  Patient will continue efforts proper glycemic control, lowering hemoglobin A1c, adherence to diabetic medication regimen  4. Patient will follow-up in 1 month or p.r.n. for complaints       Yenny Duff DPM  [address] [address] [address]  [phone]      This note was created using Trov direct voice recognition software. Note may have occasional typographical errors that may not have been identified and edited despite initial review prior to signing.

## 2025-04-04 ENCOUNTER — TELEPHONE (OUTPATIENT)
Dept: PODIATRY | Facility: CLINIC | Age: 65
End: 2025-04-04
Payer: MEDICAID

## 2025-04-04 NOTE — TELEPHONE ENCOUNTER
Spoke with patient and informed him we would send out the order for supplies to [facility].----- Message from Christoph sent at 4/4/2025 12:19 PM CDT -----  Type: Needs Medical AdviceWho Called:  Ivonne Call Back Number: [phone] Information: Pt wants to verify if he would still be getting sent supplies for bandages, wrap, guaze, tape, etc. Please call back to advise, Thanks!   Statement Selected none

## 2025-04-28 ENCOUNTER — PATIENT OUTREACH (OUTPATIENT)
Dept: ADMINISTRATIVE | Facility: HOSPITAL | Age: 65
End: 2025-04-28
Payer: MEDICAID

## 2025-04-28 DIAGNOSIS — E11.9 TYPE 2 DIABETES MELLITUS WITHOUT COMPLICATION, UNSPECIFIED WHETHER LONG TERM INSULIN USE: Primary | ICD-10-CM

## 2025-04-28 DIAGNOSIS — Z12.5 PROSTATE CANCER SCREENING: ICD-10-CM

## 2025-04-28 NOTE — PROGRESS NOTES
Population Health Chart Review & Patient Outreach Details      Additional [facility] Notes:               Updates Requested / Reviewed:      Updated Care Coordination Note, Care Everywhere, External Sources: [facility] and [facility], and Immunizations Reconciliation Completed or Queried: [facility] Topics Overdue:      [facility] Score: 2     Colon Cancer Screening  Eye Exam    Tetanus Vaccine  RSV Vaccine                  Health Maintenance Topic(s) Outreach Outcomes & Actions Taken:    Lab(s) - Outreach Outcomes & Actions Taken  : Overdue Lab(s) Ordered and Overdue Lab(s) Scheduled

## 2025-04-30 DIAGNOSIS — K40.91 UNILATERAL RECURRENT INGUINAL HERNIA WITHOUT OBSTRUCTION OR GANGRENE: Primary | ICD-10-CM

## 2025-06-03 RX ORDER — SILVER 2" X 2"
BANDAGE TOPICAL DAILY PRN
Qty: 40 G | Refills: 2 | Status: SHIPPED | OUTPATIENT
Start: 2025-06-03 | End: 2025-07-03

## 2025-06-17 ENCOUNTER — OFFICE VISIT (OUTPATIENT)
Dept: PODIATRY | Facility: CLINIC | Age: 65
End: 2025-06-17
Payer: MEDICAID

## 2025-06-17 VITALS — HEIGHT: 78 IN | BODY MASS INDEX: 38.14 KG/M2

## 2025-06-17 DIAGNOSIS — Z89.9 HX OF AMPUTATION: Primary | ICD-10-CM

## 2025-06-17 DIAGNOSIS — L97.522 ULCER OF LEFT FOOT WITH FAT LAYER EXPOSED: ICD-10-CM

## 2025-06-17 DIAGNOSIS — E11.49 TYPE II DIABETES MELLITUS WITH NEUROLOGICAL MANIFESTATIONS: ICD-10-CM

## 2025-06-17 DIAGNOSIS — I73.9 PERIPHERAL VASCULAR DISEASE: ICD-10-CM

## 2025-06-17 PROCEDURE — 99499 UNLISTED E&M SERVICE: CPT | Mod: S$GLB,,, | Performed by: PODIATRIST

## 2025-06-17 PROCEDURE — 11042 DBRDMT SUBQ TIS 1ST 20SQCM/<: CPT | Mod: S$GLB,,, | Performed by: PODIATRIST

## 2025-06-17 PROCEDURE — 11045 DBRDMT SUBQ TISS EACH ADDL: CPT | Mod: S$GLB,,, | Performed by: PODIATRIST

## 2025-06-18 DIAGNOSIS — Z79.4 TYPE 2 DIABETES MELLITUS WITH FOOT ULCER, WITH LONG-TERM CURRENT USE OF INSULIN: ICD-10-CM

## 2025-06-18 DIAGNOSIS — E11.621 TYPE 2 DIABETES MELLITUS WITH FOOT ULCER, WITH LONG-TERM CURRENT USE OF INSULIN: ICD-10-CM

## 2025-06-18 DIAGNOSIS — L97.509 TYPE 2 DIABETES MELLITUS WITH FOOT ULCER, WITH LONG-TERM CURRENT USE OF INSULIN: ICD-10-CM

## 2025-06-18 RX ORDER — INSULIN ASPART 100 [IU]/ML
INJECTION, SOLUTION INTRAVENOUS; SUBCUTANEOUS
Qty: 90 ML | Refills: 0 | Status: SHIPPED | OUTPATIENT
Start: 2025-06-18

## 2025-06-18 NOTE — TELEPHONE ENCOUNTER
Care Due:                  Date            Visit Type   Department     Provider  --------------------------------------------------------------------------------                                SAME DAY -                              ESTABLISHED   [facility] 2ND FLOOR  Last Visit: 01-      PATIENT      [facility] Bailey Pepe  Next Visit: None Scheduled  None         None Found                                                            Last  Test          Frequency    Reason                     Performed    Due Date  --------------------------------------------------------------------------------    CMP.........  12 months..  insulin, rosuvastatin....  09- 09-    HBA1C.......  6 months...  insulin..................  09- 03-    Lipid Panel.  12 months..  rosuvastatin.............  09- 09-    [employer] Embedded Care Due Messages. Reference number: [id number].   6/18/2025 8:02:36 AM CDT

## 2025-07-07 NOTE — PROGRESS NOTES
Subjective:     Patient ID: Alfred Villarreal is a 64 y.o. male    Chief Complaint: Wound Care       Alfred is a 64 y.o. male who presents to the clinic for evaluation and treatment of high risk feet. Alfred has a past medical history of A-fib, Anticoagulant long-term use, Arthritis, Diabetes mellitus, Hypertension, and Other male erectile dysfunction (11/10/2023). The patient's chief complaint is diabetic foot ulcer, plantar left foot and ankle. This patient has documented high risk feet requiring routine maintenance secondary to diabetes mellitis and those secondary complications of diabetes, as mentioned..    PCP: Sandy Pepe MD    Date Last Seen by PCP:  01/31/2025    Current shoe gear:  Affected Foot: N/A     Unaffected Foot: N/A    History of Trauma: negative  Sign of Infection: none    Hemoglobin A1C   Date Value Ref Range Status   09/19/2024 11.2 (H) 4.0 - 5.6 % Final     Comment:     ADA Screening Guidelines:  5.7-6.4%  Consistent with prediabetes  >or=6.5%  Consistent with diabetes    High levels of fetal hemoglobin interfere with the HbA1C  assay. Heterozygous hemoglobin variants (HbS, HgC, etc)do  not significantly interfere with this assay.   However, presence of multiple variants may affect accuracy.     11/10/2023 10.3 (H) 4.0 - 5.6 % Final     Comment:     ADA Screening Guidelines:  5.7-6.4%  Consistent with prediabetes  >or=6.5%  Consistent with diabetes    High levels of fetal hemoglobin interfere with the HbA1C  assay. Heterozygous hemoglobin variants (HbS, HgC, etc)do  not significantly interfere with this assay.   However, presence of multiple variants may affect accuracy.     08/14/2023 11.2 (H) 4.0 - 5.6 % Final     Comment:     ADA Screening Guidelines:  5.7-6.4%  Consistent with prediabetes  >or=6.5%  Consistent with diabetes    High levels of fetal hemoglobin interfere with the HbA1C  assay. Heterozygous hemoglobin variants (HbS, HgC, etc)do  not significantly interfere with this assay.    However, presence of multiple variants may affect accuracy.         Review of Systems   Constitutional: Negative.  Negative for chills and fever.   Respiratory:  Negative for cough and shortness of breath.    Cardiovascular:  Positive for leg swelling. Negative for chest pain.   Gastrointestinal:  Negative for diarrhea, nausea and vomiting.   Neurological:  Positive for tingling.        Objective:   Physical Exam  Vitals reviewed.   Constitutional:       General: He is not in acute distress.     Appearance: Normal appearance. He is not ill-appearing.   HENT:      Head: Normocephalic.      Nose: Nose normal.   Cardiovascular:      Pulses:           Dorsalis pedis pulses are 2+ on the left side.        Posterior tibial pulses are 1+ on the left side.   Pulmonary:      Effort: Pulmonary effort is normal. No respiratory distress.   Feet:      Left foot:      Skin integrity: Ulcer (large ulceration noted to the plantar left heel and rearfoot measuring 15.0 x 10.0 x 0.3 cm with 50% fibrotic base with evidence of bleeding and weeping with serous fluid, mild malodor noted) present.   Skin:     Capillary Refill: Capillary refill takes 2 to 3 seconds.   Neurological:      Mental Status: He is alert and oriented to person, place, and time.   Psychiatric:         Mood and Affect: Mood normal.         Behavior: Behavior normal.         Thought Content: Thought content normal.        Foot Exam    General  General Appearance: appears stated age and healthy   Orientation: alert and oriented to person, place, and time   Affect: appropriate   Assistance: walker use       Left Foot/Ankle      Inspection and Palpation  Swelling: dorsum   Skin Exam: ulcer (large ulceration noted to the plantar left heel and rearfoot measuring 15.0 x 10.0 x 0.3 cm with 50% fibrotic base with evidence of bleeding and weeping with serous fluid, mild malodor noted);     Neurovascular  Dorsalis pedis: 2+  Posterior tibial: 1+  Saphenous nerve sensation:  absent  Tibial nerve sensation: absent  Superficial peroneal nerve sensation: absent  Deep peroneal nerve sensation: absent  Sural nerve sensation: absent    Muscle Strength  Ankle dorsiflexion: 5  Ankle plantar flexion: 5  Ankle inversion: 5  Ankle eversion: 5  Great toe extension: 5  Great toe flexion: 5           Assessment:         1. Hx of amputation    2. Ulcer of left foot with fat layer exposed    3. Peripheral vascular disease    4. Type II diabetes mellitus with neurological manifestations       Plan:     Alfred Villarreal was seen today for   Chief Complaint   Patient presents with    Wound Care       Assessment & Plan           Wound Debridement    Date/Time: 6/17/2025 3:45 PM    Performed by: Thao Duff DPM  Authorized by: Thao Duff DPM    Consent Done?:  Yes (Verbal)    Wound Details:    Location:  Left foot    Location:  Left Plantar       Length (cm):  15       Width (cm):  10       Depth (cm):  0.3       Area (sq cm):  117.81       Percent Debrided (%):  100       Total Area Debrided (sq cm):  117.81    Depth of debridement:  Subcutaneous tissue    Devitalized tissue debrided:  Biofilm, Callus, Exudate and Fibrin    Instruments:  Blade  Bleeding:  Minimal  Hemostasis Achieved: Yes  Method Used:  Pressure  Patient tolerance:  Patient tolerated the procedure well with no immediate complications  1st Wound Pain Assessment: 0       1. Patient was examined and evaluated.    2. Discussed with patient continued ongoing presence of wound on the plantar aspect of the left foot.  Patient was advised to continue with once daily to every 2 day dressing changes to the area.  Patient was advised of constitutional symptoms.  Patient had supplies for dressing changes reordered to be sent to the home for management of the wound.  3. Patient had a unna boot applied to the left lower extremity following wound debridement.  Patient will keep this dressing clean, dry, and intact 1-3 days.  4. Patient will  follow-up in 2-3 weeks or p.r.n. for complaints      Yenny Duff DPM  [address] [address], [place] [address]  [phone]      This note was created using 3M fluency direct voice recognition software. Note may have occasional typographical errors that may not have been identified and edited despite initial review prior to signing.

## 2025-07-21 ENCOUNTER — TELEPHONE (OUTPATIENT)
Dept: FAMILY MEDICINE | Facility: CLINIC | Age: 65
End: 2025-07-21
Payer: MEDICAID

## 2025-07-21 NOTE — TELEPHONE ENCOUNTER
Spoke w/ pt   Pt c/u nausea/vomiting several days  Denied diarrhea.   Appt scheduled. Pt voiced understanding.

## 2025-07-21 NOTE — TELEPHONE ENCOUNTER
Copied from CRM #[id number]. Topic: Appointments - Same Day Access  >> Jul 21, 2025  3:41 PM Dorcas wrote:  Type:  Same Day Appointment Request    Caller is requesting a same day appointment.  Caller declined first available appointment listed below.    Name of Caller:Patient    When is the first available appointment?July 30th     Symptoms:Can't keep food down     Best Call Back Number: [phone]      Additional Information: Please call to advise

## 2025-07-24 ENCOUNTER — OFFICE VISIT (OUTPATIENT)
Dept: FAMILY MEDICINE | Facility: CLINIC | Age: 65
End: 2025-07-24
Payer: MEDICAID

## 2025-07-24 VITALS
WEIGHT: 315 LBS | BODY MASS INDEX: 36.45 KG/M2 | HEART RATE: 90 BPM | OXYGEN SATURATION: 96 % | HEIGHT: 78 IN | SYSTOLIC BLOOD PRESSURE: 132 MMHG | RESPIRATION RATE: 16 BRPM | DIASTOLIC BLOOD PRESSURE: 76 MMHG

## 2025-07-24 DIAGNOSIS — R11.2 NAUSEA AND VOMITING, UNSPECIFIED VOMITING TYPE: ICD-10-CM

## 2025-07-24 DIAGNOSIS — L97.509 TYPE 2 DIABETES MELLITUS WITH FOOT ULCER, WITH LONG-TERM CURRENT USE OF INSULIN: ICD-10-CM

## 2025-07-24 DIAGNOSIS — R73.09 ELEVATED GLUCOSE: Primary | ICD-10-CM

## 2025-07-24 DIAGNOSIS — Z12.11 COLON CANCER SCREENING: ICD-10-CM

## 2025-07-24 DIAGNOSIS — I48.11 LONGSTANDING PERSISTENT ATRIAL FIBRILLATION: ICD-10-CM

## 2025-07-24 DIAGNOSIS — Z12.5 PROSTATE CANCER SCREENING: ICD-10-CM

## 2025-07-24 DIAGNOSIS — E87.1 HYPONATREMIA: ICD-10-CM

## 2025-07-24 DIAGNOSIS — L03.116 CELLULITIS AND ABSCESS OF LEFT LEG: ICD-10-CM

## 2025-07-24 DIAGNOSIS — Z79.4 TYPE 2 DIABETES MELLITUS WITH FOOT ULCER, WITH LONG-TERM CURRENT USE OF INSULIN: ICD-10-CM

## 2025-07-24 DIAGNOSIS — L02.416 CELLULITIS AND ABSCESS OF LEFT LEG: ICD-10-CM

## 2025-07-24 DIAGNOSIS — Z13.6 ENCOUNTER FOR LIPID SCREENING FOR CARDIOVASCULAR DISEASE: ICD-10-CM

## 2025-07-24 DIAGNOSIS — Z00.00 ROUTINE MEDICAL EXAM: ICD-10-CM

## 2025-07-24 DIAGNOSIS — E55.9 VITAMIN D DEFICIENCY: ICD-10-CM

## 2025-07-24 DIAGNOSIS — R10.32 LEFT LOWER QUADRANT ABDOMINAL PAIN: ICD-10-CM

## 2025-07-24 DIAGNOSIS — Z13.220 ENCOUNTER FOR LIPID SCREENING FOR CARDIOVASCULAR DISEASE: ICD-10-CM

## 2025-07-24 DIAGNOSIS — E11.621 TYPE 2 DIABETES MELLITUS WITH FOOT ULCER, WITH LONG-TERM CURRENT USE OF INSULIN: ICD-10-CM

## 2025-07-24 DIAGNOSIS — R10.11 RIGHT UPPER QUADRANT ABDOMINAL PAIN: ICD-10-CM

## 2025-07-24 DIAGNOSIS — I10 PRIMARY HYPERTENSION: ICD-10-CM

## 2025-07-24 DIAGNOSIS — Z89.511 HISTORY OF AMPUTATION BELOW KNEE, RIGHT: ICD-10-CM

## 2025-07-24 PROCEDURE — 99999 PR PBB SHADOW E&M-EST. PATIENT-LVL IV: CPT | Mod: PBBFAC,,, | Performed by: FAMILY MEDICINE

## 2025-07-24 PROCEDURE — 99214 OFFICE O/P EST MOD 30 MIN: CPT | Mod: PBBFAC | Performed by: FAMILY MEDICINE

## 2025-07-24 RX ORDER — INSULIN DETEMIR 100 [IU]/ML
70 INJECTION, SOLUTION SUBCUTANEOUS 2 TIMES DAILY
Qty: 42 ML | Refills: 11 | Status: SHIPPED | OUTPATIENT
Start: 2025-07-24 | End: 2026-07-24

## 2025-07-24 RX ORDER — ONDANSETRON 4 MG/1
4 TABLET, FILM COATED ORAL
COMMUNITY
Start: 2025-07-16

## 2025-07-24 NOTE — PROGRESS NOTES
"Patient ID: Alfred Villarreal is a 64 y.o. male.    Chief Complaint: Nausea (Daily for 2 weeks sometimes in the morning and sometimes in the evening)    History of Present Illness    CHIEF COMPLAINT:  Alfred presents today for nausea and vomiting    HISTORY OF PRESENT ILLNESS:  He reports persistent nausea and vomiting occurring daily, either in the morning or at night. He experiences vomiting 4-5 times per day with mostly clear vomitus. He expresses uncertainty about the underlying cause of his symptoms.    DIABETES:  He reports uncontrolled blood sugar and currently administers insulin shots twice daily (morning and night). He admits to eating without dietary restrictions despite known limitations. He acknowledges potential complications such as gastroparesis.    CARDIOVASCULAR:  He experiences intermittent atrial fibrillation, going "in and out" of the rhythm. He reports blood pressure as "pretty good." He is currently without a cardiologist due to mobility limitations with wheelchair.    SURGICAL HISTORY:  He has an untreated hernia and reports difficulty scheduling repair due to scheduling conflicts and provider availability. He describes the situation as "aggravating" but states he will "deal with it."    MEDICATIONS:  He no longer takes Lyrica/pregabalin 75 mg for peripheral neuropathy due to insurance coverage issues. He is not taking Crestor/rosuvastatin for high cholesterol. He takes insulin morning and night and is currently out of albuterol inhaler. He has nausea medication which is not consistently effective.    VISION:  He reports needing new glasses as the glasses obtained last year are no longer appropriate for his vision needs.    FAMILY HISTORY:  He expresses significant concern about potential cancer risk due to family history of cancer but is unable to specify the exact type of cancer.    DIET:  He reports eating normally and consuming foods of his choice without specific dietary " restrictions.      ROS:  ROS as indicated in HPI.         Physical Exam  Constitutional:       Appearance: He is obese.      Comments: Wheelchair-bound due to right BKA in chronic left lower extremity edema with ulceration   HENT:      Head: Normocephalic and atraumatic.      Nose: Nose normal.   Eyes:      Extraocular Movements: Extraocular movements intact.      Pupils: Pupils are equal, round, and reactive to light.   Cardiovascular:      Rate and Rhythm: Normal rate and regular rhythm.      Pulses: Normal pulses.   Pulmonary:      Effort: Pulmonary effort is normal. No respiratory distress.      Breath sounds: Normal breath sounds. No wheezing or rhonchi.   Musculoskeletal:      Left lower leg: Edema present.      Comments: Right BKA  Left lower extremity with chronic ulceration, oozing and draining serosanguineous fluid.  Followed by Podiatry   Skin:     General: Skin is warm and dry.   Neurological:      General: No focal deficit present.      Mental Status: He is alert and oriented to person, place, and time.   Psychiatric:         Mood and Affect: Mood normal.         Behavior: Behavior normal.         Thought Content: Thought content normal.          Assessment & Plan    E11.51 Type 2 diabetes mellitus with diabetic peripheral angiopathy without gangrene  I48.0 Paroxysmal atrial fibrillation  K31.84 Gastroparesis  R11.10 Vomiting, unspecified  E11.42 Type 2 diabetes mellitus with diabetic polyneuropathy  K46.9 Unspecified abdominal hernia without obstruction or gangrene  K82.9 Disease of gallbladder, unspecified  E78.5 Hyperlipidemia, unspecified  H53.8 Other visual disturbances  K57.90 Diverticulosis of intestine, part unspecified, without perforation or abscess without bleeding  Z80.9 Family history of malignant neoplasm, unspecified  Z99.3 Dependence on wheelchair  Z79.4 Long term (current) use of insulin    TYPE 2 DIABETES MELLITUS:  - Monitored severely uncontrolled glucose levels, which likely  contribute to gastroparesis.  - Continued insulin therapy with self-administration every morning and evening, and renewed prescription.  - Discussed importance of improved dietary habits to better control glucose levels.  - Poor glycemic control is potentially contributing to gastroparesis and chronic nausea and vomiting.    PAROXYSMAL ATRIAL FIBRILLATION:  - Monitored intermittent episodes of atrial fibrillation.  - Recommend cardiology consultation with Dr. Christine, available at the facility, as the patient currently lacks cardiologic care.    GASTROPARESIS:  - Suspected gastroparesis secondary to poorly controlled diabetes as the etiology of GI symptoms, including chronic nausea and vomiting.    VOMITING AND NAUSEA:  - Alfred experiences nausea and emesis 4-5 times daily, occurring either in the morning or evening.  - Ordered abdominal ultrasound and blood test for Helicobacter pylori infection to investigate causes.  - Will consider endoscopy for further evaluation if gallbladder and H. pylori tests are negative.    DIABETIC POLYNEUROPATHY:  - Alfred was previously prescribed pregabalin (Lyrica) for peripheral neuropathy pain but transitioned to gabapentin due to insurance coverage limitations.    ABDOMINAL HERNIA:  - Alfred has not pursued hernia repair due to scheduling difficulties and has elected to forego further intervention despite ongoing symptoms.    GALLBLADDER DISEASE:  - Considered cholecystopathy as possible etiology of chronic nausea and vomiting, particularly with postprandial occurrence.  - Ordered abdominal ultrasound to evaluate gallbladder, kidneys, and pancreas for chronic nausea, vomiting, and abdominal pain.    HYPERLIPIDEMIA:  - Ordered fasting lipid panel for accurate cholesterol measurement.  - Noted the patient is not taking rosuvastatin for hyperlipidemia management.    VISUAL DISTURBANCES:  - Discussed the impact of uncontrolled diabetes on the patient's vision and emphasized the necessity for  "regular eye exams.  - Recommend follow-up with the patient's ophthalmologist, Dr. Arita.    DIVERTICULOSIS:  - Explained diverticulosis and diverticulitis using visual aids, describing weakened areas in the colon that can become infected.  - Discussed symptoms and potential complications of diverticular disease.    FAMILY HISTORY OF CANCER:  - Discussed the importance of identifying specific familial cancer types for targeted screening protocols.    DEPENDENCE ON WHEELCHAIR:  - Noted the patient reports mobility limitations requiring wheelchair dependence.    COLORECTAL CANCER SCREENING:  - Provided Fit Kit for at-home stool sample collection for colorectal cancer screening.         Review of patient's allergies indicates:   Allergen Reactions    Amitriptyline      Vivid dreams( bad)      Vitals:    07/24/25 1408   BP: 132/76   BP Location: Right arm   Patient Position: Sitting   Pulse: 90   Resp: 16   SpO2: 96%   Weight: (!) 149.7 kg (330 lb)   Height: 6' 6" (1.981 m)           Results for orders placed or performed in visit on 01/31/25   POCT Glucose, Hand-Held Device    Collection Time: 01/31/25 11:50 AM   Result Value Ref Range    POC Glucose 158 (A) 70 - 110 MG/DL      Plan:          Elevated glucose  -     Hemoglobin A1C; Future; Expected date: 07/24/2025    Type 2 diabetes mellitus with foot ulcer, with long-term current use of insulin  -     insulin detemir U-100, Levemir, (LEVEMIR FLEXTOUCH U100 INSULIN) 100 unit/mL (3 mL) InPn pen; Inject 70 Units into the skin 2 (two) times daily.  Dispense: 42 mL; Refill: 11  -     Hemoglobin A1c; Future; Expected date: 07/24/2025  -     Hemoglobin A1C; Future; Expected date: 07/24/2025    History of amputation below knee, right    Primary hypertension  -     CBC Auto Differential; Future; Expected date: 07/24/2025  -     Comprehensive Metabolic Panel; Future; Expected date: 07/24/2025  -     TSH; Future; Expected date: 07/24/2025    Longstanding persistent atrial " fibrillation    Hyponatremia  -     Comprehensive Metabolic Panel; Future; Expected date: 07/24/2025    Vitamin D deficiency  -     Vitamin D; Future; Expected date: 07/24/2025    Cellulitis and abscess of left leg  -     CBC Auto Differential; Future; Expected date: 07/24/2025    Encounter for lipid screening for cardiovascular disease  -     Lipid Panel; Future; Expected date: 07/24/2025    Colon cancer screening  -     Fecal Immunochemical Test (iFOBT); Future; Expected date: 07/24/2025    Prostate cancer screening  -     PSA, Screening; Future; Expected date: 07/24/2025    Nausea and vomiting, unspecified vomiting type  -     Helicobacter Pylori Ab (IgM); Future; Expected date: 07/24/2025  -     US Abdomen Complete; Future; Expected date: 07/24/2025    Routine medical exam  -     Hemoglobin A1c; Future; Expected date: 07/24/2025  -     Vitamin D; Future; Expected date: 07/24/2025  -     Vitamin B12; Future; Expected date: 07/24/2025  -     Lipid Panel; Future; Expected date: 07/24/2025  -     CBC Auto Differential; Future; Expected date: 07/24/2025  -     Comprehensive Metabolic Panel; Future; Expected date: 07/24/2025  -     Hemoglobin A1C; Future; Expected date: 07/24/2025  -     TSH; Future; Expected date: 07/24/2025  -     PSA, Screening; Future; Expected date: 07/24/2025    Right upper quadrant abdominal pain  -     US Abdomen Complete; Future; Expected date: 07/24/2025    Left lower quadrant abdominal pain  -     US Abdomen Complete; Future; Expected date: 07/24/2025      In excessive of 40 minutes total time spent for evaluation and management services. Time included elements of the following: time spent preparing to see patient, obtaining and reviewing separately obtained history, exam, evaluation, counseling and education of patient/family member or care giver, documenting in the HMR, independently interpreting results and communication of results, coordination of care ordering medications, tests, or  procedures, referral and communication to other health care professionals.    Follow up in about 6 months (around 1/24/2026), or if symptoms worsen or fail to improve.    This note was generated with the assistance of ambient listening technology. Verbal consent was obtained by the patient and accompanying visitor(s) for the recording of patient appointment to facilitate this note. I attest to having reviewed and edited the generated note for accuracy, though some syntax or spelling errors may persist. Please contact the author of this note for any clarification.

## 2025-07-29 ENCOUNTER — HOSPITAL ENCOUNTER (OUTPATIENT)
Dept: RADIOLOGY | Facility: HOSPITAL | Age: 65
Discharge: HOME | End: 2025-07-29
Attending: FAMILY MEDICINE
Payer: MEDICAID

## 2025-07-29 ENCOUNTER — RESULTS FOLLOW-UP (OUTPATIENT)
Dept: FAMILY MEDICINE | Facility: CLINIC | Age: 65
End: 2025-07-29
Payer: MEDICAID

## 2025-07-29 DIAGNOSIS — R10.32 LEFT LOWER QUADRANT ABDOMINAL PAIN: ICD-10-CM

## 2025-07-29 DIAGNOSIS — R11.2 NAUSEA AND VOMITING, UNSPECIFIED VOMITING TYPE: ICD-10-CM

## 2025-07-29 DIAGNOSIS — R10.11 RIGHT UPPER QUADRANT ABDOMINAL PAIN: ICD-10-CM

## 2025-07-29 DIAGNOSIS — E55.9 VITAMIN D DEFICIENCY: Primary | ICD-10-CM

## 2025-07-29 DIAGNOSIS — R14.0 ABDOMINAL BLOATING: Primary | ICD-10-CM

## 2025-07-29 PROCEDURE — 76700 US EXAM ABDOM COMPLETE: CPT | Mod: TC

## 2025-07-29 PROCEDURE — 76700 US EXAM ABDOM COMPLETE: CPT | Mod: 26,,, | Performed by: RADIOLOGY

## 2025-07-30 RX ORDER — ERGOCALCIFEROL 1.25 MG/1
50000 CAPSULE ORAL
Qty: 13 CAPSULE | Refills: 3 | Status: SHIPPED | OUTPATIENT
Start: 2025-07-30

## 2025-08-01 ENCOUNTER — TELEPHONE (OUTPATIENT)
Dept: FAMILY MEDICINE | Facility: CLINIC | Age: 65
End: 2025-08-01
Payer: MEDICAID

## 2025-08-01 NOTE — TELEPHONE ENCOUNTER
Copied from CRM #[id number]. Topic: General Inquiry - Test Results  >> Aug 1, 2025 11:34 AM Med Assistant Asher wrote:  Type:  Test Results    Who Called: patient   Name of Test (Lab/Mammo/Etc): labs  Date of Test: 07/29/2025  Ordering Provider: Sandy Pepe   Where the test was performed: [facility]   Would the patient rather a call back or a response via [employer]? Call back   Best Call Back Number: [phone]   Additional Information:  Please call patient to advise, thank you

## 2025-08-04 ENCOUNTER — TELEPHONE (OUTPATIENT)
Dept: FAMILY MEDICINE | Facility: CLINIC | Age: 65
End: 2025-08-04
Payer: MEDICAID

## 2025-08-04 NOTE — TELEPHONE ENCOUNTER
Copied from CRM #[id number]. Topic: General Inquiry - Patient Advice  >> Aug 4, 2025 10:26 AM Soledad wrote:  Type:  Needs Medical Advice    Who Called: pt  via [facility]?  call  Best Call Back Number: [phone]    Additional Information:  asking for call back for test results.

## 2025-08-04 NOTE — TELEPHONE ENCOUNTER
Contacted patient in regards of lab results, verified . Patient verbalizes understanding and denies any further questions or concerns.        Hemoglobin A1c slightly increased from previous but still too high at 9.2%.  Please follow a diabetic diet and take your medication as prescribed.     CMP shows a slightly low sodium, consistent with previous as well as an elevated glucose, elevated protein all consistent with previous     Lipid panel within acceptable range for your comorbidities     Your CBC shows anemia,, are you having dark stools or any signs of GI bleeding?  You may benefit from adding a multivitamin or just iron to your medication list    Your abdominal ultrasound shows an enlarged liver and possible fatty liver. You also have gallbladder sludge but no gallstones. Gallbladder sludge can contribute to chronic nausea, I can refer you to GI if desired - Denied GI referral at this time    Vitamin-D is low at 22 ng/mL please continue to take your your vitamin D supplements     Vitamin B12 is in the low-normal range at 364 ng/mL.  You may benefit from a vitamin B12 supplement which can be found at your local pharmacy

## 2025-08-21 ENCOUNTER — TELEPHONE (OUTPATIENT)
Dept: FAMILY MEDICINE | Facility: CLINIC | Age: 65
End: 2025-08-21
Payer: MEDICAID